# Patient Record
Sex: FEMALE | Race: WHITE | NOT HISPANIC OR LATINO | URBAN - METROPOLITAN AREA
[De-identification: names, ages, dates, MRNs, and addresses within clinical notes are randomized per-mention and may not be internally consistent; named-entity substitution may affect disease eponyms.]

---

## 2018-04-11 ENCOUNTER — TRANSFERRED RECORDS (OUTPATIENT)
Dept: HEALTH INFORMATION MANAGEMENT | Facility: CLINIC | Age: 33
End: 2018-04-11

## 2018-04-12 ENCOUNTER — TRANSFERRED RECORDS (OUTPATIENT)
Dept: HEALTH INFORMATION MANAGEMENT | Facility: CLINIC | Age: 33
End: 2018-04-12

## 2018-04-13 ENCOUNTER — TRANSFERRED RECORDS (OUTPATIENT)
Dept: HEALTH INFORMATION MANAGEMENT | Facility: CLINIC | Age: 33
End: 2018-04-13

## 2018-04-14 ENCOUNTER — TRANSFERRED RECORDS (OUTPATIENT)
Dept: HEALTH INFORMATION MANAGEMENT | Facility: CLINIC | Age: 33
End: 2018-04-14

## 2018-04-24 ENCOUNTER — MEDICAL CORRESPONDENCE (OUTPATIENT)
Dept: HEALTH INFORMATION MANAGEMENT | Facility: CLINIC | Age: 33
End: 2018-04-24

## 2018-04-24 ENCOUNTER — TRANSFERRED RECORDS (OUTPATIENT)
Dept: HEALTH INFORMATION MANAGEMENT | Facility: CLINIC | Age: 33
End: 2018-04-24

## 2018-04-27 ENCOUNTER — PRE VISIT (OUTPATIENT)
Dept: ONCOLOGY | Facility: CLINIC | Age: 33
End: 2018-04-27

## 2018-04-27 NOTE — TELEPHONE ENCOUNTER
Date of appointment: TBD-Needs appt with Dr. 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 Melvin Gupta/[facility]   Who called: Faxed referral    Recent Studies  Imaging: MRI brain on 4/11/18 and 4/14/18, CT CAP on 4/12/18  Pathology: [facility] done on 4/13/18  Labs: see CE recs  Previous chemo/radiation (if known):    Records requested/received from: Records in CE from [facility], Images pushed to PACS    Additional information:

## 2018-04-27 NOTE — TELEPHONE ENCOUNTER
Patient scheduled:  5/2/2018 with Dr. Borges for radiation oncology consult  5/4/2018 with Dr. Morrison for neuro-oncology consult.     No path requested - slides were read here by Dr. Wander Amaro for initial diagnosis.

## 2018-05-02 ENCOUNTER — ALLIED HEALTH/NURSE VISIT (OUTPATIENT)
Dept: RADIATION ONCOLOGY | Facility: CLINIC | Age: 33
End: 2018-05-02
Attending: RADIOLOGY
Payer: COMMERCIAL

## 2018-05-02 VITALS
BODY MASS INDEX: 41.03 KG/M2 | HEIGHT: 60 IN | SYSTOLIC BLOOD PRESSURE: 120 MMHG | WEIGHT: 209 LBS | DIASTOLIC BLOOD PRESSURE: 88 MMHG

## 2018-05-02 DIAGNOSIS — C71.6 MEDULLOBLASTOMA OF CEREBELLUM (H): Primary | ICD-10-CM

## 2018-05-02 LAB — HCG SERPL QL: NEGATIVE

## 2018-05-02 PROCEDURE — 77470 SPECIAL RADIATION TREATMENT: CPT | Performed by: RADIOLOGY

## 2018-05-02 PROCEDURE — 77334 RADIATION TREATMENT AID(S): CPT | Performed by: RADIOLOGY

## 2018-05-02 PROCEDURE — G0463 HOSPITAL OUTPT CLINIC VISIT: HCPCS | Mod: 25 | Performed by: RADIOLOGY

## 2018-05-02 PROCEDURE — 84703 CHORIONIC GONADOTROPIN ASSAY: CPT | Performed by: RADIOLOGY

## 2018-05-02 RX ORDER — LORAZEPAM 0.5 MG/1
0.5 TABLET ORAL ONCE
Status: DISCONTINUED | OUTPATIENT
Start: 2018-05-02 | End: 2018-05-02 | Stop reason: HOSPADM

## 2018-05-02 RX ORDER — SENNOSIDES 8.6 MG
1 TABLET ORAL DAILY
COMMUNITY
End: 2018-07-13

## 2018-05-02 ASSESSMENT — ENCOUNTER SYMPTOMS
SPUTUM PRODUCTION: 0
HEADACHES: 1
DYSURIA: 0
MEMORY LOSS: 0
BRUISES/BLEEDS EASILY: 0
HEMATURIA: 0
CONSTIPATION: 0
DIZZINESS: 1
NERVOUS/ANXIOUS: 1
LOSS OF CONSCIOUSNESS: 0
EYE DISCHARGE: 0
EYE PAIN: 0
FOCAL WEAKNESS: 0
DIARRHEA: 0
ABDOMINAL PAIN: 0
NAUSEA: 0
SEIZURES: 0
BLURRED VISION: 0
SORE THROAT: 0
FREQUENCY: 0
FEVER: 0
BLOOD IN STOOL: 0
WEAKNESS: 0
SPEECH CHANGE: 0
DOUBLE VISION: 0
NECK PAIN: 0
HALLUCINATIONS: 0
DIAPHORESIS: 0
TINGLING: 0
INSOMNIA: 0
STRIDOR: 0
COUGH: 0
WEIGHT LOSS: 0
MYALGIAS: 0
BACK PAIN: 0
PND: 0
EYE REDNESS: 0
DEPRESSION: 0
FALLS: 0
CLAUDICATION: 0
PHOTOPHOBIA: 0
CHILLS: 0
VOMITING: 0
POLYDIPSIA: 0
SHORTNESS OF BREATH: 0
FLANK PAIN: 0
WHEEZING: 0
HEARTBURN: 0
HEMOPTYSIS: 0
PALPITATIONS: 0
SINUS PAIN: 0
TREMORS: 0
SENSORY CHANGE: 0
ORTHOPNEA: 0

## 2018-05-02 ASSESSMENT — LIFESTYLE VARIABLES: SUBSTANCE_ABUSE: 0

## 2018-05-02 NOTE — PROGRESS NOTES
Department of Radiation Oncology  [address] [address]  [phone]       Consultation Note    Name: Merissa Silverman MRN: [id number]   : 1985   Date of Service: 2018  Referring: Dr. 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 Silverman is a 32 year old female with a recently diagnosed medulloblastoma. She originally presented to the emergency department at an outside hospital with progressive headaches, vision changes, and difficulties with balance. A CT scan of the head was concerning for a mass in the left cerebellum. The patient was subsequently transferred to [facility]. An MRI of the brain was then completed, which showed a mass in the left cerebellar hemisphere, measuring 3.5 cm. There was associated mild cerebellar tonsillar herniation and hydrocephalus. An MRI of the spine was also completed which showed no evidence of disease. She was then taken back to the OR on 2018 where she underwent a suboccipital craniotomy with resection of the left cerebellar tumor. The surgical pathology was positive for medulloblastoma. An immediate postop MRI showed postoperative changes without any evidence of residual tumor. The patient recovered without any significant difficulty and was ultimately discharged on 2018. It is unclear whether a lumbar puncture with CSF analysis was completed as part of the workup. The patient was ultimately referred to [facility] for further evaluation and treatment.    On exam today, Ms. Ji reports feeling quite well. She is no longer on steroids, having taken the last of her taper yesterday. Today she has  noted mild headaches which she attributes to fatigue. She denies having any nausea, vomiting, vision changes, difficulties with balance or coordination,  weakness/numbness/tingling, or any other neurologic symptoms. She otherwise has no additional concerns or complaints and the remainder of her ROS are negative.    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 Pertinent findings are noted in the HPI.    Physical Exam   ECOG Status: 0    Vitals:  B/P: 120/88  Weight: 89.4 kg  Pain: 2/10    Gen: Alert, in NAD  HEENT: Surgical scar healing well. No other abormalities   Pulm: No wheezing, stridor or respiratory distress  CV: Extremities are warm and well-perfused, no cyanosis, no pedal edema  Musculoskeletal: Normal bulk and tone  Skin: Normal color and turgor  Neuro: A/Ox3, CN II-XII intact. Motor 5/5 in bilateral upper and lower extremities. Bilateral patellar reflexes 2+. Normal finger-nose-finger, fine motor control, heel-shin maneuver, sensation to light touch.    Imaging/Path   Imaging/Path: Reviewed, per HPI    Assessment    Ms. Silverman is a 32 year old female with a diagnosis of medulloblastoma of the left cerebellar hemisphere, status post gross total resection. She presents today for discussion regarding treatment with adjuvant radiation therapy.    Plan   We reviewed the standard of care for  management of medulloblastoma includes maximal tumor resection followed by adjuvant therapy with a combination of chemotherapy and radiation. With regards to radiation therapy, our plan would be to treat the craniospinal axis followed by a sequential boost to the posterior fossa.     Ms. Silverman is scheduled to see Dr. Morrison with neuro-oncology later this week for a discussion regarding the role of chemotherapy in the treatment of her disease. We reviewed the process of, side effects, and potential long-term complications associated with radiation therapy to the craniospinal axis. The patient and her father both asked many questions, which were answered to their satisfaction, and verbalized understanding. Informed consent was obtained in clinic and she underwent a CT-simulation session for radiotherapy planning purposes.    Kaushal Garcia MD  Resident, Radiation Oncology      Attending addendum:   I saw and examined the patient with the resident and agree with the documented plan of care.    In brief, Ms. Silverman is a 32-year-old female with a recently-diagnosed medulloblastoma of the left cerebellum status post gross total resection. We are currently working on having her pathology and imaging reviewed here at the [facility] and will plan to discuss her case at our upcoming interdisciplinary CNS tumor board. Tentative plan is for concurrent chemoradiotherapy with craniospinal radiation followed by a sequential radiotherapy boost to the posterior fossa for improved local disease control. I discussed the risk and benefits of treatment and, following exam, Ms. Silverman underwent a CT-simulation session for radiotherapy planning purposes. I anticipate a tentative start date of her radiotherapy on 5/14/2018.    Mike Borges MD/PhD  Dept of Radiation Oncology  [facility]

## 2018-05-02 NOTE — PROGRESS NOTES
HPI  INITIAL PATIENT ASSESSMENT    Diagnosis: Medulloblastoma    Prior radiation therapy: None    Prior chemotherapy: None    Prior hormonal therapy:No    Pain Eval:  Denies    Psychosocial  Living arrangements:  and child  Fall Risk: independent   referral needs: Not needed    Advanced Directive: No  Implantable Cardiac Device? No        Nurse face-to-face time: Level 5:  over 15 min face to face time    Review of Systems   Constitutional: Negative for chills, diaphoresis, fever, malaise/fatigue and weight loss.   HENT: Negative for congestion, ear discharge, ear pain, hearing loss, nosebleeds, sinus pain, sore throat and tinnitus.    Eyes: Negative for blurred vision, double vision, photophobia, pain, discharge and redness.   Respiratory: Negative for cough, hemoptysis, sputum production, shortness of breath, wheezing and stridor.    Cardiovascular: Negative for chest pain, palpitations, orthopnea, claudication, leg swelling and PND.   Gastrointestinal: Negative for abdominal pain, blood in stool, constipation, diarrhea, heartburn, melena, nausea and vomiting.   Genitourinary: Negative for dysuria, flank pain, frequency, hematuria and urgency.   Musculoskeletal: Negative for back pain, falls, joint pain, myalgias and neck pain.   Skin: Negative for itching and rash.   Neurological: Positive for dizziness and headaches. Negative for tingling, tremors, sensory change, speech change, focal weakness, seizures, loss of consciousness and weakness.   Endo/Heme/Allergies: Positive for environmental allergies. Negative for polydipsia. Does not bruise/bleed easily.   Psychiatric/Behavioral: Negative for depression, hallucinations, memory loss, substance abuse and suicidal ideas. The patient is nervous/anxious. The patient does not have insomnia.

## 2018-05-02 NOTE — PROGRESS NOTES
Radiation Therapy Patient Education    Person involved with teaching: Patient    Patient educational needs for self management of treatment-related side effects assessment completed.  [facility] Patient Ed tab contains Patient Learning Assessment    Education Materials Given  Radiation Therapy and You    Educational Topics Discussed  Side effects expected    Response To Teaching  Verbalizes understanding    GYN Only  Vaginal Dilator-given and educated: N/A    Referrals sent: None    Chemotherapy?  Yes: Notified medical oncology of start date of 05/14

## 2018-05-02 NOTE — LETTER
5/2/2018      RE: Merissa Silverman  [address]    [place] [address]         HPI  INITIAL PATIENT ASSESSMENT    Diagnosis: Medulloblastoma    Prior radiation therapy: None    Prior chemotherapy: None    Prior hormonal therapy:No    Pain Eval:  Denies    Psychosocial  Living arrangements:  and child  Fall Risk: independent   referral needs: Not needed    Advanced Directive: No  Implantable Cardiac Device? No        Nurse face-to-face time: Level 5:  over 15 min face to face time    Review of Systems   Constitutional: Negative for chills, diaphoresis, fever, malaise/fatigue and weight loss.   HENT: Negative for congestion, ear discharge, ear pain, hearing loss, nosebleeds, sinus pain, sore throat and tinnitus.    Eyes: Negative for blurred vision, double vision, photophobia, pain, discharge and redness.   Respiratory: Negative for cough, hemoptysis, sputum production, shortness of breath, wheezing and stridor.    Cardiovascular: Negative for chest pain, palpitations, orthopnea, claudication, leg swelling and PND.   Gastrointestinal: Negative for abdominal pain, blood in stool, constipation, diarrhea, heartburn, melena, nausea and vomiting.   Genitourinary: Negative for dysuria, flank pain, frequency, hematuria and urgency.   Musculoskeletal: Negative for back pain, falls, joint pain, myalgias and neck pain.   Skin: Negative for itching and rash.   Neurological: Positive for dizziness and headaches. Negative for tingling, tremors, sensory change, speech change, focal weakness, seizures, loss of consciousness and weakness.   Endo/Heme/Allergies: Positive for environmental allergies. Negative for polydipsia. Does not bruise/bleed easily.   Psychiatric/Behavioral: Negative for depression, hallucinations, memory loss, substance abuse and suicidal ideas. The patient is nervous/anxious. The patient does not have insomnia.                     Department of Radiation Oncology  [facility]  [facility]  [address] [place] [address]  [phone]       Consultation Note    Name: Merissa Silverman MRN: [id number]   : 1985   Date of Service: 2018  Referring: Dr. 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 Silverman is a 32 year old female with a recently diagnosed medulloblastoma. She originally presented to the emergency department at an outside hospital with progressive headaches, vision changes, and difficulties with balance. A CT scan of the head was concerning for a mass in the left cerebellum. The patient was subsequently transferred to [facility]. An MRI of the brain was then completed, which showed a mass in the left cerebellar hemisphere, measuring 3.5 cm. There was associated mild cerebellar tonsillar herniation and hydrocephalus. An MRI of the spine was also completed which showed no evidence of disease. She was then taken back to the OR on 2018 where she underwent a suboccipital craniotomy with resection of the left cerebellar tumor. The surgical pathology was positive for medulloblastoma. An immediate postop MRI showed postoperative changes without any evidence of residual tumor. The patient recovered without any significant difficulty and was ultimately discharged on 2018. It is unclear whether a lumbar puncture with CSF analysis was completed as part of the workup. The patient was ultimately referred to [facility] for further evaluation and treatment.    On exam today, Ms. Ji reports feeling quite well. She is no longer on steroids, having taken the last of her taper yesterday. Today she has  noted mild headaches which she attributes to fatigue. She denies having any nausea, vomiting, vision changes, difficulties with balance or coordination, weakness/numbness/tingling, or any other neurologic  symptoms. She otherwise has no additional concerns or complaints and the remainder of her ROS are negative.    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 Pertinent findings are noted in the HPI.    Physical Exam   ECOG Status: 0    Vitals:  B/P: 120/88  Weight: 89.4 kg  Pain: 2/10    Gen: Alert, in NAD  HEENT: Surgical scar healing well. No other abormalities   Pulm: No wheezing, stridor or respiratory distress  CV: Extremities are warm and well-perfused, no cyanosis, no pedal edema  Musculoskeletal: Normal bulk and tone  Skin: Normal color and turgor  Neuro: A/Ox3, CN II-XII intact. Motor 5/5 in bilateral upper and lower extremities. Bilateral patellar reflexes 2+. Normal finger-nose-finger, fine motor control, heel-shin maneuver, sensation to light touch.    Imaging/Path   Imaging/Path: Reviewed, per HPI    Assessment    Ms. Silverman is a 32 year old female with a diagnosis of medulloblastoma of the left cerebellar hemisphere, status post gross total resection. She presents today for discussion regarding treatment with adjuvant radiation therapy.    Plan   We reviewed the standard of care for management of medulloblastoma includes maximal tumor  resection followed by adjuvant therapy with a combination of chemotherapy and radiation. With regards to radiation therapy, our plan would be to treat the craniospinal axis followed by a sequential boost to the posterior fossa.     Ms. Silverman is scheduled to see Dr. Morrison with neuro-oncology later this week for a discussion regarding the role of chemotherapy in the treatment of her disease. We reviewed the process of, side effects, and potential long-term complications associated with radiation therapy to the craniospinal axis. The patient and her father both asked many questions, which were answered to their satisfaction, and verbalized understanding. Informed consent was obtained in clinic and she underwent a CT-simulation session for radiotherapy planning purposes.    Kaushal Garcia MD  Resident, Radiation Oncology      Attending addendum:   I saw and examined the patient with the resident and agree with the documented plan of care.    In brief, Ms. Silverman is a 32-year-old female with a recently-diagnosed medulloblastoma of the left cerebellum status post gross total resection. We are currently working on having her pathology and imaging reviewed here at the [facility] and will plan to discuss her case at our upcoming interdisciplinary CNS tumor board. Tentative plan is for concurrent chemoradiotherapy with craniospinal radiation followed by a sequential radiotherapy boost to the posterior fossa for improved local disease control. I discussed the risk and benefits of treatment and, following exam, Ms. Silverman underwent a CT-simulation session for radiotherapy planning purposes. I anticipate a tentative start date of her radiotherapy on 5/14/2018.    Mike Borges MD/PhD  Dept of Radiation Oncology  [facility]

## 2018-05-02 NOTE — MR AVS SNAPSHOT
After Visit Summary   2018    Merissa Silverman    MRN: [id number]           Patient Information     Date Of Birth          1985        Visit Information        Provider Department      2018 1:00 PM Mike Borges MD Radiation Oncology Clinic        Today's Diagnoses     Medulloblastoma of cerebellum (H)    -  1       Follow-ups after your visit        Your next 10 appointments already scheduled     May 04, 2018 11:00 AM CDT   (Arrive by 10:45 AM)   New Patient Visit with Anupama Morrison MD   [facility] ([facility])    [address]  [address]  [place] [address] [phone]              Who to contact     Please call your clinic at [phone] to:    Ask questions about your health    Make or cancel appointments    Discuss your medicines    Learn about your test results    Speak to your doctor            Additional Information About Your Visit        MyChart Information     Pradamat is an electronic gateway that provides easy, online access to your medical records. With Dashbid, you can request a clinic appointment, read your test results, renew a prescription or communicate with your care team.     To sign up for Pradamat visit the website at [email or web address]   You will be asked to enter the access code listed below, as well as some personal information. Please follow the directions to create your username and password.     Your access code is: [email or web address]  Expires: 2018  6:30 AM     Your access code will  in 90 days. If you need help or a new code, please contact your [facility] or call [phone] for assistance.        Care EveryWhere ID     This is your Care EveryWhere ID. This could be used by other organizations to access your [facility] medical records  [id number]         Blood Pressure from Last 3 Encounters:   18 120/88    Weight from  Last 3 Encounters:   05/02/18 94.8 kg (209 lb)              We Performed the Following     HCG qualitative        Primary Care Provider Fax #    Physician No Ref-Primary [phone]       No address on file        Equal Access to Services     WANDER STREETLILIANE : Lisa handy thomas jimmy Beck, walarryda luqadaha, qaangelta kaalmada yumiko, toño nj laBccj bowman. So [facility] [phone].    ATENCIÓN: Si habla español, tiene a mccurdy disposición servicios gratuitos de asistencia lingüística. Llame al [phone].    We comply with applicable federal civil rights laws and [place] laws. We do not discriminate on the basis of race, color, national origin, age, disability, sex, sexual orientation, or gender identity.            Thank you!     Thank you for choosing RADIATION ONCOLOGY CLINIC  for your care. Our goal is always to provide you with excellent care. Hearing back from our patients is one way we can continue to improve our services. Please take a few minutes to complete the written survey that you may receive in the mail after your visit with us. Thank you!             Your Updated Medication List - Protect others around you: Learn how to safely use, store and throw away your medicines at www.disposemymeds.org.          This list is accurate as of 5/2/18  3:21 PM.  Always use your most recent med list.                   Brand Name Dispense Instructions for use Diagnosis    DECADRON PO           OXYCODONE HCL PO      Take 5 mg by mouth        PROTONIX PO      Take 40 mg by mouth        sennosides 8.6 MG tablet    SENOKOT     Take 1 tablet by mouth daily        SODIUM CHLORIDE PO      Take 1 g by mouth 3 times daily (with meals)

## 2018-05-02 NOTE — MR AVS SNAPSHOT
After Visit Summary   2018    Merissa Silverman    MRN: [id number]           Patient Information     Date Of Birth          1985        Visit Information        Provider Department      2018 10:30 AM Mike Borges MD [facility]        Today's Diagnoses     Medulloblastoma of cerebellum (H)    -  1       Follow-ups after your visit        Your next 10 appointments already scheduled     May 04, 2018 11:00 AM CDT   (Arrive by 10:45 AM)   New Patient Visit with Anupama Morrison MD   [facility] ([facility])    [address]  [address]  [place] [address] [phone]              Who to contact     Please call your clinic at [phone] to:    Ask questions about your health    Make or cancel appointments    Discuss your medicines    Learn about your test results    Speak to your doctor            Additional Information About Your Visit        MyChart Information     Lanicat is an electronic gateway that provides easy, online access to your medical records. With [email or web address], you can request a clinic appointment, read your test results, renew a prescription or communicate with your care team.     To sign up for Lanicat visit the website at [email or web address]   You will be asked to enter the access code listed below, as well as some personal information. Please follow the directions to create your username and password.     Your access code is: [email or web address]  Expires: 2018  6:30 AM     Your access code will  in 90 days. If you need help or a new code, please contact your [facility] or call [phone] for assistance.        Care EveryWhere ID     This is your Care EveryWhere ID. This could be used by other organizations to access your [facility] medical records  [id number]        Your Vitals Were     Height BMI (Body Mass Index)                1.524 m  (5') 40.82 kg/m2           Blood Pressure from Last 3 Encounters:   05/02/18 120/88    Weight from Last 3 Encounters:   05/02/18 94.8 kg (209 lb)              Today, you had the following     No orders found for display      Information about OPIOIDS     PRESCRIPTION OPIOIDS: WHAT YOU NEED TO KNOW   You have a prescription for an opioid (narcotic) pain medicine. Opioids can cause addiction. If you have a history of chemical dependency of any type, you are at a higher risk of becoming addicted to opioids. Only take this medicine after all other options have been tried. Take it for as short a time and as few doses as possible.     Do not:    Drive. If you drive while taking these medicines, you could be arrested for driving under the influence (DUI).    Operate heavy machinery    Do any other dangerous activities while taking these medicines.     Drink any alcohol while taking these medicines.      Take with any other medicines that contain acetaminophen. Read all labels carefully. Look for the word  acetaminophen  or  Tylenol.  Ask your pharmacist if you have questions or are unsure.    Store your pills in a secure place, locked if possible. We will not replace any lost or stolen medicine. If you don t finish your medicine, please throw away (dispose) as directed by your pharmacist. The [place] Pollution Control Agency has more information about safe disposal: https://www.pca.[facility].[place].us/living-green/managing-unwanted-medications    All opioids tend to cause constipation. Drink plenty of water and eat foods that have a lot of fiber, such as fruits, vegetables, prune juice, apple juice and high-fiber cereal. Take a laxative (Miralax, milk of magnesia, Colace, Senna) if you don t move your bowels at least every other day.          Primary Care Provider Fax #    Physician No Ref-Primary [phone]       No address on file        Equal Access to Services     WANDER SPAULDING: perry Reynolds  cherelle vareladeshauntu peacocktoño cisneros bryantrola bowman. So [facility] [phone].    ATENCIÓN: Si livia cox, tiene a mccurdy disposición servicios gratuitos de asistencia lingüística. Crissame al [phone].    We comply with applicable federal civil rights laws and [place] laws. We do not discriminate on the basis of race, color, national origin, age, disability, sex, sexual orientation, or gender identity.            Thank you!     Thank you for choosing [facility]  for your care. Our goal is always to provide you with excellent care. Hearing back from our patients is one way we can continue to improve our services. Please take a few minutes to complete the written survey that you may receive in the mail after your visit with us. Thank you!             Your Updated Medication List - Protect others around you: Learn how to safely use, store and throw away your medicines at www.disposemymeds.org.          This list is accurate as of 5/2/18  3:31 PM.  Always use your most recent med list.                   Brand Name Dispense Instructions for use Diagnosis    DECADRON PO           OXYCODONE HCL PO      Take 5 mg by mouth        PROTONIX PO      Take 40 mg by mouth        sennosides 8.6 MG tablet    SENOKOT     Take 1 tablet by mouth daily        SODIUM CHLORIDE PO      Take 1 g by mouth 3 times daily (with meals)

## 2018-05-04 ENCOUNTER — ONCOLOGY VISIT (OUTPATIENT)
Dept: ONCOLOGY | Facility: CLINIC | Age: 33
End: 2018-05-04
Attending: PSYCHIATRY & NEUROLOGY
Payer: COMMERCIAL

## 2018-05-04 ENCOUNTER — CARE COORDINATION (OUTPATIENT)
Dept: ONCOLOGY | Facility: CLINIC | Age: 33
End: 2018-05-04

## 2018-05-04 ENCOUNTER — OFFICE VISIT (OUTPATIENT)
Dept: ONCOLOGY | Facility: CLINIC | Age: 33
End: 2018-05-04
Attending: PHYSICIAN ASSISTANT
Payer: COMMERCIAL

## 2018-05-04 VITALS
TEMPERATURE: 97.9 F | WEIGHT: 210.9 LBS | RESPIRATION RATE: 18 BRPM | DIASTOLIC BLOOD PRESSURE: 86 MMHG | HEART RATE: 102 BPM | HEIGHT: 60 IN | BODY MASS INDEX: 41.4 KG/M2 | OXYGEN SATURATION: 99 % | SYSTOLIC BLOOD PRESSURE: 129 MMHG

## 2018-05-04 DIAGNOSIS — D70.1 CHEMOTHERAPY INDUCED NEUTROPENIA (H): ICD-10-CM

## 2018-05-04 DIAGNOSIS — C71.6 MEDULLOBLASTOMA OF CEREBELLUM (H): Primary | ICD-10-CM

## 2018-05-04 DIAGNOSIS — R11.2 CHEMOTHERAPY-INDUCED NAUSEA AND VOMITING: ICD-10-CM

## 2018-05-04 DIAGNOSIS — Z51.11 CHEMOTHERAPY MANAGEMENT, ENCOUNTER FOR: ICD-10-CM

## 2018-05-04 DIAGNOSIS — T45.1X5A CHEMOTHERAPY INDUCED NEUTROPENIA (H): ICD-10-CM

## 2018-05-04 DIAGNOSIS — C71.6 MEDULLOBLASTOMA (H): Primary | ICD-10-CM

## 2018-05-04 DIAGNOSIS — T45.1X5A CHEMOTHERAPY-INDUCED NAUSEA AND VOMITING: ICD-10-CM

## 2018-05-04 DIAGNOSIS — H93.8X9 OTOTOXICITY, UNSPECIFIED LATERALITY: ICD-10-CM

## 2018-05-04 DIAGNOSIS — Z91.89 HIGH RISK FOR CHEMOTHERAPY-INDUCED INFECTIOUS COMPLICATION: ICD-10-CM

## 2018-05-04 LAB
ALBUMIN SERPL-MCNC: 3.2 G/DL (ref 3.4–5)
ALP SERPL-CCNC: 86 U/L (ref 40–150)
ALT SERPL W P-5'-P-CCNC: 43 U/L (ref 0–50)
ANION GAP SERPL CALCULATED.3IONS-SCNC: 8 MMOL/L (ref 3–14)
APPEARANCE CSF: CLEAR
AST SERPL W P-5'-P-CCNC: 23 U/L (ref 0–45)
BASOPHILS # BLD AUTO: 0 10E9/L (ref 0–0.2)
BASOPHILS NFR BLD AUTO: 0.2 %
BILIRUB SERPL-MCNC: 0.2 MG/DL (ref 0.2–1.3)
BUN SERPL-MCNC: 24 MG/DL (ref 7–30)
CALCIUM SERPL-MCNC: 8.4 MG/DL (ref 8.5–10.1)
CHLORIDE SERPL-SCNC: 106 MMOL/L (ref 94–109)
CO2 SERPL-SCNC: 27 MMOL/L (ref 20–32)
COLOR CSF: COLORLESS
COPATH REPORT: NORMAL
CREAT SERPL-MCNC: 0.56 MG/DL (ref 0.52–1.04)
DIFFERENTIAL METHOD BLD: ABNORMAL
EOSINOPHIL # BLD AUTO: 0.2 10E9/L (ref 0–0.7)
EOSINOPHIL NFR BLD AUTO: 2.1 %
ERYTHROCYTE [DISTWIDTH] IN BLOOD BY AUTOMATED COUNT: 15.1 % (ref 10–15)
GFR SERPL CREATININE-BSD FRML MDRD: >90 ML/MIN/1.7M2
GLUCOSE CSF-MCNC: 62 MG/DL (ref 40–70)
GLUCOSE SERPL-MCNC: 95 MG/DL (ref 70–99)
HBV CORE AB SERPL QL IA: NONREACTIVE
HBV SURFACE AG SERPL QL IA: NONREACTIVE
HCT VFR BLD AUTO: 37.8 % (ref 35–47)
HGB BLD-MCNC: 12.2 G/DL (ref 11.7–15.7)
IMM GRANULOCYTES # BLD: 0.1 10E9/L (ref 0–0.4)
IMM GRANULOCYTES NFR BLD: 1 %
LYMPHOCYTES # BLD AUTO: 0.8 10E9/L (ref 0.8–5.3)
LYMPHOCYTES NFR BLD AUTO: 10.3 %
MCH RBC QN AUTO: 30.6 PG (ref 26.5–33)
MCHC RBC AUTO-ENTMCNC: 32.3 G/DL (ref 31.5–36.5)
MCV RBC AUTO: 95 FL (ref 78–100)
MONOCYTES # BLD AUTO: 0.5 10E9/L (ref 0–1.3)
MONOCYTES NFR BLD AUTO: 6.5 %
NEUTROPHILS # BLD AUTO: 6.5 10E9/L (ref 1.6–8.3)
NEUTROPHILS NFR BLD AUTO: 79.9 %
NRBC # BLD AUTO: 0 10*3/UL
NRBC BLD AUTO-RTO: 0 /100
PLATELET # BLD AUTO: 227 10E9/L (ref 150–450)
POTASSIUM SERPL-SCNC: 4.3 MMOL/L (ref 3.4–5.3)
PROT CSF-MCNC: 25 MG/DL (ref 15–60)
PROT SERPL-MCNC: 6.7 G/DL (ref 6.8–8.8)
RBC # BLD AUTO: 3.99 10E12/L (ref 3.8–5.2)
RBC # CSF MANUAL: 1 /UL (ref 0–2)
SODIUM SERPL-SCNC: 141 MMOL/L (ref 133–144)
TUBE # CSF: 3 #
WBC # BLD AUTO: 8.2 10E9/L (ref 4–11)
WBC # CSF MANUAL: 1 /UL (ref 0–5)

## 2018-05-04 PROCEDURE — 86704 HEP B CORE ANTIBODY TOTAL: CPT | Performed by: PSYCHIATRY & NEUROLOGY

## 2018-05-04 PROCEDURE — 84157 ASSAY OF PROTEIN OTHER: CPT | Performed by: PSYCHIATRY & NEUROLOGY

## 2018-05-04 PROCEDURE — 82945 GLUCOSE OTHER FLUID: CPT | Performed by: PSYCHIATRY & NEUROLOGY

## 2018-05-04 PROCEDURE — 99215 OFFICE O/P EST HI 40 MIN: CPT | Mod: 24 | Performed by: PSYCHIATRY & NEUROLOGY

## 2018-05-04 PROCEDURE — 00000102 ZZHCL STATISTIC CYTO WRIGHT STAIN TC: Performed by: PSYCHIATRY & NEUROLOGY

## 2018-05-04 PROCEDURE — 36415 COLL VENOUS BLD VENIPUNCTURE: CPT

## 2018-05-04 PROCEDURE — G0463 HOSPITAL OUTPT CLINIC VISIT: HCPCS | Mod: ZF

## 2018-05-04 PROCEDURE — 25000125 ZZHC RX 250: Mod: ZF | Performed by: PHYSICIAN ASSISTANT

## 2018-05-04 PROCEDURE — 85025 COMPLETE CBC W/AUTO DIFF WBC: CPT | Performed by: PSYCHIATRY & NEUROLOGY

## 2018-05-04 PROCEDURE — 62270 DX LMBR SPI PNXR: CPT | Mod: ZF | Performed by: PHYSICIAN ASSISTANT

## 2018-05-04 PROCEDURE — 80053 COMPREHEN METABOLIC PANEL: CPT | Performed by: PSYCHIATRY & NEUROLOGY

## 2018-05-04 PROCEDURE — 87340 HEPATITIS B SURFACE AG IA: CPT | Performed by: PSYCHIATRY & NEUROLOGY

## 2018-05-04 PROCEDURE — 88108 CYTOPATH CONCENTRATE TECH: CPT | Performed by: PSYCHIATRY & NEUROLOGY

## 2018-05-04 PROCEDURE — G0463 HOSPITAL OUTPT CLINIC VISIT: HCPCS | Mod: 25

## 2018-05-04 PROCEDURE — 89050 BODY FLUID CELL COUNT: CPT | Performed by: PSYCHIATRY & NEUROLOGY

## 2018-05-04 RX ORDER — ACETAMINOPHEN 325 MG/1
650 TABLET ORAL
COMMUNITY
Start: 2018-04-15 | End: 2020-05-18

## 2018-05-04 RX ORDER — LIDOCAINE HYDROCHLORIDE 10 MG/ML
4 INJECTION, SOLUTION EPIDURAL; INFILTRATION; INTRACAUDAL; PERINEURAL ONCE
Status: COMPLETED | OUTPATIENT
Start: 2018-05-04 | End: 2018-05-04

## 2018-05-04 RX ORDER — LIDOCAINE HYDROCHLORIDE 10 MG/ML
6 INJECTION, SOLUTION EPIDURAL; INFILTRATION; INTRACAUDAL; PERINEURAL ONCE
Status: COMPLETED | OUTPATIENT
Start: 2018-05-04 | End: 2018-05-04

## 2018-05-04 RX ADMIN — LIDOCAINE HYDROCHLORIDE 4 ML: 10 INJECTION, SOLUTION EPIDURAL; INFILTRATION; INTRACAUDAL; PERINEURAL at 12:47

## 2018-05-04 RX ADMIN — LIDOCAINE HYDROCHLORIDE 6 ML: 10 INJECTION, SOLUTION EPIDURAL; INFILTRATION; INTRACAUDAL; PERINEURAL at 13:07

## 2018-05-04 ASSESSMENT — PAIN SCALES - GENERAL: PAINLEVEL: NO PAIN (0)

## 2018-05-04 NOTE — LETTER
5/4/2018      RE: Merissa Silverman  [address]    [place] [address]       BMT ONC Adult Lumbar Puncture Procedure Note    May 4, 2018    Procedure: Lumbar Puncture to obtain CSF     Diagnosis: Medulloblastoma    Learning needs assessment complete within 12 months: YES    Vitals reviewed:  YES    Informed Consent: Procedure, benefits, risks, and alternatives explained to the patient who verbalized understanding of the information and agreed to proceed with lumbar puncture. Risks include bleeding, headache, and or infection.  Patient safety checklist completed.    Labs: Reviewed:      Lab Results   Component Value Date     05/04/2018       Description:. The patient was seated at the bedside with knees dangling. The L4-5 disc space was prepped and draped in a sterile fashion. Local anesthesia with 5 mL 1% preservative-free lidocaine was used. A 22 gauge, 4 inch needle was placed on the second attempt.  11 mL spinal fluid collected. Specimen appears clear. Specimen sent for cell count and differential, cytology, protein and glucose.  The needle was removed and a bandage was applied to the site.  Patient tolerated with mild difficulty--she developed headache during procedure which improved after lying flat.    Post-procedure pain assessment: 0 out of 10 on the numeric pain rating scale  Interventions: No    Complications: No     Disposition: Patient will remain on back for 30 minutes post procedure. Avoid soaking in a tub tonight.  Call if develops headaches, fevers and or chills.     Performed by:   JOSITN Loera

## 2018-05-04 NOTE — NURSING NOTE
Oncology Rooming Note    May 4, 2018 12:44 PM   Merissa Silverman is a 32 year old female who presents for:    Chief Complaint   Patient presents with     Oncology Clinic Visit     Lumbar Puncture     Initial Vitals: LMP 04/18/2018 (Approximate) Estimated body mass index is 41.19 kg/(m^2) as calculated from the following:    Height as of an earlier encounter on 5/4/18: 1.524 m (5').    Weight as of an earlier encounter on 5/4/18: 95.7 kg (210 lb 14.4 oz). There is no height or weight on file to calculate BSA.  Data Unavailable Comment: Data Unavailable   Patient's last menstrual period was 04/18/2018 (approximate).  Allergies reviewed: Yes  Medications reviewed: Yes    Medications: Medication refills not needed today.  Pharmacy name entered into EPIC: Data Unavailable    Clinical concerns: No New Concerns    5 minutes for nursing intake (face to face time)     PINKY Floyd

## 2018-05-04 NOTE — LETTER
5/4/2018       RE: Merissa Silverman  [address]    [place] [id number]     Dear Colleague,    Thank you for referring your patient, Merissa Silverman, to the [facility]. Please see a copy of my visit note below.    NEURO-ONCOLOGY INITIAL VISIT  May 4, 2018    CHIEF COMPLAINT: Ms. 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 She is presenting to this initial clinic visit as referred by Dr. Gupta, medical oncology at [facility] for evaluation and recommendations on treatment.     Accompanying her to this visit is Tito ().       HISTORY OF PRESENT ILLNESS  A summary of the patient s oncologic history is as follows;   -PRESENTATION: Progressively worsening headaches. Additionally was with abrupt position changes resulted in dizziness/ syncope. CTH at an outside hospital showed a mass in the left cerebellum. Transferred to [facility].  -4/11/2018 MRB showed a 3.5cm mass in the left cerebellar hemisphere that was associated with mild cerebellar tonsillar herniation and hydrocephalus.  -MRI spine showed no evidence of disease.   -4/13/2018 SURGERY: Suboccipital craniotomy with resection of the left cerebellar mass. Immediate post-operative MRI demonstrated a gross total resection.   PATHOLOGY: Medulloblastoma; Molecular markers pending.   -5/4/2018 NEURO-ONC: LP performed. Evaluated by radiation oncology. Recommending craniospinal radiation + concurrent vincristine followed by eight 6-week cycles of cisplatin, lomustine, and vincristine.  -5/4/2018 LP with CSF analysis: WBC 1/ RBC 1, protein 25, glucose 62, negative cytology.   -5/14 - 6/25/2018 CHEMORADS with vincristine 2 mg/m2.    Today in clinic;   -Merissa is feeling well, recovered from surgery.   -Off steroids, but  experiencing some joint discomfort. No interest is restarting steroids with a slower/ more prolonged taper.   -Infrequent mild headaches, continue to improve.   -Fatigue, but energy is improving with improved sleep now that she is off dexamethasone.   -Denies nausea, vomiting, vision changes, difficulties with balance or coordination, weakness, numbness, or any other neurologic symptoms.   -No additional concerns or complaints.  -Denies any episodes concerning for a seizure, including unexplained episodes of loss of consciousness, unexplained falls, unexplained loss of bowel/ bladder control or tongue biting, unexplained bruising/ myalgia, periods of loss of time, or witnessed shaking/ jerking movements.   -Fertility preservation was discussed and Merissa is not interested.     REVIEW OF SYSTEMS  A comprehensive ROS negative except as in HPI.      MEDICATIONS   Current Outpatient Prescriptions   Medication Sig Dispense Refill     acetaminophen (TYLENOL) 325 MG tablet Take 650 mg by mouth       sennosides (SENOKOT) 8.6 MG tablet Take 1 tablet by mouth daily       DRUG ALLERGIES   Allergies   Allergen Reactions     Sulfa Drugs Unknown     PAST MEDICAL HISTORY   Past Medical History:   Diagnosis Date     Medulloblastoma (H)      PAST SURGICAL HISTORY   Past Surgical History:   Procedure Laterality Date     CRANIOTOMY  04/13/2018     SOCIAL HISTORY   History   Smoking Status     Current Every Day Smoker     Types: Other   Smokeless Tobacco     Never Used     Comment: ECigs      Alcohol use No    History   Drug Use No   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    FAMILY HISTORY   Family History   Problem Relation Age of Onset     Colon Cancer Paternal Grandmother      Colon Cancer Paternal Grandfather        PHYSICAL EXAMINATION  /86 (BP Location: Right arm, Patient Position: Sitting, Cuff Size: Adult Regular)  Pulse 102  Temp 97.9  F (36.6  C) (Tympanic)  Resp 18  Ht 1.524 m (5')   Wt 95.7 kg (210 lb 14.4 oz)  LMP 04/18/2018 (Approximate)  SpO2 99%  BMI 41.19 kg/m2  Wt Readings from Last 2 Encounters:   05/04/18 95.7 kg (210 lb 14.4 oz)   05/02/18 94.8 kg (209 lb)    Ht Readings from Last 2 Encounters:   05/04/18 1.524 m (5')   05/02/18 1.524 m (5')     KPS: 100    -Generally well appearing.  -Throat: No oral thrush.  -Respiratory: Normal breath sounds, no audible wheezing.   -Skin: No rashes. Healing head incision.  -Hematologic/ lymphatic: No abnormal bruising. Mild leg swelling.  -Psychiatric: Normal mood and affect. Pleasant, talkative.  -Neurologic:   MENTAL STATUS:     Alert, oriented to date.    Recall: Immediate 3/3, delayed 3/3.    Speech fluent. Comprehension intact to multi-step commands.   Normal naming, repetition. Able to read.   Good right-left orientation.     CRANIAL NERVES:     Discs flat on fundoscopy.    Pupils are equal, round, reactive to light.     Extraocular movements full, patient denies diplopia. Mild eye jerks noted on pursuit.    Visual fields full.     Facial sensation intact to light touch.   Symmetric facial movements.   Hearing intact.   Palate moves symmetrically.     Sternocleidomastoid and trapezius strength intact.    Tongue midline.  MOTOR:    Normal and symmetric tone.   Grossly 5/5 throughout.    No pronation or drift. No orbiting.   Able to rise from a chair without use of arms.   On toe/ heel walk, equal distance from floor to heels/ toes.   SENSATION:    Intact to light touch throughout.  COORDINATION:   Intact finger-nose with eyes open and closed, mild impairment on the left.   REFLEXES:    Brisk, but symmetric.    Toes not tested. No clonus. No Hoffmans.   No grasp.    GAIT:  Walks without assistance.   Good speed. Normal stride length and heel strike. Normal turns. Normal arm swing.   Able to toe, heel walk. Able to tandem walk.       MEDICAL RECORDS  Obtained and personally reviewed all available outside medical records in addition to  reviewing any records available in our electronic system.     LABS  Personally reviewed all available lab results.     IMAGING  Personally reviewed pre- and post-operative MR brain imaging from last month. To my eye, there was a gross total resection of the contrast enhancing tumor in the cerebellum.    Imaging and case reviewed and discussed at Brain Tumor Conference.       IMPRESSION  For the 60 minute appointment, more than 50% of the encounter was spent discussing in detail the nature of this tumor, providing emotional support, answering questions pertaining to my recommendations, and devising the treatment plan as outlined below.    While medulloblastomas are the most common malignant brain tumor of childhood, this tumor is rare in adults. As a result, information specific to the adult patient population with medulloblastoma is limited and much is extrapolated from data on children.     Spine MR imaging and a lumbar puncture for CSF analysis are performed on all patients as part of initial work-up to determine the extent of disease. Even with no evidence of cancer spread on spinal imaging, this does not eliminate the concern for metastatic disease. Given that nearly 30% that have metastatic disease at time of diagnosis, will arrange for a lumbar puncture to analysis CSF for neoplastic cells, elevated protein, and pleocytosis. While negative testing does not exclude advanced disease, positive CSF results serve as a gauge to predict a likely higher rate of relapse and worse overall outcome. ADDENDUM: CSF negative for malignant cells.     In terms of prognosis, using the modified Irizarry criteria, the tumor is a T2, M0. Per the literature, patients without metastatic disease (M0) had a significantly higher five-year progression-free survival rate compared to those with disease spread. However, older patients tend to have a worse outcome. Tumors that show activation of the WNT pathway have the best prognosis, whereas  tumors with amplification of the MYC kenya-oncogene or  group 3  have the worst prognosis. Merissa's tumor did not have nuclear beta-catenin staining, ruling out a WNT-activated mechanism. At this point, molecular testing is still pending, but with a unilateral lesion in this adult age group, a SHH subgroup should be considered until proven otherwise. Tumors with activation of the SHH pathway and those in group 4 have an intermediate prognosis, with the exception of SHH tumors containing TP53 mutations, which are associated with a particularly poor prognosis. As stated, these studies are pending and will submit pathology for next generation sequencing via [employer].    With regard to cancer-directed therapy, a multimodal treatment approach first involves attempting a gross total surgical resection, which was performed in this case. As medulloblastoma is rare in adults, following surgery, there are no randomized studies upon which to base treatment recommendations and such recommendations are based on the literature, which mainly details retrospective case reviews, but some prospective data.     Of the prospective data, results detail that patients with standard risk medulloblastoma appear to do better with adjuvant chemotherapy. Will initiate weekly vincristine during radiation followed by up to eight cycles of maintenance chemotherapy with cisplatin, lomustine, and vincristine. Of note, this regimen is highly toxic and in clinical studies, treatment was terminated or dose reduced due to side effects in nearly 60 percent of patients by cycle four.    Finally, there is a clinical trial options for recurrent disease; Phase I/II Study of Intra-arterial Melphalan Given With Intra-arterial Carboplatin, Osmotic Blood-Brain Barrier Disruption and Delayed Otoprotective Sodium Thiosulfate for Patients With Recurrent or Progressive CNS Embryonal or Germ Cell Tumors that is open here. So, following completion of initial therapy,  Merissa should be scanned every three months to monitor for disease recurrence. At recurrence, therapy on or off clinical trials can be considered.     PROBLEM LIST  Medulloblastoma  Steroid intolerance    PLAN  -CANCER-DIRECTED THERAPY-  -As above.  -LP to be performed by [facility]. CSF testing for cell count, protein, glucose, and cytology. (Negative for malignancy).   -Molecular markers pending.   -Next generation sequencing via [employer].  -Radiation per Dr. Borges.   -Radiotherapy and concomitant 2 mg/m2 vincristin i.v weekly. Following a 6 week break, maintenance chemotherapy (8 cycles of 42 days): Day 1 is 70 mg/m2 cis-platin i.v. + 75 mg/m2 CCNU oral + 2 mg/m2 vincristin i.v. Day 8 and 15 is 2 mg/m2 vincristin i.v.  -Pharmacy to do chemotherapy teaching.   -Port placement.   -Baseline labs today.  -Merissa and Tito declined referral for fertility preservation.     -STEROIDS-  -Currently off dexamethasone.  -Dose may increase while undergoing radiation.  -Poor tolerance for steroids.     -SEIZURE MANAGEMENT-  -While this patient is at increased risk of having seizures, given the lack of seizure history, there is no indication to prescribe an antiepileptic at this time.     -Quality of life/ MOOD/ FATIGUE-  -Denies any mood issues.  -Continue to monitor mood as untreated/ undertreated depression can worsen fatigue, dysorexia, and quality of life.    Return to clinic on 6/4 at midpoint of chemoradiotherapy.     In the meantime, Merissa knows to call with questions or concerns or to report new complaints and can be seen sooner if needed. Urgent evaluation is needed in the setting of acute onset of severe headache, abrupt change in mental status, on-going seizures, new focal deficits, or new leg swelling/ pain. Everyone in attendance voiced understanding.    Anupama Morrison MD  Neuro-oncology

## 2018-05-04 NOTE — MR AVS SNAPSHOT
After Visit Summary   5/4/2018    Merissa Silverman    MRN: [id number]           Patient Information     Date Of Birth          1985        Visit Information        Provider Department      5/4/2018 11:00 AM Anupama Morrison MD [facility]        Today's Diagnoses     Medulloblastoma (H)    -  1    Chemotherapy induced neutropenia (H)        Chemotherapy-induced nausea and vomiting        High risk for chemotherapy-induced infectious complication        Chemotherapy management, encounter for          Care Instructions    Treatment plan:  Craniospinal local-boost radiotherapy and adjuvant chemotherapy with vincristine weekly during radiotherapy followed by eight 6-week cycles of cisplatin, Lomustine, and vincristine.  Dosage: Vincristine weekly during RT       After RT     Cisplatin DAY 1                    Lomustine DAY 1         Vincristine  DAY 1, 8, and 15    Labs done today.   LP today.   Pharmacy to do chemotherapy teaching.     Return to clinic in 6/4.    Anupama Morrison MD  Neuro-oncology  5/4/2018            Follow-ups after your visit        Your next 10 appointments already scheduled     May 14, 2018  9:30 AM CDT   EXTERNAL RADIATION TREATMENT with [facility] RAD ONC DANISHA   Radiation Oncology Clinic ([facility])    [facility]  1st Floor  [address] [address]   [phone]            May 14, 2018  9:45 AM CDT   ON TREATMENT VISIT with Mike Borges MD   Radiation Oncology Clinic ([facility])    [facility]  1st Floor  [address] [address]   [phone]            May 15, 2018 10:00 AM CDT   EXTERNAL RADIATION TREATMENT with [facility] RAD ONC DANISHA   Radiation Oncology Clinic ([facility])    [facility]  1st Floor  [address] [address]   [phone]            May 16, 2018 10:00 AM CDT    EXTERNAL RADIATION TREATMENT with [facility] RAD ONC DANISHA   Radiation Oncology Clinic ([facility])    [facility]  1st Floor  [address] [address]   [phone]            May 17, 2018 10:00 AM CDT   EXTERNAL RADIATION TREATMENT with [facility] RAD ONC DANISHA   Radiation Oncology Clinic ([facility])    [facility]  1st Floor  [address] [address]   [phone]            May 18, 2018 10:00 AM CDT   EXTERNAL RADIATION TREATMENT with [facility] RAD ONC DANSIHA   Radiation Oncology Clinic ([facility])    [facility]  1st Floor  [address] [address]   [phone]            May 21, 2018 10:00 AM CDT   EXTERNAL RADIATION TREATMENT with [facility] RAD ONC DANISHA   Radiation Oncology Clinic ([facility])    [facility]  1st Floor  [address] [address]   [phone]            May 21, 2018 10:15 AM CDT   ON TREATMENT VISIT with Mike Borges MD   Radiation Oncology Clinic ([facility])    [facility]  1st Floor  [address] [address]   [phone]            May 22, 2018 10:00 AM CDT   EXTERNAL RADIATION TREATMENT with [facility] RAD ONC DANISHA   Radiation Oncology Clinic ([facility])    [facility]  1st Floor  [address] [address]   [phone]            May 23, 2018 10:00 AM CDT   EXTERNAL RADIATION TREATMENT with [facility] RAD ONC DANISHA   Radiation Oncology Clinic ([facility])    [facility]  1st Floor  [address] [address]   [phone]              Who to contact     If you have questions or need follow up information about today's clinic visit or your schedule please contact [facility] directly at [phone].  Normal or non-critical lab  "and imaging results will be communicated to you by MyChart, letter or phone within 4 business days after the clinic has received the results. If you do not hear from us within 7 days, please contact the clinic through [employer] or phone. If you have a critical or abnormal lab result, we will notify you by phone as soon as possible.  Submit refill requests through [employer] or call your pharmacy and they will forward the refill request to us. Please allow 3 business days for your refill to be completed.          Additional Information About Your Visit        [email or web address] Information     [employer] lets you send messages to your doctor, view your test results, renew your prescriptions, schedule appointments and more. To sign up, go to [email or web address] . Click on \"Log in\" on the left side of the screen, which will take you to the Welcome page. Then click on \"Sign up Now\" on the right side of the page.     You will be asked to enter the access code listed below, as well as some personal information. Please follow the directions to create your username and password.     Your access code is: [email or web address]  Expires: 2018  6:30 AM     Your access code will  in 90 days. If you need help or a new code, please call your [facility] clinic or [phone].        Care EveryWhere ID     This is your Care EveryWhere ID. This could be used by other organizations to access your [facility] medical records  [id number]        Your Vitals Were     Pulse Temperature Respirations Height Last Period Pulse Oximetry    102 97.9  F (36.6  C) (Tympanic) 18 1.524 m (5') 2018 (Approximate) 99%    BMI (Body Mass Index)                   41.19 kg/m2            Blood Pressure from Last 3 Encounters:   18 129/86   18 120/88    Weight from Last 3 Encounters:   18 95.7 kg (210 lb 14.4 oz)   18 94.8 kg (209 lb)              We Performed the Following     Cell count with differential CSF: Tube 1     Cell count with " differential CSF: Tube 4     Cytology non gyn     Glucose CSF: Tube 2     Protein total CSF: Tube 2          Today's Medication Changes          These changes are accurate as of 5/4/18 12:21 PM.  If you have any questions, ask your nurse or doctor.               Stop taking these medicines if you haven't already. Please contact your care team if you have questions.     DECADRON PO   Stopped by:  Anupama Morrison MD           OXYCODONE HCL PO   Stopped by:  Anupama Morrison MD           PROTONIX PO   Stopped by:  Anupama Morrison MD           SODIUM CHLORIDE PO   Stopped by:  Anupama Morrison MD                    Primary Care Provider Fax #    Physician No Ref-Primary [phone]       No address on file        Equal Access to Services     [facility] : Lisa nielsono Soraul, waaxda luqadaha, qaybta kaalmada adedeannayatu, toño jade . So [facility] [phone].    ATENCIÓN: Si habla español, tiene a mccurdy disposición servicios gratuitos de asistencia lingüística. Llame al [phone].    We comply with applicable federal civil rights laws and [place] laws. We do not discriminate on the basis of race, color, national origin, age, disability, sex, sexual orientation, or gender identity.            Thank you!     Thank you for choosing [facility]  for your care. Our goal is always to provide you with excellent care. Hearing back from our patients is one way we can continue to improve our services. Please take a few minutes to complete the written survey that you may receive in the mail after your visit with us. Thank you!             Your Updated Medication List - Protect others around you: Learn how to safely use, store and throw away your medicines at www.disposemymeds.org.          This list is accurate as of 5/4/18 12:21 PM.  Always use your most recent med list.                   Brand Name Dispense Instructions for use  Diagnosis    acetaminophen 325 MG tablet    TYLENOL     Take 650 mg by mouth        sennosides 8.6 MG tablet    SENOKOT     Take 1 tablet by mouth daily

## 2018-05-04 NOTE — PROGRESS NOTES
BMT ONC Adult Lumbar Puncture Procedure Note    May 4, 2018    Procedure: Lumbar Puncture to obtain CSF     Diagnosis: Medulloblastoma    Learning needs assessment complete within 12 months: YES    Vitals reviewed:  YES    Informed Consent: Procedure, benefits, risks, and alternatives explained to the patient who verbalized understanding of the information and agreed to proceed with lumbar puncture. Risks include bleeding, headache, and or infection.  Patient safety checklist completed.    Labs: Reviewed:      Lab Results   Component Value Date     05/04/2018       Description:. The patient was seated at the bedside with knees dangling. The L4-5 disc space was prepped and draped in a sterile fashion. Local anesthesia with 5 mL 1% preservative-free lidocaine was used. A 22 gauge, 4 inch needle was placed on the second attempt.  11 mL spinal fluid collected. Specimen appears clear. Specimen sent for cell count and differential, cytology, protein and glucose.  The needle was removed and a bandage was applied to the site.  Patient tolerated with mild difficulty--she developed headache during procedure which improved after lying flat.    Post-procedure pain assessment: 0 out of 10 on the numeric pain rating scale  Interventions: No    Complications: No     Disposition: Patient will remain on back for 30 minutes post procedure. Avoid soaking in a tub tonight.  Call if develops headaches, fevers and or chills.     Performed by:   Karla Dorman

## 2018-05-04 NOTE — NURSING NOTE
Oncology Rooming Note    May 4, 2018 10:44 AM   Merissa Silverman is a 32 year old female who presents for:    Chief Complaint   Patient presents with     Oncology Clinic Visit     new     Initial Vitals: /86 (BP Location: Right arm, Patient Position: Sitting, Cuff Size: Adult Regular)  Pulse 102  Temp 97.9  F (36.6  C) (Tympanic)  Resp 18  Ht 1.524 m (5')  Wt 95.7 kg (210 lb 14.4 oz)  LMP 04/18/2018 (Approximate)  SpO2 99%  BMI 41.19 kg/m2 Estimated body mass index is 41.19 kg/(m^2) as calculated from the following:    Height as of this encounter: 1.524 m (5').    Weight as of this encounter: 95.7 kg (210 lb 14.4 oz). Body surface area is 2.01 meters squared.  No Pain (0) Comment: Data Unavailable   Patient's last menstrual period was 04/18/2018 (approximate).  Allergies reviewed: Yes  Medications reviewed: Yes    Medications: Medication refills not needed today.  Pharmacy name entered into EPIC: Data Unavailable    Clinical concerns: New     6 minutes for nursing intake (face to face time)     Cary Siddiqui, RN

## 2018-05-04 NOTE — PROGRESS NOTES
NEURO-ONCOLOGY INITIAL VISIT  May 4, 2018    CHIEF COMPLAINT: Ms. 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 She is presenting to this initial clinic visit as referred by Dr. Gupta, medical oncology at [facility] for evaluation and recommendations on treatment.     Accompanying her to this visit is Tito ().       HISTORY OF PRESENT ILLNESS  A summary of the patient s oncologic history is as follows;   -PRESENTATION: Progressively worsening headaches. Additionally was with abrupt position changes resulted in dizziness/ syncope. CTH at an outside hospital showed a mass in the left cerebellum. Transferred to [facility].  -4/11/2018 MRB showed a 3.5cm mass in the left cerebellar hemisphere that was associated with mild cerebellar tonsillar herniation and hydrocephalus.  -MRI spine showed no evidence of disease.   -4/13/2018 SURGERY: Suboccipital craniotomy with resection of the left cerebellar mass. Immediate post-operative MRI demonstrated a gross total resection.   PATHOLOGY: Medulloblastoma; Molecular markers pending.   -5/4/2018 NEURO-ONC: LP performed. Evaluated by radiation oncology. Recommending craniospinal radiation + concurrent vincristine followed by eight 6-week cycles of cisplatin, lomustine, and vincristine.  -5/4/2018 LP with CSF analysis: WBC 1/ RBC 1, protein 25, glucose 62, negative cytology.   -5/14 - 6/25/2018 CHEMORADS with vincristine 2 mg/m2.    Today in clinic;   -Merissa is feeling well, recovered from surgery.   -Off steroids, but experiencing some joint discomfort. No interest is restarting steroids with a slower/ more prolonged taper.   -Infrequent mild headaches, continue to improve.   -Fatigue, but energy is improving with improved sleep now that she is off dexamethasone.   -Denies  nausea, vomiting, vision changes, difficulties with balance or coordination, weakness, numbness, or any other neurologic symptoms.   -No additional concerns or complaints.  -Denies any episodes concerning for a seizure, including unexplained episodes of loss of consciousness, unexplained falls, unexplained loss of bowel/ bladder control or tongue biting, unexplained bruising/ myalgia, periods of loss of time, or witnessed shaking/ jerking movements.   -Fertility preservation was discussed and Merissa is not interested.     REVIEW OF SYSTEMS  A comprehensive ROS negative except as in HPI.      MEDICATIONS   Current Outpatient Prescriptions   Medication Sig Dispense Refill     acetaminophen (TYLENOL) 325 MG tablet Take 650 mg by mouth       sennosides (SENOKOT) 8.6 MG tablet Take 1 tablet by mouth daily       DRUG ALLERGIES   Allergies   Allergen Reactions     Sulfa Drugs Unknown     PAST MEDICAL HISTORY   Past Medical History:   Diagnosis Date     Medulloblastoma (H)      PAST SURGICAL HISTORY   Past Surgical History:   Procedure Laterality Date     CRANIOTOMY  04/13/2018     SOCIAL HISTORY   History   Smoking Status     Current Every Day Smoker     Types: Other   Smokeless Tobacco     Never Used     Comment: ECigs      Alcohol use No    History   Drug Use No   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    FAMILY HISTORY   Family History   Problem Relation Age of Onset     Colon Cancer Paternal Grandmother      Colon Cancer Paternal Grandfather        PHYSICAL EXAMINATION  /86 (BP Location: Right arm, Patient Position: Sitting, Cuff Size: Adult Regular)  Pulse 102  Temp 97.9  F (36.6  C) (Tympanic)  Resp 18  Ht 1.524 m (5')  Wt 95.7 kg (210 lb 14.4 oz)  LMP 04/18/2018 (Approximate)  SpO2 99%  BMI 41.19 kg/m2  Wt Readings from Last 2 Encounters:   05/04/18 95.7 kg (210 lb 14.4 oz)   05/02/18 94.8 kg (209 lb)    Ht Readings from Last 2 Encounters:   05/04/18 1.524 m (5')    05/02/18 1.524 m (5')     KPS: 100    -Generally well appearing.  -Throat: No oral thrush.  -Respiratory: Normal breath sounds, no audible wheezing.   -Skin: No rashes. Healing head incision.  -Hematologic/ lymphatic: No abnormal bruising. Mild leg swelling.  -Psychiatric: Normal mood and affect. Pleasant, talkative.  -Neurologic:   MENTAL STATUS:     Alert, oriented to date.    Recall: Immediate 3/3, delayed 3/3.    Speech fluent. Comprehension intact to multi-step commands.   Normal naming, repetition. Able to read.   Good right-left orientation.     CRANIAL NERVES:     Discs flat on fundoscopy.    Pupils are equal, round, reactive to light.     Extraocular movements full, patient denies diplopia. Mild eye jerks noted on pursuit.    Visual fields full.     Facial sensation intact to light touch.   Symmetric facial movements.   Hearing intact.   Palate moves symmetrically.     Sternocleidomastoid and trapezius strength intact.    Tongue midline.  MOTOR:    Normal and symmetric tone.   Grossly 5/5 throughout.    No pronation or drift. No orbiting.   Able to rise from a chair without use of arms.   On toe/ heel walk, equal distance from floor to heels/ toes.   SENSATION:    Intact to light touch throughout.  COORDINATION:   Intact finger-nose with eyes open and closed, mild impairment on the left.   REFLEXES:    Brisk, but symmetric.    Toes not tested. No clonus. No Hoffmans.   No grasp.    GAIT:  Walks without assistance.   Good speed. Normal stride length and heel strike. Normal turns. Normal arm swing.   Able to toe, heel walk. Able to tandem walk.       MEDICAL RECORDS  Obtained and personally reviewed all available outside medical records in addition to reviewing any records available in our electronic system.     LABS  Personally reviewed all available lab results.     IMAGING  Personally reviewed pre- and post-operative MR brain imaging from last month. To my eye, there was a gross total resection of the  contrast enhancing tumor in the cerebellum.    Imaging and case reviewed and discussed at Brain Tumor Conference.       IMPRESSION  For the 60 minute appointment, more than 50% of the encounter was spent discussing in detail the nature of this tumor, providing emotional support, answering questions pertaining to my recommendations, and devising the treatment plan as outlined below.    While medulloblastomas are the most common malignant brain tumor of childhood, this tumor is rare in adults. As a result, information specific to the adult patient population with medulloblastoma is limited and much is extrapolated from data on children.     Spine MR imaging and a lumbar puncture for CSF analysis are performed on all patients as part of initial work-up to determine the extent of disease. Even with no evidence of cancer spread on spinal imaging, this does not eliminate the concern for metastatic disease. Given that nearly 30% that have metastatic disease at time of diagnosis, will arrange for a lumbar puncture to analysis CSF for neoplastic cells, elevated protein, and pleocytosis. While negative testing does not exclude advanced disease, positive CSF results serve as a gauge to predict a likely higher rate of relapse and worse overall outcome. ADDENDUM: CSF negative for malignant cells.     In terms of prognosis, using the modified Irizarry criteria, the tumor is a T2, M0. Per the literature, patients without metastatic disease (M0) had a significantly higher five-year progression-free survival rate compared to those with disease spread. However, older patients tend to have a worse outcome. Tumors that show activation of the WNT pathway have the best prognosis, whereas tumors with amplification of the MYC kenya-oncogene or  group 3  have the worst prognosis. Merissa's tumor did not have nuclear beta-catenin staining, ruling out a WNT-activated mechanism. At this point, molecular testing is still pending, but with a  unilateral lesion in this adult age group, a SHH subgroup should be considered until proven otherwise. Tumors with activation of the SHH pathway and those in group 4 have an intermediate prognosis, with the exception of SHH tumors containing TP53 mutations, which are associated with a particularly poor prognosis. As stated, these studies are pending and will submit pathology for next generation sequencing via [employer].    With regard to cancer-directed therapy, a multimodal treatment approach first involves attempting a gross total surgical resection, which was performed in this case. As medulloblastoma is rare in adults, following surgery, there are no randomized studies upon which to base treatment recommendations and such recommendations are based on the literature, which mainly details retrospective case reviews, but some prospective data.     Of the prospective data, results detail that patients with standard risk medulloblastoma appear to do better with adjuvant chemotherapy. Will initiate weekly vincristine during radiation followed by up to eight cycles of maintenance chemotherapy with cisplatin, lomustine, and vincristine. Of note, this regimen is highly toxic and in clinical studies, treatment was terminated or dose reduced due to side effects in nearly 60 percent of patients by cycle four.    Finally, there is a clinical trial options for recurrent disease; Phase I/II Study of Intra-arterial Melphalan Given With Intra-arterial Carboplatin, Osmotic Blood-Brain Barrier Disruption and Delayed Otoprotective Sodium Thiosulfate for Patients With Recurrent or Progressive CNS Embryonal or Germ Cell Tumors that is open here. So, following completion of initial therapy, Merissa should be scanned every three months to monitor for disease recurrence. At recurrence, therapy on or off clinical trials can be considered.     PROBLEM LIST  Medulloblastoma  Steroid intolerance    PLAN  -CANCER-DIRECTED THERAPY-  -As above.  -LP  to be performed by [facility]. CSF testing for cell count, protein, glucose, and cytology. (Negative for malignancy).   -Molecular markers pending.   -Next generation sequencing via [facility].  -Radiation per Dr. Borges.   -Radiotherapy and concomitant 2 mg/m2 vincristin i.v weekly. Following a 6 week break, maintenance chemotherapy (8 cycles of 42 days): Day 1 is 70 mg/m2 cis-platin i.v. + 75 mg/m2 CCNU oral + 2 mg/m2 vincristin i.v. Day 8 and 15 is 2 mg/m2 vincristin i.v.  -Pharmacy to do chemotherapy teaching.   -Port placement.   -Baseline labs today.  -Merissa and Tito declined referral for fertility preservation.     -STEROIDS-  -Currently off dexamethasone.  -Dose may increase while undergoing radiation.  -Poor tolerance for steroids.     -SEIZURE MANAGEMENT-  -While this patient is at increased risk of having seizures, given the lack of seizure history, there is no indication to prescribe an antiepileptic at this time.     -Quality of life/ MOOD/ FATIGUE-  -Denies any mood issues.  -Continue to monitor mood as untreated/ undertreated depression can worsen fatigue, dysorexia, and quality of life.    Return to clinic on 6/4 at midpoint of chemoradiotherapy.     In the meantime, Merissa knows to call with questions or concerns or to report new complaints and can be seen sooner if needed. Urgent evaluation is needed in the setting of acute onset of severe headache, abrupt change in mental status, on-going seizures, new focal deficits, or new leg swelling/ pain. Everyone in attendance voiced understanding.    Anupama Morrison MD  Neuro-oncology

## 2018-05-04 NOTE — MR AVS SNAPSHOT
After Visit Summary   5/4/2018    Merissa Silverman    MRN: [id number]           Patient Information     Date Of Birth          1985        Visit Information        Provider Department      5/4/2018 12:20 PM Karla Dorman PA [facility]        Today's Diagnoses     Medulloblastoma of cerebellum (H)    -  1       Follow-ups after your visit        Your next 10 appointments already scheduled     May 14, 2018  9:30 AM CDT   EXTERNAL RADIATION TREATMENT with [facility] RAD ONC DANISHA   Radiation Oncology Clinic ([facility])    [facility]  1st Floor  [address] [address]   [phone]            May 14, 2018  9:45 AM CDT   ON TREATMENT VISIT with Mike Borges MD   Radiation Oncology Clinic ([facility])    [facility]  1st Floor  [address] [address]   [phone]            May 14, 2018 12:15 PM CDT   [facility] Draw with  [facility] DRAW   [facility]    [address]  [address]  [facility] [address]   [phone]            May 14, 2018  1:00 PM CDT   Infusion 60 with UC ONCOLOGY INFUSION, UC 18 ATC   [facility] ([facility])    [address]  [address]  [place] [address]   [phone]            May 15, 2018 10:00 AM CDT   EXTERNAL RADIATION TREATMENT with [facility] RAD ONC DANISHA   Radiation Oncology Clinic ([facility])    [facility]  1st Floor  [address] [address]   [phone]            May 16, 2018 10:00 AM CDT   EXTERNAL RADIATION TREATMENT with [facility] RAD ONC DANISHA   Radiation Oncology Clinic ([facility])    [facility]  1st Floor  [address] [address]   [phone]            May 17, 2018 10:00 AM CDT   EXTERNAL  "RADIATION TREATMENT with [facility] RAD ONC DANISHA   Radiation Oncology Clinic ([facility])    [facility]  1st Floor  [address] [facility] [address]   [phone]            May 18, 2018 10:00 AM CDT   EXTERNAL RADIATION TREATMENT with [facility] RAD ONC DANISHA   Radiation Oncology Clinic ([facility])    [facility]  1st Floor  [address] [facility] [address]   [phone]            May 21, 2018 10:00 AM CDT   EXTERNAL RADIATION TREATMENT with [facility] RAD ONC DANISHA   Radiation Oncology Clinic ([facility])    [facility]  1st Floor  [address] [facility] [address]   [phone]              Future tests that were ordered for you today     Open Future Orders        Priority Expected Expires Ordered    IR Chest Port Placement > 5 Yrs of Age Routine  5/4/2019 5/4/2018            Who to contact     If you have questions or need follow up information about today's clinic visit or your schedule please contact [facility] directly at [phone].  Normal or non-critical lab and imaging results will be communicated to you by [employer], letter or phone within 4 business days after the clinic has received the results. If you do not hear from us within 7 days, please contact the clinic through [employer] or phone. If you have a critical or abnormal lab result, we will notify you by phone as soon as possible.  Submit refill requests through [employer] or call your pharmacy and they will forward the refill request to us. Please allow 3 business days for your refill to be completed.          Additional Information About Your Visit        [employer] Information     [employer] lets you send messages to your doctor, view your test results, renew your prescriptions, schedule appointments and more. To sign up, go to [email or web address] . Click on \"Log in\" on the left side of the screen, which will take you " "to the Welcome page. Then click on \"Sign up Now\" on the right side of the page.     You will be asked to enter the access code listed below, as well as some personal information. Please follow the directions to create your username and password.     Your access code is: [email or web address]  Expires: 2018  6:30 AM     Your access code will  in 90 days. If you need help or a new code, please call your [place] clinic or [phone].        Care EveryWhere ID     This is your Care EveryWhere ID. This could be used by other organizations to access your [place] medical records  [id number]        Your Vitals Were     Last Period                   2018 (Approximate)            Blood Pressure from Last 3 Encounters:   18 129/86   18 120/88    Weight from Last 3 Encounters:   18 95.7 kg (210 lb 14.4 oz)   18 94.8 kg (209 lb)              We Performed the Following     Lumbar Puncture, Diagnostic (Charge)          Today's Medication Changes          These changes are accurate as of 18  2:42 PM.  If you have any questions, ask your nurse or doctor.               Stop taking these medicines if you haven't already. Please contact your care team if you have questions.     DECADRON PO   Stopped by:  Anupama Morrison MD           OXYCODONE HCL PO   Stopped by:  Anupama Morrison MD           PROTONIX PO   Stopped by:  Anupama Morrison MD           SODIUM CHLORIDE PO   Stopped by:  Anupama Morrison MD                    Primary Care Provider Fax #    Physician No Ref-Primary [phone]       No address on file        Equal Access to Services     [facility] : Hadii handy Beck, waaxda luqadaha, qaybta yangaltoño casas . So [facility] [phone].    ATENCIÓN: Si habla español, tiene a mccurdy disposición servicios gratuitos de asistencia lingüística. Llame al [phone].    We comply with applicable " federal civil rights laws and [place] laws. We do not discriminate on the basis of race, color, national origin, age, disability, sex, sexual orientation, or gender identity.            Thank you!     Thank you for choosing [facility]  for your care. Our goal is always to provide you with excellent care. Hearing back from our patients is one way we can continue to improve our services. Please take a few minutes to complete the written survey that you may receive in the mail after your visit with us. Thank you!             Your Updated Medication List - Protect others around you: Learn how to safely use, store and throw away your medicines at www.disposemymeds.org.          This list is accurate as of 5/4/18  2:42 PM.  Always use your most recent med list.                   Brand Name Dispense Instructions for use Diagnosis    acetaminophen 325 MG tablet    TYLENOL     Take 650 mg by mouth        sennosides 8.6 MG tablet    SENOKOT     Take 1 tablet by mouth daily

## 2018-05-04 NOTE — PROGRESS NOTES
"Met with Merissa to discuss chemotherapy and resources available at the [facility]. Provided patient with \"My Cancer Guidebook\" and [facility] printouts on Vincristine and Cisplatin. Reviewed administration, side effects (including myelosuppression, nausea/vomiting, diarrhea/constipation, hair loss, mouth sores) and ongoing symptom management by APPs in clinic. Provided phone numbers for triage and after hours care. Highlighted steps to expect when getting chemotherapy (check in, labs, pre- meds, infusion), Discussed that chemo treatment may be delayed a day or two due to blood counts, infusion schedule or patient's need to modify. Included a one page resource of symptoms and when to contact the Cancer Clinic with questions or concerns.      Merissa signed Authorization to Discuss paperwork. Discussed and encouraged Merissa to sign up for [employer] to assist in managing appointments and asking future questions of health care team.    Presented the port book with visual of what a port looks like, provided descriptive explanation of placement, access, when to use and ongoing maintenance of port. Discussed risks of infection and bloods clots. Provided [facility] Port information for Merissa to read at home.              Answered all Merissa's questions to her stated satisfaction.                                                                                                                                                  "

## 2018-05-04 NOTE — PATIENT INSTRUCTIONS
Treatment plan:  Craniospinal local-boost radiotherapy and adjuvant chemotherapy with vincristine weekly during radiotherapy followed by eight 6-week cycles of cisplatin, Lomustine, and vincristine.  Dosage: Vincristine weekly during RT       After RT     Cisplatin DAY 1                    Lomustine DAY 1         Vincristine  DAY 1, 8, and 15    Labs done today.   LP today.   Pharmacy to do chemotherapy teaching.     Return to clinic in 6/4.    Anupama Morrison MD  Neuro-oncology  5/4/2018

## 2018-05-04 NOTE — NURSING NOTE
Chief Complaint   Patient presents with     Blood Draw     Labs only     Vitals done, labs drawn by venipuncture.  See doc flow sheets for details.  Xiomara Tello CMA

## 2018-05-07 RX ORDER — DIPHENHYDRAMINE HYDROCHLORIDE 50 MG/ML
50 INJECTION INTRAMUSCULAR; INTRAVENOUS
Status: CANCELLED
Start: 2018-06-04

## 2018-05-07 RX ORDER — ALBUTEROL SULFATE 90 UG/1
1-2 AEROSOL, METERED RESPIRATORY (INHALATION)
Status: CANCELLED
Start: 2018-05-14

## 2018-05-07 RX ORDER — SODIUM CHLORIDE 9 MG/ML
1000 INJECTION, SOLUTION INTRAVENOUS CONTINUOUS PRN
Status: CANCELLED
Start: 2018-06-11

## 2018-05-07 RX ORDER — ALBUTEROL SULFATE 90 UG/1
1-2 AEROSOL, METERED RESPIRATORY (INHALATION)
Status: CANCELLED
Start: 2018-06-11

## 2018-05-07 RX ORDER — SODIUM CHLORIDE 9 MG/ML
1000 INJECTION, SOLUTION INTRAVENOUS CONTINUOUS PRN
Status: CANCELLED
Start: 2018-06-04

## 2018-05-07 RX ORDER — ALBUTEROL SULFATE 90 UG/1
1-2 AEROSOL, METERED RESPIRATORY (INHALATION)
Status: CANCELLED
Start: 2018-06-24

## 2018-05-07 RX ORDER — EPINEPHRINE 1 MG/ML
0.3 INJECTION, SOLUTION, CONCENTRATE INTRAVENOUS EVERY 5 MIN PRN
Status: CANCELLED | OUTPATIENT
Start: 2018-06-04

## 2018-05-07 RX ORDER — EPINEPHRINE 0.3 MG/.3ML
0.3 INJECTION SUBCUTANEOUS EVERY 5 MIN PRN
Status: CANCELLED | OUTPATIENT
Start: 2018-05-21

## 2018-05-07 RX ORDER — EPINEPHRINE 1 MG/ML
0.3 INJECTION, SOLUTION, CONCENTRATE INTRAVENOUS EVERY 5 MIN PRN
Status: CANCELLED | OUTPATIENT
Start: 2018-06-18

## 2018-05-07 RX ORDER — LORAZEPAM 2 MG/ML
0.5 INJECTION INTRAMUSCULAR EVERY 4 HOURS PRN
Status: CANCELLED
Start: 2018-06-24

## 2018-05-07 RX ORDER — MEPERIDINE HYDROCHLORIDE 25 MG/ML
25 INJECTION INTRAMUSCULAR; INTRAVENOUS; SUBCUTANEOUS EVERY 30 MIN PRN
Status: CANCELLED | OUTPATIENT
Start: 2018-05-14

## 2018-05-07 RX ORDER — ALBUTEROL SULFATE 0.83 MG/ML
2.5 SOLUTION RESPIRATORY (INHALATION)
Status: CANCELLED | OUTPATIENT
Start: 2018-06-18

## 2018-05-07 RX ORDER — DIPHENHYDRAMINE HYDROCHLORIDE 50 MG/ML
50 INJECTION INTRAMUSCULAR; INTRAVENOUS
Status: CANCELLED
Start: 2018-06-18

## 2018-05-07 RX ORDER — LORAZEPAM 2 MG/ML
0.5 INJECTION INTRAMUSCULAR EVERY 4 HOURS PRN
Status: CANCELLED
Start: 2018-06-18

## 2018-05-07 RX ORDER — SODIUM CHLORIDE 9 MG/ML
1000 INJECTION, SOLUTION INTRAVENOUS CONTINUOUS PRN
Status: CANCELLED
Start: 2018-05-28

## 2018-05-07 RX ORDER — METHYLPREDNISOLONE SODIUM SUCCINATE 125 MG/2ML
125 INJECTION, POWDER, LYOPHILIZED, FOR SOLUTION INTRAMUSCULAR; INTRAVENOUS
Status: CANCELLED
Start: 2018-05-14

## 2018-05-07 RX ORDER — SODIUM CHLORIDE 9 MG/ML
1000 INJECTION, SOLUTION INTRAVENOUS CONTINUOUS PRN
Status: CANCELLED
Start: 2018-06-24

## 2018-05-07 RX ORDER — MEPERIDINE HYDROCHLORIDE 25 MG/ML
25 INJECTION INTRAMUSCULAR; INTRAVENOUS; SUBCUTANEOUS EVERY 30 MIN PRN
Status: CANCELLED | OUTPATIENT
Start: 2018-06-04

## 2018-05-07 RX ORDER — LORAZEPAM 2 MG/ML
0.5 INJECTION INTRAMUSCULAR EVERY 4 HOURS PRN
Status: CANCELLED
Start: 2018-05-14

## 2018-05-07 RX ORDER — MEPERIDINE HYDROCHLORIDE 25 MG/ML
25 INJECTION INTRAMUSCULAR; INTRAVENOUS; SUBCUTANEOUS EVERY 30 MIN PRN
Status: CANCELLED | OUTPATIENT
Start: 2018-05-28

## 2018-05-07 RX ORDER — MEPERIDINE HYDROCHLORIDE 25 MG/ML
25 INJECTION INTRAMUSCULAR; INTRAVENOUS; SUBCUTANEOUS EVERY 30 MIN PRN
Status: CANCELLED | OUTPATIENT
Start: 2018-06-11

## 2018-05-07 RX ORDER — ALBUTEROL SULFATE 0.83 MG/ML
2.5 SOLUTION RESPIRATORY (INHALATION)
Status: CANCELLED | OUTPATIENT
Start: 2018-06-11

## 2018-05-07 RX ORDER — ALBUTEROL SULFATE 0.83 MG/ML
2.5 SOLUTION RESPIRATORY (INHALATION)
Status: CANCELLED | OUTPATIENT
Start: 2018-05-28

## 2018-05-07 RX ORDER — MEPERIDINE HYDROCHLORIDE 25 MG/ML
25 INJECTION INTRAMUSCULAR; INTRAVENOUS; SUBCUTANEOUS EVERY 30 MIN PRN
Status: CANCELLED | OUTPATIENT
Start: 2018-06-24

## 2018-05-07 RX ORDER — METHYLPREDNISOLONE SODIUM SUCCINATE 125 MG/2ML
125 INJECTION, POWDER, LYOPHILIZED, FOR SOLUTION INTRAMUSCULAR; INTRAVENOUS
Status: CANCELLED
Start: 2018-06-24

## 2018-05-07 RX ORDER — EPINEPHRINE 0.3 MG/.3ML
0.3 INJECTION SUBCUTANEOUS EVERY 5 MIN PRN
Status: CANCELLED | OUTPATIENT
Start: 2018-05-28

## 2018-05-07 RX ORDER — EPINEPHRINE 0.3 MG/.3ML
0.3 INJECTION SUBCUTANEOUS EVERY 5 MIN PRN
Status: CANCELLED | OUTPATIENT
Start: 2018-06-18

## 2018-05-07 RX ORDER — LORAZEPAM 2 MG/ML
0.5 INJECTION INTRAMUSCULAR EVERY 4 HOURS PRN
Status: CANCELLED
Start: 2018-05-21

## 2018-05-07 RX ORDER — DIPHENHYDRAMINE HYDROCHLORIDE 50 MG/ML
50 INJECTION INTRAMUSCULAR; INTRAVENOUS
Status: CANCELLED
Start: 2018-05-28

## 2018-05-07 RX ORDER — DIPHENHYDRAMINE HYDROCHLORIDE 50 MG/ML
50 INJECTION INTRAMUSCULAR; INTRAVENOUS
Status: CANCELLED
Start: 2018-06-11

## 2018-05-07 RX ORDER — ALBUTEROL SULFATE 0.83 MG/ML
2.5 SOLUTION RESPIRATORY (INHALATION)
Status: CANCELLED | OUTPATIENT
Start: 2018-06-04

## 2018-05-07 RX ORDER — METHYLPREDNISOLONE SODIUM SUCCINATE 125 MG/2ML
125 INJECTION, POWDER, LYOPHILIZED, FOR SOLUTION INTRAMUSCULAR; INTRAVENOUS
Status: CANCELLED
Start: 2018-06-18

## 2018-05-07 RX ORDER — MEPERIDINE HYDROCHLORIDE 25 MG/ML
25 INJECTION INTRAMUSCULAR; INTRAVENOUS; SUBCUTANEOUS EVERY 30 MIN PRN
Status: CANCELLED | OUTPATIENT
Start: 2018-05-21

## 2018-05-07 RX ORDER — DIPHENHYDRAMINE HYDROCHLORIDE 50 MG/ML
50 INJECTION INTRAMUSCULAR; INTRAVENOUS
Status: CANCELLED
Start: 2018-05-21

## 2018-05-07 RX ORDER — EPINEPHRINE 0.3 MG/.3ML
0.3 INJECTION SUBCUTANEOUS EVERY 5 MIN PRN
Status: CANCELLED | OUTPATIENT
Start: 2018-06-24

## 2018-05-07 RX ORDER — ALBUTEROL SULFATE 90 UG/1
1-2 AEROSOL, METERED RESPIRATORY (INHALATION)
Status: CANCELLED
Start: 2018-05-28

## 2018-05-07 RX ORDER — EPINEPHRINE 1 MG/ML
0.3 INJECTION, SOLUTION, CONCENTRATE INTRAVENOUS EVERY 5 MIN PRN
Status: CANCELLED | OUTPATIENT
Start: 2018-05-28

## 2018-05-07 RX ORDER — EPINEPHRINE 0.3 MG/.3ML
0.3 INJECTION SUBCUTANEOUS EVERY 5 MIN PRN
Status: CANCELLED | OUTPATIENT
Start: 2018-05-14

## 2018-05-07 RX ORDER — EPINEPHRINE 1 MG/ML
0.3 INJECTION, SOLUTION, CONCENTRATE INTRAVENOUS EVERY 5 MIN PRN
Status: CANCELLED | OUTPATIENT
Start: 2018-05-14

## 2018-05-07 RX ORDER — EPINEPHRINE 1 MG/ML
0.3 INJECTION, SOLUTION, CONCENTRATE INTRAVENOUS EVERY 5 MIN PRN
Status: CANCELLED | OUTPATIENT
Start: 2018-05-21

## 2018-05-07 RX ORDER — EPINEPHRINE 0.3 MG/.3ML
0.3 INJECTION SUBCUTANEOUS EVERY 5 MIN PRN
Status: CANCELLED | OUTPATIENT
Start: 2018-06-04

## 2018-05-07 RX ORDER — METHYLPREDNISOLONE SODIUM SUCCINATE 125 MG/2ML
125 INJECTION, POWDER, LYOPHILIZED, FOR SOLUTION INTRAMUSCULAR; INTRAVENOUS
Status: CANCELLED
Start: 2018-06-11

## 2018-05-07 RX ORDER — LORAZEPAM 2 MG/ML
0.5 INJECTION INTRAMUSCULAR EVERY 4 HOURS PRN
Status: CANCELLED
Start: 2018-05-28

## 2018-05-07 RX ORDER — EPINEPHRINE 1 MG/ML
0.3 INJECTION, SOLUTION, CONCENTRATE INTRAVENOUS EVERY 5 MIN PRN
Status: CANCELLED | OUTPATIENT
Start: 2018-06-24

## 2018-05-07 RX ORDER — ALBUTEROL SULFATE 0.83 MG/ML
2.5 SOLUTION RESPIRATORY (INHALATION)
Status: CANCELLED | OUTPATIENT
Start: 2018-05-14

## 2018-05-07 RX ORDER — METHYLPREDNISOLONE SODIUM SUCCINATE 125 MG/2ML
125 INJECTION, POWDER, LYOPHILIZED, FOR SOLUTION INTRAMUSCULAR; INTRAVENOUS
Status: CANCELLED
Start: 2018-05-21

## 2018-05-07 RX ORDER — METHYLPREDNISOLONE SODIUM SUCCINATE 125 MG/2ML
125 INJECTION, POWDER, LYOPHILIZED, FOR SOLUTION INTRAMUSCULAR; INTRAVENOUS
Status: CANCELLED
Start: 2018-05-28

## 2018-05-07 RX ORDER — LORAZEPAM 2 MG/ML
0.5 INJECTION INTRAMUSCULAR EVERY 4 HOURS PRN
Status: CANCELLED
Start: 2018-06-11

## 2018-05-07 RX ORDER — EPINEPHRINE 1 MG/ML
0.3 INJECTION, SOLUTION, CONCENTRATE INTRAVENOUS EVERY 5 MIN PRN
Status: CANCELLED | OUTPATIENT
Start: 2018-06-11

## 2018-05-07 RX ORDER — DIPHENHYDRAMINE HYDROCHLORIDE 50 MG/ML
50 INJECTION INTRAMUSCULAR; INTRAVENOUS
Status: CANCELLED
Start: 2018-06-24

## 2018-05-07 RX ORDER — ALBUTEROL SULFATE 90 UG/1
1-2 AEROSOL, METERED RESPIRATORY (INHALATION)
Status: CANCELLED
Start: 2018-06-18

## 2018-05-07 RX ORDER — MEPERIDINE HYDROCHLORIDE 25 MG/ML
25 INJECTION INTRAMUSCULAR; INTRAVENOUS; SUBCUTANEOUS EVERY 30 MIN PRN
Status: CANCELLED | OUTPATIENT
Start: 2018-06-18

## 2018-05-07 RX ORDER — METHYLPREDNISOLONE SODIUM SUCCINATE 125 MG/2ML
125 INJECTION, POWDER, LYOPHILIZED, FOR SOLUTION INTRAMUSCULAR; INTRAVENOUS
Status: CANCELLED
Start: 2018-06-04

## 2018-05-07 RX ORDER — ALBUTEROL SULFATE 90 UG/1
1-2 AEROSOL, METERED RESPIRATORY (INHALATION)
Status: CANCELLED
Start: 2018-06-04

## 2018-05-07 RX ORDER — LORAZEPAM 2 MG/ML
0.5 INJECTION INTRAMUSCULAR EVERY 4 HOURS PRN
Status: CANCELLED
Start: 2018-06-04

## 2018-05-07 RX ORDER — ALBUTEROL SULFATE 0.83 MG/ML
2.5 SOLUTION RESPIRATORY (INHALATION)
Status: CANCELLED | OUTPATIENT
Start: 2018-06-24

## 2018-05-07 RX ORDER — ALBUTEROL SULFATE 0.83 MG/ML
2.5 SOLUTION RESPIRATORY (INHALATION)
Status: CANCELLED | OUTPATIENT
Start: 2018-05-21

## 2018-05-07 RX ORDER — SODIUM CHLORIDE 9 MG/ML
1000 INJECTION, SOLUTION INTRAVENOUS CONTINUOUS PRN
Status: CANCELLED
Start: 2018-06-18

## 2018-05-07 RX ORDER — ALBUTEROL SULFATE 90 UG/1
1-2 AEROSOL, METERED RESPIRATORY (INHALATION)
Status: CANCELLED
Start: 2018-05-21

## 2018-05-07 RX ORDER — DIPHENHYDRAMINE HYDROCHLORIDE 50 MG/ML
50 INJECTION INTRAMUSCULAR; INTRAVENOUS
Status: CANCELLED
Start: 2018-05-14

## 2018-05-07 RX ORDER — SODIUM CHLORIDE 9 MG/ML
1000 INJECTION, SOLUTION INTRAVENOUS CONTINUOUS PRN
Status: CANCELLED
Start: 2018-05-21

## 2018-05-07 RX ORDER — SODIUM CHLORIDE 9 MG/ML
1000 INJECTION, SOLUTION INTRAVENOUS CONTINUOUS PRN
Status: CANCELLED
Start: 2018-05-14

## 2018-05-07 RX ORDER — EPINEPHRINE 0.3 MG/.3ML
0.3 INJECTION SUBCUTANEOUS EVERY 5 MIN PRN
Status: CANCELLED | OUTPATIENT
Start: 2018-06-11

## 2018-05-08 ENCOUNTER — ANESTHESIA EVENT (OUTPATIENT)
Dept: SURGERY | Facility: AMBULATORY SURGERY CENTER | Age: 33
End: 2018-05-08

## 2018-05-08 ENCOUNTER — HEALTH MAINTENANCE LETTER (OUTPATIENT)
Age: 33
End: 2018-05-08

## 2018-05-09 ENCOUNTER — SURGERY (OUTPATIENT)
Age: 33
End: 2018-05-09

## 2018-05-09 ENCOUNTER — RADIANT APPOINTMENT (OUTPATIENT)
Dept: RADIOLOGY | Facility: AMBULATORY SURGERY CENTER | Age: 33
End: 2018-05-09
Attending: PSYCHIATRY & NEUROLOGY
Payer: COMMERCIAL

## 2018-05-09 ENCOUNTER — HOSPITAL ENCOUNTER (OUTPATIENT)
Facility: AMBULATORY SURGERY CENTER | Age: 33
End: 2018-05-09
Attending: PHYSICIAN ASSISTANT
Payer: COMMERCIAL

## 2018-05-09 ENCOUNTER — ANESTHESIA (OUTPATIENT)
Dept: SURGERY | Facility: AMBULATORY SURGERY CENTER | Age: 33
End: 2018-05-09

## 2018-05-09 VITALS
RESPIRATION RATE: 16 BRPM | SYSTOLIC BLOOD PRESSURE: 123 MMHG | TEMPERATURE: 97 F | BODY MASS INDEX: 41.23 KG/M2 | DIASTOLIC BLOOD PRESSURE: 71 MMHG | WEIGHT: 210 LBS | OXYGEN SATURATION: 100 % | HEIGHT: 60 IN

## 2018-05-09 DIAGNOSIS — C71.6 MEDULLOBLASTOMA (H): ICD-10-CM

## 2018-05-09 DIAGNOSIS — Z51.11 CHEMOTHERAPY MANAGEMENT, ENCOUNTER FOR: ICD-10-CM

## 2018-05-09 LAB
B-HCG SERPL-ACNC: <1 IU/L (ref 0–5)
INR PPP: 0.93 (ref 0.86–1.14)

## 2018-05-09 DEVICE — IMPLANTABLE DEVICE
Type: IMPLANTABLE DEVICE | Site: CHEST | Status: NON-FUNCTIONAL
Removed: 2019-03-08

## 2018-05-09 RX ORDER — LIDOCAINE 40 MG/G
CREAM TOPICAL
Status: DISCONTINUED | OUTPATIENT
Start: 2018-05-09 | End: 2018-05-10 | Stop reason: HOSPADM

## 2018-05-09 RX ORDER — DEXTROSE MONOHYDRATE 25 G/50ML
25-50 INJECTION, SOLUTION INTRAVENOUS
Status: DISCONTINUED | OUTPATIENT
Start: 2018-05-09 | End: 2018-05-10 | Stop reason: HOSPADM

## 2018-05-09 RX ORDER — ONDANSETRON 4 MG/1
4 TABLET, ORALLY DISINTEGRATING ORAL EVERY 30 MIN PRN
Status: DISCONTINUED | OUTPATIENT
Start: 2018-05-09 | End: 2018-05-10 | Stop reason: HOSPADM

## 2018-05-09 RX ORDER — GABAPENTIN 300 MG/1
300 CAPSULE ORAL ONCE
Status: COMPLETED | OUTPATIENT
Start: 2018-05-09 | End: 2018-05-09

## 2018-05-09 RX ORDER — HEPARIN SODIUM (PORCINE) LOCK FLUSH IV SOLN 100 UNIT/ML 100 UNIT/ML
SOLUTION INTRAVENOUS PRN
Status: DISCONTINUED | OUTPATIENT
Start: 2018-05-09 | End: 2018-05-09 | Stop reason: HOSPADM

## 2018-05-09 RX ORDER — PROPOFOL 10 MG/ML
INJECTION, EMULSION INTRAVENOUS CONTINUOUS PRN
Status: DISCONTINUED | OUTPATIENT
Start: 2018-05-09 | End: 2018-05-09

## 2018-05-09 RX ORDER — SODIUM CHLORIDE, SODIUM LACTATE, POTASSIUM CHLORIDE, CALCIUM CHLORIDE 600; 310; 30; 20 MG/100ML; MG/100ML; MG/100ML; MG/100ML
INJECTION, SOLUTION INTRAVENOUS CONTINUOUS
Status: DISCONTINUED | OUTPATIENT
Start: 2018-05-09 | End: 2018-05-10 | Stop reason: HOSPADM

## 2018-05-09 RX ORDER — HEPARIN SODIUM,PORCINE 10 UNIT/ML
5 VIAL (ML) INTRAVENOUS EVERY 24 HOURS
Status: CANCELLED | OUTPATIENT
Start: 2018-05-09

## 2018-05-09 RX ORDER — ACETAMINOPHEN 325 MG/1
975 TABLET ORAL ONCE
Status: COMPLETED | OUTPATIENT
Start: 2018-05-09 | End: 2018-05-09

## 2018-05-09 RX ORDER — FENTANYL CITRATE 50 UG/ML
25-50 INJECTION, SOLUTION INTRAMUSCULAR; INTRAVENOUS
Status: DISCONTINUED | OUTPATIENT
Start: 2018-05-09 | End: 2018-05-10 | Stop reason: HOSPADM

## 2018-05-09 RX ORDER — NICOTINE POLACRILEX 4 MG
15-30 LOZENGE BUCCAL
Status: DISCONTINUED | OUTPATIENT
Start: 2018-05-09 | End: 2018-05-10 | Stop reason: HOSPADM

## 2018-05-09 RX ORDER — ONDANSETRON 2 MG/ML
4 INJECTION INTRAMUSCULAR; INTRAVENOUS EVERY 30 MIN PRN
Status: DISCONTINUED | OUTPATIENT
Start: 2018-05-09 | End: 2018-05-10 | Stop reason: HOSPADM

## 2018-05-09 RX ORDER — HYDROMORPHONE HYDROCHLORIDE 1 MG/ML
.3-.5 INJECTION, SOLUTION INTRAMUSCULAR; INTRAVENOUS; SUBCUTANEOUS EVERY 10 MIN PRN
Status: DISCONTINUED | OUTPATIENT
Start: 2018-05-09 | End: 2018-05-10 | Stop reason: HOSPADM

## 2018-05-09 RX ORDER — NALOXONE HYDROCHLORIDE 0.4 MG/ML
.1-.4 INJECTION, SOLUTION INTRAMUSCULAR; INTRAVENOUS; SUBCUTANEOUS
Status: DISCONTINUED | OUTPATIENT
Start: 2018-05-09 | End: 2018-05-10 | Stop reason: HOSPADM

## 2018-05-09 RX ORDER — ONDANSETRON 2 MG/ML
INJECTION INTRAMUSCULAR; INTRAVENOUS PRN
Status: DISCONTINUED | OUTPATIENT
Start: 2018-05-09 | End: 2018-05-09

## 2018-05-09 RX ORDER — LIDOCAINE HYDROCHLORIDE 20 MG/ML
INJECTION, SOLUTION INFILTRATION; PERINEURAL PRN
Status: DISCONTINUED | OUTPATIENT
Start: 2018-05-09 | End: 2018-05-09

## 2018-05-09 RX ORDER — MEPERIDINE HYDROCHLORIDE 25 MG/ML
12.5 INJECTION INTRAMUSCULAR; INTRAVENOUS; SUBCUTANEOUS
Status: DISCONTINUED | OUTPATIENT
Start: 2018-05-09 | End: 2018-05-10 | Stop reason: HOSPADM

## 2018-05-09 RX ORDER — HEPARIN SODIUM (PORCINE) LOCK FLUSH IV SOLN 100 UNIT/ML 100 UNIT/ML
5 SOLUTION INTRAVENOUS
Status: CANCELLED | OUTPATIENT
Start: 2018-05-09

## 2018-05-09 RX ORDER — SODIUM CHLORIDE, SODIUM LACTATE, POTASSIUM CHLORIDE, CALCIUM CHLORIDE 600; 310; 30; 20 MG/100ML; MG/100ML; MG/100ML; MG/100ML
INJECTION, SOLUTION INTRAVENOUS CONTINUOUS PRN
Status: DISCONTINUED | OUTPATIENT
Start: 2018-05-09 | End: 2018-05-09

## 2018-05-09 RX ADMIN — LIDOCAINE HYDROCHLORIDE 100 MG: 20 INJECTION, SOLUTION INFILTRATION; PERINEURAL at 12:15

## 2018-05-09 RX ADMIN — PROPOFOL 150 MCG/KG/MIN: 10 INJECTION, EMULSION INTRAVENOUS at 12:15

## 2018-05-09 RX ADMIN — ONDANSETRON 4 MG: 2 INJECTION INTRAMUSCULAR; INTRAVENOUS at 12:20

## 2018-05-09 RX ADMIN — HEPARIN SODIUM (PORCINE) LOCK FLUSH IV SOLN 100 UNIT/ML 500 UNITS: 100 SOLUTION at 13:24

## 2018-05-09 RX ADMIN — ACETAMINOPHEN 975 MG: 325 TABLET ORAL at 10:54

## 2018-05-09 RX ADMIN — Medication 25 ML: at 13:13

## 2018-05-09 RX ADMIN — PROPOFOL: 10 INJECTION, EMULSION INTRAVENOUS at 12:51

## 2018-05-09 RX ADMIN — SODIUM CHLORIDE, SODIUM LACTATE, POTASSIUM CHLORIDE, CALCIUM CHLORIDE: 600; 310; 30; 20 INJECTION, SOLUTION INTRAVENOUS at 12:11

## 2018-05-09 RX ADMIN — GABAPENTIN 300 MG: 300 CAPSULE ORAL at 10:55

## 2018-05-09 ASSESSMENT — LIFESTYLE VARIABLES: TOBACCO_USE: 1

## 2018-05-09 ASSESSMENT — COPD QUESTIONNAIRES: COPD: 0

## 2018-05-09 NOTE — IP AVS SNAPSHOT
[facility]    [address] [place] [address]    Phone:  [phone]    Fax:  [phone]                                       After Visit Summary   5/9/2018    Merissa Silverman    MRN: [id number]           After Visit Summary Signature Page     I have received my discharge instructions, and my questions have been answered. I have discussed any challenges I see with this plan with the nurse or doctor.    ..........................................................................................................................................  Patient/Patient Representative Signature      ..........................................................................................................................................  Patient Representative Print Name and Relationship to Patient    ..................................................               ................................................  Date                                            Time    ..........................................................................................................................................  Reviewed by Signature/Title    ...................................................              ..............................................  Date                                                            Time

## 2018-05-09 NOTE — DISCHARGE INSTRUCTIONS
A collaboration between [facility] and [facility]  Experts in minimally invasive, targeted treatments performed using imaging guidance    Venous Access Device,  Port Catheter or Tunneled or Non-Tunneled Central Line Placement    Today you had a procedure today to install a venous access device; either a tunneled central vein catheter or a subcutaneous port catheter.    One of our Radiology PAs performed this procedure for you today:  ? Sung Selby PA-C  ? JARRETT Ndiaye PA-C  ? Tito Briceño PA-C  ? Gavi Gutierrez PA-C   ? Xander Mahmood PA-C    After you go home:  - Drink plenty of fluids.  Generally 6-8 (8 ounce) glasses a day is recommended.  - Resume your regular diet unless otherwise ordered by a medical provider.  - Keep any applied tape/gauze dressings clean and dry.  Change tape/gauze dressings if they get wet or soiled.  - You may shower the following day after procedure, however cover and protect from moisture any tape/gauze dressings.  You may let water hit and run over dried skin glue, but do not scrub.  Pat the area dry after showering.  - Port placement incisions are closed with absorbable suture, meaning they do not need to be removed at a later date, and a topical skin adhesive (skin glue).  This glue will wear off in 7-14 days.  Do not remove before this time.  If 14 days have passed and residual glue is present, you may gently remove it.  - Do not apply gels, lotions, or ointments to the glue site for the first 10 days as this may cause the glue to prematurely soften and fail.  - Do not perform strenuous activities or lift greater than 10 pounds for the next three days.  - If there is bleeding or oozing from the procedure site, apply firm pressure to the area for 5-10 minutes.  If the bleeding continues seek medical advice at the numbers below.  - Mild procedure site discomfort can be treated with an ice pack and over-the-counter pain  relievers.        For 24 hours after any sedation used:  - Relax and take it easy.  No strenuous activities.  - Do not drive or operate machines at home or at work.  - No alcohol consumption.  - Do not make any important or legal decisions.    Call our Interventional Radiology (IR) service if:  - If you start bleeding from the procedure site.  If you do start to bleed from the site, lie down and hold some pressure on the site.  Our radiology provider can help you decide if you need to return to the hospital.  - If you have new or worsening pain related to the procedure.  - If you have concerning swelling at the procedure site.  - If you develop persistent nausea or vomiting.  - If you develop hives or a rash or any unexplained itching.  - If you have a fever of greater than 100.5  F and chills in the first 5 days after procedure.  - Any other concerns related to your procedure.      [facility]  Interventional Radiology (IR)  [address] [place] [address]    Contact Number:  [phone]  (IR control desk)  - Monday - Friday 8:00 am - 4:30 pm    After hours for urgent concerns:  [phone]  - After 4:30 pm Monday - Friday, Weekends and Holidays.   - Ask for Interventional Radiology on-call.  Someone is available 24 hours a day.  - [facility] toll free number:  [phone]

## 2018-05-09 NOTE — ANESTHESIA CARE TRANSFER NOTE
Patient: Merissa Silverman    Procedure(s):  Right Central Venous Chest Port Placement - Wound Class: I-Clean    Diagnosis: Chemotherapy Management, Encounter for Medulloblastoma  Diagnosis Additional Information: No value filed.    Anesthesia Type:   MAC     Note:  Airway :Room Air  Patient transferred to:Phase II  Comments: VSS and WNL, denies pain, no PONV, report to Magdalena DAVISHandoff Report: Identifed the Patient, Identified the Reponsible Provider, Reviewed the pertinent medical history, Discussed the surgical course, Reviewed Intra-OP anesthesia mangement and issues during anesthesia, Set expectations for post-procedure period and Allowed opportunity for questions and acknowledgement of understanding      Vitals: (Last set prior to Anesthesia Care Transfer)    CRNA VITALS  5/9/2018 1322 - 5/9/2018 1357      5/9/2018             Resp Rate (set): 10                Electronically Signed By: PHIL Crawley CRNA  May 9, 2018  1:57 PM

## 2018-05-09 NOTE — ANESTHESIA POSTPROCEDURE EVALUATION
Patient: Merissa Silverman    Procedure(s):  Right Central Venous Chest Port Placement - Wound Class: I-Clean    Diagnosis:Chemotherapy Management, Encounter for Medulloblastoma  Diagnosis Additional Information: No value filed.    Anesthesia Type:  MAC    Note:  Anesthesia Post Evaluation    Patient location during evaluation: Phase 2  Patient participation: Able to fully participate in evaluation  Level of consciousness: awake and alert  Pain management: adequate  Airway patency: patent  Cardiovascular status: acceptable  Respiratory status: acceptable  Hydration status: acceptable  PONV: none     Anesthetic complications: None          Last vitals:  Vitals:    05/09/18 1028 05/09/18 1356 05/09/18 1412   BP: 136/87 116/74 123/71   Resp: 16 16 16   Temp: 36.7  C (98  F) 35.7  C (96.2  F) 36.1  C (97  F)   SpO2: 98% 99% 100%         Electronically Signed By: Wander Hall MD  May 9, 2018  2:38 PM

## 2018-05-09 NOTE — ANESTHESIA PREPROCEDURE EVALUATION
Merissa Silverman is a 32 year old female with a PMH of  Chemotherapy Management, Encounter for Medulloblastoma who is scheduled for Procedure(s):  Chest Port Placement - Wound Class: I-Clean    NPO Status: Adequate.  > 6 hours solids, > 2 hours clear liquids.       Past Surgical History:   Procedure Laterality Date     CRANIOTOMY  04/13/2018     dermoid cyst removed right ovary         Anesthesia Evaluation     . Pt has had prior anesthetic. Type: General    No history of anesthetic complications          ROS/MED HX    ENT/Pulmonary:     (+)tobacco use, Past use , . .   (-) asthma and COPD   Neurologic:      (-) CVA, TIA and Neuropathy   Cardiovascular:        (-) hypertension, CAD, irregular heartbeat/palpitations and stent   METS/Exercise Tolerance:     Hematologic:        (-) anemia   Musculoskeletal:         GI/Hepatic:        (-) GERD and liver disease   Renal/Genitourinary:      (-) renal disease   Endo:     (+) Obesity, .   (-) Type I DM, Type II DM and thyroid disease   Psychiatric:         Infectious Disease:  - neg infectious disease ROS       Malignancy:   (+) Malignancy History of Neuro  Neuro CA status post Surgery.          Other:                                    Anesthesia Plan      History & Physical Review  History and physical reviewed and following examination; no interval change.    ASA Status:  3 .        Plan for MAC (with GA backup) with Intravenous induction. Maintenance will be TIVA.  Reason for MAC:  Procedure to face, neck, head or breast  PONV prophylaxis:  Ondansetron (or other 5HT-3) and Dexamethasone or Solumedrol       Postoperative Care  Postoperative pain management:  IV analgesics.      Consents  Anesthetic plan, risks, benefits and alternatives discussed with:  Patient (Including possibility of intraoperative awareness or recall.)..        Wander Hall MD  11:51 AM May 9, 2018                       .

## 2018-05-09 NOTE — IP AVS SNAPSHOT
MRN:[id number]                      After Visit Summary   5/9/2018    Merissa Silverman    MRN: [id number]           Thank you!     Thank you for choosing [facility] for your care. Our goal is always to provide you with excellent care. Hearing back from our patients is one way we can continue to improve our services. Please take a few minutes to complete the written survey that you may receive in the mail after you visit with us. Thank you!        Patient Information     Date Of Birth          1985        About your hospital stay     You were admitted on:  May 9, 2018 You last received care in the:  [facility]    You were discharged on:  May 9, 2018       Who to Call     For medical emergencies, please call 911.  For non-urgent questions about your medical care, please call your primary care provider or clinic, None  For questions related to your surgery, please call your surgery clinic        Attending Provider     Provider Specialty    Sung Selby PA-C Radiology       Primary Care Provider Fax #    Physician No Ref-Primary [phone]      Your next 10 appointments already scheduled     May 14, 2018  9:30 AM CDT   EXTERNAL RADIATION TREATMENT with [facility] RAD ONC DANISHA   Radiation Oncology Clinic ([facility])    [facility]  1st Floor  [address] [address]   [phone]            May 14, 2018  9:45 AM CDT   ON TREATMENT VISIT with Mike Borges MD   Radiation Oncology Clinic ([facility])    [facility]  1st Floor  [address] [address]   [phone]            May 14, 2018 12:15 PM CDT   [facility] Draw with  [facility] DRAW   [facility] ([facility])    [address]  [address]  [facility] [address]   [phone]            May 14, 2018  1:00 PM CDT   Infusion 60 with   ONCOLOGY INFUSION,  18 ATC   [facility] ([facility])    [address]  [address]  [facility] [address]   [phone]            May 15, 2018 10:00 AM CDT   EXTERNAL RADIATION TREATMENT with [facility] RAD ONC DANISHA   Radiation Oncology Clinic ([facility])    [facility]  1st Floor  [address] [address]   [phone]            May 16, 2018 10:00 AM CDT   EXTERNAL RADIATION TREATMENT with [facility] RAD ONC DANISHA   Radiation Oncology Clinic ([facility])    [facility]  1st Floor  [address] [address]   [phone]            May 17, 2018 10:00 AM CDT   EXTERNAL RADIATION TREATMENT with [facility] RAD ONC DANISHA   Radiation Oncology Clinic ([facility])    [facility]  1st Floor  [address] [address]   [phone]            May 18, 2018 10:00 AM CDT   EXTERNAL RADIATION TREATMENT with [facility] RAD ONC DANISHA   Radiation Oncology Clinic ([facility])    [facility]  1st Floor  [address] [facility] [address]   [phone]            May 21, 2018 10:00 AM CDT   EXTERNAL RADIATION TREATMENT with [facility] RAD ONC DANISHA   Radiation Oncology Clinic ([facility])    [facility]  1st Floor  [address] [facility] [address]   [phone]              Further instructions from your care team         A collaboration between [facility] and [facility]  Experts in minimally invasive, targeted treatments performed using imaging guidance    Venous Access Device,  Port Catheter or Tunneled or Non-Tunneled Central Line Placement    Today you had a procedure today to install a venous access device; either a tunneled central vein catheter or a subcutaneous port catheter.    One of our Radiology PAs  performed this procedure for you today:  ? Sung Selby PA-C  ? JARRETT Ndiaye PA-C  ? Tito Briceño PA-C  ? Gavi Gutierrez PA-C   ? Xander Mahmood PA-C    After you go home:  - Drink plenty of fluids.  Generally 6-8 (8 ounce) glasses a day is recommended.  - Resume your regular diet unless otherwise ordered by a medical provider.  - Keep any applied tape/gauze dressings clean and dry.  Change tape/gauze dressings if they get wet or soiled.  - You may shower the following day after procedure, however cover and protect from moisture any tape/gauze dressings.  You may let water hit and run over dried skin glue, but do not scrub.  Pat the area dry after showering.  - Port placement incisions are closed with absorbable suture, meaning they do not need to be removed at a later date, and a topical skin adhesive (skin glue).  This glue will wear off in 7-14 days.  Do not remove before this time.  If 14 days have passed and residual glue is present, you may gently remove it.  - Do not apply gels, lotions, or ointments to the glue site for the first 10 days as this may cause the glue to prematurely soften and fail.  - Do not perform strenuous activities or lift greater than 10 pounds for the next three days.  - If there is bleeding or oozing from the procedure site, apply firm pressure to the area for 5-10 minutes.  If the bleeding continues seek medical advice at the numbers below.  - Mild procedure site discomfort can be treated with an ice pack and over-the-counter pain relievers.        For 24 hours after any sedation used:  - Relax and take it easy.  No strenuous activities.  - Do not drive or operate machines at home or at work.  - No alcohol consumption.  - Do not make any important or legal decisions.    Call our Interventional Radiology (IR) service if:  - If you start bleeding from the procedure site.  If you do start to bleed from the site, lie down and hold some pressure on the site.  Our radiology  provider can help you decide if you need to return to the hospital.  - If you have new or worsening pain related to the procedure.  - If you have concerning swelling at the procedure site.  - If you develop persistent nausea or vomiting.  - If you develop hives or a rash or any unexplained itching.  - If you have a fever of greater than 100.5  F and chills in the first 5 days after procedure.  - Any other concerns related to your procedure.      [facility]  Interventional Radiology (IR)  [address]  2nd Floor, [facility] [place] [address]    Contact Number:  [phone]  (IR control desk)  - Monday - Friday 8:00 am - 4:30 pm    After hours for urgent concerns:  [phone]  - After 4:30 pm Monday - Friday, Weekends and Holidays.   - Ask for Interventional Radiology on-call.  Someone is available 24 hours a day.  - [facility] toll free number:  [phone]        Pending Results     Date and Time Order Name Status Description    5/9/2018 1153 IR CHEST PORT PLACEMENT > 5 YRS OF AGE In process             Admission Information     Date & Time Provider Department Dept. Phone    5/9/2018 Sung Selby PA-C [facility] [phone]      Your Vitals Were     Blood Pressure Temperature Respirations Height Weight Last Period    116/74 96.2  F (35.7  C) (Axillary) 16 1.524 m (5') 95.3 kg (210 lb) 04/18/2018 (Approximate)    Pulse Oximetry BMI (Body Mass Index)                99% 41.01 kg/m2          [employer] Information     [employer] gives you secure access to your electronic health record. If you see a primary care provider, you can also send messages to your care team and make appointments. If you have questions, please call your primary care clinic.  If you do not have a primary care provider, please call [phone] and they will assist you.      [employer] is an electronic gateway that provides easy, online access to your medical records. With  [employer], you can request a clinic appointment, read your test results, renew a prescription or communicate with your care team.     To access your existing account, please contact your [facility] or call [phone] for assistance.        Care EveryWhere ID     This is your Care EveryWhere ID. This could be used by other organizations to access your [facility] medical records  [id number]        Equal Access to Services     WANDER HAMMER : Hadii aad ku hadasho Soomaali, waaxda luqadaha, qaybta kaalmada adeegyada, waxay bryantin hayaan adedeanna nj laBccj . So [facility] [phone].    ATENCIÓN: Si habla esplaurie, tiene a mccurdy disposición servicios gratuitos de asistencia lingüística. Llame al [phone].    We comply with applicable federal civil rights laws and [place] laws. We do not discriminate on the basis of race, color, national origin, age, disability, sex, sexual orientation, or gender identity.               Review of your medicines      UNREVIEWED medicines. Ask your doctor about these medicines        Dose / Directions    acetaminophen 325 MG tablet   Commonly known as:  TYLENOL        Dose:  650 mg   Take 650 mg by mouth   Refills:  0       sennosides 8.6 MG tablet   Commonly known as:  SENOKOT        Dose:  1 tablet   Take 1 tablet by mouth daily   Refills:  0                Protect others around you: Learn how to safely use, store and throw away your medicines at www.disposemymeds.org.             Medication List: This is a list of all your medications and when to take them. Check marks below indicate your daily home schedule. Keep this list as a reference.      Medications           Morning Afternoon Evening Bedtime As Needed    acetaminophen 325 MG tablet   Commonly known as:  TYLENOL   Take 650 mg by mouth   Last time this was given:  975 mg on 5/9/2018 10:54 AM                                sennosides 8.6 MG tablet   Commonly known as:  SENOKOT   Take 1 tablet by mouth  daily

## 2018-05-09 NOTE — BRIEF OP NOTE
Interventional Radiology Brief Post Procedure Note    Procedure: Central venous chest port placement.    Proceduralist: Sona Junior PA-C    Assistant: BRI Davidson.    Time Out: Prior to the start of the procedure and with procedural staff participation, I verbally confirmed the patient s identity using two indicators, relevant allergies, that the procedure was appropriate and matched the consent or emergent situation, and that the correct equipment/implants were available. Immediately prior to starting the procedure I conducted the Time Out with the procedural staff and re-confirmed the patient s name, procedure, and site/side. (The Joint Commission universal protocol was followed.)  Yes        Sedation: Monitored Anesthesia Care (MAC) administered and documented by Anesthesia Care Provider    Findings: Image guided placement of right IJ, 8 Fr., 19 cm., single chamber central venous chest port. Port is ready for use.    Estimated Blood Loss: Less than 10 ml    Fluoroscopy Time: 0.4 minute(s)    SPECIMENS: None    Complications: 1. None     Condition: Stable    Plan: Follow up per primary team. Return to IR for port removal when indicated.    Comments: See dictated procedure note for full details.    Sung Selby PA-C

## 2018-05-11 ENCOUNTER — CARE COORDINATION (OUTPATIENT)
Dept: ONCOLOGY | Facility: CLINIC | Age: 33
End: 2018-05-11

## 2018-05-11 NOTE — PROGRESS NOTES
Placed call to patient to discuss need for hearing test. Discussed Cisplatin can cause hearing loss and baseline hearing test is needed. Patient voiced understanding. States she is doing well so far and has no further questions or concerns at this time. Encouraged patient to call clinic should any needs arise.

## 2018-05-14 ENCOUNTER — ALLIED HEALTH/NURSE VISIT (OUTPATIENT)
Dept: ONCOLOGY | Facility: CLINIC | Age: 33
End: 2018-05-14

## 2018-05-14 ENCOUNTER — APPOINTMENT (OUTPATIENT)
Dept: RADIATION ONCOLOGY | Facility: CLINIC | Age: 33
End: 2018-05-14
Attending: RADIOLOGY
Payer: COMMERCIAL

## 2018-05-14 ENCOUNTER — APPOINTMENT (OUTPATIENT)
Dept: LAB | Facility: CLINIC | Age: 33
End: 2018-05-14
Attending: PSYCHIATRY & NEUROLOGY
Payer: COMMERCIAL

## 2018-05-14 ENCOUNTER — INFUSION THERAPY VISIT (OUTPATIENT)
Dept: ONCOLOGY | Facility: CLINIC | Age: 33
End: 2018-05-14
Attending: PSYCHIATRY & NEUROLOGY
Payer: COMMERCIAL

## 2018-05-14 VITALS
OXYGEN SATURATION: 98 % | HEART RATE: 95 BPM | DIASTOLIC BLOOD PRESSURE: 82 MMHG | SYSTOLIC BLOOD PRESSURE: 117 MMHG | WEIGHT: 207.5 LBS | TEMPERATURE: 97.1 F | RESPIRATION RATE: 16 BRPM | BODY MASS INDEX: 40.52 KG/M2

## 2018-05-14 VITALS — BODY MASS INDEX: 40.82 KG/M2 | WEIGHT: 209 LBS

## 2018-05-14 DIAGNOSIS — C71.6 MEDULLOBLASTOMA OF CEREBELLUM (H): Primary | ICD-10-CM

## 2018-05-14 DIAGNOSIS — Z71.9 VISIT FOR COUNSELING: Primary | ICD-10-CM

## 2018-05-14 LAB
ALBUMIN SERPL-MCNC: 3.6 G/DL (ref 3.4–5)
ALP SERPL-CCNC: 75 U/L (ref 40–150)
ALT SERPL W P-5'-P-CCNC: 29 U/L (ref 0–50)
ANION GAP SERPL CALCULATED.3IONS-SCNC: 8 MMOL/L (ref 3–14)
AST SERPL W P-5'-P-CCNC: 14 U/L (ref 0–45)
BASOPHILS # BLD AUTO: 0 10E9/L (ref 0–0.2)
BASOPHILS NFR BLD AUTO: 0.6 %
BILIRUB SERPL-MCNC: 0.3 MG/DL (ref 0.2–1.3)
BUN SERPL-MCNC: 9 MG/DL (ref 7–30)
CALCIUM SERPL-MCNC: 9 MG/DL (ref 8.5–10.1)
CHLORIDE SERPL-SCNC: 107 MMOL/L (ref 94–109)
CO2 SERPL-SCNC: 23 MMOL/L (ref 20–32)
CREAT SERPL-MCNC: 0.71 MG/DL (ref 0.52–1.04)
DIFFERENTIAL METHOD BLD: NORMAL
EOSINOPHIL # BLD AUTO: 0.2 10E9/L (ref 0–0.7)
EOSINOPHIL NFR BLD AUTO: 2.9 %
ERYTHROCYTE [DISTWIDTH] IN BLOOD BY AUTOMATED COUNT: 13.4 % (ref 10–15)
GFR SERPL CREATININE-BSD FRML MDRD: >90 ML/MIN/1.7M2
GLUCOSE SERPL-MCNC: 142 MG/DL (ref 70–99)
HCT VFR BLD AUTO: 35.5 % (ref 35–47)
HGB BLD-MCNC: 12.2 G/DL (ref 11.7–15.7)
IMM GRANULOCYTES # BLD: 0 10E9/L (ref 0–0.4)
IMM GRANULOCYTES NFR BLD: 0.8 %
LYMPHOCYTES # BLD AUTO: 1 10E9/L (ref 0.8–5.3)
LYMPHOCYTES NFR BLD AUTO: 18.4 %
MCH RBC QN AUTO: 30.6 PG (ref 26.5–33)
MCHC RBC AUTO-ENTMCNC: 34.4 G/DL (ref 31.5–36.5)
MCV RBC AUTO: 89 FL (ref 78–100)
MONOCYTES # BLD AUTO: 0.3 10E9/L (ref 0–1.3)
MONOCYTES NFR BLD AUTO: 5.8 %
NEUTROPHILS # BLD AUTO: 3.7 10E9/L (ref 1.6–8.3)
NEUTROPHILS NFR BLD AUTO: 71.5 %
NRBC # BLD AUTO: 0 10*3/UL
NRBC BLD AUTO-RTO: 0 /100
PLATELET # BLD AUTO: 283 10E9/L (ref 150–450)
POTASSIUM SERPL-SCNC: 3.8 MMOL/L (ref 3.4–5.3)
PROT SERPL-MCNC: 7.2 G/DL (ref 6.8–8.8)
RBC # BLD AUTO: 3.99 10E12/L (ref 3.8–5.2)
SODIUM SERPL-SCNC: 139 MMOL/L (ref 133–144)
WBC # BLD AUTO: 5.2 10E9/L (ref 4–11)

## 2018-05-14 PROCEDURE — 25000128 H RX IP 250 OP 636: Mod: ZF | Performed by: PSYCHIATRY & NEUROLOGY

## 2018-05-14 PROCEDURE — 77386 ZZH IMRT TREATMENT DELIVERY, COMPLEX: CPT | Performed by: RADIOLOGY

## 2018-05-14 PROCEDURE — 96409 CHEMO IV PUSH SNGL DRUG: CPT

## 2018-05-14 PROCEDURE — 85025 COMPLETE CBC W/AUTO DIFF WBC: CPT | Performed by: PSYCHIATRY & NEUROLOGY

## 2018-05-14 PROCEDURE — 25000132 ZZH RX MED GY IP 250 OP 250 PS 637: Mod: ZF | Performed by: RADIOLOGY

## 2018-05-14 PROCEDURE — 80053 COMPREHEN METABOLIC PANEL: CPT | Performed by: PSYCHIATRY & NEUROLOGY

## 2018-05-14 RX ORDER — HEPARIN SODIUM (PORCINE) LOCK FLUSH IV SOLN 100 UNIT/ML 100 UNIT/ML
500 SOLUTION INTRAVENOUS ONCE
Status: COMPLETED | OUTPATIENT
Start: 2018-05-14 | End: 2018-05-14

## 2018-05-14 RX ORDER — HEPARIN SODIUM (PORCINE) LOCK FLUSH IV SOLN 100 UNIT/ML 100 UNIT/ML
5 SOLUTION INTRAVENOUS ONCE
Status: COMPLETED | OUTPATIENT
Start: 2018-05-14 | End: 2018-05-14

## 2018-05-14 RX ORDER — LORAZEPAM 0.5 MG/1
0.5 TABLET ORAL EVERY 6 HOURS PRN
Qty: 10 TABLET | Refills: 0 | Status: SHIPPED | OUTPATIENT
Start: 2018-05-14 | End: 2018-05-21

## 2018-05-14 RX ORDER — LORAZEPAM 0.5 MG/1
0.5 TABLET ORAL ONCE
Status: COMPLETED | OUTPATIENT
Start: 2018-05-14 | End: 2018-05-14

## 2018-05-14 RX ADMIN — SODIUM CHLORIDE, PRESERVATIVE FREE 5 ML: 5 INJECTION INTRAVENOUS at 12:12

## 2018-05-14 RX ADMIN — LORAZEPAM 0.5 MG: 0.5 TABLET ORAL at 09:33

## 2018-05-14 RX ADMIN — SODIUM CHLORIDE 250 ML: 9 INJECTION, SOLUTION INTRAVENOUS at 13:38

## 2018-05-14 RX ADMIN — VINCRISTINE SULFATE 2 MG: 1 INJECTION, SOLUTION INTRAVENOUS at 13:40

## 2018-05-14 RX ADMIN — Medication 500 UNITS: at 13:48

## 2018-05-14 ASSESSMENT — PAIN SCALES - GENERAL: PAINLEVEL: NO PAIN (0)

## 2018-05-14 NOTE — PROGRESS NOTES
RADIATION ONCOLOGY WEEKLY ON TREATMENT VISIT   Encounter Date: May 14, 2018    Patient Name: Merissa Silverman  MRN: [id number]  : 1985     Disease and Stage: Medulloblastoma   Treatment Site: Brain and spine   Current Dose/Planned Total Dose: 180 / 5400 cGy  Daily Fraction Size: 180 cGy/day, 5 times/week  Concurrent Chemotherapy: Yes  Drug and Frequency: Weekly vincristine     Subjective: Ms. Silverman presents to clinic today for her first weekly on treatment visit. She was anxious prior to radiotherapy today and required ativan. She otherwise has no concerns or complaints.     ROS:   Nutrition Alteration  Diet Type: Patient's Preference    Skin  Skin Reaction: No changes     ENT and Mouth Exam  Mucositis - Current: None      Gastrointestinal  Nausea: None      Pain Assessment  0-10 Pain Scale: 0    Objective:   Weight: 94.8 mg    General: Alert, NAD   Neuro: CN II-XII intact. Normal strength and sensation in bilateral upper and lower extremities   Skin: No erythema    Treatment-related toxicities (CTCAE v4.0):  No acute toxicities     Assessment:    Ms. 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 She is currently undergoing adjuvant chemoradiotherapy for improved local disease control.    Plan:   1. Continue radiotherapy  2. Ativan prescription provided for anxiety prior to radiotherapy treatments    Mosaiq chart and setup information reviewed  IGRT/MVCT images reviewed    Medication Review  Med list reviewed with patient?: Yes    Kaushal Garcia MD  Department of Radiation Oncology  [facility]      Attending addendum:   I saw and examined the patient with the resident and agree with the documented plan of care.    Mike Borges MD/PhD  Dept of Radiation Oncology  [facility]

## 2018-05-14 NOTE — PATIENT INSTRUCTIONS
Contact Numbers  [facility]: [phone]  [facility] Triage Nurse:  [phone]  After Hours: [phone]    Call triage with chills and/or temperature greater than or equal to 100.5, uncontrolled nausea/vomiting, diarrhea, constipation, dizziness, shortness of breath, chest pain, bleeding, unexplained bruising, or any new/concerning symptoms, questions/concerns.     If after hours, weekends, or holidays, call the main clinic number. Calls will be forwarded to the hospital , please ask for the adult oncology doctor on call.     If you are having any concerning symptoms or wish to speak to a provider before your next infusion visit, please call your care coordinator or triage to notify them so we can adequately serve you.     If you need a refill on a narcotic prescription, please call triage or your care coordinator before your infusion appointment.             May 2018   Tod Monday Tuesday Wednesday Thursday Friday Saturday             1     2     [facility] CONSULT   10:30 AM   (90 min.)   Mike Borges MD   Radiation Oncology Clinic     [facility] TCT/SIM SUITE    1:00 PM   (60 min.)   Mike Borges MD   Radiation Oncology Clinic 3     4     [facility]   10:30 AM   (15 min.)   [facility] LAB DRAW   [facility] Lab Draw     [facility] NEW   10:45 AM   (60 min.)   Anupama Morrison MD   [facility] LUMBAR PUNCTURE   12:05 PM   (50 min.)   Karla Dorman PA   [facility]     LAB WITH [facility]    2:15 PM   (15 min.)    LAB   [facility] Lab 5       6     7     8     9     Outpatient Visit   10:11 AM   [facility]     IR CHEST PORT PLACEMENT >5 YRS   10:45 AM   (75 min.)   [id number]   [facility]     INSERT PORT VASCULAR ACCESS   12:15 PM   Sung Selby PA-C    OR 10     11     [facility] TREATMENT PLAN VISIT    7:00 AM   (15 min.)   Mike Borges MD   Radiation  Oncology Clinic 12       13     14     [facility] EXTERNAL RADIATION TREATMT    9:30 AM   (15 min.)   [facility] RAD ONC DANISHA   Radiation Oncology Clinic     [facility] ON TREATMENT VISIT    9:45 AM   (15 min.)   Mike Borges MD   Radiation Oncology Clinic     [facility] DRAW   12:15 PM   (15 min.)    [facility] DRAW   [facility] Draw     [facility] ONC INFUSION 60    1:00 PM   (60 min.)   [facility] ONCOLOGY INFUSION   [facility] 15     [facility] EXTERNAL RADIATION TREATMT   10:00 AM   (15 min.)   [facility] RAD ONC DANISHA   Radiation Oncology Clinic 16     [facility] EXTERNAL RADIATION TREATMT   10:00 AM   (15 min.)   [facility] RAD ONC DANISHA   Radiation Oncology Clinic 17     [facility] EXTERNAL RADIATION TREATMT   10:00 AM   (15 min.)   P RAD ONC DANISHA   Radiation Oncology Clinic     CHEMO AUDIOGRAM   10:45 AM   (60 min.)   Tomasa Kelley, Jerson   [facility] 18     [facility] EXTERNAL RADIATION TREATMT   10:00 AM   (15 min.)   [facility] RAD ONC DANISHA   Radiation Oncology Clinic 19       20     21     [facility] EXTERNAL RADIATION TREATMT   10:00 AM   (15 min.)   [facility] RAD ONC DANISHA   Radiation Oncology Clinic     [facility] ON TREATMENT VISIT   10:15 AM   (15 min.)   Mike Borges MD   Radiation Oncology Clinic     [facility] DRAW   11:30 AM   (15 min.)    [facility] DRAW   [facility] Draw     P ONC INFUSION 60   12:00 PM   (60 min.)   [facility] ONCOLOGY INFUSION   [facility] 22     [facility] EXTERNAL RADIATION TREATMT   10:00 AM   (15 min.)   [facility] RAD ONC DANISHA   Radiation Oncology Clinic 23     [facility] EXTERNAL RADIATION TREATMT   10:00 AM   (15 min.)   [facility] RAD ONC DANISHA   Radiation Oncology Clinic 24     [facility] EXTERNAL RADIATION TREATMT   10:00 AM   (15 min.)   [facility] RAD ONC DANISHA   Radiation Oncology Clinic 25     [facility] EXTERNAL RADIATION TREATMT   10:00 AM   (15 min.)   [facility] RAD ONC DANISHA   Radiation Oncology Clinic 26       27     28     29     [facility] EXTERNAL RADIATION TREATMT   10:00 AM   (15 min.)   [facility] RAD ONC DANISHA    Radiation Oncology Clinic     [facility] ON TREATMENT VISIT   10:15 AM   (15 min.)   Mike Borges MD   Radiation Oncology Clinic 30     [facility] EXTERNAL RADIATION TREATMT   10:00 AM   (15 min.)   [facility] RAD ONC DANISHA   Radiation Oncology Clinic     [facility] DRAW   11:00 AM   (15 min.)    [facility] LAB DRAW   [facility] Lab Draw     [facility] ONC INFUSION 60   11:30 AM   (60 min.)   [facility] ONCOLOGY INFUSION   [facility] 31     [facility] EXTERNAL RADIATION TREATMT   10:00 AM   (15 min.)   [facility] RAD ONC DANISHA   Radiation Oncology Clinic                      June 2018 Sunday Monday Tuesday Wednesday Thursday Friday Saturday                            1     [facility] EXTERNAL RADIATION TREATMT   10:00 AM   (15 min.)   [facility] RAD ONC DANISHA   Radiation Oncology Clinic 2       3     4     [facility] EXTERNAL RADIATION TREATMT   10:00 AM   (15 min.)   P RAD ONC DANISHA   Radiation Oncology Clinic     P ON TREATMENT VISIT   10:15 AM   (15 min.)   Mike Borges MD   Radiation Oncology Clinic     [facility] RETURN   10:45 AM   (30 min.)   Anupama Morrison MD   [facility] 5     [facility] EXTERNAL RADIATION TREATMT   10:00 AM   (15 min.)   P RAD ONC DANISHA   Radiation Oncology Clinic     [facility] DRAW   11:00 AM   (15 min.)    [facility] LAB DRAW   [facility] Draw     [facility] ONC INFUSION 60   11:30 AM   (60 min.)   [facility] ONCOLOGY INFUSION   [facility] 6     [facility] EXTERNAL RADIATION TREATMT   10:00 AM   (15 min.)   [facility] RAD ONC DANISHA   Radiation Oncology Clinic 7     [facility] EXTERNAL RADIATION TREATMT   10:00 AM   (15 min.)   [facility] RAD ONC DANISHA   Radiation Oncology Clinic 8     [facility] EXTERNAL RADIATION TREATMT   10:00 AM   (15 min.)   [facility] RAD ONC DANISHA   Radiation Oncology Clinic 9       10     11     [facility] EXTERNAL RADIATION TREATMT   10:00 AM   (15 min.)   [facility] RAD ONC DANISHA   Radiation Oncology Clinic     [facility] ON TREATMENT VISIT   10:15 AM   (15 min.)   Mike Borges MD    Radiation Oncology Clinic     [facility] LAB DRAW   11:00 AM   (15 min.)   [facility] DRAW   [facility]     [facility] ONC INFUSION 60   11:30 AM   (60 min.)   UC ONCOLOGY INFUSION   [facility] 12     [facility] EXTERNAL RADIATION TREATMT   10:00 AM   (15 min.)   [facility] RAD ONC DANISHA   Radiation Oncology Clinic 13     [facility] EXTERNAL RADIATION TREATMT   10:00 AM   (15 min.)   [facility] RAD ONC DANSIHA   Radiation Oncology Clinic 14     [facility] EXTERNAL RADIATION TREATMT   10:00 AM   (15 min.)   [facility] RAD ONC DANISHA   Radiation Oncology Clinic 15     [facility] EXTERNAL RADIATION TREATMT   10:00 AM   (15 min.)   [facility] RAD ONC DANISHA   Radiation Oncology Clinic 16       17     18     [facility] EXTERNAL RADIATION TREATMT   10:00 AM   (15 min.)   P RAD ONC DANISHA   Radiation Oncology Clinic     [facility] ON TREATMENT VISIT   10:15 AM   (15 min.)   Mike Borges MD   Radiation Oncology Clinic     [facility] LAB DRAW   11:00 AM   (15 min.)   [facility] DRAW   [facility] Lab Draw     P ONC INFUSION 60   11:30 AM   (60 min.)   UC ONCOLOGY INFUSION   [facility] 19     [facility] EXTERNAL RADIATION TREATMT   10:00 AM   (15 min.)   P RAD ONC DANISHA   Radiation Oncology Clinic 20     [facility] EXTERNAL RADIATION TREATMT   10:00 AM   (15 min.)   [facility] RAD ONC DANISHA   Radiation Oncology Clinic 21     [facility] EXTERNAL RADIATION TREATMT   10:00 AM   (15 min.)   P RAD ONC DANISHA   Radiation Oncology Clinic 22     [facility] EXTERNAL RADIATION TREATMT   10:00 AM   (15 min.)   P RAD ONC DANISHA   Radiation Oncology Clinic 23       24     25     [facility] EXTERNAL RADIATION TREATMT   10:00 AM   (15 min.)   P RAD ONC DANISHA   Radiation Oncology Clinic     [facility] ON TREATMENT VISIT   10:15 AM   (15 min.)   Mike Borges MD   Radiation Oncology Clinic     [facility] LAB DRAW   11:00 AM   (15 min.)   [facility] DRAW   [facility]     P ONC INFUSION 60   11:30 AM   (60 min.)   UC ONCOLOGY INFUSION   [facility]  Cancer Clinic 26     27     28     29     30                  Lab Results:  Recent Results (from the past 12 hour(s))   CBC with platelets differential    Collection Time: 05/14/18 12:18 PM   Result Value Ref Range    WBC 5.2 4.0 - 11.0 10e9/L    RBC Count 3.99 3.8 - 5.2 10e12/L    Hemoglobin 12.2 11.7 - 15.7 g/dL    Hematocrit 35.5 35.0 - 47.0 %    MCV 89 78 - 100 fl    MCH 30.6 26.5 - 33.0 pg    MCHC 34.4 31.5 - 36.5 g/dL    RDW 13.4 10.0 - 15.0 %    Platelet Count 283 150 - 450 10e9/L    Diff Method Automated Method     % Neutrophils 71.5 %    % Lymphocytes 18.4 %    % Monocytes 5.8 %    % Eosinophils 2.9 %    % Basophils 0.6 %    % Immature Granulocytes 0.8 %    Nucleated RBCs 0 0 /100    Absolute Neutrophil 3.7 1.6 - 8.3 10e9/L    Absolute Lymphocytes 1.0 0.8 - 5.3 10e9/L    Absolute Monocytes 0.3 0.0 - 1.3 10e9/L    Absolute Eosinophils 0.2 0.0 - 0.7 10e9/L    Absolute Basophils 0.0 0.0 - 0.2 10e9/L    Abs Immature Granulocytes 0.0 0 - 0.4 10e9/L    Absolute Nucleated RBC 0.0    Comprehensive metabolic panel    Collection Time: 05/14/18 12:18 PM   Result Value Ref Range    Sodium 139 133 - 144 mmol/L    Potassium 3.8 3.4 - 5.3 mmol/L    Chloride 107 94 - 109 mmol/L    Carbon Dioxide 23 20 - 32 mmol/L    Anion Gap 8 3 - 14 mmol/L    Glucose 142 (H) 70 - 99 mg/dL    Urea Nitrogen 9 7 - 30 mg/dL    Creatinine 0.71 0.52 - 1.04 mg/dL    GFR Estimate >90 >60 mL/min/1.7m2    GFR Estimate If Black >90 >60 mL/min/1.7m2    Calcium 9.0 8.5 - 10.1 mg/dL    Bilirubin Total 0.3 0.2 - 1.3 mg/dL    Albumin 3.6 3.4 - 5.0 g/dL    Protein Total 7.2 6.8 - 8.8 g/dL    Alkaline Phosphatase 75 40 - 150 U/L    ALT 29 0 - 50 U/L    AST 14 0 - 45 U/L

## 2018-05-14 NOTE — MR AVS SNAPSHOT
After Visit Summary   5/14/2018    Merissa Silverman    MRN: [id number]           Patient Information     Date Of Birth          1985        Visit Information        Provider Department      5/14/2018 1:00 PM  18 ATC;  ONCOLOGY INFUSION [facility]        Today's Diagnoses     Medulloblastoma of cerebellum (H)    -  1      Care Instructions    Contact Numbers  [facility] Clinic: [phone]  [facility] Triage Nurse:  [phone]  After Hours: [phone]    Call triage with chills and/or temperature greater than or equal to 100.5, uncontrolled nausea/vomiting, diarrhea, constipation, dizziness, shortness of breath, chest pain, bleeding, unexplained bruising, or any new/concerning symptoms, questions/concerns.     If after hours, weekends, or holidays, call the main clinic number. Calls will be forwarded to the hospital , please ask for the adult oncology doctor on call.     If you are having any concerning symptoms or wish to speak to a provider before your next infusion visit, please call your care coordinator or triage to notify them so we can adequately serve you.     If you need a refill on a narcotic prescription, please call triage or your care coordinator before your infusion appointment.             May 2018   Tod Monday Tuesday Wednesday Thursday Friday Saturday             1     2     [facility] CONSULT   10:30 AM   (90 min.)   Mike Borges MD   Radiation Oncology Clinic     [facility] TCT/SIM SUITE    1:00 PM   (60 min.)   Mike Borges MD   Radiation Oncology Clinic 3     4     [facility] LAB DRAW   10:30 AM   (15 min.)   [facility] LAB DRAW   [facility] Lab Draw     [facility] NEW   10:45 AM   (60 min.)   Anupama Morrison MD   [facility] LUMBAR PUNCTURE   12:05 PM   (50 min.)   Karla Dorman PA   [facility]     LAB WITH  CLINIC    2:15 PM   (15 min.)    LAB   [facility]  Lab 5       6     7     8     9     Outpatient Visit   10:11 AM   [facility]     IR CHEST PORT PLACEMENT >5 YRS   10:45 AM   (75 min.)   [id number]   [facility] ASC Imaging     INSERT PORT VASCULAR ACCESS   12:15 PM   Sung Selby PA-C    OR 10     11     [facility] TREATMENT PLAN VISIT    7:00 AM   (15 min.)   Mike Borges MD   Radiation Oncology Clinic 12       13     14     [facility] EXTERNAL RADIATION TREATMT    9:30 AM   (15 min.)   P RAD ONC DANISHA   Radiation Oncology Clinic     [facility] ON TREATMENT VISIT    9:45 AM   (15 min.)   Mike Borges MD   Radiation Oncology Clinic     P [facility] LAB DRAW   12:15 PM   (15 min.)    [facility] LAB DRAW   [facility] Lab Draw     P ONC INFUSION 60    1:00 PM   (60 min.)   UC ONCOLOGY INFUSION   [facility] 15     UMP EXTERNAL RADIATION TREATMT   10:00 AM   (15 min.)   P RAD ONC DANISHA   Radiation Oncology Clinic 16     [facility] EXTERNAL RADIATION TREATMT   10:00 AM   (15 min.)   P RAD ONC DANISHA   [facility]iation Oncology Clinic 17     [facility] EXTERNAL RADIATION TREATMT   10:00 AM   (15 min.)   P RAD ONC DANISHA   Radiation Oncology Clinic     CHEMO AUDIOGRAM   10:45 AM   (60 min.)   Tomasa Kelley AuD   [facility] Audiology 18     [facility] EXTERNAL RADIATION TREATMT   10:00 AM   (15 min.)   P RAD ONC DANISHA   [facility] Oncology Clinic 19       20     21     [facility] EXTERNAL RADIATION TREATMT   10:00 AM   (15 min.)   P RAD ONC DANISHA   Radiation Oncology Clinic     [facility] ON TREATMENT VISIT   10:15 AM   (15 min.)   Mike Borges MD   Radiation Oncology Clinic     [facility] LAB DRAW   11:30 AM   (15 min.)   [facility] LAB DRAW   [facility] Lab Draw     P ONC INFUSION 60   12:00 PM   (60 min.)   UC ONCOLOGY INFUSION   [facility] 22     UMP EXTERNAL RADIATION TREATMT   10:00 AM   (15 min.)   P RAD ONC DANISHA   [facility] Oncology Clinic 23     [facility] EXTERNAL RADIATION TREATMT    10:00 AM   (15 min.)   UMP RAD ONC DANISHA   [facility] Oncology Clinic 24     UMP EXTERNAL RADIATION TREATMT   10:00 AM   (15 min.)   UMP RAD ONC DANISHA   [facility] Oncology Clinic 25     UMP EXTERNAL RADIATION TREATMT   10:00 AM   (15 min.)   [facility] RAD ONC DANISHA   [facility] Oncology Clinic 26       27     28     29     UMP EXTERNAL RADIATION TREATMT   10:00 AM   (15 min.)   [facility] RAD ONC DANISHA   Radiation Oncology Clinic     [facility] ON TREATMENT VISIT   10:15 AM   (15 min.)   Mike Borges MD   [facility] Oncology Clinic 30     UMP EXTERNAL RADIATION TREATMT   10:00 AM   (15 min.)   [facility] RAD ONC DANISHA   Radiation Oncology Clinic     [facility] LAB DRAW   11:00 AM   (15 min.)   [facility] LAB DRAW   [facility] Lab Draw     P ONC INFUSION 60   11:30 AM   (60 min.)   UC ONCOLOGY INFUSION   [facility] 31     UMP EXTERNAL RADIATION TREATMT   10:00 AM   (15 min.)   P RAD ONC DANISHA   Radiation Oncology Clinic                      June 2018 Sunday Monday Tuesday Wednesday Thursday Friday Saturday                            1     UMP EXTERNAL RADIATION TREATMT   10:00 AM   (15 min.)   UMP RAD ONC DANISHA   Radiation Oncology Clinic 2       3     4     [facility] EXTERNAL RADIATION TREATMT   10:00 AM   (15 min.)   P RAD ONC DANISHA   Radiation Oncology Clinic     [facility] ON TREATMENT VISIT   10:15 AM   (15 min.)   Mike Borges MD   Radiation Oncology Clinic     UMP RETURN   10:45 AM   (30 min.)   Anupama Morrison MD   [facility] 5     [facility] EXTERNAL RADIATION TREATMT   10:00 AM   (15 min.)   P RAD ONC DANISHA   Radiation Oncology Clinic     [facility] LAB DRAW   11:00 AM   (15 min.)   [facility] LAB DRAW   [facility] Lab Draw     UMP ONC INFUSION 60   11:30 AM   (60 min.)   UC ONCOLOGY INFUSION   [facility] 6     UMP EXTERNAL RADIATION TREATMT   10:00 AM   (15 min.)   P RAD ONC DANISHA   [facility] Oncology Clinic 7     UMP EXTERNAL RADIATION  TREATMT   10:00 AM   (15 min.)   UMP RAD ONC DANISHA   [facility] 8     UMP EXTERNAL RADIATION TREATMT   10:00 AM   (15 min.)   UMP RAD ONC DANISHA   [facility] 9       10     11     UMP EXTERNAL RADIATION TREATMT   10:00 AM   (15 min.)   UMP RAD ONC DANISHA   Radiation Oncology Clinic     UMP ON TREATMENT VISIT   10:15 AM   (15 min.)   Mike Borges MD   Radiation Oncology Clinic     [facility] LAB DRAW   11:00 AM   (15 min.)    [facility] LAB DRAW   [facility] Draw     P ONC INFUSION 60   11:30 AM   (60 min.)   UC ONCOLOGY INFUSION   [facility] 12     UMP EXTERNAL RADIATION TREATMT   10:00 AM   (15 min.)   UMP RAD ONC DANISHA   [facility] Oncology Clinic 13     UMP EXTERNAL RADIATION TREATMT   10:00 AM   (15 min.)   UMP RAD ONC DANISHA   [facility] Clinic 14     UMP EXTERNAL RADIATION TREATMT   10:00 AM   (15 min.)   UMP RAD ONC DANISHA   [facility] Oncology Clinic 15     UMP EXTERNAL RADIATION TREATMT   10:00 AM   (15 min.)   UMP RAD ONC DANISHA   [facility] Clinic 16       17     18     UMP EXTERNAL RADIATION TREATMT   10:00 AM   (15 min.)   UMP RAD ONC DANISHA   Radiation Oncology Clinic     [facility] ON TREATMENT VISIT   10:15 AM   (15 min.)   Mike Borges MD   Radiation Oncology Clinic     [facility] LAB DRAW   11:00 AM   (15 min.)    [facility] LAB DRAW   [facility] Draw     P ONC INFUSION 60   11:30 AM   (60 min.)   UC ONCOLOGY INFUSION   [facility] 19     UMP EXTERNAL RADIATION TREATMT   10:00 AM   (15 min.)   UMP RAD ONC DANISHA   [facility]cology Clinic 20     UMP EXTERNAL RADIATION TREATMT   10:00 AM   (15 min.)   UMP RAD ONC DANISHA   [facility] Oncology Clinic 21     UMP EXTERNAL RADIATION TREATMT   10:00 AM   (15 min.)   UMP RAD ONC DANISHA   [facility] Oncology Clinic 22     UMP EXTERNAL RADIATION TREATMT   10:00 AM   (15 min.)   UMP RAD ONC DANISHA   [facility] Oncology Clinic 23       24     25     UMP  EXTERNAL RADIATION TREATMT   10:00 AM   (15 min.)   [facility] RAD ONC DANISHA   Radiation Oncology Clinic     [facility] ON TREATMENT VISIT   10:15 AM   (15 min.)   Mike Borges MD   Radiation Oncology Clinic     [facility] LAB DRAW   11:00 AM   (15 min.)   [facility] LAB DRAW   [facility] Lab Draw     [facility] ONC INFUSION 60   11:30 AM   (60 min.)    ONCOLOGY INFUSION   [facility] 26     27     28     29     30                  Lab Results:  Recent Results (from the past 12 hour(s))   CBC with platelets differential    Collection Time: 05/14/18 12:18 PM   Result Value Ref Range    WBC 5.2 4.0 - 11.0 10e9/L    RBC Count 3.99 3.8 - 5.2 10e12/L    Hemoglobin 12.2 11.7 - 15.7 g/dL    Hematocrit 35.5 35.0 - 47.0 %    MCV 89 78 - 100 fl    MCH 30.6 26.5 - 33.0 pg    MCHC 34.4 31.5 - 36.5 g/dL    RDW 13.4 10.0 - 15.0 %    Platelet Count 283 150 - 450 10e9/L    Diff Method Automated Method     % Neutrophils 71.5 %    % Lymphocytes 18.4 %    % Monocytes 5.8 %    % Eosinophils 2.9 %    % Basophils 0.6 %    % Immature Granulocytes 0.8 %    Nucleated RBCs 0 0 /100    Absolute Neutrophil 3.7 1.6 - 8.3 10e9/L    Absolute Lymphocytes 1.0 0.8 - 5.3 10e9/L    Absolute Monocytes 0.3 0.0 - 1.3 10e9/L    Absolute Eosinophils 0.2 0.0 - 0.7 10e9/L    Absolute Basophils 0.0 0.0 - 0.2 10e9/L    Abs Immature Granulocytes 0.0 0 - 0.4 10e9/L    Absolute Nucleated RBC 0.0    Comprehensive metabolic panel    Collection Time: 05/14/18 12:18 PM   Result Value Ref Range    Sodium 139 133 - 144 mmol/L    Potassium 3.8 3.4 - 5.3 mmol/L    Chloride 107 94 - 109 mmol/L    Carbon Dioxide 23 20 - 32 mmol/L    Anion Gap 8 3 - 14 mmol/L    Glucose 142 (H) 70 - 99 mg/dL    Urea Nitrogen 9 7 - 30 mg/dL    Creatinine 0.71 0.52 - 1.04 mg/dL    GFR Estimate >90 >60 mL/min/1.7m2    GFR Estimate If Black >90 >60 mL/min/1.7m2    Calcium 9.0 8.5 - 10.1 mg/dL    Bilirubin Total 0.3 0.2 - 1.3 mg/dL    Albumin 3.6 3.4 - 5.0 g/dL    Protein  Total 7.2 6.8 - 8.8 g/dL    Alkaline Phosphatase 75 40 - 150 U/L    ALT 29 0 - 50 U/L    AST 14 0 - 45 U/L               Follow-ups after your visit        Your next 10 appointments already scheduled     May 15, 2018 10:00 AM CDT   EXTERNAL RADIATION TREATMENT with [facility] RAD ONC DANISHA   Radiation Oncology Clinic ([facility])    [facility]  1st Floor  [address] [address]   [phone]            May 16, 2018 10:00 AM CDT   EXTERNAL RADIATION TREATMENT with [facility] RAD ONC DANISHA   Radiation Oncology Clinic ([facility])    [facility]  1st Floor  [address] [address]   [phone]            May 17, 2018 10:00 AM CDT   EXTERNAL RADIATION TREATMENT with [facility] RAD ONC DANISAH   Radiation Oncology Clinic ([facility])    [facility]  1st Floor  [address] [address]   [phone]            May 17, 2018 11:00 AM CDT   (Arrive by 10:45 AM)   Chemo Audiogram with Tomasa Kelley [facility] ([facility])    [address]  4th Floor  [place] [address] [phone]            May 18, 2018 10:00 AM CDT   EXTERNAL RADIATION TREATMENT with [facility] RAD ONC DANISHA   Radiation Oncology Clinic ([facility])    [facility]  1st Floor  [address] [address]   [phone]            May 21, 2018 10:00 AM CDT   EXTERNAL RADIATION TREATMENT with [facility] RAD ONC DANISHA   Radiation Oncology Clinic ([facility])    [facility]  1st Floor  [address] [address]   [phone]            May 21, 2018 10:15 AM CDT   ON TREATMENT VISIT with Mike Borges MD   Radiation Oncology Clinic ([facility])    [facility]  1st Floor  [address] [address]   [phone]             May 21, 2018 11:30 AM CDT   [facility] Lab Draw with  [facility] LAB DRAW   [facility] Lab Draw ([facility])    [address]  [address]  [place] [address] [phone]            May 21, 2018 12:00 PM CDT   Infusion 60 with UC ONCOLOGY INFUSION, UC 10 ATC   [facility] ([facility])    [address]  [address]  [place] [address] [phone]              Who to contact     If you have questions or need follow up information about today's clinic visit or your schedule please contact [facility] directly at [phone].  Normal or non-critical lab and imaging results will be communicated to you by Health2[employer], letter or phone within 4 business days after the clinic has received the results. If you do not hear from us within 7 days, please contact the clinic through [employer] or phone. If you have a critical or abnormal lab result, we will notify you by phone as soon as possible.  Submit refill requests through [employer] or call your pharmacy and they will forward the refill request to us. Please allow 3 business days for your refill to be completed.          Additional Information About Your Visit        [employer] Information     [employer] gives you secure access to your electronic health record. If you see a primary care provider, you can also send messages to your care team and make appointments. If you have questions, please call your primary care clinic.  If you do not have a primary care provider, please call [phone] and they will assist you.        Care EveryWhere ID     This is your Care EveryWhere ID. This could be used by other organizations to access your [place] medical records  [id number]        Your Vitals Were     Pulse Temperature Respirations Last Period Pulse Oximetry BMI (Body Mass Index)    95 97.1  F (36.2  C) 16 04/18/2018 (Approximate) 98% 40.52 kg/m2       Blood  Pressure from Last 3 Encounters:   05/14/18 117/82   05/09/18 123/71   05/04/18 129/86    Weight from Last 3 Encounters:   05/14/18 94.1 kg (207 lb 8 oz)   05/14/18 94.8 kg (209 lb)   05/09/18 95.3 kg (210 lb)              We Performed the Following     CBC with platelets differential     Comprehensive metabolic panel          Today's Medication Changes          These changes are accurate as of 5/14/18  4:42 PM.  If you have any questions, ask your nurse or doctor.               Start taking these medicines.        Dose/Directions    LORazepam 0.5 MG tablet   Commonly known as:  ATIVAN   Used for:  Medulloblastoma of cerebellum (H)   Started by:  Mike Borges MD        Dose:  0.5 mg   Take 1 tablet (0.5 mg) by mouth every 6 hours as needed for anxiety   Quantity:  10 tablet   Refills:  0            Where to get your medicines      Some of these will need a paper prescription and others can be bought over the counter.  Ask your nurse if you have questions.     Bring a paper prescription for each of these medications     LORazepam 0.5 MG tablet                Primary Care Provider Fax #    Physician No Ref-Primary [phone]       No address on file        Equal Access to Services     WANDER HAMMER : Hadlindy Beck, perry varela, qaangelta kaalmada yumiko, toño jade . So [facility] [phone].    ATENCIÓN: Si habla español, tiene a mccurdy disposición servicios gratuitos de asistencia lingüística. Llame al [phone].    We comply with applicable federal civil rights laws and [place] laws. We do not discriminate on the basis of race, color, national origin, age, disability, sex, sexual orientation, or gender identity.            Thank you!     Thank you for choosing [facility]  for your care. Our goal is always to provide you with excellent care. Hearing back from our patients is one way we can continue to improve our services. Please take  a few minutes to complete the written survey that you may receive in the mail after your visit with us. Thank you!             Your Updated Medication List - Protect others around you: Learn how to safely use, store and throw away your medicines at www.disposemymeds.org.          This list is accurate as of 5/14/18  4:42 PM.  Always use your most recent med list.                   Brand Name Dispense Instructions for use Diagnosis    acetaminophen 325 MG tablet    TYLENOL     Take 650 mg by mouth        LORazepam 0.5 MG tablet    ATIVAN    10 tablet    Take 1 tablet (0.5 mg) by mouth every 6 hours as needed for anxiety    Medulloblastoma of cerebellum (H)       sennosides 8.6 MG tablet    SENOKOT     Take 1 tablet by mouth daily

## 2018-05-14 NOTE — NURSING NOTE
Chief Complaint   Patient presents with     Port Draw     labs drawn from port by RN     Port accessed, labs drawn, flushed with NS & heparin.  Patient checked in for infusion visit.  Marivel Kovacs RN

## 2018-05-14 NOTE — PROGRESS NOTES
Infusion Nursing Note:  Merissa Silverman presents today for Day 1 Cycle 1 Vincristine.    Patient seen by provider today: No   present during visit today: Not Applicable.    Note: Merissa reports feeling well today. First time getting chemotherapy. Oriented to infusion center, including call light, nutrition station and restrooms. Chemotherapy teaching done previously by Dr. Morrison. Revieweed side effects and schedule reviewed with patient. General and individual chemotherapy side effects reviewed with patient. Pt instructed to call care coordinator, triage (or MD on call if after hours/weekends) with chills/temp >=100.5, questions/concerns. Pt stated understanding of plan.     Intravenous Access:  Implanted Port.    Treatment Conditions:  Lab Results   Component Value Date    HGB 12.2 05/14/2018     Lab Results   Component Value Date    WBC 5.2 05/14/2018      Lab Results   Component Value Date    ANEU 3.7 05/14/2018     Lab Results   Component Value Date     05/14/2018      Lab Results   Component Value Date     05/14/2018                   Lab Results   Component Value Date    POTASSIUM 3.8 05/14/2018           No results found for: MAG         Lab Results   Component Value Date    CR 0.71 05/14/2018                   Lab Results   Component Value Date    ANAND 9.0 05/14/2018                Lab Results   Component Value Date    BILITOTAL 0.3 05/14/2018           Lab Results   Component Value Date    ALBUMIN 3.6 05/14/2018                    Lab Results   Component Value Date    ALT 29 05/14/2018           Lab Results   Component Value Date    AST 14 05/14/2018       Results reviewed, labs MET treatment parameters, ok to proceed with treatment.    Post Infusion Assessment:  Patient tolerated infusion without incident.  Blood return noted pre and post infusion.  Vincristine   Site patent and intact, free from redness, edema or discomfort.  No evidence of extravasations.  Access discontinued per  protocol.    Discharge Plan:   Prescription refills given for Ativan.  AVS to patient via [employer].  Patient will return 05/21 for next appointment.   Patient discharged in stable condition accompanied by: sister.  Departure Mode: Ambulatory.    Vera Abad RN                                                t

## 2018-05-14 NOTE — PROGRESS NOTES
Social Work Follow-Up Encounter Visit  Oncology Clinic    Data/Intervention:  Patient Name:  Merissa Silverman  /Age:  1985 (32 year old)    Reason for Follow-Up:  New diagnosis    Collaborated With:    -RNKURTIS-Luis Fernando  -patient  -infusion RN    Resources Provided:  [employer] information  ACS Navigator resource     Assessment:  Met with pt (family/friend present) to introduce SW and role in clinic/infusion.  SW discussed possible resources and support available to pt (and family) during pt's treatment.  Pt has a 7yr old (almost 8yr old) son.  SW reviewed [employer], their children programs, emergency financial assistance and others.  SW also mentioned ACS navigator available in clinic as well, pt states already meeting Halima from [facility].  Discussed pt talking to son's school, pt states she has and no concerns so far.  Pt and family/friend state appreciation for SW visit and resources available.  Pt not interested in applying today, but possibly in future.       Plan:  Provided patient/family with SW's contact information and availability.   SW available as necessary.       Antonietta Juarez (Martens), ASIYA, MSW   - [facility]  Phone: [phone]  Pager: [phone]  2018

## 2018-05-14 NOTE — MR AVS SNAPSHOT
After Visit Summary   5/14/2018    Merissa Silverman    MRN: [id number]           Patient Information     Date Of Birth          1985        Visit Information        Provider Department      5/14/2018 2:08 PM Antonietta Juarez MSW [facility]        Today's Diagnoses     Visit for counseling    -  1       Follow-ups after your visit        Your next 10 appointments already scheduled     May 15, 2018 10:00 AM CDT   EXTERNAL RADIATION TREATMENT with [facility] RAD ONC DANISHA   Radiation Oncology Clinic ([facility])    [facility]  1st Floor  [address] [address]   [phone]            May 16, 2018 10:00 AM CDT   EXTERNAL RADIATION TREATMENT with [facility] RAD ONC DANISAH   Radiation Oncology Clinic ([facility])    [facility]  1st Floor  [address] [address]   [phone]            May 17, 2018 10:00 AM CDT   EXTERNAL RADIATION TREATMENT with [facility] RAD ONC DANISHA   Radiation Oncology Clinic ([facility])    [facility]  1st Floor  [address] [address]   [phone]            May 17, 2018 11:00 AM CDT   (Arrive by 10:45 AM)   Chemo Audiogram with Jerson Lloyd   [facility] Audiology ([facility])    [address]  [address] [facility] [address]   [phone]            May 18, 2018 10:00 AM CDT   EXTERNAL RADIATION TREATMENT with [facility] RAD ONC DANISHA   Radiation Oncology Clinic ([facility])    [facility]  1st Floor  [address] [address]   [phone]            May 21, 2018 10:00 AM CDT   EXTERNAL RADIATION TREATMENT with [facility] RAD ONC DANISHA   Radiation Oncology Clinic ([facility])    [facility]  1st Floor  [address] [address]   [phone]            May 21, 2018 10:15  AM CDT   ON TREATMENT VISIT with Mike Borges MD   Radiation Oncology Clinic ([facility])    [facility]  1st Floor  [address]  [facility] [address] [phone]            May 21, 2018 11:30 AM CDT   Masonic Lab Draw with  MASONIC LAB DRAW   [facility] Lab Draw ([facility])    [address]  [address]  [facility] [id number] [phone]            May 21, 2018 12:00 PM CDT   Infusion 60 with UC ONCOLOGY INFUSION, UC 10 ATC   [facility] ([facility])    [address]  [address]  [facility] [address] [phone]              Who to contact     If you have questions or need follow up information about today's clinic visit or your schedule please contact [facility] directly at [phone].  Normal or non-critical lab and imaging results will be communicated to you by [employer], letter or phone within 4 business days after the clinic has received the results. If you do not hear from us within 7 days, please contact the clinic through [employer] or phone. If you have a critical or abnormal lab result, we will notify you by phone as soon as possible.  Submit refill requests through [employer] or call your pharmacy and they will forward the refill request to us. Please allow 3 business days for your refill to be completed.          Additional Information About Your Visit        Meet[employer]hart Information     [employer] gives you secure access to your electronic health record. If you see a primary care provider, you can also send messages to your care team and make appointments. If you have questions, please call your primary care clinic.  If you do not have a primary care provider, please call [phone] and they will assist you.        Care EveryWhere ID     This is your Care EveryWhere ID. This could be used by other organizations to access your  [place] medical records  [id number]        Your Vitals Were     Last Period                   04/18/2018 (Approximate)            Blood Pressure from Last 3 Encounters:   05/14/18 117/82   05/09/18 123/71   05/04/18 129/86    Weight from Last 3 Encounters:   05/14/18 94.1 kg (207 lb 8 oz)   05/14/18 94.8 kg (209 lb)   05/09/18 95.3 kg (210 lb)              Today, you had the following     No orders found for display         Today's Medication Changes          These changes are accurate as of 5/14/18  3:41 PM.  If you have any questions, ask your nurse or doctor.               Start taking these medicines.        Dose/Directions    LORazepam 0.5 MG tablet   Commonly known as:  ATIVAN   Used for:  Medulloblastoma of cerebellum (H)   Started by:  Mike Borges MD        Dose:  0.5 mg   Take 1 tablet (0.5 mg) by mouth every 6 hours as needed for anxiety   Quantity:  10 tablet   Refills:  0            Where to get your medicines      Some of these will need a paper prescription and others can be bought over the counter.  Ask your nurse if you have questions.     Bring a paper prescription for each of these medications     LORazepam 0.5 MG tablet                Primary Care Provider Fax #    Physician No Ref-Primary [phone]       No address on file        Equal Access to Services     WANDER HAMMER AH: Lisa nielsono Soraul, waaxda luqadaha, qaybta kaalmada adeegyada, toño bowman. So [facility] [phone].    ATENCIÓN: Si habla español, tiene a mccurdy disposición servicios gratuitos de asistencia lingüística. Llame al [phone].    We comply with applicable federal civil rights laws and [place] laws. We do not discriminate on the basis of race, color, national origin, age, disability, sex, sexual orientation, or gender identity.            Thank you!     Thank you for choosing [facility]  for your care. Our goal is always to provide you with excellent  care. Hearing back from our patients is one way we can continue to improve our services. Please take a few minutes to complete the written survey that you may receive in the mail after your visit with us. Thank you!             Your Updated Medication List - Protect others around you: Learn how to safely use, store and throw away your medicines at www.disposemymeds.org.          This list is accurate as of 5/14/18  3:41 PM.  Always use your most recent med list.                   Brand Name Dispense Instructions for use Diagnosis    acetaminophen 325 MG tablet    TYLENOL     Take 650 mg by mouth        LORazepam 0.5 MG tablet    ATIVAN    10 tablet    Take 1 tablet (0.5 mg) by mouth every 6 hours as needed for anxiety    Medulloblastoma of cerebellum (H)       sennosides 8.6 MG tablet    SENOKOT     Take 1 tablet by mouth daily

## 2018-05-15 ENCOUNTER — APPOINTMENT (OUTPATIENT)
Dept: RADIATION ONCOLOGY | Facility: CLINIC | Age: 33
End: 2018-05-15
Attending: RADIOLOGY
Payer: COMMERCIAL

## 2018-05-15 PROCEDURE — 77386 ZZH IMRT TREATMENT DELIVERY, COMPLEX: CPT | Performed by: RADIOLOGY

## 2018-05-16 ENCOUNTER — APPOINTMENT (OUTPATIENT)
Dept: RADIATION ONCOLOGY | Facility: CLINIC | Age: 33
End: 2018-05-16
Attending: RADIOLOGY
Payer: COMMERCIAL

## 2018-05-16 PROCEDURE — 77386 ZZH IMRT TREATMENT DELIVERY, COMPLEX: CPT | Performed by: RADIOLOGY

## 2018-05-17 ENCOUNTER — APPOINTMENT (OUTPATIENT)
Dept: RADIATION ONCOLOGY | Facility: CLINIC | Age: 33
End: 2018-05-17
Attending: RADIOLOGY
Payer: COMMERCIAL

## 2018-05-17 ENCOUNTER — OFFICE VISIT (OUTPATIENT)
Dept: AUDIOLOGY | Facility: CLINIC | Age: 33
End: 2018-05-17
Payer: COMMERCIAL

## 2018-05-17 DIAGNOSIS — Z51.81 ENCOUNTER FOR MONITORING OTOTOXIC DRUG THERAPY: ICD-10-CM

## 2018-05-17 DIAGNOSIS — Z01.10 EXAMINATION OF EARS AND HEARING: Primary | ICD-10-CM

## 2018-05-17 DIAGNOSIS — Z79.899 ENCOUNTER FOR MONITORING OTOTOXIC DRUG THERAPY: ICD-10-CM

## 2018-05-17 PROCEDURE — 77386 ZZH IMRT TREATMENT DELIVERY, COMPLEX: CPT | Performed by: RADIOLOGY

## 2018-05-17 NOTE — PROGRESS NOTES
AUDIOLOGY REPORT    SUBJECTIVE:  Merissa Silverman is a 32 year old female who was seen in the Audiology Clinic at the [facility] for audiologic evaluation, referred by Anupama Morrison M.D. 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  Her chart indicates that she will be receiving Cisplatin which is a known ototoxic drug. The patient denies bilateral tinnitus, bilateral otalgia, bilateral drainage, bilateral aural fullness, history of noise exposure, and history of ear surgeries. Today's results will serve as the patient's baseline hearing evaluation.    OBJECTIVE:  Otoscopic exam indicated ears are clear of cerumen bilaterally     Pure Tone Thresholds assessed using conventional audiometry with good, reliability from 250-8000 Hz bilaterally using circumaural headphones     RIGHT:  Normal hearing    LEFT:    Normal hearing    High frequency audiometry from 9,000-20,000 Hz was performed and results were in the normal to profound hearing loss range, results for the right ear were within aged norms (potentially outside of aged norms 18-20 kHz but due to equipment limits thresholds could not be tested at a louder level), results for the left ear were outside of aged norms 9 kHz, 12.5-16 kHz and possible 18-20 kHz.     Distortion product otoacoustic emissions were attempted but due to technical issues the testing could not be performed (multiple machines would not connect to the computer and no other room was available for testing).    Tympanogram:    RIGHT: normal eardrum mobility    LEFT:  normal eardrum mobility    Reflexes (reported by stimulus ear):  RIGHT: Ipsilateral: present at normal levels  RIGHT: Contralateral: present at normal levels  LEFT:   Ipsilateral: present at normal levels  LEFT:   Contralateral: present at normal levels    Speech Reception Threshold:    RIGHT: 15 dB HL    LEFT:   10 dB HL    Word Recognition Score:      RIGHT: 100% at 55 dB HL using NU-6 recorded word list.    LEFT:   100% at 55 dB HL using NU-6 recorded word list.      ASSESSMENT:   Hearing evaluation was performed and no hearing loss was found in the range important for understanding speech and language. Today s results were discussed with the patient in detail. Today's results will serve as a baseline hearing evaluation.    PLAN:  Patient was counseled regarding hearing loss and impact on communication.  It is recommended that the patient return for testing based on physician protocol and recommendation, sooner if concerns arise.  Please call this clinic with questions regarding these results or recommendations.        Jerson Lloyd  Audiologist  [place] License  #[id number]

## 2018-05-17 NOTE — MR AVS SNAPSHOT
After Visit Summary   5/17/2018    Merissa Silverman    MRN: [id number]           Patient Information     Date Of Birth          1985        Visit Information        Provider Department      5/17/2018 11:00 AM Tomasa Kelley [facility] Audiology        Today's Diagnoses     Examination of ears and hearing    -  1    Encounter for monitoring ototoxic drug therapy           Follow-ups after your visit        Your next 10 appointments already scheduled     May 18, 2018 10:00 AM CDT   EXTERNAL RADIATION TREATMENT with [facility] RAD ONC DANISHA   Radiation Oncology Clinic ([facility])    [facility]  1st Floor  [address] [address]   [phone]            May 21, 2018 10:00 AM CDT   EXTERNAL RADIATION TREATMENT with [facility] RAD ONC DANISHA   Radiation Oncology Clinic ([facility])    [facility]  1st Floor  [address] [address]   [phone]            May 21, 2018 10:15 AM CDT   ON TREATMENT VISIT with Mike Borges MD   Radiation Oncology Clinic ([facility])    [facility]  1st Floor  [address] [address]   [phone]            May 21, 2018 11:30 AM CDT   Masonic Lab Draw with  MASONIC LAB DRAW   [facility])    [address]  [address]  [facility] [place] [facility] [address]   [phone]            May 21, 2018 12:00 PM CDT   Infusion 60 with UC ONCOLOGY INFUSION, UC 10 ATC   [facility])    [address]  [address]  [facility] [place] [facility] [address]   [phone]            May 22, 2018 10:00 AM CDT   EXTERNAL RADIATION TREATMENT with [facility] RAD ONC DANISHA   Radiation Oncology Clinic ([facility])    [facility]  1st Floor  [address] [address]   [phone]             May 23, 2018 10:00 AM CDT   EXTERNAL RADIATION TREATMENT with [facility] RAD ONC DANISHA   Radiation Oncology Clinic ([facility])    [facility]  1st Floor  [address] [address]   [phone]            May 24, 2018 10:00 AM CDT   EXTERNAL RADIATION TREATMENT with [facility] RAD ONC DANISHA   Radiation Oncology Clinic ([facility])    [facility]  1st Floor  [address] [address]   [phone]            May 25, 2018 10:00 AM CDT   EXTERNAL RADIATION TREATMENT with [facility] RAD ONC DANISHA   Radiation Oncology Clinic ([facility])    [facility]  1st Floor  [address] [address] [phone]              Who to contact     Please call your clinic at [phone] to:    Ask questions about your health    Make or cancel appointments    Discuss your medicines    Learn about your test results    Speak to your doctor            Additional Information About Your Visit        [employer] Information     [employer] gives you secure access to your electronic health record. If you see a primary care provider, you can also send messages to your care team and make appointments. If you have questions, please call your primary care clinic.  If you do not have a primary care provider, please call [phone] and they will assist you.      [employer] is an electronic gateway that provides easy, online access to your medical records. With [employer], you can request a clinic appointment, read your test results, renew a prescription or communicate with your care team.     To access your existing account, please contact your [facility] or call [phone] for assistance.        Care EveryWhere ID     This is your Care EveryWhere ID. This could be used by other organizations to access your [place] medical records  [id number]        Your Vitals Were     Last Period                    04/18/2018 (Approximate)            Blood Pressure from Last 3 Encounters:   05/14/18 117/82   05/09/18 123/71   05/04/18 129/86    Weight from Last 3 Encounters:   05/14/18 94.1 kg (207 lb 8 oz)   05/14/18 94.8 kg (209 lb)   05/09/18 95.3 kg (210 lb)              We Performed the Following     AUDIOGRAM/TYMPANOGRAM - INTERFACE     [facility] Audiometry Thrshld Eval & Speech Recog (68662)     Tymps / Reflex   ([address])        Primary Care Provider Fax #    Physician No Ref-Primary [phone]       No address on file        Equal Access to Services     [facility] : Hadii handy nielsono Ebony, waaxda luqadaha, qaybta kaalmada yumiko, toño jade . So [facility] [phone].    ATENCIÓN: Si habla español, tiene a mccurdy disposición servicios gratuitos de asistencia lingüística. Llame al [phone].    We comply with applicable federal civil rights laws and [place] laws. We do not discriminate on the basis of race, color, national origin, age, disability, sex, sexual orientation, or gender identity.            Thank you!     Thank you for choosing [facility] AUDIOLOGY  for your care. Our goal is always to provide you with excellent care. Hearing back from our patients is one way we can continue to improve our services. Please take a few minutes to complete the written survey that you may receive in the mail after your visit with us. Thank you!             Your Updated Medication List - Protect others around you: Learn how to safely use, store and throw away your medicines at www.disposemymeds.org.          This list is accurate as of 5/17/18  1:12 PM.  Always use your most recent med list.                   Brand Name Dispense Instructions for use Diagnosis    acetaminophen 325 MG tablet    TYLENOL     Take 650 mg by mouth        LORazepam 0.5 MG tablet    ATIVAN    10 tablet    Take 1 tablet (0.5 mg) by mouth every 6 hours as needed for anxiety    Medulloblastoma of cerebellum  (H)       sennosides 8.6 MG tablet    SENOKOT     Take 1 tablet by mouth daily

## 2018-05-18 ENCOUNTER — APPOINTMENT (OUTPATIENT)
Dept: RADIATION ONCOLOGY | Facility: CLINIC | Age: 33
End: 2018-05-18
Attending: RADIOLOGY
Payer: COMMERCIAL

## 2018-05-18 DIAGNOSIS — C71.6 MEDULLOBLASTOMA OF CEREBELLUM (H): Primary | ICD-10-CM

## 2018-05-18 PROCEDURE — 25000125 ZZHC RX 250: Mod: ZF | Performed by: RADIOLOGY

## 2018-05-18 PROCEDURE — 77336 RADIATION PHYSICS CONSULT: CPT | Performed by: RADIOLOGY

## 2018-05-18 PROCEDURE — 77386 ZZH IMRT TREATMENT DELIVERY, COMPLEX: CPT | Performed by: RADIOLOGY

## 2018-05-18 RX ORDER — ONDANSETRON 8 MG/1
8 TABLET, ORALLY DISINTEGRATING ORAL EVERY 8 HOURS PRN
Qty: 60 TABLET | Refills: 1 | Status: SHIPPED | OUTPATIENT
Start: 2018-05-18 | End: 2018-08-13

## 2018-05-18 RX ORDER — ONDANSETRON 4 MG/1
8 TABLET, ORALLY DISINTEGRATING ORAL EVERY 6 HOURS PRN
Status: DISCONTINUED | OUTPATIENT
Start: 2018-05-18 | End: 2018-05-18 | Stop reason: HOSPADM

## 2018-05-18 RX ADMIN — ONDANSETRON 8 MG: 4 TABLET, ORALLY DISINTEGRATING ORAL at 11:59

## 2018-05-21 ENCOUNTER — APPOINTMENT (OUTPATIENT)
Dept: RADIATION ONCOLOGY | Facility: CLINIC | Age: 33
End: 2018-05-21
Attending: RADIOLOGY
Payer: COMMERCIAL

## 2018-05-21 ENCOUNTER — APPOINTMENT (OUTPATIENT)
Dept: LAB | Facility: CLINIC | Age: 33
End: 2018-05-21
Attending: PSYCHIATRY & NEUROLOGY
Payer: COMMERCIAL

## 2018-05-21 ENCOUNTER — INFUSION THERAPY VISIT (OUTPATIENT)
Dept: ONCOLOGY | Facility: CLINIC | Age: 33
End: 2018-05-21
Attending: PSYCHIATRY & NEUROLOGY
Payer: COMMERCIAL

## 2018-05-21 VITALS
HEART RATE: 79 BPM | OXYGEN SATURATION: 100 % | DIASTOLIC BLOOD PRESSURE: 85 MMHG | WEIGHT: 204.4 LBS | BODY MASS INDEX: 39.92 KG/M2 | RESPIRATION RATE: 24 BRPM | TEMPERATURE: 97.8 F | SYSTOLIC BLOOD PRESSURE: 123 MMHG

## 2018-05-21 VITALS — BODY MASS INDEX: 40.23 KG/M2 | WEIGHT: 206 LBS

## 2018-05-21 DIAGNOSIS — C71.6 MEDULLOBLASTOMA OF CEREBELLUM (H): Primary | ICD-10-CM

## 2018-05-21 DIAGNOSIS — C71.6 MEDULLOBLASTOMA OF CEREBELLUM (H): ICD-10-CM

## 2018-05-21 LAB
ALBUMIN SERPL-MCNC: 3.8 G/DL (ref 3.4–5)
ALP SERPL-CCNC: 67 U/L (ref 40–150)
ALT SERPL W P-5'-P-CCNC: 23 U/L (ref 0–50)
ANION GAP SERPL CALCULATED.3IONS-SCNC: 8 MMOL/L (ref 3–14)
AST SERPL W P-5'-P-CCNC: 18 U/L (ref 0–45)
BASOPHILS # BLD AUTO: 0 10E9/L (ref 0–0.2)
BASOPHILS NFR BLD AUTO: 0.6 %
BILIRUB SERPL-MCNC: 0.5 MG/DL (ref 0.2–1.3)
BUN SERPL-MCNC: 6 MG/DL (ref 7–30)
CALCIUM SERPL-MCNC: 9.2 MG/DL (ref 8.5–10.1)
CHLORIDE SERPL-SCNC: 105 MMOL/L (ref 94–109)
CO2 SERPL-SCNC: 24 MMOL/L (ref 20–32)
CREAT SERPL-MCNC: 0.59 MG/DL (ref 0.52–1.04)
DIFFERENTIAL METHOD BLD: ABNORMAL
EOSINOPHIL # BLD AUTO: 0 10E9/L (ref 0–0.7)
EOSINOPHIL NFR BLD AUTO: 0.6 %
ERYTHROCYTE [DISTWIDTH] IN BLOOD BY AUTOMATED COUNT: 12.5 % (ref 10–15)
GFR SERPL CREATININE-BSD FRML MDRD: >90 ML/MIN/1.7M2
GLUCOSE SERPL-MCNC: 81 MG/DL (ref 70–99)
HCT VFR BLD AUTO: 32.9 % (ref 35–47)
HGB BLD-MCNC: 11.4 G/DL (ref 11.7–15.7)
IMM GRANULOCYTES # BLD: 0.1 10E9/L (ref 0–0.4)
IMM GRANULOCYTES NFR BLD: 1.5 %
LYMPHOCYTES # BLD AUTO: 0.4 10E9/L (ref 0.8–5.3)
LYMPHOCYTES NFR BLD AUTO: 12.2 %
MCH RBC QN AUTO: 30.4 PG (ref 26.5–33)
MCHC RBC AUTO-ENTMCNC: 34.7 G/DL (ref 31.5–36.5)
MCV RBC AUTO: 88 FL (ref 78–100)
MONOCYTES # BLD AUTO: 0.2 10E9/L (ref 0–1.3)
MONOCYTES NFR BLD AUTO: 4.8 %
NEUTROPHILS # BLD AUTO: 2.7 10E9/L (ref 1.6–8.3)
NEUTROPHILS NFR BLD AUTO: 80.3 %
NRBC # BLD AUTO: 0 10*3/UL
NRBC BLD AUTO-RTO: 0 /100
PLATELET # BLD AUTO: 329 10E9/L (ref 150–450)
POTASSIUM SERPL-SCNC: 3.8 MMOL/L (ref 3.4–5.3)
PROT SERPL-MCNC: 7.4 G/DL (ref 6.8–8.8)
RBC # BLD AUTO: 3.75 10E12/L (ref 3.8–5.2)
SODIUM SERPL-SCNC: 138 MMOL/L (ref 133–144)
WBC # BLD AUTO: 3.4 10E9/L (ref 4–11)

## 2018-05-21 PROCEDURE — 77386 ZZH IMRT TREATMENT DELIVERY, COMPLEX: CPT | Performed by: RADIOLOGY

## 2018-05-21 PROCEDURE — 25000128 H RX IP 250 OP 636: Mod: ZF | Performed by: PSYCHIATRY & NEUROLOGY

## 2018-05-21 PROCEDURE — 85025 COMPLETE CBC W/AUTO DIFF WBC: CPT | Performed by: PSYCHIATRY & NEUROLOGY

## 2018-05-21 PROCEDURE — 96409 CHEMO IV PUSH SNGL DRUG: CPT

## 2018-05-21 PROCEDURE — 80053 COMPREHEN METABOLIC PANEL: CPT | Performed by: PSYCHIATRY & NEUROLOGY

## 2018-05-21 RX ORDER — HEPARIN SODIUM (PORCINE) LOCK FLUSH IV SOLN 100 UNIT/ML 100 UNIT/ML
5 SOLUTION INTRAVENOUS ONCE
Status: COMPLETED | OUTPATIENT
Start: 2018-05-21 | End: 2018-05-21

## 2018-05-21 RX ORDER — HEPARIN SODIUM (PORCINE) LOCK FLUSH IV SOLN 100 UNIT/ML 100 UNIT/ML
5 SOLUTION INTRAVENOUS EVERY 8 HOURS
Status: DISCONTINUED | OUTPATIENT
Start: 2018-05-21 | End: 2018-05-21 | Stop reason: HOSPADM

## 2018-05-21 RX ORDER — LORAZEPAM 0.5 MG/1
0.5 TABLET ORAL EVERY 6 HOURS PRN
Qty: 30 TABLET | Refills: 0 | Status: SHIPPED | OUTPATIENT
Start: 2018-05-21 | End: 2018-06-11

## 2018-05-21 RX ADMIN — Medication 5 ML: at 13:07

## 2018-05-21 RX ADMIN — VINCRISTINE SULFATE 2 MG: 1 INJECTION, SOLUTION INTRAVENOUS at 12:56

## 2018-05-21 RX ADMIN — SODIUM CHLORIDE 250 ML: 9 INJECTION, SOLUTION INTRAVENOUS at 12:16

## 2018-05-21 RX ADMIN — Medication 5 ML: at 11:44

## 2018-05-21 ASSESSMENT — PAIN SCALES - GENERAL: PAINLEVEL: NO PAIN (0)

## 2018-05-21 NOTE — MR AVS SNAPSHOT
After Visit Summary   5/21/2018    Merissa Silverman    MRN: [id number]           Patient Information     Date Of Birth          1985        Visit Information        Provider Department      5/21/2018 10:15 AM Mike Borges MD Radiation Oncology Clinic        Today's Diagnoses     Medulloblastoma of cerebellum (H)           Follow-ups after your visit        Your next 10 appointments already scheduled     May 22, 2018 10:00 AM CDT   EXTERNAL RADIATION TREATMENT with [facility] RAD ONC DANISHA   Radiation Oncology Clinic ([facility])    [facility]  1st Floor  [address] [address]   [phone]            May 23, 2018 10:00 AM CDT   EXTERNAL RADIATION TREATMENT with [facility] RAD ONC DANISHA   Radiation Oncology Clinic ([facility])    [facility]  1st Floor  [address] [address]   [phone]            May 24, 2018 10:00 AM CDT   EXTERNAL RADIATION TREATMENT with [facility] RAD ONC DANISHA   Radiation Oncology Clinic ([facility])    [facility]  1st Floor  [address] [address]   [phone]            May 25, 2018 10:00 AM CDT   EXTERNAL RADIATION TREATMENT with [facility] RAD ONC DANISHA   Radiation Oncology Clinic ([facility])    [facility]  1st Floor  [address] [address]   [phone]            May 29, 2018 10:00 AM CDT   EXTERNAL RADIATION TREATMENT with [facility] RAD ONC DANISHA   Radiation Oncology Clinic ([facility])    [facility]  1st Floor  [address] [address]   [phone]            May 30, 2018 10:00 AM CDT   EXTERNAL RADIATION TREATMENT with [facility] RAD ONC DANISHA   Radiation Oncology Clinic ([facility])    [facility]  1st Floor  [address] [address]   [phone]             May 30, 2018 10:15 AM CDT   ON TREATMENT VISIT with Leslye Crenshaw MD   Radiation Oncology Clinic ([facility])    [facility]  1st Floor  [address]  [facility] [address] [phone]            May 30, 2018 11:00 AM CDT   Masonic Lab Draw with UC MASONIC LAB DRAW   [facility] Lab Draw ([facility])    [address]  [address]  [facility] [id number] [phone]            May 30, 2018 11:30 AM CDT   Infusion 60 with UC ONCOLOGY INFUSION, UC 13 ATC   [facility]    [address]  [address]  [facility] [id number] [phone]              Who to contact     Please call your clinic at [phone] to:    Ask questions about your health    Make or cancel appointments    Discuss your medicines    Learn about your test results    Speak to your doctor            Additional Information About Your Visit        [employer] Information     [employer] gives you secure access to your electronic health record. If you see a primary care provider, you can also send messages to your care team and make appointments. If you have questions, please call your primary care clinic.  If you do not have a primary care provider, please call [phone] and they will assist you.      [employer] is an electronic gateway that provides easy, online access to your medical records. With [employer], you can request a clinic appointment, read your test results, renew a prescription or communicate with your care team.     To access your existing account, please contact your [facility] or call [phone] for assistance.        Care EveryWhere ID     This is your Care EveryWhere ID. This could be used by other organizations to access your [place] medical records  [id number]        Your Vitals Were     BMI (Body Mass Index)                   40.23 kg/m2             Blood Pressure from Last 3 Encounters:   05/21/18 123/85   05/14/18 117/82   05/09/18 123/71    Weight from Last 3 Encounters:   05/21/18 92.7 kg (204 lb 6.4 oz)   05/21/18 93.4 kg (206 lb)   05/14/18 94.1 kg (207 lb 8 oz)              Today, you had the following     No orders found for display         Where to get your medicines      Some of these will need a paper prescription and others can be bought over the counter.  Ask your nurse if you have questions.     Bring a paper prescription for each of these medications     LORazepam 0.5 MG tablet          Primary Care Provider Fax #    Physician No Ref-Primary [phone]       No address on file        Equal Access to Services     WANDER HAMMER : Lisa Beck, perry varela, cherelle calderon, toño bowman. So [facility] [phone].    ATENCIÓN: Si habla español, tiene a mccurdy disposición servicios gratuitos de asistencia lingüística. Llame al [phone].    We comply with applicable federal civil rights laws and [place] laws. We do not discriminate on the basis of race, color, national origin, age, disability, sex, sexual orientation, or gender identity.            Thank you!     Thank you for choosing RADIATION ONCOLOGY CLINIC  for your care. Our goal is always to provide you with excellent care. Hearing back from our patients is one way we can continue to improve our services. Please take a few minutes to complete the written survey that you may receive in the mail after your visit with us. Thank you!             Your Updated Medication List - Protect others around you: Learn how to safely use, store and throw away your medicines at www.disposemymeds.org.          This list is accurate as of 5/21/18  2:38 PM.  Always use your most recent med list.                   Brand Name Dispense Instructions for use Diagnosis    acetaminophen 325 MG tablet    TYLENOL     Take 650 mg by mouth        LORazepam 0.5 MG  tablet    ATIVAN    30 tablet    Take 1 tablet (0.5 mg) by mouth every 6 hours as needed for anxiety    Medulloblastoma of cerebellum (H)       ondansetron 8 MG ODT tab    ZOFRAN-ODT    60 tablet    Take 1 tablet (8 mg) by mouth every 8 hours as needed for nausea    Medulloblastoma of cerebellum (H)       sennosides 8.6 MG tablet    SENOKOT     Take 1 tablet by mouth daily

## 2018-05-21 NOTE — PATIENT INSTRUCTIONS
Contact Numbers    [facility] [place]: [phone]  [facility] [place] and after hours / weekends / holidays:  [phone]      Please call the triage or after hours line if you experience a temperature greater than or equal to 100.5, shaking chills, have uncontrolled nausea, vomiting and/or diarrhea, dizziness, shortness of breath, chest pain, bleeding, unexplained bruising, or if you have any other new/concerning symptoms, questions or concerns.      If you are having any concerning symptoms or wish to speak to a provider before your next infusion visit, please call your care coordinator or triage to notify them so we can adequately serve you.     If you need a refill on a narcotic prescription or other medication, please call before your infusion appointment.                   May 2018   Tod Monday Tuesday Wednesday Thursday Friday Saturday             1     2     P CONSULT   10:30 AM   (90 min.)   Mike Borges MD   [facility]     [facility] TCT/SIM SUITE    1:00 PM   (60 min.)   Mike Borges MD   [facility] 3     4     [facility] DRAW   10:30 AM   (15 min.)    [facility] DRAW   [facility] Lab Draw     [facility] NEW   10:45 AM   (60 min.)   Anupama Morrison MD   [facility] LUMBAR PUNCTURE   12:05 PM   (50 min.)   Karla Dorman PA   [facility]     LAB WITH [facility] CL[facility]IC    2:15 PM   (15 min.)    LAB   [facility] Lab 5       6     7     8     9     Outpatient Visit   10:11 AM   [facility]     IR CHEST PORT PLACEMENT >5 YRS   10:45 AM   (75 min.)   [id number]   [facility] Imaging     INSERT PORT VASCULAR ACCESS   12:15 PM   Sung Selby PA-C    OR 10     11     [facility] TREATMENT PLAN VISIT    7:00 AM   (15 min.)   Mike Borges MD   [facility] 12       13     14     [facility] EXTERNAL RADIATION TREATMT    9:30 AM   (15 min.)   [facility] RAD ONC DANISHA    [facility]cology Clinic     [facility] ON TREATMENT VISIT    9:45 AM   (15 min.)   Mike Borges MD   [facility]cology [facility]     [facility] DRAW   12:15 PM   (15 min.)    [facility] DRAW   [facility] Draw     P ONC INFUSION 60    1:00 PM   (60 min.)   [facility] ONCOLOGY INFUSION   [facility] 15     [facility] EXTERNAL RADIATION TREATMT   10:00 AM   (15 min.)   P RAD ONC DANISHA   [facility] 16     [facility] EXTERNAL RADIATION TREATMT   10:00 AM   (15 min.)   P RAD ONC DANISHA   [facility] 17     [facility] EXTERNAL RADIATION TREATMT   10:00 AM   (15 min.)   [facility] RAD ONC DANISHA   [facility]     CHEMO AUDIOGRAM   10:45 AM   (60 min.)   Tomasa Kelley AuD   [facility] Audiology 18     [facility] EXTERNAL RADIATION TREATMT   10:00 AM   (15 min.)   [facility] RAD ONC DANISHA   [facility]cology [facility]     [facility] RETURN   11:00 AM   (30 min.)   Nurse, [facility] Rad Onc   [facility] 19       20     21     [facility] EXTERNAL RADIATION TREATMT   10:00 AM   (15 min.)   [facility] RAD ONC DANISHA   [facility]     [facility] ON TREATMENT VISIT   10:15 AM   (15 min.)   Mike Borges MD   [facility]     [facility] DRAW   11:30 AM   (15 min.)    [facility] DRAW   [facility] Draw     [facility] ONC INFUSION 60   12:00 PM   (60 min.)   [facility] ONCOLOGY INFUSION   [facility] 22     [facility] EXTERNAL RADIATION TREATMT   10:00 AM   (15 min.)   P RAD ONC DANISHA   [facility] 23     [facility] EXTERNAL RADIATION TREATMT   10:00 AM   (15 min.)   P RAD ONC DANISHA   [facility] 24     [facility] EXTERNAL RADIATION TREATMT   10:00 AM   (15 min.)   P RAD ONC DANISHA   [facility] 25     [facility] EXTERNAL RADIATION TREATMT   10:00 AM   (15 min.)   P RAD ONC DANISHA   [facility] 26       27     28     29     [facility] EXTERNAL RADIATION TREATMT   10:00 AM   (15 min.)   [facility] RAD ONC DANISHA   [facility]  30     [facility] EXTERNAL RADIATION TREATMT   10:00 AM   (15 min.)   [facility] RAD ONC DANISHA   [facility]     [facility] ON TREATMENT VISIT   10:15 AM   (15 min.)   Leslye Crenshaw MD   [facility]cology [facility]     [facility] DRAW   11:00 AM   (15 min.)    [facility]     [facility] ONC INFUSION 60   11:30 AM   (60 min.)   [facility] ONCOLOGY INFUSION   [facility] 31     [facility] EXTERNAL RADIATION TREATMT   10:00 AM   (15 min.)   P RAD ONC DANISHA   [facility]                      June 2018 Sunday Monday Tuesday Wednesday Thursday Friday Saturday                            1     [facility] EXTERNAL RADIATION TREATMT   10:00 AM   (15 min.)   P RAD ONC DANISHA   [facility] 2       3     4     [facility] EXTERNAL RADIATION TREATMT   10:00 AM   (15 min.)   P RAD ONC DANISHA   [facility]     [facility] ON TREATMENT VISIT   10:15 AM   (15 min.)   Mike Borges MD   [facility]     [facility] RETURN   10:45 AM   (30 min.)   Anupama Morrison MD   [facility] 5     [facility] EXTERNAL RADIATION TREATMT   10:00 AM   (15 min.)   P RAD ONC DANISHA   [facility]     [facility] DRAW   11:00 AM   (15 min.)    [facility] DRAW   [facility] Lab Draw     P ONC INFUSION 60   11:30 AM   (60 min.)   [facility] ONCOLOGY INFUSION   [facility] 6     [facility] EXTERNAL RADIATION TREATMT   10:00 AM   (15 min.)   P RAD ONC DANISHA   [facility] 7     [facility] EXTERNAL RADIATION TREATMT   10:00 AM   (15 min.)   P RAD ONC DANISHA   [facility] 8     [facility] EXTERNAL RADIATION TREATMT   10:00 AM   (15 min.)   P RAD ONC DANISHA   [facility] 9       10     11     [facility] EXTERNAL RADIATION TREATMT   10:00 AM   (15 min.)   P RAD ONC DANISHA   [facility]     [facility] ON TREATMENT VISIT   10:15 AM   (15 min.)   Mike Borges MD   [facility]     [facility]  [facility] DRAW   11:00 AM   (15 min.)    [facility] DRAW   [facility] Draw     [facility] ONC INFUSION 60   11:30 AM   (60 min.)   [facility] ONCOLOGY INFUSION   [facility] 12     [facility] EXTERNAL RADIATION TREATMT   10:00 AM   (15 min.)   [facility] RAD ONC DANISHA   [facility] 13     [facility] EXTERNAL RADIATION TREATMT   10:00 AM   (15 min.)   [facility] RAD ONC DANISHA   [facility] 14     [facility] EXTERNAL RADIATION TREATMT   10:00 AM   (15 min.)   [facility] RAD ONC DANIHSA   [facility] 15     [facility] EXTERNAL RADIATION TREATMT   10:00 AM   (15 min.)   [facility] RAD ONC DANISHA   [facility] 16       17     18     [facility] EXTERNAL RADIATION TREATMT   10:00 AM   (15 min.)   P RAD ONC DANISHA   [facility]     [facility] ON TREATMENT VISIT   10:15 AM   (15 min.)   Mike Borges MD   [facility]cology [facility]     [facility] DRAW   11:00 AM   (15 min.)    [facility]     P ONC INFUSION 60   11:30 AM   (60 min.)   [facility] ONCOLOGY INFUSION   [facility] 19     [facility] EXTERNAL RADIATION TREATMT   10:00 AM   (15 min.)   [facility] RAD ONC DANISHA   [facility] 20     [facility] EXTERNAL RADIATION TREATMT   10:00 AM   (15 min.)   [facility] RAD ONC DANISHA   [facility] 21     [facility] EXTERNAL RADIATION TREATMT   10:00 AM   (15 min.)   [facility] RAD ONC DAINSHA   [facility] 22     [facility] EXTERNAL RADIATION TREATMT   10:00 AM   (15 min.)   [facility] RAD ONC DANISHA   [facility] 23       24     25     [facility] EXTERNAL RADIATION TREATMT   10:00 AM   (15 min.)   [facility] RAD ONC DANISHA   [facility]     [facility] ON TREATMENT VISIT   10:15 AM   (15 min.)   Mike Borges MD   [facility]     [facility] DRAW   11:00 AM   (15 min.)   [facility] DRAW   [facility] Draw     P ONC INFUSION 60   11:30 AM   (60 min.)   [facility] ONCOLOGY INFUSION   [facility] 26     27     28      29     30                 Recent Results (from the past 24 hour(s))   CBC with platelets differential    Collection Time: 05/21/18 11:45 AM   Result Value Ref Range    WBC 3.4 (L) 4.0 - 11.0 10e9/L    RBC Count 3.75 (L) 3.8 - 5.2 10e12/L    Hemoglobin 11.4 (L) 11.7 - 15.7 g/dL    Hematocrit 32.9 (L) 35.0 - 47.0 %    MCV 88 78 - 100 fl    MCH 30.4 26.5 - 33.0 pg    MCHC 34.7 31.5 - 36.5 g/dL    RDW 12.5 10.0 - 15.0 %    Platelet Count 329 150 - 450 10e9/L    Diff Method Automated Method     % Neutrophils 80.3 %    % Lymphocytes 12.2 %    % Monocytes 4.8 %    % Eosinophils 0.6 %    % Basophils 0.6 %    % Immature Granulocytes 1.5 %    Nucleated RBCs 0 0 /100    Absolute Neutrophil 2.7 1.6 - 8.3 10e9/L    Absolute Lymphocytes 0.4 (L) 0.8 - 5.3 10e9/L    Absolute Monocytes 0.2 0.0 - 1.3 10e9/L    Absolute Eosinophils 0.0 0.0 - 0.7 10e9/L    Absolute Basophils 0.0 0.0 - 0.2 10e9/L    Abs Immature Granulocytes 0.1 0 - 0.4 10e9/L    Absolute Nucleated RBC 0.0    Comprehensive metabolic panel    Collection Time: 05/21/18 11:45 AM   Result Value Ref Range    Sodium 138 133 - 144 mmol/L    Potassium 3.8 3.4 - 5.3 mmol/L    Chloride 105 94 - 109 mmol/L    Carbon Dioxide 24 20 - 32 mmol/L    Anion Gap 8 3 - 14 mmol/L    Glucose 81 70 - 99 mg/dL    Urea Nitrogen 6 (L) 7 - 30 mg/dL    Creatinine 0.59 0.52 - 1.04 mg/dL    GFR Estimate >90 >60 mL/min/1.7m2    GFR Estimate If Black >90 >60 mL/min/1.7m2    Calcium 9.2 8.5 - 10.1 mg/dL    Bilirubin Total 0.5 0.2 - 1.3 mg/dL    Albumin 3.8 3.4 - 5.0 g/dL    Protein Total 7.4 6.8 - 8.8 g/dL    Alkaline Phosphatase 67 40 - 150 U/L    ALT 23 0 - 50 U/L    AST 18 0 - 45 U/L

## 2018-05-21 NOTE — MR AVS SNAPSHOT
After Visit Summary   5/21/2018    Merissa Silverman    MRN: [id number]           Patient Information     Date Of Birth          1985        Visit Information        Provider Department      5/21/2018 12:00 PM  10 ATC;  ONCOLOGY INFUSION [facility]        Today's Diagnoses     Medulloblastoma of cerebellum (H)    -  1      Care Instructions    Contact Numbers    [facility] Main Line: [phone]  [facility] Triage and after hours / weekends / holidays:  [phone]      Please call the triage or after hours line if you experience a temperature greater than or equal to 100.5, shaking chills, have uncontrolled nausea, vomiting and/or diarrhea, dizziness, shortness of breath, chest pain, bleeding, unexplained bruising, or if you have any other new/concerning symptoms, questions or concerns.      If you are having any concerning symptoms or wish to speak to a provider before your next infusion visit, please call your care coordinator or triage to notify them so we can adequately serve you.     If you need a refill on a narcotic prescription or other medication, please call before your infusion appointment.                   May 2018   Tod Monday Tuesday Wednesday Thursday Friday Saturday             1     2     [facility] CONSULT   10:30 AM   (90 min.)   Mike Borges MD   Radiation Oncology Clinic     [facility] TCT/SIM SUITE    1:00 PM   (60 min.)   Mike Borges MD   Radiation Oncology Clinic 3     4     [facility] LAB DRAW   10:30 AM   (15 min.)   [facility] DRAW   [facility] Lab Draw     [facility] NEW   10:45 AM   (60 min.)   Anupama Morrison MD   [facility] LUMBAR PUNCTURE   12:05 PM   (50 min.)   Karla Dorman PA   [facility]     LAB WITH [facility] CLINIC    2:15 PM   (15 min.)    LAB   [facility] Lab 5       6     7     8     9     Outpatient Visit   10:11 AM   [facility]     IR  CHEST PORT PLACEMENT >5 YRS   10:45 AM   (75 min.)   [id number]   [facility] ASC Imaging     INSERT PORT VASCULAR ACCESS   12:15 PM   Sung Selby PA-C   [facility] OR 10     11     [facility] TREATMENT PLAN VISIT    7:00 AM   (15 min.)   Mike Borges MD   [facility] 12       13     14     UMP EXTERNAL RADIATION TREATMT    9:30 AM   (15 min.)   P RAD ONC DANISHA   Radiation Oncology Clinic     [facility] ON TREATMENT VISIT    9:45 AM   (15 min.)   Mike Borges MD   Radiation Oncology Clinic     [facility] LAB DRAW   12:15 PM   (15 min.)    [facility] LAB DRAW   [facility] Lab Draw     P ONC INFUSION 60    1:00 PM   (60 min.)   UC ONCOLOGY INFUSION   [facility] 15     UMP EXTERNAL RADIATION TREATMT   10:00 AM   (15 min.)   [facility] RAD ONC DANISHA   [facility] Oncology Clinic 16     UMP EXTERNAL RADIATION TREATMT   10:00 AM   (15 min.)   [facility] RAD ONC DANISHA   [facility] 17     UMP EXTERNAL RADIATION TREATMT   10:00 AM   (15 min.)   [facility] RAD ONC DANISHA   Radiation Oncology Clinic     CHEMO AUDIOGRAM   10:45 AM   (60 min.)   Tomasa Kelley AuD   [facility] Audiology 18     UMP EXTERNAL RADIATION TREATMT   10:00 AM   (15 min.)   [facility] RAD ONC DANISHA   Radiation Oncology Clinic     UMP RETURN   11:00 AM   (30 min.)   Nurse, [facility] Rad Onc   Radiation Oncology Clinic 19       20     21     UMP EXTERNAL RADIATION TREATMT   10:00 AM   (15 min.)   [facility] RAD ONC DANISHA   Radiation Oncology Clinic     UM ON TREATMENT VISIT   10:15 AM   (15 min.)   Mike Borges MD   Radiation Oncology Clinic     [facility] LAB DRAW   11:30 AM   (15 min.)    [facility] LAB DRAW   [facility] Lab Draw     [facility] ONC INFUSION 60   12:00 PM   (60 min.)   UC ONCOLOGY INFUSION   [facility] 22     UMP EXTERNAL RADIATION TREATMT   10:00 AM   (15 min.)   [facility] RAD ONC DANISHA   [facility] Oncology Clinic 23     UMP EXTERNAL RADIATION TREATMT   10:00 AM   (15 min.)   [facility]  RAD ONC DANISHA   Radiation Oncology Clinic 24     UMP EXTERNAL RADIATION TREATMT   10:00 AM   (15 min.)   [facility] RAD ONC DANISHA   [facility] Clinic 25     UMP EXTERNAL RADIATION TREATMT   10:00 AM   (15 min.)   [facility] RAD ONC DANISHA   [facility] 26       27     28     29     UMP EXTERNAL RADIATION TREATMT   10:00 AM   (15 min.)   [facility] RAD ONC DANISHA   [facility] 30     UMP EXTERNAL RADIATION TREATMT   10:00 AM   (15 min.)   [facility] RAD ONC DANISHA   Radiation Oncology Clinic     UMP ON TREATMENT VISIT   10:15 AM   (15 min.)   Leslye Crenshaw MD   Radiation Oncology Clinic     [facility] LAB DRAW   11:00 AM   (15 min.)    [facility] LAB DRAW   [facility] [employer] Lab Draw     P ONC INFUSION 60   11:30 AM   (60 min.)   UC ONCOLOGY INFUSION   [facility] 31     UMP EXTERNAL RADIATION TREATMT   10:00 AM   (15 min.)   P RAD ONC DANISHA   Radiation Oncology Clinic                      June 2018 Sunday Monday Tuesday Wednesday Thursday Friday Saturday                            1     UMP EXTERNAL RADIATION TREATMT   10:00 AM   (15 min.)   P RAD ONC DANISHA   Radiation Oncology Clinic 2       3     4     UMP EXTERNAL RADIATION TREATMT   10:00 AM   (15 min.)   P RAD ONC DANISHA   Radiation Oncology Clinic     [facility] ON TREATMENT VISIT   10:15 AM   (15 min.)   Mike Borges MD   Radiation Oncology Clinic     UMP RETURN   10:45 AM   (30 min.)   Anupama Morrison MD   [facility] 5     UMP EXTERNAL RADIATION TREATMT   10:00 AM   (15 min.)   P RAD ONC DANISHA   Radiation Oncology Clinic     [facility] LAB DRAW   11:00 AM   (15 min.)    [facility] LAB DRAW   [facility] [employer] Lab Draw     P ONC INFUSION 60   11:30 AM   (60 min.)   UC ONCOLOGY INFUSION   [facility] 6     UMP EXTERNAL RADIATION TREATMT   10:00 AM   (15 min.)   P RAD ONC DANISHA   [facility] 7     UMP EXTERNAL RADIATION TREATMT   10:00 AM   (15 min.)   [facility]  RAD ONC DANISHA   Radiation Oncology Clinic 8     UMP EXTERNAL RADIATION TREATMT   10:00 AM   (15 min.)   UMP RAD ONC DANISHA   [facility] 9       10     11     UMP EXTERNAL RADIATION TREATMT   10:00 AM   (15 min.)   UMP RAD ONC DANISHA   Radiation Oncology Clinic     UMP ON TREATMENT VISIT   10:15 AM   (15 min.)   Mike Borges MD   Radiation Oncology Clinic     [facility] LAB DRAW   11:00 AM   (15 min.)    [facility] LAB DRAW   [facility] Lab Draw     UMP ONC INFUSION 60   11:30 AM   (60 min.)   UC ONCOLOGY INFUSION   [facility] 12     UMP EXTERNAL RADIATION TREATMT   10:00 AM   (15 min.)   UMP RAD ONC DANISHA   [facility] 13     UMP EXTERNAL RADIATION TREATMT   10:00 AM   (15 min.)   UMP RAD ONC DANISHA   [facility] 14     UMP EXTERNAL RADIATION TREATMT   10:00 AM   (15 min.)   P RAD ONC DANISHA   [facility] Oncology Clinic 15     UMP EXTERNAL RADIATION TREATMT   10:00 AM   (15 min.)   UMP RAD ONC DANISHA   [facility] 16       17     18     UMP EXTERNAL RADIATION TREATMT   10:00 AM   (15 min.)   UMP RAD ONC DANISHA   Radiation Oncology Clinic     UMP ON TREATMENT VISIT   10:15 AM   (15 min.)   Mike Borges MD   Radiation Oncology Clinic     [facility] LAB DRAW   11:00 AM   (15 min.)   [facility] LAB DRAW   [facility] [employer] Lab Draw     P ONC INFUSION 60   11:30 AM   (60 min.)   UC ONCOLOGY INFUSION   [facility] 19     UMP EXTERNAL RADIATION TREATMT   10:00 AM   (15 min.)   UMP RAD ONC DANISHA   [facility] 20     UMP EXTERNAL RADIATION TREATMT   10:00 AM   (15 min.)   UMP RAD ONC DANISHA   [facility] 21     UMP EXTERNAL RADIATION TREATMT   10:00 AM   (15 min.)   UMP RAD ONC DANISHA   [facility] Clinic 22     UMP EXTERNAL RADIATION TREATMT   10:00 AM   (15 min.)   UMP RAD ONC DANISHA   [facility] Clinic 23       24     25     UMP EXTERNAL RADIATION TREATMT   10:00 AM    (15 min.)   [facility] RAD ONC DANISHA   Radiation Oncology Clinic     [facility] ON TREATMENT VISIT   10:15 AM   (15 min.)   Mike Borges MD   Radiation Oncology Clinic     [facility] LAB DRAW   11:00 AM   (15 min.)   [facility] LAB DRAW   [facility] Lab Draw     [facility] ONC INFUSION 60   11:30 AM   (60 min.)    ONCOLOGY INFUSION   [facility] 26     27     28     29     30                 Recent Results (from the past 24 hour(s))   CBC with platelets differential    Collection Time: 05/21/18 11:45 AM   Result Value Ref Range    WBC 3.4 (L) 4.0 - 11.0 10e9/L    RBC Count 3.75 (L) 3.8 - 5.2 10e12/L    Hemoglobin 11.4 (L) 11.7 - 15.7 g/dL    Hematocrit 32.9 (L) 35.0 - 47.0 %    MCV 88 78 - 100 fl    MCH 30.4 26.5 - 33.0 pg    MCHC 34.7 31.5 - 36.5 g/dL    RDW 12.5 10.0 - 15.0 %    Platelet Count 329 150 - 450 10e9/L    Diff Method Automated Method     % Neutrophils 80.3 %    % Lymphocytes 12.2 %    % Monocytes 4.8 %    % Eosinophils 0.6 %    % Basophils 0.6 %    % Immature Granulocytes 1.5 %    Nucleated RBCs 0 0 /100    Absolute Neutrophil 2.7 1.6 - 8.3 10e9/L    Absolute Lymphocytes 0.4 (L) 0.8 - 5.3 10e9/L    Absolute Monocytes 0.2 0.0 - 1.3 10e9/L    Absolute Eosinophils 0.0 0.0 - 0.7 10e9/L    Absolute Basophils 0.0 0.0 - 0.2 10e9/L    Abs Immature Granulocytes 0.1 0 - 0.4 10e9/L    Absolute Nucleated RBC 0.0    Comprehensive metabolic panel    Collection Time: 05/21/18 11:45 AM   Result Value Ref Range    Sodium 138 133 - 144 mmol/L    Potassium 3.8 3.4 - 5.3 mmol/L    Chloride 105 94 - 109 mmol/L    Carbon Dioxide 24 20 - 32 mmol/L    Anion Gap 8 3 - 14 mmol/L    Glucose 81 70 - 99 mg/dL    Urea Nitrogen 6 (L) 7 - 30 mg/dL    Creatinine 0.59 0.52 - 1.04 mg/dL    GFR Estimate >90 >60 mL/min/1.7m2    GFR Estimate If Black >90 >60 mL/min/1.7m2    Calcium 9.2 8.5 - 10.1 mg/dL    Bilirubin Total 0.5 0.2 - 1.3 mg/dL    Albumin 3.8 3.4 - 5.0 g/dL    Protein Total 7.4 6.8 - 8.8 g/dL    Alkaline  Phosphatase 67 40 - 150 U/L    ALT 23 0 - 50 U/L    AST 18 0 - 45 U/L                 Follow-ups after your visit        Your next 10 appointments already scheduled     May 22, 2018 10:00 AM CDT   EXTERNAL RADIATION TREATMENT with UMP RAD ONC DANISHA   Radiation Oncology Clinic ([facility])    [facility]  1st Floor  [address] [address]   [phone]            May 23, 2018 10:00 AM CDT   EXTERNAL RADIATION TREATMENT with UMP RAD ONC DANISHA   Radiation Oncology Clinic ([facility])    [facility]  1st Floor  [address] [address]   [phone]            May 24, 2018 10:00 AM CDT   EXTERNAL RADIATION TREATMENT with UMP RAD ONC DANISHA   Radiation Oncology Clinic ([facility])    [facility]  1st Floor  [address] [address]   [phone]            May 25, 2018 10:00 AM CDT   EXTERNAL RADIATION TREATMENT with UMP RAD ONC DANISHA   Radiation Oncology Clinic ([facility])    [facility]  1st Floor  [address] [address]   [phone]            May 29, 2018 10:00 AM CDT   EXTERNAL RADIATION TREATMENT with UMP RAD ONC DANISHA   Radiation Oncology Clinic ([facility])    [facility]  1st Floor  [address] [address]   [phone]            May 30, 2018 10:00 AM CDT   EXTERNAL RADIATION TREATMENT with UMP RAD ONC DANISHA   Radiation Oncology Clinic ([facility])    [facility]  1st Floor  [address] [address]   [phone]            May 30, 2018 10:15 AM CDT   ON TREATMENT VISIT with Leslye Crenshaw MD   Radiation Oncology Clinic ([facility])    [facility]  1st Floor  [address] [address]   [phone]            May 30, 2018 11:00 AM CDT    [facility] Lab Draw with  [facility] LAB DRAW   [facility] Lab Draw ([facility])    [address]  [address]  [facility] [facility] [facility] [address] [phone]            May 30, 2018 11:30 AM CDT   Infusion 60 with UC ONCOLOGY INFUSION, UC 13 ATC   [facility] ([facility])    [address]  [address]  [place] [facility] [address] [phone]              Who to contact     If you have questions or need follow up information about today's clinic visit or your schedule please contact [facility] directly at [phone].  Normal or non-critical lab and imaging results will be communicated to you by [employer], letter or phone within 4 business days after the clinic has received the results. If you do not hear from us within 7 days, please contact the clinic through [employer] or phone. If you have a critical or abnormal lab result, we will notify you by phone as soon as possible.  Submit refill requests through [employer] or call your pharmacy and they will forward the refill request to us. Please allow 3 business days for your refill to be completed.          Additional Information About Your Visit        [employer] Information     [employer] gives you secure access to your electronic health record. If you see a primary care provider, you can also send messages to your care team and make appointments. If you have questions, please call your primary care clinic.  If you do not have a primary care provider, please call [phone] and they will assist you.        Care EveryWhere ID     This is your Care EveryWhere ID. This could be used by other organizations to access your [place] medical records  [id number]        Your Vitals Were     Pulse Temperature Respirations Pulse Oximetry BMI (Body Mass Index)       79 97.8  F (36.6  C) (Oral) 24 100% 39.92 kg/m2        Blood Pressure from Last 3 Encounters:   05/21/18 123/85   05/14/18  117/82   05/09/18 123/71    Weight from Last 3 Encounters:   05/21/18 92.7 kg (204 lb 6.4 oz)   05/21/18 93.4 kg (206 lb)   05/14/18 94.1 kg (207 lb 8 oz)              We Performed the Following     CBC with platelets differential     Comprehensive metabolic panel          Where to get your medicines      Some of these will need a paper prescription and others can be bought over the counter.  Ask your nurse if you have questions.     Bring a paper prescription for each of these medications     LORazepam 0.5 MG tablet          Primary Care Provider Fax #    Physician No Ref-Primary [phone]       No address on file        Equal Access to Services     WANDER HAMMER : Lisa Beck, perry varela, cherelle calderon, toño jade . So [facility] [phone].    ATENCIÓN: Si habla español, tiene a mccurdy disposición servicios gratuitos de asistencia lingüística. Llame al [phone].    We comply with applicable federal civil rights laws and [place] laws. We do not discriminate on the basis of race, color, national origin, age, disability, sex, sexual orientation, or gender identity.            Thank you!     Thank you for choosing [facility]  for your care. Our goal is always to provide you with excellent care. Hearing back from our patients is one way we can continue to improve our services. Please take a few minutes to complete the written survey that you may receive in the mail after your visit with us. Thank you!             Your Updated Medication List - Protect others around you: Learn how to safely use, store and throw away your medicines at www.disposemymeds.org.          This list is accurate as of 5/21/18  1:15 PM.  Always use your most recent med list.                   Brand Name Dispense Instructions for use Diagnosis    acetaminophen 325 MG tablet    TYLENOL     Take 650 mg by mouth        LORazepam 0.5 MG tablet    ATIVAN    30 tablet     Take 1 tablet (0.5 mg) by mouth every 6 hours as needed for anxiety    Medulloblastoma of cerebellum (H)       ondansetron 8 MG ODT tab    ZOFRAN-ODT    60 tablet    Take 1 tablet (8 mg) by mouth every 8 hours as needed for nausea    Medulloblastoma of cerebellum (H)       sennosides 8.6 MG tablet    SENOKOT     Take 1 tablet by mouth daily

## 2018-05-21 NOTE — PROGRESS NOTES
RADIATION ONCOLOGY WEEKLY ON TREATMENT VISIT   Encounter Date: May 21, 2018    Patient Name: Merissa Silverman  MRN: [id number]  : 1985     Disease and Stage: Medulloblastoma   Treatment Site: Brain and spine   Current Dose/Planned Total Dose: 1080 / 5400 cGy  Daily Fraction Size: 180 cGy/day, 5 times/week  Concurrent Chemotherapy: Yes  Drug and Frequency: Weekly vincristine     Subjective: Ms. Silverman presents to clinic today for her weekly on treatment visit. She continues to do well without any acute, radiation-induced toxicities. Her nausea is well controlled with zofran and ativan at night. She reports mild dysguesia. No additional complaints.     ROS:   Nutrition Alteration  Diet Type: Patient's Preference    Skin  Skin Reaction: No changes     ENT and Mouth Exam  Mucositis - Current: None      Gastrointestinal  Nausea: None      Pain Assessment  0-10 Pain Scale: 0    Objective:   Weight: 93.4 kg  Weight last week: 94.8 kg  Starting Weight: 94.8 kg    General: Alert, NAD  HEENT: NC/AT, no alopecia, EOMI, PERRL  Skin: No erythema    Treatment-related toxicities (CTCAE v4.0):  No acute toxicities     Assessment:    Ms. 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 She is currently undergoing adjuvant chemoradiotherapy for improved local disease control.    Plan:   1. Continue radiotherapy  2. Continue ativan for anxiety   3. Continue zofran for nausea    Mosaiq chart and setup information reviewed  MVCT images reviewed    Medication Review  Med list reviewed with patient?: Yes    Kaushal Garcia MD  Department of Radiation Oncology  [facility]        Attending addendum:   I saw and examined the patient with the resident and agree with the documented plan of care.    Mike Borges MD/PhD  Dept of  Radiation Oncology  [facility]

## 2018-05-21 NOTE — PROGRESS NOTES
Infusion Nursing Note:  Merissa Silverman presents today for Cycle 1 day 8 Vincristine.    Patient seen by provider today: No   present during visit today: Not Applicable.    Intravenous Access:  Implanted Port.    Treatment Conditions:  Lab Results   Component Value Date    HGB 11.4 05/21/2018     Lab Results   Component Value Date    WBC 3.4 05/21/2018      Lab Results   Component Value Date    ANEU 2.7 05/21/2018     Lab Results   Component Value Date     05/21/2018      Lab Results   Component Value Date     05/21/2018                   Lab Results   Component Value Date    POTASSIUM 3.8 05/21/2018           No results found for: MAG         Lab Results   Component Value Date    CR 0.59 05/21/2018                   Lab Results   Component Value Date    ANAND 9.2 05/21/2018                Lab Results   Component Value Date    BILITOTAL 0.5 05/21/2018           Lab Results   Component Value Date    ALBUMIN 3.8 05/21/2018                    Lab Results   Component Value Date    ALT 23 05/21/2018           Lab Results   Component Value Date    AST 18 05/21/2018     Results reviewed, labs MET treatment parameters, ok to proceed with treatment.    Post Infusion Assessment:  Patient tolerated infusion without incident.  Blood return checked q5 mLs during vincristine infusion- blood return noted  Blood return noted pre and post infusion.  Site patent and intact, free from redness, edema or discomfort.  No evidence of extravasations.  Access discontinued per protocol.    Discharge Plan:   Prescription refills given for Ativan.  Discharge instructions reviewed with: Patient and Family.  Patient and/or family verbalized understanding of discharge instructions and all questions answered.  AVS to patient via [employer] managementT.  Patient will return 5/30/18 for next appointment.   Patient discharged in stable condition accompanied by: .  Departure Mode: Ambulatory.    NURIS DALAL  RN

## 2018-05-22 ENCOUNTER — APPOINTMENT (OUTPATIENT)
Dept: RADIATION ONCOLOGY | Facility: CLINIC | Age: 33
End: 2018-05-22
Attending: RADIOLOGY
Payer: COMMERCIAL

## 2018-05-22 PROCEDURE — 77386 ZZH IMRT TREATMENT DELIVERY, COMPLEX: CPT | Performed by: RADIOLOGY

## 2018-05-23 ENCOUNTER — APPOINTMENT (OUTPATIENT)
Dept: RADIATION ONCOLOGY | Facility: CLINIC | Age: 33
End: 2018-05-23
Attending: RADIOLOGY
Payer: COMMERCIAL

## 2018-05-23 PROCEDURE — 77386 ZZH IMRT TREATMENT DELIVERY, COMPLEX: CPT | Performed by: RADIOLOGY

## 2018-05-24 ENCOUNTER — APPOINTMENT (OUTPATIENT)
Dept: RADIATION ONCOLOGY | Facility: CLINIC | Age: 33
End: 2018-05-24
Attending: RADIOLOGY
Payer: COMMERCIAL

## 2018-05-24 PROCEDURE — 77386 ZZH IMRT TREATMENT DELIVERY, COMPLEX: CPT | Performed by: RADIOLOGY

## 2018-05-25 ENCOUNTER — APPOINTMENT (OUTPATIENT)
Dept: RADIATION ONCOLOGY | Facility: CLINIC | Age: 33
End: 2018-05-25
Attending: RADIOLOGY
Payer: COMMERCIAL

## 2018-05-25 PROCEDURE — 77386 ZZH IMRT TREATMENT DELIVERY, COMPLEX: CPT | Performed by: RADIOLOGY

## 2018-05-25 PROCEDURE — 77336 RADIATION PHYSICS CONSULT: CPT | Performed by: RADIOLOGY

## 2018-05-29 ENCOUNTER — OFFICE VISIT (OUTPATIENT)
Dept: RADIATION ONCOLOGY | Facility: CLINIC | Age: 33
End: 2018-05-29
Attending: RADIOLOGY
Payer: COMMERCIAL

## 2018-05-29 VITALS — BODY MASS INDEX: 38.94 KG/M2 | WEIGHT: 199.4 LBS

## 2018-05-29 DIAGNOSIS — C71.6 MEDULLOBLASTOMA OF CEREBELLUM (H): Primary | ICD-10-CM

## 2018-05-29 PROCEDURE — 77386 ZZH IMRT TREATMENT DELIVERY, COMPLEX: CPT | Performed by: RADIOLOGY

## 2018-05-29 NOTE — MR AVS SNAPSHOT
After Visit Summary   5/29/2018    Merissa Silverman    MRN: [id number]           Patient Information     Date Of Birth          1985        Visit Information        Provider Department      5/29/2018 10:15 AM Leslye Crenshaw MD Radiation Oncology Clinic        Today's Diagnoses     Medulloblastoma of cerebellum (H)    -  1       Follow-ups after your visit        Your next 10 appointments already scheduled     May 30, 2018 10:00 AM CDT   EXTERNAL RADIATION TREATMENT with [facility] RAD ONC DANISHA   Radiation Oncology Clinic ([facility])    [facility]  1st Floor  [address] [facility] [address]   [phone]            May 30, 2018 11:00 AM CDT   [facility] Draw with  [facility] DRAW   [facility] Draw ([facility])    [address]  [address]  [facility] [address]   [phone]            May 30, 2018 11:30 AM CDT   Infusion 60 with [facility] ONCOLOGY INFUSION, UC 13 ATC   [facility] ([facility])    [address]  [address]  [facility] [address]   [phone]            May 31, 2018 10:00 AM CDT   EXTERNAL RADIATION TREATMENT with [facility] RAD ONC DANISHA   Radiation Oncology Clinic ([facility])    [facility]  1st Floor  [address] [address]   [phone]            Jun 01, 2018 10:00 AM CDT   EXTERNAL RADIATION TREATMENT with [facility] RAD ONC DANISHA   Radiation Oncology Clinic ([facility])    [facility]  1st Floor  [address] [address]   [phone]            Jun 03, 2018 10:00 AM CDT   EXTERNAL RADIATION TREATMENT with [facility] RAD ONC DANISHA   Radiation Oncology Clinic ([facility])    [facility]  1st Floor  [address] [address]   [phone]            Jun 04, 2018  9:00 AM CDT   (Arrive by 8:45  AM)   New Patient Visit with Marsha Reeder RD   [facility] ([facility])    [address]  [address]  [facility] [address] [phone]            Jun 04, 2018 10:00 AM CDT   EXTERNAL RADIATION TREATMENT with [facility] RAD ONC DANISHA   Radiation Oncology Clinic ([facility])    [facility]  1st Floor  [address]  [facility] [id number] [phone]            Jun 04, 2018 10:15 AM CDT   ON TREATMENT VISIT with Mike Borges MD   Radiation Oncology Clinic ([facility])    [facility]  1st Floor  [address]  [facility] [id number] [phone]              Who to contact     Please call your clinic at [phone] to:    Ask questions about your health    Make or cancel appointments    Discuss your medicines    Learn about your test results    Speak to your doctor            Additional Information About Your Visit        [employer] Information     [employer] gives you secure access to your electronic health record. If you see a primary care provider, you can also send messages to your care team and make appointments. If you have questions, please call your primary care clinic.  If you do not have a primary care provider, please call [phone] and they will assist you.      [employer] is an electronic gateway that provides easy, online access to your medical records. With [employer], you can request a clinic appointment, read your test results, renew a prescription or communicate with your care team.     To access your existing account, please contact your [facility] or call [phone] for assistance.        Care EveryWhere ID     This is your Care EveryWhere ID. This could be used by other organizations to access your [place] medical records  [id number]        Your Vitals Were     BMI (Body Mass Index)                   38.94 kg/m2             Blood Pressure from Last 3 Encounters:   05/21/18 123/85   05/14/18 117/82   05/09/18 123/71    Weight from Last 3 Encounters:   05/29/18 90.4 kg (199 lb 6.4 oz)   05/21/18 92.7 kg (204 lb 6.4 oz)   05/21/18 93.4 kg (206 lb)              Today, you had the following     No orders found for display       Primary Care Provider Fax #    Physician No Ref-Primary [phone]       No address on file        Equal Access to Services     WANDER HAMMER : Hadii handy ku nagao Soomaali, waaxda luqadaha, qaybta kaalmada adedeannayatu, toño jade . So [facility] [phone].    ATENCIÓN: Si habla español, tiene a mccurdy disposición servicios gratuitos de asistencia lingüística. Llame al [phone].    We comply with applicable federal civil rights laws and [place] laws. We do not discriminate on the basis of race, color, national origin, age, disability, sex, sexual orientation, or gender identity.            Thank you!     Thank you for choosing RADIATION ONCOLOGY CLINIC  for your care. Our goal is always to provide you with excellent care. Hearing back from our patients is one way we can continue to improve our services. Please take a few minutes to complete the written survey that you may receive in the mail after your visit with us. Thank you!             Your Updated Medication List - Protect others around you: Learn how to safely use, store and throw away your medicines at www.disposemymeds.org.          This list is accurate as of 5/29/18  1:10 PM.  Always use your most recent med list.                   Brand Name Dispense Instructions for use Diagnosis    acetaminophen 325 MG tablet    TYLENOL     Take 650 mg by mouth        LORazepam 0.5 MG tablet    ATIVAN    30 tablet    Take 1 tablet (0.5 mg) by mouth every 6 hours as needed for anxiety    Medulloblastoma of cerebellum (H)       ondansetron 8 MG ODT tab    ZOFRAN-ODT    60 tablet    Take 1 tablet (8 mg) by mouth every 8 hours as  needed for nausea    Medulloblastoma of cerebellum (H)       sennosides 8.6 MG tablet    SENOKOT     Take 1 tablet by mouth daily

## 2018-05-29 NOTE — LETTER
"2018       RE: Merissa Silverman  [address]  [address] [place], [place] [id number]     Dear Colleague,    Thank you for referring your patient, Merissa Silverman, to the RADIATION ONCOLOGY CLINIC. Please see a copy of my visit note below.    RADIATION ONCOLOGY WEEKLY ON TREATMENT VISIT   Encounter Date: May 29, 2018    Patient Name: Merissa Silverman  MRN: [id number]  : 1985     Disease and Stage: Medulloblastoma   Treatment Site: Brain and spine   Current Dose/Planned Total Dose: 1980 / 5400 cGy  Daily Fraction Size: 180 cGy/day, 5 times/week  Concurrent Chemotherapy: Yes  Drug and Frequency: Weekly vincristine     Treatment Summary:  -18: Doing well. No issues  -18: Worsening esophagitis, fatigue and alopecia. Weight down 5 lbs from last week    ED Visits/Hospitalizations: None   Unplanned Treatment Breaks: None    Subjective: Ms. Silverman presents to clinic today for her weekly on treatment visit. She has developed esophagitis over the past week. It is still quite mild at this point and she is not interested in using viscous lidocaine. She is down ~5 lbs from last week. However, she says this is closer to her weight prior to starting steroids. She has not yet met with nutrition. Her energy level is \"great\". She otherwise has no complaints.     ROS:   Nutrition Alteration  Diet Type: Patient's Preference    Skin  Skin Reaction: No changes     ENT and Mouth Exam  Mucositis - Current: None      Gastrointestinal  Nausea: None      Pain Assessment  0-10 Pain Scale: 0    Objective:   Weight: 90.4 kg  Weight last week: 93.4 kg  Starting Weight: 94.8 kg    BP: 116/78 (laying); 104/73 (standing)  Pulse: 78 (laying); 94 (standing)     General: Alert, NAD  HEENT: NC/AT, no alopecia, EOMI, PERRL  Skin: No erythema    Treatment-related toxicities (CTCAE v4.0):  Alopecia: Grade 1: Hair loss <50% of normal; not obvious from a distance; does not require a wig or hair peice to camoflage  Esophagitis: Grade " 2: Symptomatic; altered eating/swallowing; oral supplements indicated  Dermatitis: Grade 1: Faint erythema or dry desquamation    Assessment:    Ms. 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 She is currently undergoing adjuvant chemoradiotherapy for improved local disease control.    Plan:   1. Continue radiotherapy  2. Continue ativan for anxiety   3. Continue zofran for nausea  4. Given ensure supplements. She will meet with nutrition next week      Mosaiq chart and setup information reviewed  MVCT images reviewed    Medication Review  Med list reviewed with patient?: Yes    Kaushal Garcia MD  Department of Radiation Oncology  [facility]      Ms. Silverman was seen and examined by me. Note above by Dr. Garcia was reviewed and edited by me and reflects our mutual findings and plan of care.    Leslye Crenshaw MD  Department of Radiation Oncology  [facility]

## 2018-05-29 NOTE — PROGRESS NOTES
"RADIATION ONCOLOGY WEEKLY ON TREATMENT VISIT   Encounter Date: May 29, 2018    Patient Name: Merissa Silverman  MRN: [id number]  : 1985     Disease and Stage: Medulloblastoma   Treatment Site: Brain and spine   Current Dose/Planned Total Dose: 1980 / 5400 cGy  Daily Fraction Size: 180 cGy/day, 5 times/week  Concurrent Chemotherapy: Yes  Drug and Frequency: Weekly vincristine     Treatment Summary:  -18: Doing well. No issues  -18: Worsening esophagitis, fatigue and alopecia. Weight down 5 lbs from last week    ED Visits/Hospitalizations: None   Unplanned Treatment Breaks: None    Subjective: Ms. Silverman presents to clinic today for her weekly on treatment visit. She has developed esophagitis over the past week. It is still quite mild at this point and she is not interested in using viscous lidocaine. She is down ~5 lbs from last week. However, she says this is closer to her weight prior to starting steroids. She has not yet met with nutrition. Her energy level is \"great\". She otherwise has no complaints.     ROS:   Nutrition Alteration  Diet Type: Patient's Preference    Skin  Skin Reaction: No changes     ENT and Mouth Exam  Mucositis - Current: None      Gastrointestinal  Nausea: None      Pain Assessment  0-10 Pain Scale: 0    Objective:   Weight: 90.4 kg  Weight last week: 93.4 kg  Starting Weight: 94.8 kg    BP: 116/78 (laying); 104/73 (standing)  Pulse: 78 (laying); 94 (standing)     General: Alert, NAD  HEENT: NC/AT, no alopecia, EOMI, PERRL  Skin: No erythema    Treatment-related toxicities (CTCAE v4.0):  Alopecia: Grade 1: Hair loss <50% of normal; not obvious from a distance; does not require a wig or hair peice to camoflage  Esophagitis: Grade 2: Symptomatic; altered eating/swallowing; oral supplements indicated  Dermatitis: Grade 1: Faint erythema or dry desquamation    Assessment:    Ms. Silverman is a 32 year old female with a diagnosis of a medulloblastoma of the left cerebellar " hemisphere s/p gross total resection with no evidence of disseminated disease within the cerebrospinal axis on imaging or by CSF analysis. She is currently undergoing adjuvant chemoradiotherapy for improved local disease control.    Plan:   1. Continue radiotherapy  2. Continue ativan for anxiety   3. Continue zofran for nausea  4. Given ensure supplements. She will meet with nutrition next week      Mosaiq chart and setup information reviewed  MVCT images reviewed    Medication Review  Med list reviewed with patient?: Yes    Kaushal Garcia MD  Department of Radiation Oncology  [facility]      Ms. Silverman was seen and examined by me. Note above by Dr. Garcia was reviewed and edited by me and reflects our mutual findings and plan of care.    Leslye Crenshaw MD  Department of Radiation Oncology  [facility]

## 2018-05-30 ENCOUNTER — INFUSION THERAPY VISIT (OUTPATIENT)
Dept: ONCOLOGY | Facility: CLINIC | Age: 33
End: 2018-05-30
Attending: PSYCHIATRY & NEUROLOGY
Payer: COMMERCIAL

## 2018-05-30 ENCOUNTER — APPOINTMENT (OUTPATIENT)
Dept: RADIATION ONCOLOGY | Facility: CLINIC | Age: 33
End: 2018-05-30
Attending: RADIOLOGY
Payer: COMMERCIAL

## 2018-05-30 ENCOUNTER — APPOINTMENT (OUTPATIENT)
Dept: LAB | Facility: CLINIC | Age: 33
End: 2018-05-30
Attending: PSYCHIATRY & NEUROLOGY
Payer: COMMERCIAL

## 2018-05-30 VITALS
SYSTOLIC BLOOD PRESSURE: 122 MMHG | BODY MASS INDEX: 38.79 KG/M2 | WEIGHT: 198.6 LBS | DIASTOLIC BLOOD PRESSURE: 81 MMHG | RESPIRATION RATE: 16 BRPM | OXYGEN SATURATION: 100 % | HEART RATE: 93 BPM | TEMPERATURE: 98 F

## 2018-05-30 DIAGNOSIS — C71.6 MEDULLOBLASTOMA OF CEREBELLUM (H): Primary | ICD-10-CM

## 2018-05-30 LAB
ALBUMIN SERPL-MCNC: 3.9 G/DL (ref 3.4–5)
ALP SERPL-CCNC: 66 U/L (ref 40–150)
ALT SERPL W P-5'-P-CCNC: 32 U/L (ref 0–50)
ANION GAP SERPL CALCULATED.3IONS-SCNC: 7 MMOL/L (ref 3–14)
AST SERPL W P-5'-P-CCNC: 32 U/L (ref 0–45)
BASOPHILS # BLD AUTO: 0 10E9/L (ref 0–0.2)
BASOPHILS NFR BLD AUTO: 0 %
BILIRUB SERPL-MCNC: 0.5 MG/DL (ref 0.2–1.3)
BUN SERPL-MCNC: 6 MG/DL (ref 7–30)
CALCIUM SERPL-MCNC: 9.2 MG/DL (ref 8.5–10.1)
CHLORIDE SERPL-SCNC: 107 MMOL/L (ref 94–109)
CO2 SERPL-SCNC: 25 MMOL/L (ref 20–32)
CREAT SERPL-MCNC: 0.66 MG/DL (ref 0.52–1.04)
DIFFERENTIAL METHOD BLD: ABNORMAL
EOSINOPHIL # BLD AUTO: 0 10E9/L (ref 0–0.7)
EOSINOPHIL NFR BLD AUTO: 0 %
ERYTHROCYTE [DISTWIDTH] IN BLOOD BY AUTOMATED COUNT: 12.4 % (ref 10–15)
GFR SERPL CREATININE-BSD FRML MDRD: >90 ML/MIN/1.7M2
GLUCOSE SERPL-MCNC: 89 MG/DL (ref 70–99)
HCT VFR BLD AUTO: 32.7 % (ref 35–47)
HGB BLD-MCNC: 11.4 G/DL (ref 11.7–15.7)
LYMPHOCYTES # BLD AUTO: 0.1 10E9/L (ref 0.8–5.3)
LYMPHOCYTES NFR BLD AUTO: 8.9 %
MCH RBC QN AUTO: 30.3 PG (ref 26.5–33)
MCHC RBC AUTO-ENTMCNC: 34.9 G/DL (ref 31.5–36.5)
MCV RBC AUTO: 87 FL (ref 78–100)
MONOCYTES # BLD AUTO: 0.1 10E9/L (ref 0–1.3)
MONOCYTES NFR BLD AUTO: 8.9 %
NEUTROPHILS # BLD AUTO: 1 10E9/L (ref 1.6–8.3)
NEUTROPHILS NFR BLD AUTO: 82.2 %
NRBC # BLD AUTO: 0 10*3/UL
NRBC BLD AUTO-RTO: 1 /100
PLATELET # BLD AUTO: 118 10E9/L (ref 150–450)
PLATELET # BLD EST: ABNORMAL 10*3/UL
POLYCHROMASIA BLD QL SMEAR: SLIGHT
POTASSIUM SERPL-SCNC: 3.8 MMOL/L (ref 3.4–5.3)
PROT SERPL-MCNC: 7.4 G/DL (ref 6.8–8.8)
RBC # BLD AUTO: 3.76 10E12/L (ref 3.8–5.2)
SODIUM SERPL-SCNC: 139 MMOL/L (ref 133–144)
WBC # BLD AUTO: 1.2 10E9/L (ref 4–11)

## 2018-05-30 PROCEDURE — 80053 COMPREHEN METABOLIC PANEL: CPT | Performed by: PSYCHIATRY & NEUROLOGY

## 2018-05-30 PROCEDURE — 25000128 H RX IP 250 OP 636: Mod: ZF | Performed by: PSYCHIATRY & NEUROLOGY

## 2018-05-30 PROCEDURE — 36591 DRAW BLOOD OFF VENOUS DEVICE: CPT

## 2018-05-30 PROCEDURE — 85025 COMPLETE CBC W/AUTO DIFF WBC: CPT | Performed by: PSYCHIATRY & NEUROLOGY

## 2018-05-30 PROCEDURE — 77386 ZZH IMRT TREATMENT DELIVERY, COMPLEX: CPT | Performed by: RADIOLOGY

## 2018-05-30 RX ORDER — HEPARIN SODIUM (PORCINE) LOCK FLUSH IV SOLN 100 UNIT/ML 100 UNIT/ML
5 SOLUTION INTRAVENOUS
Status: COMPLETED | OUTPATIENT
Start: 2018-05-30 | End: 2018-05-30

## 2018-05-30 RX ORDER — HEPARIN SODIUM (PORCINE) LOCK FLUSH IV SOLN 100 UNIT/ML 100 UNIT/ML
500 SOLUTION INTRAVENOUS ONCE
Status: DISCONTINUED | OUTPATIENT
Start: 2018-05-30 | End: 2018-05-30 | Stop reason: HOSPADM

## 2018-05-30 RX ADMIN — SODIUM CHLORIDE, PRESERVATIVE FREE 5 ML: 5 INJECTION INTRAVENOUS at 11:27

## 2018-05-30 ASSESSMENT — PAIN SCALES - GENERAL: PAINLEVEL: NO PAIN (0)

## 2018-05-30 NOTE — NURSING NOTE
"Chief Complaint   Patient presents with     Port Draw     port accessed and labs drawn by rn.  vs taken.     Port accessed with 20g 3/4\" gripper needle, labs drawn, port flushed with saline and heparin, vitals checked, pt checked in for next appointment.  Katherin Pardo RN    "

## 2018-05-30 NOTE — PROGRESS NOTES
Infusion Nursing Note:  Merissa Silverman presents today for Day 15 Cycle 1 Vincristine--Canceled.    Patient seen by provider today: No   present during visit today: Not Applicable.    Note: Patient has been having blurred vision when she stands up quickly for the last several days.  Patient states that it improves after she shakes her head.  Patient is feeling well.  Zofran is helping to control her nausea.  She is drinking adequately and eating as she is able.    TORB Dr. Morrison/Ale Yanez, RN at 1235 on 5/30/18  Cancel chemotherapy today for ANC 1.0  Review neutropenic precautions with patient  Patient to return Friday for CBC with differential.  No infusion appointment needed.  No additional intervention for occasional blurred vision needed at this time.    Reviewed plan with patient. Written information about neutropenic precautions reviewed and given to patient.  Patient verbalized understanding of plan.  Intravenous Access:  Implanted Port.    Treatment Conditions:  Lab Results   Component Value Date    HGB 11.4 05/30/2018     Lab Results   Component Value Date    WBC 1.2 05/30/2018      Lab Results   Component Value Date    ANEU 1.0 05/30/2018     Lab Results   Component Value Date     05/30/2018      Lab Results   Component Value Date     05/30/2018                   Lab Results   Component Value Date    POTASSIUM 3.8 05/30/2018           No results found for: MAG         Lab Results   Component Value Date    CR 0.66 05/30/2018                   Lab Results   Component Value Date    ANAND 9.2 05/30/2018                Lab Results   Component Value Date    BILITOTAL 0.5 05/30/2018           Lab Results   Component Value Date    ALBUMIN 3.9 05/30/2018                    Lab Results   Component Value Date    ALT 32 05/30/2018           Lab Results   Component Value Date    AST 32 05/30/2018       Results reviewed, labs did NOT meet treatment parameters: ANC, see TORB.      Post  Infusion Assessment:  Access discontinued per protocol.    Discharge Plan:   Patient declined prescription refills.  Discharge instructions reviewed with: Patient.  Patient and/or family verbalized understanding of discharge instructions and all questions answered.  Copy of AVS reviewed with patient and/or family.  Patient will return 6/1/18 for labs and 6/5/18 for next appointment.  Patient discharged in stable condition accompanied by: self.  Departure Mode: Ambulatory.  Face to Face time: 3 minutes educating patient.    Ale Yanez RN

## 2018-05-30 NOTE — MR AVS SNAPSHOT
After Visit Summary   5/30/2018    Merissa Silverman    MRN: [id number]           Patient Information     Date Of Birth          1985        Visit Information        Provider Department      5/30/2018 11:30 AM  13 ATC;  ONCOLOGY INFUSION [facility]        Today's Diagnoses     Medulloblastoma of cerebellum (H)    -  1      Care Instructions    Contact Numbers  [facility] Clinic: [phone] (for scheduling changes)  Triage: [phone] (for symptoms)    Please call the [facility] Triage line if you experience a temperature greater than or equal to 100.4, shaking chills, have uncontrolled nausea, vomiting and/or diarrhea, dizziness, shortness of breath, chest pain, bleeding, unexplained bruising, or if you have any other new/concerning symptoms, questions or concerns.     If you are having any concerning symptoms or wish to speak to a provider before your next infusion visit, please call your care coordinator or triage to notify them so we can adequately serve you.     If you need a refill on a narcotic prescription or other medication, please call triage before your infusion appointment.             May 2018   Tod Monday Tuesday Wednesday Thursday Friday Saturday             1     2     P CONSULT   10:30 AM   (90 min.)   Mike Borges MD   Radiation Oncology Clinic     [facility] TCT/SIM SUITE    1:00 PM   (60 min.)   Mike Borges MD   Radiation Oncology Clinic 3     4     [facility] LAB DRAW   10:30 AM   (15 min.)   [facility] LAB DRAW   [facility] Lab Draw     [facility] NEW   10:45 AM   (60 min.)   Anupama Morrison MD   [facility] LUMBAR PUNCTURE   12:05 PM   (50 min.)   Karla Dorman PA   [facility]     LAB WITH [facility]    2:15 PM   (15 min.)    LAB   [facility] Lab 5       6     7     8     9     Outpatient Visit   10:11 AM   [facility]     IR CHEST PORT  PLACEMENT >5 YRS   10:45 AM   (75 min.)   [id number]   [facility]     INSERT PORT VASCULAR ACCESS   12:15 PM   Sung Selby PA-C   [facility] OR 10     11     [facility] TREATMENT PLAN VISIT    7:00 AM   (15 min.)   Mike Borges MD   [facility] 12       13     14     [facility] EXTERNAL RADIATION TREATMT    9:30 AM   (15 min.)   P RAD ONC DANISHA   Radiation Oncology Clinic     [facility] ON TREATMENT VISIT    9:45 AM   (15 min.)   Mike Borges MD   Radiation Oncology Clinic     P [facility] LAB DRAW   12:15 PM   (15 min.)    [facility] LAB DRAW   [facility]Dialonic Lab Draw     P ONC INFUSION 60    1:00 PM   (60 min.)   UC ONCOLOGY INFUSION   [facility] 15     [facility] EXTERNAL RADIATION TREATMT   10:00 AM   (15 min.)   P RAD ONC DANISHA   [facility] 16     [facility] EXTERNAL RADIATION TREATMT   10:00 AM   (15 min.)   P RAD ONC DANISHA   [facility] 17     [facility] EXTERNAL RADIATION TREATMT   10:00 AM   (15 min.)   [facility] RAD ONC DANISHA   Radiation Oncology Clinic     CHEMO AUDIOGRAM   10:45 AM   (60 min.)   Tomasa Kelley AuD   [facility] 18     [facility] EXTERNAL RADIATION TREATMT   10:00 AM   (15 min.)   [facility] RAD ONC DANISHA   Radiation Oncology Clinic     UMP RETURN   11:00 AM   (30 min.)   Nurse, [facility] Rad Onc   [facility] Oncology Clinic 19       20     21     [facility] EXTERNAL RADIATION TREATMT   10:00 AM   (15 min.)   [facility] RAD ONC DANISHA   Radiation Oncology Clinic     [facility] ON TREATMENT VISIT   10:15 AM   (15 min.)   Mike Borges MD   Radiation Oncology Clinic     [facility] LAB DRAW   11:30 AM   (15 min.)    [facility] LAB DRAW   [facility]ial[facility] Lab Draw     P ONC INFUSION 60   12:00 PM   (60 min.)   UC ONCOLOGY INFUSION   [facility] 22     [facility] EXTERNAL RADIATION TREATMT   10:00 AM   (15 min.)   P RAD ONC DANISHA   [facility] 23     [facility] EXTERNAL RADIATION TREATMT   10:00 AM   (15 min.)   [facility] RAD ONC  DANISHA   Radiation Oncology Clinic 24     [facility] EXTERNAL RADIATION TREATMT   10:00 AM   (15 min.)   P RAD ONC DANISHA   [facility] Oncology Clinic 25     UMP EXTERNAL RADIATION TREATMT   10:00 AM   (15 min.)   [facility] RAD ONC DANISHA   Radiation Oncology Clinic 26       27     28     29     [facility] EXTERNAL RADIATION TREATMT   10:00 AM   (15 min.)   P RAD ONC DANISHA   Radiation Oncology Clinic     [facility] ON TREATMENT VISIT   10:15 AM   (15 min.)   Leslye Crenshaw MD   [facility]cology Clinic 30     [facility] EXTERNAL RADIATION TREATMT   10:00 AM   (15 min.)   [facility] RAD ONC DANISHA   Radiation Oncology Clinic     [facility] LAB DRAW   11:00 AM   (15 min.)    [facility] LAB DRAW   [facility] Lab Draw     [facility] ONC INFUSION 60   11:30 AM   (60 min.)   UC ONCOLOGY INFUSION   [facility] 31     UMP EXTERNAL RADIATION TREATMT   10:00 AM   (15 min.)   [facility] RAD ONC DANISHA   Radiation Oncology Clinic                      June 2018 Sunday Monday Tuesday Wednesday Thursday Friday Saturday                            1     UMP EXTERNAL RADIATION TREATMT   10:00 AM   (15 min.)   [facility] RAD ONC DANISHA   Radiation Oncology Clinic     [facility] LAB DRAW   11:15 AM   (15 min.)    [facility] LAB DRAW   [facility] Lab Draw 2       3     UMP EXTERNAL RADIATION TREATMT   10:00 AM   (15 min.)   [facility] RAD ONC DANISHA   Radiation Oncology Clinic 4     FOLLOW UP    9:00 AM   (60 min.)   Marsha Tadeo RD   [facility] [place], Nutrition Services     P EXTERNAL RADIATION TREATMT   10:00 AM   (15 min.)   [facility] RAD ONC DANISHA   Radiation Oncology Clinic     [facility] ON TREATMENT VISIT   10:15 AM   (15 min.)   Mike Borges MD   Radiation Oncology Clinic     UMP RETURN   10:45 AM   (30 min.)   Anupama Morrison MD   [facility] 5     UMP EXTERNAL RADIATION TREATMT   10:00 AM   (15 min.)   [facility] RAD ONC DANISHA   Radiation Oncology Clinic     [facility] LAB DRAW   11:00 AM   (15 min.)   [facility] DRAW   M  [facility] Lab Draw     UMP ONC INFUSION 60   11:30 AM   (60 min.)   UC ONCOLOGY INFUSION   [facility] 6     UMP EXTERNAL RADIATION TREATMT   10:00 AM   (15 min.)   [facility] RAD ONC DANISHA   [facility] 7     [facility] EXTERNAL RADIATION TREATMT   10:00 AM   (15 min.)   [facility] RAD ONC DANISHA   [facility] 8     [facility] EXTERNAL RADIATION TREATMT   10:00 AM   (15 min.)   [facility] RAD ONC DANISHA   [facility] 9       10     11     [facility] EXTERNAL RADIATION TREATMT   10:00 AM   (15 min.)   [facility] RAD ONC DANISHA   Radiation Oncology Clinic     [facility] ON TREATMENT VISIT   10:15 AM   (15 min.)   Mike Borges MD   Radiation Oncology Clinic     [facility] LAB DRAW   11:00 AM   (15 min.)    [facility] LAB DRAW   [facility] Lab Draw     UMP ONC INFUSION 60   11:30 AM   (60 min.)   UC ONCOLOGY INFUSION   [facility] 12     UMP EXTERNAL RADIATION TREATMT   10:00 AM   (15 min.)   [facility] RAD ONC DANISHA   [facility] 13     [facility] EXTERNAL RADIATION TREATMT   10:00 AM   (15 min.)   [facility] RAD ONC DANISHA   [facility] 14     [facility] EXTERNAL RADIATION TREATMT   10:00 AM   (15 min.)   [facility] RAD ONC DANISHA   [facility]cology Clinic 15     [facility] EXTERNAL RADIATION TREATMT   10:00 AM   (15 min.)   [facility] RAD ONC DANISHA   [facility] 16       17     18     [facility] EXTERNAL RADIATION TREATMT   10:00 AM   (15 min.)   [facility] RAD ONC DANISHA   Radiation Oncology Clinic     [facility] ON TREATMENT VISIT   10:15 AM   (15 min.)   Mike Borges MD   Radiation Oncology Clinic     [facility] LAB DRAW   11:00 AM   (15 min.)    [facility] LAB DRAW   [facility] Lab Draw     UMP ONC INFUSION 60   11:30 AM   (60 min.)   UC ONCOLOGY INFUSION   [facility] 19     UMP EXTERNAL RADIATION TREATMT   10:00 AM   (15 min.)   [facility] RAD ONC DANISHA   [facility]cology Clinic 20     [facility] EXTERNAL RADIATION TREATMT   10:00 AM   (15 min.)   [facility] RAD ONC DANISHA   Radiation  Oncology Clinic 21     [facility] EXTERNAL RADIATION TREATMT   10:00 AM   (15 min.)   [facility] RAD ONC DANISHA   Radiation Oncology Clinic 22     [facility] EXTERNAL RADIATION TREATMT   10:00 AM   (15 min.)   [facility] RAD ONC DANISHA   Radiation Oncology Clinic 23       24     25     [facility] EXTERNAL RADIATION TREATMT   10:00 AM   (15 min.)   [facility] RAD ONC DANISHA   Radiation Oncology Clinic     [facility] ON TREATMENT VISIT   10:15 AM   (15 min.)   Mike Borges MD   Radiation Oncology Clinic     [facility] LAB DRAW   11:00 AM   (15 min.)   [facility] LAB DRAW   [facility] Lab Draw     [facility] ONC INFUSION 60   11:30 AM   (60 min.)    ONCOLOGY INFUSION   [facility] 26     27     28     29     30                  Lab Results:  Recent Results (from the past 12 hour(s))   CBC with platelets differential    Collection Time: 05/30/18 11:31 AM   Result Value Ref Range    WBC 1.2 (L) 4.0 - 11.0 10e9/L    RBC Count 3.76 (L) 3.8 - 5.2 10e12/L    Hemoglobin 11.4 (L) 11.7 - 15.7 g/dL    Hematocrit 32.7 (L) 35.0 - 47.0 %    MCV 87 78 - 100 fl    MCH 30.3 26.5 - 33.0 pg    MCHC 34.9 31.5 - 36.5 g/dL    RDW 12.4 10.0 - 15.0 %    Platelet Count 118 (L) 150 - 450 10e9/L    Diff Method Manual Differential     % Neutrophils 82.2 %    % Lymphocytes 8.9 %    % Monocytes 8.9 %    % Eosinophils 0.0 %    % Basophils 0.0 %    Nucleated RBCs 1 (H) 0 /100    Absolute Neutrophil 1.0 (L) 1.6 - 8.3 10e9/L    Absolute Lymphocytes 0.1 (L) 0.8 - 5.3 10e9/L    Absolute Monocytes 0.1 0.0 - 1.3 10e9/L    Absolute Eosinophils 0.0 0.0 - 0.7 10e9/L    Absolute Basophils 0.0 0.0 - 0.2 10e9/L    Absolute Nucleated RBC 0.0     Polychromasia Slight     Platelet Estimate Confirming automated cell count    Comprehensive metabolic panel    Collection Time: 05/30/18 11:31 AM   Result Value Ref Range    Sodium 139 133 - 144 mmol/L    Potassium 3.8 3.4 - 5.3 mmol/L    Chloride 107 94 - 109 mmol/L    Carbon Dioxide 25 20 - 32 mmol/L    Anion Gap 7 3 - 14 mmol/L     Glucose 89 70 - 99 mg/dL    Urea Nitrogen 6 (L) 7 - 30 mg/dL    Creatinine 0.66 0.52 - 1.04 mg/dL    GFR Estimate >90 >60 mL/min/1.7m2    GFR Estimate If Black >90 >60 mL/min/1.7m2    Calcium 9.2 8.5 - 10.1 mg/dL    Bilirubin Total 0.5 0.2 - 1.3 mg/dL    Albumin 3.9 3.4 - 5.0 g/dL    Protein Total 7.4 6.8 - 8.8 g/dL    Alkaline Phosphatase 66 40 - 150 U/L    ALT 32 0 - 50 U/L    AST 32 0 - 45 U/L              Follow-ups after your visit        Your next 10 appointments already scheduled     May 31, 2018 10:00 AM CDT   EXTERNAL RADIATION TREATMENT with [facility] RAD ONC DANISHA   Radiation Oncology Clinic ([facility])    [facility]  1st Floor  [address] [address]   [phone]            Jun 01, 2018 10:00 AM CDT   EXTERNAL RADIATION TREATMENT with [facility] RAD ONC DANISHA   Radiation Oncology Clinic ([facility])    [facility]  1st Floor  [address] [address]   [phone]            Jun 01, 2018 11:15 AM CDT   [facility] Lab Draw with  [facility] LAB DRAW   [facility] Draw ([facility])    [address]  [address]  [facility] [address] [phone]            Jun 03, 2018 10:00 AM CDT   EXTERNAL RADIATION TREATMENT with [facility] RAD ONC DANISHA   Radiation Oncology Clinic ([facility])    [facility]  1st Floor  [address] [address]   [phone]            Jun 04, 2018  9:00 AM CDT   Follow Up with Marsha Reeder RD   [facility] ([facility] [place])    [address]  [facility] [address]               Jun 04, 2018 10:00 AM CDT   EXTERNAL RADIATION TREATMENT with [facility] RAD ONC DANISHA   Radiation Oncology Clinic ([facility])    [facility]  1st Floor  [address] [address]   [phone]             Jun 04, 2018 10:15 AM CDT   ON TREATMENT VISIT with Mike Borges MD   Radiation Oncology Clinic ([facility])    [facility]  1st Floor  [address]  [facility] [address]   [phone]            Jun 04, 2018 11:00 AM CDT   (Arrive by 10:45 AM)   Return Visit with Anupama Morrison MD   [facility] ([facility])    [address]  [address]  [facility] [address]   [phone]            Jun 05, 2018 10:00 AM CDT   EXTERNAL RADIATION TREATMENT with [facility] RAD ONC DANISHA   Radiation Oncology Clinic ([facility])    [facility]  1st Floor  [address]  [facility] [address]   [phone]            Jun 05, 2018 11:30 AM CDT   Infusion 60 with UC ONCOLOGY INFUSION   [facility] ([facility])    [address]  [address]  [facility] [address] [phone]              Future tests that were ordered for you today     Open Future Orders        Priority Expected Expires Ordered    *CBC with platelets differential Routine 6/1/2018 5/30/2019 5/30/2018            Who to contact     If you have questions or need follow up information about today's clinic visit or your schedule please contact [facility] directly at [phone].  Normal or non-critical lab and imaging results will be communicated to you by MyChart, letter or phone within 4 business days after the clinic has received the results. If you do not hear from us within 7 days, please contact the clinic through MyChart or phone. If you have a critical or abnormal lab result, we will notify you by phone as soon as possible.  Submit refill requests through [employer] or call your pharmacy and they will forward the refill request to us. Please allow 3 business days for your refill to be completed.          Additional Information About  Your Visit        [employer] Information     [employer] gives you secure access to your electronic health record. If you see a primary care provider, you can also send messages to your care team and make appointments. If you have questions, please call your primary care clinic.  If you do not have a primary care provider, please call [phone] and they will assist you.        Care EveryWhere ID     This is your Care EveryWhere ID. This could be used by other organizations to access your [place] medical records  [id number]        Your Vitals Were     Pulse Temperature Respirations Pulse Oximetry BMI (Body Mass Index)       93 98  F (36.7  C) (Oral) 16 100% 38.79 kg/m2        Blood Pressure from Last 3 Encounters:   05/30/18 122/81   05/21/18 123/85   05/14/18 117/82    Weight from Last 3 Encounters:   05/30/18 90.1 kg (198 lb 9.6 oz)   05/29/18 90.4 kg (199 lb 6.4 oz)   05/21/18 92.7 kg (204 lb 6.4 oz)              We Performed the Following     CBC with platelets differential     Comprehensive metabolic panel        Primary Care Provider Fax #    Physician No Ref-Primary [phone]       No address on file        Equal Access to Services     WANDER HAMMER : Hadii handy Beck, waaxda avery, qaybta kaalmada yumiko, toño bowman. So [facility] [phone].    ATENCIÓN: Si habla español, tiene a mccurdy disposición servicios gratuitos de asistencia lingüística. [facility] [phone].    We comply with applicable federal civil rights laws and [place] laws. We do not discriminate on the basis of race, color, national origin, age, disability, sex, sexual orientation, or gender identity.            Thank you!     Thank you for choosing [facility]  for your care. Our goal is always to provide you with excellent care. Hearing back from our patients is one way we can continue to improve our services. Please take a few minutes to complete the written survey that you  may receive in the mail after your visit with us. Thank you!             Your Updated Medication List - Protect others around you: Learn how to safely use, store and throw away your medicines at www.disposemymeds.org.          This list is accurate as of 5/30/18 12:43 PM.  Always use your most recent med list.                   Brand Name Dispense Instructions for use Diagnosis    acetaminophen 325 MG tablet    TYLENOL     Take 650 mg by mouth        LORazepam 0.5 MG tablet    ATIVAN    30 tablet    Take 1 tablet (0.5 mg) by mouth every 6 hours as needed for anxiety    Medulloblastoma of cerebellum (H)       ondansetron 8 MG ODT tab    ZOFRAN-ODT    60 tablet    Take 1 tablet (8 mg) by mouth every 8 hours as needed for nausea    Medulloblastoma of cerebellum (H)       sennosides 8.6 MG tablet    SENOKOT     Take 1 tablet by mouth daily

## 2018-05-30 NOTE — PATIENT INSTRUCTIONS
Contact Numbers  [facility] Clinic: [phone] (for scheduling changes)  Triage: [phone] (for symptoms)    Please call the [facility] Triage line if you experience a temperature greater than or equal to 100.4, shaking chills, have uncontrolled nausea, vomiting and/or diarrhea, dizziness, shortness of breath, chest pain, bleeding, unexplained bruising, or if you have any other new/concerning symptoms, questions or concerns.     If you are having any concerning symptoms or wish to speak to a provider before your next infusion visit, please call your care coordinator or triage to notify them so we can adequately serve you.     If you need a refill on a narcotic prescription or other medication, please call triage before your infusion appointment.             May 2018   Tod Monday Tuesday Wednesday Thursday Friday Saturday             1     2     P CONSULT   10:30 AM   (90 min.)   Mike Borges MD   Radiation Oncology Clinic     [facility] TCT/SIM SUITE    1:00 PM   (60 min.)   Mike Borges MD   Radiation Oncology Clinic 3     4     [facility]   10:30 AM   (15 min.)   [facility]   [facility] Lab Draw     [facility] NEW   10:45 AM   (60 min.)   Anupama Morrison MD   [facility] Cancer [facility] LUMBAR PUNCTURE   12:05 PM   (50 min.)   Karla Dorman PA   [facility]     LAB WITH [facility] CLINIC    2:15 PM   (15 min.)    LAB   [facility] Lab 5       6     7     8     9     Outpatient Visit   10:11 AM   [facility]     IR CHEST PORT PLACEMENT >5 YRS   10:45 AM   (75 min.)   [id number]   [facility] ASC Imaging     INSERT PORT VASCULAR ACCESS   12:15 PM   Sung Selby PA-C    OR 10     11     [facility] TREATMENT PLAN VISIT    7:00 AM   (15 min.)   Mike Borges MD   Radiation Oncology Clinic 12       13     14     [facility] EXTERNAL RADIATION TREATMT    9:30 AM   (15 min.)   [facility] RAD ONC DANISHA   Radiation  Oncology Clinic     [facility] ON TREATMENT VISIT    9:45 AM   (15 min.)   Mike Bogres MD   Radiation Oncology Clinic     [facility] LAB DRAW   12:15 PM   (15 min.)    [facility] LAB DRAW   [facility] Lab Draw     P ONC INFUSION 60    1:00 PM   (60 min.)   [facility] ONCOLOGY INFUSION   [facility] 15     [facility] EXTERNAL RADIATION TREATMT   10:00 AM   (15 min.)   P RAD ONC DANISHA   Radiation Oncology Clinic 16     [facility] EXTERNAL RADIATION TREATMT   10:00 AM   (15 min.)   P RAD ONC DANISHA   Radiation Oncology Clinic 17     [facility] EXTERNAL RADIATION TREATMT   10:00 AM   (15 min.)   [facility] RAD ONC DANISHA   Radiation Oncology Clinic     CHEMO AUDIOGRAM   10:45 AM   (60 min.)   Tomasa Kelley AuD   [facility] Audiology 18     [facility] EXTERNAL RADIATION TREATMT   10:00 AM   (15 min.)   [facility] RAD ONC DANISHA   Radiation Oncology Clinic     [facility] RETURN   11:00 AM   (30 min.)   Nurse, [facility] Rad Onc   Radiation Oncology Clinic 19       20     21     [facility] EXTERNAL RADIATION TREATMT   10:00 AM   (15 min.)   [facility] RAD ONC DANISHA   Radiation Oncology Clinic     [facility] ON TREATMENT VISIT   10:15 AM   (15 min.)   Mike Borges MD   Radiation Oncology Clinic     [facility] LAB DRAW   11:30 AM   (15 min.)    [facility] LAB DRAW   [facility] Cardiostrongonic Lab Draw     [facility] ONC INFUSION 60   12:00 PM   (60 min.)   [facility] ONCOLOGY INFUSION   [facility] 22     [facility] EXTERNAL RADIATION TREATMT   10:00 AM   (15 min.)   P RAD ONC DANISHA   Radiation Oncology Clinic 23     [facility] EXTERNAL RADIATION TREATMT   10:00 AM   (15 min.)   P RAD ONC DANISHA   Radiation Oncology Clinic 24     [facility] EXTERNAL RADIATION TREATMT   10:00 AM   (15 min.)   P RAD ONC DANISHA   Radiation Oncology Clinic 25     [facility] EXTERNAL RADIATION TREATMT   10:00 AM   (15 min.)   P RAD ONC DANISHA   Radiation Oncology Clinic 26       27     28     29     [facility] EXTERNAL RADIATION TREATMT   10:00 AM   (15 min.)   P RAD ONC DANISHA   Radiation Oncology Clinic     [facility] ON  TREATMENT VISIT   10:15 AM   (15 min.)   Leslye Crenshaw MD   Radiation Oncology Clinic 30     [facility] EXTERNAL RADIATION TREATMT   10:00 AM   (15 min.)   [facility] RAD ONC DANISHA   Radiation Oncology Clinic     [facility] LAB DRAW   11:00 AM   (15 min.)   [facility] DRAW   [facility] Lab Draw     P ONC INFUSION 60   11:30 AM   (60 min.)   [facility] ONCOLOGY INFUSION   [facility] 31     [facility] EXTERNAL RADIATION TREATMT   10:00 AM   (15 min.)   P RAD ONC DANISHA   Radiation Oncology Clinic                      June 2018 Sunday Monday Tuesday Wednesday Thursday Friday Saturday                            1     [facility] EXTERNAL RADIATION TREATMT   10:00 AM   (15 min.)   P RAD ONC DANISHA   Radiation Oncology Clinic     [facility] LAB DRAW   11:15 AM   (15 min.)   [facility] DRAW   [facility] Lab Draw 2       3     [facility] EXTERNAL RADIATION TREATMT   10:00 AM   (15 min.)   [facility] RAD ONC DANISHA   Radiation Oncology Clinic 4     FOLLOW UP    9:00 AM   (60 min.)   Marsha Tadeo RD   [facility] Services     [facility] EXTERNAL RADIATION TREATMT   10:00 AM   (15 min.)   P RAD ONC DANISHA   Radiation Oncology Clinic     [facility] ON TREATMENT VISIT   10:15 AM   (15 min.)   Mike Borges MD   Radiation Oncology Clinic     [facility] RETURN   10:45 AM   (30 min.)   Anupama Morrison MD   [facility] 5     [facility] EXTERNAL RADIATION TREATMT   10:00 AM   (15 min.)   P RAD ONC DANISHA   Radiation Oncology Clinic     [facility] LAB DRAW   11:00 AM   (15 min.)   [facility] DRAW   [facility] Lab Draw     P ONC INFUSION 60   11:30 AM   (60 min.)   [facility] ONCOLOGY INFUSION   [facility] 6     [facility] EXTERNAL RADIATION TREATMT   10:00 AM   (15 min.)   [facility] RAD ONC DANISHA   Radiation Oncology Clinic 7     [facility] EXTERNAL RADIATION TREATMT   10:00 AM   (15 min.)   [facility] RAD ONC DANISHA   Radiation Oncology Clinic 8     [facility] EXTERNAL RADIATION TREATMT   10:00 AM    (15 min.)   [facility] RAD ONC DANISHA   Radiation Oncology Clinic 9       10     11     [facility] EXTERNAL RADIATION TREATMT   10:00 AM   (15 min.)   [facility] RAD ONC DANISHA   Radiation Oncology Clinic     [facility] ON TREATMENT VISIT   10:15 AM   (15 min.)   Mike Borges MD   Radiation Oncology Clinic     [facility] DRAW   11:00 AM   (15 min.)   [facility] ONC INFUSION 60   11:30 AM   (60 min.)   [facility] ONCOLOGY INFUSION   [facility] 12     [facility] EXTERNAL RADIATION TREATMT   10:00 AM   (15 min.)   [facility] RAD ONC DANISHA   Radiation Oncology Clinic 13     [facility] EXTERNAL RADIATION TREATMT   10:00 AM   (15 min.)   [facility] RAD ONC DANISHA   Radiation Oncology Clinic 14     [facility] EXTERNAL RADIATION TREATMT   10:00 AM   (15 min.)   [facility] RAD ONC DANISHA   Radiation Oncology Clinic 15     [facility] EXTERNAL RADIATION TREATMT   10:00 AM   (15 min.)   [facility] RAD ONC DANISHA   Radiation Oncology Clinic 16       17     18     [facility] EXTERNAL RADIATION TREATMT   10:00 AM   (15 min.)   [facility] RAD ONC DANISHA   Radiation Oncology Clinic     [facility] ON TREATMENT VISIT   10:15 AM   (15 min.)   Mike Borges MD   Radiation Oncology Clinic     [facility] DRAW   11:00 AM   (15 min.)   [facility] D[facility]   [facility]     P ONC INFUSION 60   11:30 AM   (60 min.)   [facility] ONCOLOGY INFUSION   [facility] 19     [facility] EXTERNAL RADIATION TREATMT   10:00 AM   (15 min.)   [facility] RAD ONC DANISHA   Radiation Oncology Clinic 20     [facility] EXTERNAL RADIATION TREATMT   10:00 AM   (15 min.)   [facility] RAD ONC DANISHA   Radiation Oncology Clinic 21     [facility] EXTERNAL RADIATION TREATMT   10:00 AM   (15 min.)   [facility] RAD ONC DANISHA   Radiation Oncology Clinic 22     [facility] EXTERNAL RADIATION TREATMT   10:00 AM   (15 min.)   [facility] RAD ONC DANISHA   Radiation Oncology Clinic 23       24     25     [facility] EXTERNAL RADIATION TREATMT   10:00 AM   (15 min.)   [facility] RAD ONC DANISHA   Radiation Oncology Clinic     [facility] ON TREATMENT VISIT   10:15  AM   (15 min.)   Mike Borges MD   Radiation Oncology Clinic     [facility] LAB DRAW   11:00 AM   (15 min.)   [facility] LAB DRAW   [facility] Lab Draw     [facility] ONC INFUSION 60   11:30 AM   (60 min.)    ONCOLOGY INFUSION   [facility] Cancer Clinic 26     27     28     29     30                  Lab Results:  Recent Results (from the past 12 hour(s))   CBC with platelets differential    Collection Time: 05/30/18 11:31 AM   Result Value Ref Range    WBC 1.2 (L) 4.0 - 11.0 10e9/L    RBC Count 3.76 (L) 3.8 - 5.2 10e12/L    Hemoglobin 11.4 (L) 11.7 - 15.7 g/dL    Hematocrit 32.7 (L) 35.0 - 47.0 %    MCV 87 78 - 100 fl    MCH 30.3 26.5 - 33.0 pg    MCHC 34.9 31.5 - 36.5 g/dL    RDW 12.4 10.0 - 15.0 %    Platelet Count 118 (L) 150 - 450 10e9/L    Diff Method Manual Differential     % Neutrophils 82.2 %    % Lymphocytes 8.9 %    % Monocytes 8.9 %    % Eosinophils 0.0 %    % Basophils 0.0 %    Nucleated RBCs 1 (H) 0 /100    Absolute Neutrophil 1.0 (L) 1.6 - 8.3 10e9/L    Absolute Lymphocytes 0.1 (L) 0.8 - 5.3 10e9/L    Absolute Monocytes 0.1 0.0 - 1.3 10e9/L    Absolute Eosinophils 0.0 0.0 - 0.7 10e9/L    Absolute Basophils 0.0 0.0 - 0.2 10e9/L    Absolute Nucleated RBC 0.0     Polychromasia Slight     Platelet Estimate Confirming automated cell count    Comprehensive metabolic panel    Collection Time: 05/30/18 11:31 AM   Result Value Ref Range    Sodium 139 133 - 144 mmol/L    Potassium 3.8 3.4 - 5.3 mmol/L    Chloride 107 94 - 109 mmol/L    Carbon Dioxide 25 20 - 32 mmol/L    Anion Gap 7 3 - 14 mmol/L    Glucose 89 70 - 99 mg/dL    Urea Nitrogen 6 (L) 7 - 30 mg/dL    Creatinine 0.66 0.52 - 1.04 mg/dL    GFR Estimate >90 >60 mL/min/1.7m2    GFR Estimate If Black >90 >60 mL/min/1.7m2    Calcium 9.2 8.5 - 10.1 mg/dL    Bilirubin Total 0.5 0.2 - 1.3 mg/dL    Albumin 3.9 3.4 - 5.0 g/dL    Protein Total 7.4 6.8 - 8.8 g/dL    Alkaline Phosphatase 66 40 - 150 U/L    ALT 32 0 - 50 U/L    AST 32 0 - 45  U/L

## 2018-05-31 ENCOUNTER — APPOINTMENT (OUTPATIENT)
Dept: RADIATION ONCOLOGY | Facility: CLINIC | Age: 33
End: 2018-05-31
Attending: RADIOLOGY
Payer: COMMERCIAL

## 2018-05-31 PROCEDURE — 77386 ZZH IMRT TREATMENT DELIVERY, COMPLEX: CPT | Performed by: RADIOLOGY

## 2018-06-01 ENCOUNTER — APPOINTMENT (OUTPATIENT)
Dept: RADIATION ONCOLOGY | Facility: CLINIC | Age: 33
End: 2018-06-01
Attending: RADIOLOGY
Payer: COMMERCIAL

## 2018-06-01 ENCOUNTER — CARE COORDINATION (OUTPATIENT)
Dept: ONCOLOGY | Facility: CLINIC | Age: 33
End: 2018-06-01

## 2018-06-01 DIAGNOSIS — C71.6 MEDULLOBLASTOMA OF CEREBELLUM (H): ICD-10-CM

## 2018-06-01 PROBLEM — D70.1 CHEMOTHERAPY INDUCED NEUTROPENIA (H): Status: ACTIVE | Noted: 2018-06-01

## 2018-06-01 PROBLEM — T45.1X5A CHEMOTHERAPY INDUCED NEUTROPENIA (H): Status: ACTIVE | Noted: 2018-06-01

## 2018-06-01 PROBLEM — Z91.89 HIGH RISK FOR CHEMOTHERAPY-INDUCED INFECTIOUS COMPLICATION: Status: ACTIVE | Noted: 2018-06-01

## 2018-06-01 PROBLEM — T45.1X5A ANTINEOPLASTIC CHEMOTHERAPY INDUCED PANCYTOPENIA (H): Status: ACTIVE | Noted: 2018-06-01

## 2018-06-01 PROBLEM — D61.810 ANTINEOPLASTIC CHEMOTHERAPY INDUCED PANCYTOPENIA (H): Status: ACTIVE | Noted: 2018-06-01

## 2018-06-01 LAB
BASOPHILS # BLD AUTO: 0 10E9/L (ref 0–0.2)
BASOPHILS NFR BLD AUTO: 0 %
DIFFERENTIAL METHOD BLD: ABNORMAL
EOSINOPHIL # BLD AUTO: 0 10E9/L (ref 0–0.7)
EOSINOPHIL NFR BLD AUTO: 0.7 %
ERYTHROCYTE [DISTWIDTH] IN BLOOD BY AUTOMATED COUNT: 12.8 % (ref 10–15)
HCT VFR BLD AUTO: 30 % (ref 35–47)
HGB BLD-MCNC: 10.6 G/DL (ref 11.7–15.7)
IMM GRANULOCYTES # BLD: 0 10E9/L (ref 0–0.4)
IMM GRANULOCYTES NFR BLD: 0.7 %
LYMPHOCYTES # BLD AUTO: 0.2 10E9/L (ref 0.8–5.3)
LYMPHOCYTES NFR BLD AUTO: 16.4 %
MCH RBC QN AUTO: 30.4 PG (ref 26.5–33)
MCHC RBC AUTO-ENTMCNC: 35.3 G/DL (ref 31.5–36.5)
MCV RBC AUTO: 86 FL (ref 78–100)
MONOCYTES # BLD AUTO: 0.3 10E9/L (ref 0–1.3)
MONOCYTES NFR BLD AUTO: 23.9 %
NEUTROPHILS # BLD AUTO: 0.8 10E9/L (ref 1.6–8.3)
NEUTROPHILS NFR BLD AUTO: 58.3 %
NRBC # BLD AUTO: 0 10*3/UL
NRBC BLD AUTO-RTO: 0 /100
PLATELET # BLD AUTO: 106 10E9/L (ref 150–450)
PLATELET # BLD EST: ABNORMAL 10*3/UL
RBC # BLD AUTO: 3.49 10E12/L (ref 3.8–5.2)
WBC # BLD AUTO: 1.3 10E9/L (ref 4–11)

## 2018-06-01 PROCEDURE — 77386 ZZH IMRT TREATMENT DELIVERY, COMPLEX: CPT | Performed by: RADIOLOGY

## 2018-06-01 PROCEDURE — 25000128 H RX IP 250 OP 636: Performed by: PSYCHIATRY & NEUROLOGY

## 2018-06-01 PROCEDURE — 85025 COMPLETE CBC W/AUTO DIFF WBC: CPT | Performed by: PSYCHIATRY & NEUROLOGY

## 2018-06-01 RX ORDER — HEPARIN SODIUM (PORCINE) LOCK FLUSH IV SOLN 100 UNIT/ML 100 UNIT/ML
5 SOLUTION INTRAVENOUS ONCE
Status: COMPLETED | OUTPATIENT
Start: 2018-06-01 | End: 2018-06-01

## 2018-06-01 RX ADMIN — SODIUM CHLORIDE, PRESERVATIVE FREE 5 ML: 5 INJECTION INTRAVENOUS at 11:10

## 2018-06-01 NOTE — PROGRESS NOTES
Reviewed lab results with Dr. Morrison. Per Dr. Morrison, will wait to start neupogen until appointment next week. Reviewed neutropenic precautions with patient. Instructed patient to call clinic and go to ED should she develop temp of 100.4 or higher. Patient voiced understanding and states she has been feeling well. No new concerns or complaints at this time. She knows to call clinic should anything arise.

## 2018-06-01 NOTE — NURSING NOTE
Chief Complaint   Patient presents with     Labs Only     labs drawn via port by RN     There were no vitals taken for this visit.    Port accessed by RN. Labs collected and sent. Line flushed with NS & Heparin. Pt tolerated well.  Gale Valentino RN

## 2018-06-03 ENCOUNTER — APPOINTMENT (OUTPATIENT)
Dept: RADIATION ONCOLOGY | Facility: CLINIC | Age: 33
End: 2018-06-03
Attending: RADIOLOGY
Payer: COMMERCIAL

## 2018-06-03 PROCEDURE — 77386 ZZH IMRT TREATMENT DELIVERY, COMPLEX: CPT | Performed by: RADIOLOGY

## 2018-06-03 PROCEDURE — 77336 RADIATION PHYSICS CONSULT: CPT | Performed by: RADIOLOGY

## 2018-06-04 ENCOUNTER — ONCOLOGY VISIT (OUTPATIENT)
Dept: ONCOLOGY | Facility: CLINIC | Age: 33
End: 2018-06-04
Attending: PSYCHIATRY & NEUROLOGY
Payer: COMMERCIAL

## 2018-06-04 ENCOUNTER — APPOINTMENT (OUTPATIENT)
Dept: RADIATION ONCOLOGY | Facility: CLINIC | Age: 33
End: 2018-06-04
Attending: RADIOLOGY
Payer: COMMERCIAL

## 2018-06-04 VITALS
SYSTOLIC BLOOD PRESSURE: 113 MMHG | WEIGHT: 198 LBS | BODY MASS INDEX: 38.67 KG/M2 | OXYGEN SATURATION: 100 % | RESPIRATION RATE: 16 BRPM | TEMPERATURE: 98.2 F | DIASTOLIC BLOOD PRESSURE: 80 MMHG | HEART RATE: 82 BPM

## 2018-06-04 VITALS — BODY MASS INDEX: 38.67 KG/M2 | WEIGHT: 198 LBS

## 2018-06-04 DIAGNOSIS — R11.2 CHEMOTHERAPY-INDUCED NAUSEA AND VOMITING: ICD-10-CM

## 2018-06-04 DIAGNOSIS — D70.1 CHEMOTHERAPY INDUCED NEUTROPENIA (H): ICD-10-CM

## 2018-06-04 DIAGNOSIS — Z91.89 HIGH RISK FOR CHEMOTHERAPY-INDUCED INFECTIOUS COMPLICATION: ICD-10-CM

## 2018-06-04 DIAGNOSIS — C71.6 MEDULLOBLASTOMA OF CEREBELLUM (H): Primary | ICD-10-CM

## 2018-06-04 DIAGNOSIS — H35.60 RETINAL HEMORRHAGE, UNSPECIFIED LATERALITY: ICD-10-CM

## 2018-06-04 DIAGNOSIS — Z51.11 CHEMOTHERAPY MANAGEMENT, ENCOUNTER FOR: ICD-10-CM

## 2018-06-04 DIAGNOSIS — T45.1X5A CHEMOTHERAPY-INDUCED NAUSEA AND VOMITING: ICD-10-CM

## 2018-06-04 DIAGNOSIS — T45.1X5A CHEMOTHERAPY INDUCED NEUTROPENIA (H): ICD-10-CM

## 2018-06-04 LAB
ALBUMIN SERPL-MCNC: 4.2 G/DL (ref 3.4–5)
ALP SERPL-CCNC: 63 U/L (ref 40–150)
ALT SERPL W P-5'-P-CCNC: 40 U/L (ref 0–50)
ANION GAP SERPL CALCULATED.3IONS-SCNC: 7 MMOL/L (ref 3–14)
AST SERPL W P-5'-P-CCNC: 27 U/L (ref 0–45)
BASOPHILS # BLD AUTO: 0 10E9/L (ref 0–0.2)
BASOPHILS NFR BLD AUTO: 0.4 %
BILIRUB SERPL-MCNC: 0.6 MG/DL (ref 0.2–1.3)
BUN SERPL-MCNC: 10 MG/DL (ref 7–30)
CALCIUM SERPL-MCNC: 9.1 MG/DL (ref 8.5–10.1)
CHLORIDE SERPL-SCNC: 107 MMOL/L (ref 94–109)
CO2 SERPL-SCNC: 24 MMOL/L (ref 20–32)
CREAT SERPL-MCNC: 0.63 MG/DL (ref 0.52–1.04)
DIFFERENTIAL METHOD BLD: ABNORMAL
EOSINOPHIL # BLD AUTO: 0 10E9/L (ref 0–0.7)
EOSINOPHIL NFR BLD AUTO: 0.4 %
ERYTHROCYTE [DISTWIDTH] IN BLOOD BY AUTOMATED COUNT: 13.1 % (ref 10–15)
GFR SERPL CREATININE-BSD FRML MDRD: >90 ML/MIN/1.7M2
GLUCOSE SERPL-MCNC: 84 MG/DL (ref 70–99)
HCT VFR BLD AUTO: 30.1 % (ref 35–47)
HGB BLD-MCNC: 10.7 G/DL (ref 11.7–15.7)
IMM GRANULOCYTES # BLD: 0 10E9/L (ref 0–0.4)
IMM GRANULOCYTES NFR BLD: 1.3 %
LYMPHOCYTES # BLD AUTO: 0.2 10E9/L (ref 0.8–5.3)
LYMPHOCYTES NFR BLD AUTO: 8.8 %
MCH RBC QN AUTO: 30.1 PG (ref 26.5–33)
MCHC RBC AUTO-ENTMCNC: 35.5 G/DL (ref 31.5–36.5)
MCV RBC AUTO: 85 FL (ref 78–100)
MONOCYTES # BLD AUTO: 0.3 10E9/L (ref 0–1.3)
MONOCYTES NFR BLD AUTO: 13.6 %
NEUTROPHILS # BLD AUTO: 1.7 10E9/L (ref 1.6–8.3)
NEUTROPHILS NFR BLD AUTO: 75.5 %
NRBC # BLD AUTO: 0 10*3/UL
NRBC BLD AUTO-RTO: 0 /100
PLATELET # BLD AUTO: 105 10E9/L (ref 150–450)
POTASSIUM SERPL-SCNC: 3.9 MMOL/L (ref 3.4–5.3)
PROT SERPL-MCNC: 7.5 G/DL (ref 6.8–8.8)
RBC # BLD AUTO: 3.56 10E12/L (ref 3.8–5.2)
SODIUM SERPL-SCNC: 138 MMOL/L (ref 133–144)
WBC # BLD AUTO: 2.3 10E9/L (ref 4–11)

## 2018-06-04 PROCEDURE — 85025 COMPLETE CBC W/AUTO DIFF WBC: CPT | Performed by: PSYCHIATRY & NEUROLOGY

## 2018-06-04 PROCEDURE — 25000128 H RX IP 250 OP 636: Mod: ZF | Performed by: PSYCHIATRY & NEUROLOGY

## 2018-06-04 PROCEDURE — 99215 OFFICE O/P EST HI 40 MIN: CPT | Mod: ZP | Performed by: PSYCHIATRY & NEUROLOGY

## 2018-06-04 PROCEDURE — G0463 HOSPITAL OUTPT CLINIC VISIT: HCPCS | Mod: ZF

## 2018-06-04 PROCEDURE — 77386 ZZH IMRT TREATMENT DELIVERY, COMPLEX: CPT | Performed by: RADIOLOGY

## 2018-06-04 PROCEDURE — 80053 COMPREHEN METABOLIC PANEL: CPT | Performed by: PSYCHIATRY & NEUROLOGY

## 2018-06-04 PROCEDURE — 36591 DRAW BLOOD OFF VENOUS DEVICE: CPT

## 2018-06-04 RX ORDER — HEPARIN SODIUM (PORCINE) LOCK FLUSH IV SOLN 100 UNIT/ML 100 UNIT/ML
5 SOLUTION INTRAVENOUS EVERY 8 HOURS
Status: DISCONTINUED | OUTPATIENT
Start: 2018-06-04 | End: 2018-06-05 | Stop reason: HOSPADM

## 2018-06-04 RX ADMIN — SODIUM CHLORIDE, PRESERVATIVE FREE 5 ML: 5 INJECTION INTRAVENOUS at 11:57

## 2018-06-04 ASSESSMENT — PAIN SCALES - GENERAL: PAINLEVEL: NO PAIN (0)

## 2018-06-04 NOTE — MR AVS SNAPSHOT
After Visit Summary   6/4/2018    Merissa Silverman    MRN: [id number]           Patient Information     Date Of Birth          1985        Visit Information        Provider Department      6/4/2018 11:00 AM Anupama Morrison MD [facility]        Today's Diagnoses     Medulloblastoma of cerebellum (H)    -  1    Retinal hemorrhage, unspecified laterality        Chemotherapy induced neutropenia (H)        Chemotherapy-induced nausea and vomiting        High risk for chemotherapy-induced infectious complication        Chemotherapy management, encounter for          Care Instructions    Treatment plan:  1. Craniospinal local-boost radiotherapy. No more chemotherapy.  2. Then a 4-6 week break with no cancer-directed treatment.  3. Then eight 6-week cycles of cisplatin, Lomustine, and vincristine.   Dosage: Cisplatin DAY 1                    Lomustine DAY 1                     Vincristine  DAY 1 and 8    Labs today, then continue with weekly labs.     Monitor temperature.     Referral to Dr. Kennedy Rivera for ophtho follow-up.     Return to clinic on 6/25.     Anupama Morrison MD  Neuro-oncology  6/4/2018            Follow-ups after your visit        Additional Services     CANCER RISK MGMT/CANCER GENETIC COUNSELING REFERRAL       Your provider has referred you to the Cancer Risk Management Program - Cancer Genetic Counseling.    Reason for Referral: Rare adult cancer    We have a sent a notice to a staff member of the Cancer Risk Management Program to give you a call to assist with scheduling your appointment.  You may also call  [phone] ([phone]) to initiate scheduling.    Please be aware that coverage of these services is subject to the terms and limitations of your health insurance plan.  Call member services at your health plan with any benefit or coverage questions.      Please bring the completed family history sheet to your appointment in addition  to any available outside medical records documenting your cancer diagnosis.            OPHTHALMOLOGY ADULT REFERRAL       Your provider has referred you to: Dr. Kennedy Rivera with [facility].    Please be aware that coverage of these services is subject to the terms and limitations of your health insurance plan.  Call member services at your health plan with any benefit or coverage questions.      Please bring the following with you to your appointment:    (1) Any X-Rays, CTs or MRIs which have been performed.  Contact the facility where they were done to arrange for  prior to your scheduled appointment.    (2) List of current medications  (3) This referral request   (4) Any documents/labs given to you for this referral                  Your next 10 appointments already scheduled     Jun 06, 2018 10:00 AM CDT   EXTERNAL RADIATION TREATMENT with [facility] RAD ONC DANISHA   Radiation Oncology Clinic ([facility])    [facility]  1st Floor  [address] [address]   [phone]            Jun 07, 2018 10:00 AM CDT   EXTERNAL RADIATION TREATMENT with [facility] RAD ONC DANISHA   Radiation Oncology Clinic ([facility])    [facility]  1st Floor  [address] [address]   [phone]            Jun 08, 2018 10:00 AM CDT   EXTERNAL RADIATION TREATMENT with [facility] RAD ONC DANISHA   Radiation Oncology Clinic ([facility])    [facility]  1st Floor  [address] [address]   [phone]            Jun 11, 2018 10:00 AM CDT   EXTERNAL RADIATION TREATMENT with [facility] RAD ONC DANISHA   Radiation Oncology Clinic ([facility])    [facility]  1st Floor  [address] [address]   [phone]            Jun 11, 2018 10:15 AM CDT   ON TREATMENT VISIT with Mike Borges MD   Radiation Oncology Clinic ([facility])    [facility]  [place] [facility]  1st Floor  [address] [facility] [address]   [phone]            Jun 11, 2018 11:15 AM CDT   [facility] Lab Draw with  [facility] LAB DRAW   [facility] Lab Draw ([facility])    [address]  [address]  [facility] [address]   [phone]            Jun 11, 2018  1:00 PM CDT   (Arrive by 12:45 PM)   New Patient Visit with Marsha Reeder RD   [facility] ([facility])    [address]  [address]  [facility] [address]   [phone]            Jun 12, 2018 10:00 AM CDT   EXTERNAL RADIATION TREATMENT with [facility] RAD ONC DANISHA   Radiation Oncology Clinic ([facility])    [facility]  1st Floor  [address] [facility] [address]   [phone]            Jun 13, 2018 10:00 AM CDT   EXTERNAL RADIATION TREATMENT with [facility] RAD ONC DANISHA   Radiation Oncology Clinic ([facility])    [facility]  1st Floor  [address] [facility] [address]   [phone]            Jun 14, 2018 10:00 AM CDT   EXTERNAL RADIATION TREATMENT with [facility] RAD ONC DANISHA   Radiation Oncology Clinic ([facility])    [facility]  1st Floor  [address] [facility] [address]   [phone]              Who to contact     If you have questions or need follow up information about today's clinic visit or your schedule please contact [facility] directly at [phone].  Normal or non-critical lab and imaging results will be communicated to you by MyChart, letter or phone within 4 business days after the clinic has received the results. If you do not hear from us within 7 days, please contact the clinic through MyChart or phone. If you have a critical or abnormal lab result, we will notify you by phone as soon as possible.  Submit refill requests through [facility] or call  your pharmacy and they will forward the refill request to us. Please allow 3 business days for your refill to be completed.          Additional Information About Your Visit        [employer] Information     [facility] gives you secure access to your electronic health record. If you see a primary care provider, you can also send messages to your care team and make appointments. If you have questions, please call your primary care clinic.  If you do not have a primary care provider, please call [phone] and they will assist you.        Care EveryWhere ID     This is your Care EveryWhere ID. This could be used by other organizations to access your [place] medical records  [id number]        Your Vitals Were     Pulse Temperature Respirations Pulse Oximetry BMI (Body Mass Index)       82 98.2  F (36.8  C) (Oral) 16 100% 38.67 kg/m2        Blood Pressure from Last 3 Encounters:   06/04/18 (P) 119/86   06/04/18 113/80   05/30/18 122/81    Weight from Last 3 Encounters:   06/04/18 89.8 kg (198 lb)   06/04/18 89.8 kg (198 lb)   05/30/18 90.1 kg (198 lb 9.6 oz)              We Performed the Following     CANCER RISK MGMT/CANCER GENETIC COUNSELING REFERRAL     CBC with platelets differential - NOW     Comprehensive metabolic panel - NOW     OPHTHALMOLOGY ADULT REFERRAL        Primary Care Provider Fax #    Physician No Ref-Primary [phone]       No address on file        Equal Access to Services     WANDER HAMMER : Hadii handy nielsono Ebony, waaxda luqadaha, qaybta kaalmada yumiko, toño bowman. So [facility] [phone].    ATENCIÓN: Si habla español, tiene a mccurdy disposición servicios gratuitos de asistencia lingüística. Llame al [phone].    We comply with applicable federal civil rights laws and [place] laws. We do not discriminate on the basis of race, color, national origin, age, disability, sex, sexual orientation, or gender identity.            Thank you!     Thank you for  [facility]  for your care. Our goal is always to provide you with excellent care. Hearing back from our patients is one way we can continue to improve our services. Please take a few minutes to complete the written survey that you may receive in the mail after your visit with us. Thank you!             Your Updated Medication List - Protect others around you: Learn how to safely use, store and throw away your medicines at www.disposemymeds.org.          This list is accurate as of 6/4/18 11:59 PM.  Always use your most recent med list.                   Brand Name Dispense Instructions for use Diagnosis    acetaminophen 325 MG tablet    TYLENOL     Take 650 mg by mouth        LORazepam 0.5 MG tablet    ATIVAN    30 tablet    Take 1 tablet (0.5 mg) by mouth every 6 hours as needed for anxiety    Medulloblastoma of cerebellum (H)       ondansetron 8 MG ODT tab    ZOFRAN-ODT    60 tablet    Take 1 tablet (8 mg) by mouth every 8 hours as needed for nausea    Medulloblastoma of cerebellum (H)       sennosides 8.6 MG tablet    SENOKOT     Take 1 tablet by mouth daily

## 2018-06-04 NOTE — PROGRESS NOTES
"RADIATION ONCOLOGY WEEKLY ON TREATMENT VISIT   Encounter Date: 2018    Patient Name: Merissa Silverman  MRN: [id number]  : 1985     Disease and Stage: Medulloblastoma   Treatment Site: Craniospinal  Current Dose/Planned Total Dose: 2880 / 5400 cGy  Daily Fraction Size: 180 cGy/day, 5 times/week  Concurrent Chemotherapy: Yes  Drug and Frequency: Weekly vincristine     Treatment Summary:  -18: Doing well. No issues  -18: Worsening esophagitis, fatigue and alopecia. Weight down 5 lbs from last week  -2018: Continued esophagitis, well controlled with tylenol. No other issues.     ED Visits/Hospitalizations: None   Unplanned Treatment Breaks: None  Pain Management: Tylenol for esophagitis   Chemo:   -2018: Week 3 vincristine infusion held for ANC of 1.0.     Subjective: Ms. Silverman presents to clinic today for her weekly on treatment visit. She has continued issues with esophagitis, but this is still well controlled with PRN Tylenol. It is still quite mild at this point and she is not interested in using viscous lidocaine. Her energy level is \"great\". She otherwise has no complaints.     ROS:   Nutrition Alteration  Diet Type: Patient's Preference    Skin  Skin Reaction: No changes     ENT and Mouth Exam  Mucositis - Current: None      Gastrointestinal  Nausea: None      Pain Assessment  0-10 Pain Scale: 0    Objective:   Weight: 89.8  kg  Weight last week: 90.4kg  Starting Weight: 94.8 kg    BP: 113/80  Pulse: 82    General: Alert, NAD  HEENT: NC/AT, no alopecia, EOMI, PERRL  Skin: No erythema    Treatment-related toxicities (CTCAE v4.0):  Alopecia: Grade 2: Hair loss >=50% of normal; readily apparent to others; wig or hair piece necessary to camoflage  Esophagitis: Grade 2: Symptomatic; altered eating/swallowing; oral supplements indicated  Dermatitis: Grade 1: Faint erythema or dry desquamation    Assessment:    Ms. 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 She is currently undergoing adjuvant chemoradiotherapy for improved local disease control.    Plan:   1. Continue radiotherapy  2. Continue ativan for anxiety   3. Continue zofran for nausea  4. Continue to press oral intake  5. Will consider viscous lidocaine if esophagitis continues to worsen     Mosaiq chart and setup information reviewed  MVCT images reviewed    Medication Review  Med list reviewed with patient?: Yes    Kaushal Garcia MD  Department of Radiation Oncology  [facility]      Attending addendum:   I saw and examined the patient with the resident and agree with the documented plan of care.    Mike Borges MD/PhD  Dept of Radiation Oncology  [facility]

## 2018-06-04 NOTE — PROGRESS NOTES
NEURO-ONCOLOGY VISIT  06/04/2018    CHIEF COMPLAINT: Ms. 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 Currently undergoing craniospinal radiation, concurrent vincristine was stopped in the setting of pancytopenia.    She is presenting in follow-up for continued evaluation and recommendations on treatment accompanied by Tito ().       HISTORY OF PRESENT ILLNESS  -Merissa is feeling well, mild soreness of her throat when she swallows some food, not liquids.  -Elevated temperature of 100 x 1 on Saturday, rechecked hourly and it did not increase.   -With position change, she at times has double vision that lasts for seconds.   -No problems with strength/ walking.  -No confusion, no headaches.   -Nausea controlled with Zofran.  -Had last radiation to the spine.    REVIEW OF SYSTEMS  A comprehensive ROS negative except as in HPI.      MEDICATIONS   Current Outpatient Prescriptions   Medication     acetaminophen (TYLENOL) 325 MG tablet     LORazepam (ATIVAN) 0.5 MG tablet     ondansetron (ZOFRAN-ODT) 8 MG ODT tab     sennosides (SENOKOT) 8.6 MG tablet     Current Facility-Administered Medications   Medication     heparin 100 UNIT/ML injection 5 mL     DRUG ALLERGIES   Allergies   Allergen Reactions     Sulfa Drugs Unknown       ONCOLOGIC HISTORY  -PRESENTATION: Progressively worsening headaches. Additionally was with abrupt position changes resulted in dizziness/ syncope. CTH at an outside hospital showed a mass in the left cerebellum. Transferred to [facility].  -4/11/2018 MRB showed a 3.5cm mass in the left cerebellar hemisphere that was associated with mild cerebellar tonsillar herniation and hydrocephalus.  -MRI spine showed no evidence of disease.   -4/13/2018 SURGERY: Suboccipital craniotomy  with resection of the left cerebellar mass. Immediate post-operative MRI demonstrated a gross total resection.   PATHOLOGY: Medulloblastoma; Molecular markers pending.   -5/4/2018 NEURO-ONC: LP performed. Evaluated by radiation oncology. Recommending craniospinal radiation + concurrent vincristine followed by eight 6-week cycles of cisplatin, lomustine, and vincristine.  -5/4/2018 LP with CSF analysis: WBC 1/ RBC 1, protein 25, glucose 62, negative cytology.   -5/14 - 6/25/2018 CHEMORADS with vincristine 2 mg/m2 (stopped after 3 infusions due to pancytopenia).  -5/17/2018 Audiology- Baseline hearing testing with no hearing loss.  -6/4/2018 NEURO-ONC: Doing well, no new neurological symptoms. Tolerating treatment well. Ophtho referral. Labs improved.    SOCIAL HISTORY   Tobacco use: Former smoker, E-cigs stopped on 5/3/2018  Alcohol use: None.   Drug use: Denies marijuana use.  Supplement, complimentary/ alternative medicine: None.   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      PHYSICAL EXAMINATION  /80  Pulse 82  Temp 98.2  F (36.8  C) (Oral)  Resp 16  Wt 89.8 kg (198 lb)  SpO2 100%  BMI 38.67 kg/m2   Vitals:    06/04/18 1043   Weight: 89.8 kg (198 lb)     Ht Readings from Last 2 Encounters:   05/09/18 1.524 m (5')   05/04/18 1.524 m (5')     KPS: 100    -Generally well appearing.  -Throat: No oral thrush. No redness of throat. No lymphadenopathy.   -Respiratory: Normal breath sounds, no audible wheezing.   -Skin: No rashes. Healed head incision. Shaved head.   -Hematologic/ lymphatic: No abnormal bruising. No leg swelling.  -Psychiatric: Normal mood and affect. Pleasant, talkative.  -Neurologic:   MENTAL STATUS:     Alert, oriented to date.    Recall: Immediate 3/3, delayed 3/3.    Speech fluent. Comprehension intact to multi-step commands.   Normal naming, repetition. Able to read.   Good right-left orientation.     CRANIAL NERVES:     Discs flat on fundoscopy.    Pupils  are equal, round, reactive to light.     Extraocular movements full, patient denies diplopia. Mild eye jerks noted on pursuit.    Visual fields full.     Facial sensation intact to light touch.   Symmetric facial movements.   Hearing intact.   Palate moves symmetrically.     Sternocleidomastoid and trapezius strength intact.    Tongue midline.  MOTOR:    Normal and symmetric tone.   Grossly 5/5 throughout.    No pronation or drift. No orbiting.   Able to rise from a chair without use of arms.   On toe/ heel walk, equal distance from floor to heels/ toes.   SENSATION:    Intact to light touch throughout.  COORDINATION:   Intact finger-nose with eyes open and closed, mild impairment on the left.   REFLEXES:    Brisk, but symmetric.    Toes not tested. No clonus. No Hoffmans.   No grasp.    GAIT:  Walks without assistance.   Good speed. Normal stride length and heel strike. Normal turns. Normal arm swing.   Able to toe, heel walk. Able to tandem walk.       MEDICAL RECORDS  Obtained and personally reviewed all available outside medical records in addition to reviewing any records available in our electronic system.     LABS  Personally reviewed all available lab results.     IMAGING  No new neuro-imaging to review.       IMPRESSION  For the 30 minute appointment, more than 50% of the encounter was spent discussing in detail the nature of this tumor, providing emotional support, answering questions pertaining to my recommendations, and devising the treatment plan as outlined below.    We briefly discussed updates in molecular testing; SHH subtype with no TP53 mutations, translating to a fair/ intermediate prognosis. Next generation sequencing via [employer] is still pending.    With regard to cancer-directed therapy, will permanently stop weekly vincristine infusions during radiation due to chemoradiotherapy induced pancytopenia. Counts have nicely improved, however, WBC remains low and Hb and platelets are borderline low.  Four to six weeks post-radiation, will start up to eight cycles of maintenance chemotherapy with cisplatin, lomustine, and vincristine. Of note, this regimen is highly toxic and in clinical studies, treatment was terminated or dose reduced due to side effects in nearly 60 percent of patients by cycle four. Clinical trial options remain a strong consideration for recurrent disease.     PROBLEM LIST  Medulloblastoma  Steroid intolerance    PLAN  -CANCER-DIRECTED THERAPY-  -As above.  -Next generation sequencing via [employer] pending.  -Radiation per Dr. Borges.   -Stopping concomitant vincristin.  -Continue weekly labs to monitor counts.  -Merissa and Tito declined referral for fertility preservation.   -Referral to Cancer Risk Management given the rarity of her cancer.     -STEROIDS-  -Currently off dexamethasone.  -Dose may increase while undergoing radiation.  -Poor tolerance for steroids.     -SEIZURE MANAGEMENT-  -While this patient is at increased risk of having seizures, given the lack of seizure history, there is no indication to prescribe an antiepileptic at this time.     -Quality of life/ MOOD/ FATIGUE-  -Denies any mood issues.  -Continue to monitor mood as untreated/ undertreated depression can worsen fatigue, dysorexia, and quality of life.    -RETINAL HEMORRHAGE-  -Identified when inpatient on presentation.  -Follow-up dilated eye examination recommended by inpatient ophtho team.  -Referral to Dr. Kennedy Rivera for repeat examination.     Return to clinic on 6/25 at completion of radiation.     In the meantime, Merissa knows to call with questions or concerns or to report new complaints and can be seen sooner if needed. Urgent evaluation is needed in the setting of acute onset of severe headache, abrupt change in mental status, on-going seizures, new focal deficits, or new leg swelling/ pain. Everyone in attendance voiced understanding.    Anupama Morrison MD  Neuro-oncology

## 2018-06-04 NOTE — MR AVS SNAPSHOT
After Visit Summary   6/4/2018    Merissa Silverman    MRN: [id number]           Patient Information     Date Of Birth          1985        Visit Information        Provider Department      6/4/2018 12:15 PM Mike Borges MD Radiation Oncology Clinic        Today's Diagnoses     Medulloblastoma of cerebellum (H)    -  1       Follow-ups after your visit        Your next 10 appointments already scheduled     Jun 05, 2018 10:00 AM CDT   EXTERNAL RADIATION TREATMENT with [facility] RAD ONC DANISHA   Radiation Oncology Clinic ([facility])    [facility]  1st Floor  [address] [address]   [phone]            Jun 06, 2018 10:00 AM CDT   EXTERNAL RADIATION TREATMENT with [facility] RAD ONC DANISHA   Radiation Oncology Clinic ([facility])    [facility]  1st Floor  [address] [address]   [phone]            Jun 07, 2018 10:00 AM CDT   EXTERNAL RADIATION TREATMENT with [facility] RAD ONC DANISHA   Radiation Oncology Clinic ([facility])    [facility]  1st Floor  [address] [address]   [phone]            Jun 08, 2018 10:00 AM CDT   EXTERNAL RADIATION TREATMENT with [facility] RAD ONC DANISHA   Radiation Oncology Clinic ([facility])    [facility]  1st Floor  [address] [facility] [address]   [phone]            Jun 11, 2018 10:00 AM CDT   EXTERNAL RADIATION TREATMENT with [facility] RAD ONC DANISHA   Radiation Oncology Clinic ([facility])    [facility]  1st Floor  [address] [address]   [phone]            Jun 11, 2018 10:15 AM CDT   ON TREATMENT VISIT with Mike Borges MD   Radiation Oncology Clinic ([facility])    [facility]  1st Floor  [address] [address]   [phone]             Jun 11, 2018 11:15 AM CDT   [facility] Draw with  [facility] DRAW   [facility] Draw ([facility])    [address]  [address]  [place] [address] [phone]            Jun 11, 2018  1:00 PM CDT   (Arrive by 12:45 PM)   New Patient Visit with Marsha Reeder RD   [facility] ([facility])    [address]  [address]  [facility] [id number] [phone]            Jun 12, 2018 10:00 AM CDT   EXTERNAL RADIATION TREATMENT with [facility] RAD ONC DANISHA   Radiation Oncology Clinic ([facility])    [facility]  1st Floor  [address] [id number] [phone]            Jun 13, 2018 10:00 AM CDT   EXTERNAL RADIATION TREATMENT with [facility] RAD ONC DANISHA   Radiation Oncology Clinic ([facility])    [facility]  1st Floor  [address] [id number] [phone]              Who to contact     Please call your clinic at [phone] to:    Ask questions about your health    Make or cancel appointments    Discuss your medicines    Learn about your test results    Speak to your doctor            Additional Information About Your Visit        InboxQ Information     InboxQ gives you secure access to your electronic health record. If you see a primary care provider, you can also send messages to your care team and make appointments. If you have questions, please call your primary care clinic.  If you do not have a primary care provider, please call [phone] and they will assist you.      InboxQ is an electronic gateway that provides easy, online access to your medical records. With InboxQ, you can request a clinic appointment, read your test results, renew a prescription or communicate with your care team.     To access your existing account, please contact your [facility] or  call [phone] for assistance.        Care EveryWhere ID     This is your Care EveryWhere ID. This could be used by other organizations to access your [place] medical records  [id number]        Your Vitals Were     BMI (Body Mass Index)                   38.67 kg/m2            Blood Pressure from Last 3 Encounters:   06/04/18 (P) 119/86   06/04/18 113/80   05/30/18 122/81    Weight from Last 3 Encounters:   06/04/18 89.8 kg (198 lb)   06/04/18 89.8 kg (198 lb)   05/30/18 90.1 kg (198 lb 9.6 oz)              Today, you had the following     No orders found for display       Primary Care Provider Fax #    Physician No Ref-Primary [phone]       No address on file        Equal Access to Services     WANDER HAMMER : Lisa Beck, wawil welchqcatarino, qaybta kaalmatu calderon, toño jade . [facility] [phone].    ATENCIÓN: Si habla español, tiene a mccurdy disposición servicios gratuitos de asistencia lingüística. Llame al [phone].    We comply with applicable federal civil rights laws and [place] laws. We do not discriminate on the basis of race, color, national origin, age, disability, sex, sexual orientation, or gender identity.            Thank you!     Thank you for choosing RADIATION ONCOLOGY CLINIC  for your care. Our goal is always to provide you with excellent care. Hearing back from our patients is one way we can continue to improve our services. Please take a few minutes to complete the written survey that you may receive in the mail after your visit with us. Thank you!             Your Updated Medication List - Protect others around you: Learn how to safely use, store and throw away your medicines at www.disposemymeds.org.          This list is accurate as of 6/4/18  3:10 PM.  Always use your most recent med list.                   Brand Name Dispense Instructions for use Diagnosis    acetaminophen 325 MG tablet    TYLENOL     Take 650 mg by mouth         LORazepam 0.5 MG tablet    ATIVAN    30 tablet    Take 1 tablet (0.5 mg) by mouth every 6 hours as needed for anxiety    Medulloblastoma of cerebellum (H)       ondansetron 8 MG ODT tab    ZOFRAN-ODT    60 tablet    Take 1 tablet (8 mg) by mouth every 8 hours as needed for nausea    Medulloblastoma of cerebellum (H)       sennosides 8.6 MG tablet    SENOKOT     Take 1 tablet by mouth daily

## 2018-06-04 NOTE — NURSING NOTE
Oncology Rooming Note    June 4, 2018 10:47 AM   Merissa Silverman is a 32 year old female who presents for:    Chief Complaint   Patient presents with     Oncology Clinic Visit     Return Medulloblasoma 1 month f.u; Active Tx     Initial Vitals: /80  Pulse 82  Temp 98.2  F (36.8  C) (Oral)  Resp 16  Wt 89.8 kg (198 lb)  SpO2 100%  BMI 38.67 kg/m2 Estimated body mass index is 38.67 kg/(m^2) as calculated from the following:    Height as of 5/9/18: 1.524 m (5').    Weight as of this encounter: 89.8 kg (198 lb). Body surface area is 1.95 meters squared.  No Pain (0) Comment: Data Unavailable   No LMP recorded.  Allergies reviewed: Yes  Medications reviewed: Yes    Medications: Medication refills not needed today.  Pharmacy name entered into EPIC: Data Unavailable    Clinical concerns: Patient states there are no new concerns to discuss with provider.  Dr Morrison was not notified.       8 minutes for nursing intake (face to face time)     Chelle Ruiz CMA

## 2018-06-04 NOTE — LETTER
6/4/2018       RE: Merissa Silverman  [address]  [address]  [place] [address]     Dear Colleague,    Thank you for referring your patient, Merissa Silverman, to the [facility]. Please see a copy of my visit note below.    NEURO-ONCOLOGY VISIT  06/04/2018    CHIEF COMPLAINT: Ms. Merissa Silverman is a 32 year old right-handed woman with medulloblastoma (SHH-pathway activated and AO09-hmngmezo, T2, M0 (spinal imaging and CSF negative) arising from the left cerebellum, diagnosed following gross total resection on 4/13/2018. Currently undergoing craniospinal radiation, concurrent vincristine was stopped in the setting of pancytopenia.    She is presenting in follow-up for continued evaluation and recommendations on treatment accompanied by Tito ().       HISTORY OF PRESENT ILLNESS  -Merissa is feeling well, mild soreness of her throat when she swallows some food, not liquids.  -Elevated temperature of 100 x 1 on Saturday, rechecked hourly and it did not increase.   -With position change, she at times has double vision that lasts for seconds.   -No problems with strength/ walking.  -No confusion, no headaches.   -Nausea controlled with Zofran.  -Had last radiation to the spine.    REVIEW OF SYSTEMS  A comprehensive ROS negative except as in HPI.      MEDICATIONS   Current Outpatient Prescriptions   Medication     acetaminophen (TYLENOL) 325 MG tablet     LORazepam (ATIVAN) 0.5 MG tablet     ondansetron (ZOFRAN-ODT) 8 MG ODT tab     sennosides (SENOKOT) 8.6 MG tablet     Current Facility-Administered Medications   Medication     heparin 100 UNIT/ML injection 5 mL     DRUG ALLERGIES   Allergies   Allergen Reactions     Sulfa Drugs Unknown       ONCOLOGIC HISTORY  -PRESENTATION: Progressively worsening headaches. Additionally was with abrupt position changes resulted in dizziness/ syncope. CTH at an outside hospital showed a mass in the left cerebellum. Transferred to [facility]  [place].  -4/11/2018 MRB showed a 3.5cm mass in the left cerebellar hemisphere that was associated with mild cerebellar tonsillar herniation and hydrocephalus.  -MRI spine showed no evidence of disease.   -4/13/2018 SURGERY: Suboccipital craniotomy with resection of the left cerebellar mass. Immediate post-operative MRI demonstrated a gross total resection.   PATHOLOGY: Medulloblastoma; Molecular markers pending.   -5/4/2018 NEURO-ONC: LP performed. Evaluated by radiation oncology. Recommending craniospinal radiation + concurrent vincristine followed by eight 6-week cycles of cisplatin, lomustine, and vincristine.  -5/4/2018 LP with CSF analysis: WBC 1/ RBC 1, protein 25, glucose 62, negative cytology.   -5/14 - 6/25/2018 CHEMORADS with vincristine 2 mg/m2 (stopped after 3 infusions due to pancytopenia).  -5/17/2018 Audiology- Baseline hearing testing with no hearing loss.  -6/4/2018 NEURO-ONC: Doing well, no new neurological symptoms. Tolerating treatment well. Ophtho referral. Labs improved.    SOCIAL HISTORY   Tobacco use: Former smoker, E-cigs stopped on 5/3/2018  Alcohol use: None.   Drug use: Denies marijuana use.  Supplement, complimentary/ alternative medicine: None.   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      PHYSICAL EXAMINATION  /80  Pulse 82  Temp 98.2  F (36.8  C) (Oral)  Resp 16  Wt 89.8 kg (198 lb)  SpO2 100%  BMI 38.67 kg/m2   Vitals:    06/04/18 1043   Weight: 89.8 kg (198 lb)     Ht Readings from Last 2 Encounters:   05/09/18 1.524 m (5')   05/04/18 1.524 m (5')     KPS: 100    -Generally well appearing.  -Throat: No oral thrush. No redness of throat. No lymphadenopathy.   -Respiratory: Normal breath sounds, no audible wheezing.   -Skin: No rashes. Healed head incision. Shaved head.   -Hematologic/ lymphatic: No abnormal bruising. No leg swelling.  -Psychiatric: Normal mood and affect. Pleasant, talkative.  -Neurologic:   MENTAL STATUS:     Alert,  oriented to date.    Recall: Immediate 3/3, delayed 3/3.    Speech fluent. Comprehension intact to multi-step commands.   Normal naming, repetition. Able to read.   Good right-left orientation.     CRANIAL NERVES:     Discs flat on fundoscopy.    Pupils are equal, round, reactive to light.     Extraocular movements full, patient denies diplopia. Mild eye jerks noted on pursuit.    Visual fields full.     Facial sensation intact to light touch.   Symmetric facial movements.   Hearing intact.   Palate moves symmetrically.     Sternocleidomastoid and trapezius strength intact.    Tongue midline.  MOTOR:    Normal and symmetric tone.   Grossly 5/5 throughout.    No pronation or drift. No orbiting.   Able to rise from a chair without use of arms.   On toe/ heel walk, equal distance from floor to heels/ toes.   SENSATION:    Intact to light touch throughout.  COORDINATION:   Intact finger-nose with eyes open and closed, mild impairment on the left.   REFLEXES:    Brisk, but symmetric.    Toes not tested. No clonus. No Hoffmans.   No grasp.    GAIT:  Walks without assistance.   Good speed. Normal stride length and heel strike. Normal turns. Normal arm swing.   Able to toe, heel walk. Able to tandem walk.       MEDICAL RECORDS  Obtained and personally reviewed all available outside medical records in addition to reviewing any records available in our electronic system.     LABS  Personally reviewed all available lab results.     IMAGING  No new neuro-imaging to review.       IMPRESSION  For the 30 minute appointment, more than 50% of the encounter was spent discussing in detail the nature of this tumor, providing emotional support, answering questions pertaining to my recommendations, and devising the treatment plan as outlined below.    We briefly discussed updates in molecular testing; SHH subtype with no TP53 mutations, translating to a fair/ intermediate prognosis. Next generation sequencing via [employer] is still  pending.    With regard to cancer-directed therapy, will permanently stop weekly vincristine infusions during radiation due to chemoradiotherapy induced pancytopenia. Counts have nicely improved, however, WBC remains low and Hb and platelets are borderline low. Four to six weeks post-radiation, will start up to eight cycles of maintenance chemotherapy with cisplatin, lomustine, and vincristine. Of note, this regimen is highly toxic and in clinical studies, treatment was terminated or dose reduced due to side effects in nearly 60 percent of patients by cycle four. Clinical trial options remain a strong consideration for recurrent disease.     PROBLEM LIST  Medulloblastoma  Steroid intolerance    PLAN  -CANCER-DIRECTED THERAPY-  -As above.  -Next generation sequencing via [employer] pending.  -Radiation per Dr. Borges.   -Stopping concomitant vincristin.  -Continue weekly labs to monitor counts.  -Merissa and Tito declined referral for fertility preservation.   -Referral to Cancer Risk Management given the rarity of her cancer.     -STEROIDS-  -Currently off dexamethasone.  -Dose may increase while undergoing radiation.  -Poor tolerance for steroids.     -SEIZURE MANAGEMENT-  -While this patient is at increased risk of having seizures, given the lack of seizure history, there is no indication to prescribe an antiepileptic at this time.     -Quality of life/ MOOD/ FATIGUE-  -Denies any mood issues.  -Continue to monitor mood as untreated/ undertreated depression can worsen fatigue, dysorexia, and quality of life.    -RETINAL HEMORRHAGE-  -Identified when inpatient on presentation.  -Follow-up dilated eye examination recommended by inpatient ophtho team.  -Referral to Dr. Kennedy Rivera for repeat examination.     Return to clinic on 6/25 at completion of radiation.     In the meantime, Merissa knows to call with questions or concerns or to report new complaints and can be seen sooner if needed. Urgent evaluation is needed in the  setting of acute onset of severe headache, abrupt change in mental status, on-going seizures, new focal deficits, or new leg swelling/ pain. Everyone in attendance voiced understanding.    Anupama Morrison MD  Neuro-oncology

## 2018-06-04 NOTE — PATIENT INSTRUCTIONS
Treatment plan:  1. Craniospinal local-boost radiotherapy. No more chemotherapy.  2. Then a 4-6 week break with no cancer-directed treatment.  3. Then eight 6-week cycles of cisplatin, Lomustine, and vincristine.   Dosage: Cisplatin DAY 1                    Lomustine DAY 1                     Vincristine  DAY 1 and 8    Labs today, then continue with weekly labs.     Monitor temperature.     Referral to Dr. Kennedy Rivera for ophtho follow-up.     Return to clinic on 6/25.     Anupama Morrison MD  Neuro-oncology  6/4/2018

## 2018-06-05 ENCOUNTER — APPOINTMENT (OUTPATIENT)
Dept: RADIATION ONCOLOGY | Facility: CLINIC | Age: 33
End: 2018-06-05
Attending: RADIOLOGY
Payer: COMMERCIAL

## 2018-06-05 PROCEDURE — 77386 ZZH IMRT TREATMENT DELIVERY, COMPLEX: CPT | Performed by: RADIOLOGY

## 2018-06-06 ENCOUNTER — APPOINTMENT (OUTPATIENT)
Dept: RADIATION ONCOLOGY | Facility: CLINIC | Age: 33
End: 2018-06-06
Attending: RADIOLOGY
Payer: COMMERCIAL

## 2018-06-06 PROCEDURE — 77386 ZZH IMRT TREATMENT DELIVERY, COMPLEX: CPT | Performed by: RADIOLOGY

## 2018-06-07 ENCOUNTER — APPOINTMENT (OUTPATIENT)
Dept: RADIATION ONCOLOGY | Facility: CLINIC | Age: 33
End: 2018-06-07
Attending: RADIOLOGY
Payer: COMMERCIAL

## 2018-06-07 PROCEDURE — 77386 ZZH IMRT TREATMENT DELIVERY, COMPLEX: CPT | Performed by: RADIOLOGY

## 2018-06-07 PROCEDURE — 77300 RADIATION THERAPY DOSE PLAN: CPT | Performed by: RADIOLOGY

## 2018-06-08 ENCOUNTER — APPOINTMENT (OUTPATIENT)
Dept: RADIATION ONCOLOGY | Facility: CLINIC | Age: 33
End: 2018-06-08
Attending: RADIOLOGY
Payer: COMMERCIAL

## 2018-06-08 PROCEDURE — 77336 RADIATION PHYSICS CONSULT: CPT | Performed by: RADIOLOGY

## 2018-06-08 PROCEDURE — 77386 ZZH IMRT TREATMENT DELIVERY, COMPLEX: CPT | Performed by: RADIOLOGY

## 2018-06-11 ENCOUNTER — ONCOLOGY VISIT (OUTPATIENT)
Dept: ONCOLOGY | Facility: CLINIC | Age: 33
End: 2018-06-11
Attending: DIETITIAN, REGISTERED
Payer: COMMERCIAL

## 2018-06-11 ENCOUNTER — APPOINTMENT (OUTPATIENT)
Dept: RADIATION ONCOLOGY | Facility: CLINIC | Age: 33
End: 2018-06-11
Attending: RADIOLOGY
Payer: COMMERCIAL

## 2018-06-11 VITALS — WEIGHT: 196 LBS | BODY MASS INDEX: 38.28 KG/M2

## 2018-06-11 DIAGNOSIS — D61.810 ANTINEOPLASTIC CHEMOTHERAPY INDUCED PANCYTOPENIA (H): ICD-10-CM

## 2018-06-11 DIAGNOSIS — T45.1X5A ANTINEOPLASTIC CHEMOTHERAPY INDUCED PANCYTOPENIA (H): ICD-10-CM

## 2018-06-11 DIAGNOSIS — C71.6 MEDULLOBLASTOMA OF CEREBELLUM (H): Primary | ICD-10-CM

## 2018-06-11 DIAGNOSIS — C71.6 MEDULLOBLASTOMA OF CEREBELLUM (H): ICD-10-CM

## 2018-06-11 LAB
BASOPHILS # BLD AUTO: 0 10E9/L (ref 0–0.2)
BASOPHILS NFR BLD AUTO: 0.6 %
DIFFERENTIAL METHOD BLD: ABNORMAL
EOSINOPHIL # BLD AUTO: 0 10E9/L (ref 0–0.7)
EOSINOPHIL NFR BLD AUTO: 0.3 %
ERYTHROCYTE [DISTWIDTH] IN BLOOD BY AUTOMATED COUNT: 14.8 % (ref 10–15)
HCT VFR BLD AUTO: 29.4 % (ref 35–47)
HGB BLD-MCNC: 10.1 G/DL (ref 11.7–15.7)
IMM GRANULOCYTES # BLD: 0 10E9/L (ref 0–0.4)
IMM GRANULOCYTES NFR BLD: 0.9 %
LYMPHOCYTES # BLD AUTO: 0.2 10E9/L (ref 0.8–5.3)
LYMPHOCYTES NFR BLD AUTO: 4.6 %
MCH RBC QN AUTO: 30.3 PG (ref 26.5–33)
MCHC RBC AUTO-ENTMCNC: 34.4 G/DL (ref 31.5–36.5)
MCV RBC AUTO: 88 FL (ref 78–100)
MONOCYTES # BLD AUTO: 0.5 10E9/L (ref 0–1.3)
MONOCYTES NFR BLD AUTO: 14.4 %
NEUTROPHILS # BLD AUTO: 2.6 10E9/L (ref 1.6–8.3)
NEUTROPHILS NFR BLD AUTO: 79.2 %
NRBC # BLD AUTO: 0 10*3/UL
NRBC BLD AUTO-RTO: 0 /100
PLATELET # BLD AUTO: 183 10E9/L (ref 150–450)
RBC # BLD AUTO: 3.33 10E12/L (ref 3.8–5.2)
WBC # BLD AUTO: 3.3 10E9/L (ref 4–11)

## 2018-06-11 PROCEDURE — 77386 ZZH IMRT TREATMENT DELIVERY, COMPLEX: CPT | Performed by: RADIOLOGY

## 2018-06-11 PROCEDURE — 85025 COMPLETE CBC W/AUTO DIFF WBC: CPT | Performed by: PSYCHIATRY & NEUROLOGY

## 2018-06-11 PROCEDURE — 36591 DRAW BLOOD OFF VENOUS DEVICE: CPT

## 2018-06-11 PROCEDURE — 97802 MEDICAL NUTRITION INDIV IN: CPT | Mod: ZF | Performed by: DIETITIAN, REGISTERED

## 2018-06-11 PROCEDURE — 25000128 H RX IP 250 OP 636: Performed by: PSYCHIATRY & NEUROLOGY

## 2018-06-11 RX ORDER — HEPARIN SODIUM (PORCINE) LOCK FLUSH IV SOLN 100 UNIT/ML 100 UNIT/ML
5 SOLUTION INTRAVENOUS EVERY 8 HOURS PRN
Status: DISCONTINUED | OUTPATIENT
Start: 2018-06-11 | End: 2018-06-19 | Stop reason: HOSPADM

## 2018-06-11 RX ORDER — LORAZEPAM 0.5 MG/1
0.5 TABLET ORAL EVERY 6 HOURS PRN
Qty: 30 TABLET | Refills: 0 | Status: SHIPPED | OUTPATIENT
Start: 2018-06-11 | End: 2018-06-25

## 2018-06-11 RX ADMIN — SODIUM CHLORIDE, PRESERVATIVE FREE 5 ML: 5 INJECTION INTRAVENOUS at 12:03

## 2018-06-11 NOTE — LETTER
"6/11/2018       RE: Merissa Silverman  [address]  [address] [place] [address]     Dear Colleague,    Thank you for referring your patient, Merissa Silverman, to the [facility]. Please see a copy of my visit note below.    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  She denies swallowing difficulties.  She reports that she does not want to eat solid foods as she does not want to have taste aversions for the future.    She has been drinking mostly Ensure Plus and CIB to meet >75% of her calorie/protein needs.   She is also eating tapioca pudding, jello, yogurt, Naked smoothies and chicken noodle soup.   She has been drinking water and Gatorade, coffee and hot chocolate.   She reports that chocolate and spicy foods tend to taste the best for her.       ANTHROPOMETRICS  Height: 60\"  Weight: 198 lbs/89kg  BMI: 38.7  Weight Status:  Obesity Grade II BMI 35-39.9  IBW: 100 lbs  % IBW: 198%  Weight History: down 2 lbs x past 1 week.  Wt Readings from Last 5 Encounters:   06/11/18 88.9 kg (196 lb)   06/04/18 89.8 kg (198 lb)   06/04/18 89.8 kg (198 lb)   05/30/18 90.1 kg (198 lb 9.6 oz)   05/29/18 90.4 kg (199 lb 6.4 oz)       Dosing Weight: 122 lbs/55kg      Medications/vitamins/minerals/herbals:   Reviewed    LABS  Labs reviewed    ASSESSED NUTRITION NEEDS:  Estimated Energy Needs: 1700 kcals (30 Kcal/Kg)  Justification: maintenance  Estimated Protein Needs: 70 grams protein (1.2 g pro/Kg)  Justification: increased needs with " radiation  Estimated Fluid Needs: 2000  mL   Justification: maintenance    MALNUTRITION:  % Weight Loss:  Weight loss does not meet criteria for malnutrition   % Intake:  Decreased intake does not meet criteria for malnutrition   Subcutaneous Fat Loss:  None observed  Muscle Loss:  None observed  Fluid Retention:  None noted    Malnutrition Diagnosis: Patient does not meet two of the above criteria necessary for diagnosing malnutrition but is at risk.     NUTRITION DIAGNOSIS:  Predicted suboptimal nutrient intake related to dysgeusia    INTERVENTIONS  Nutrition Education     Provided written & verbal education on:   - Ways to optimize kcal and protein intake. Discussed calorie and protein needs for maintenance of weight and nutrition status.  Advised pt to aim for at least 1700kcal and 70g protein via 5-6 small frequent meals.   - Coping with barriers - as radiation progresses, eating may become more difficult and discussed ways to cope with this. (Coping with Taste Changes).   - ONS (Ensure Enlive, Ensure Plus/Boost Plus, CIB, Benecalorie, Scandi shake etc). Suggested ways to incorporate these supplements to avoid flavor fatigue.  Provided pt with recipe booklet for home made smoothies.       Provided pt with corresponding education materials/handouts on:  Protein Sources.      Goals  1  Aim for 1700kcal and 70g protein/day  2. Weight maintenance/1-2 lb wt loss/wk okay through cancer treatment      Follow-Up Plans: Pt has RD contact information for questions.      Marsha Reeder, DANNY, LD

## 2018-06-11 NOTE — PROGRESS NOTES
"RADIATION ONCOLOGY WEEKLY ON TREATMENT VISIT   Encounter Date: 2018    Patient Name: Merissa Silverman  MRN: [id number]  : 1985     Disease and Stage: Medulloblastoma   Treatment Site: Craniospinal  Current Dose/Planned Total Dose: 3780 / 5400 cGy  Daily Fraction Size: 180 cGy/day, 5 times/week  Concurrent Chemotherapy: Yes  Drug and Frequency: Weekly vincristine     Treatment Summary:  -18: Doing well. No issues  -18: Worsening esophagitis, fatigue and alopecia. Weight down 5 lbs from last week  -2018: Continued esophagitis, well controlled with tylenol. No other issues.   -2018: Esophagitis well controlled with tylenol. Worsening dysgeusia. Notes scalp erythema.     ED Visits/Hospitalizations: None   Unplanned Treatment Breaks: None  Pain Management: Tylenol for esophagitis   Chemo:   -2018: Week 3 vincristine infusion held for ANC of 1.0.     Subjective: Ms. Silverman presents to clinic today for her weekly on treatment visit. She has continued issues with esophagitis, but this is still well-controlled with PRN Tylenol. It is still quite mild at this point and she is not interested in using viscous lidocaine. Her energy level is \"great\". She otherwise has no complaints.     ROS:   Nutrition Alteration  Diet Type: Patient's Preference    Skin  Skin Reaction: No changes     ENT and Mouth Exam  Mucositis - Current: None      Gastrointestinal  Nausea: None      Pain Assessment  0-10 Pain Scale: 0    Objective:   Weight: 88.9 kg  Weight last week: 89.8 kg  Starting Weight: 94.8 kg    General: Alert, NAD  HEENT: NC/AT, no alopecia, EOMI, PERRL  Skin: No erythema    Treatment-related toxicities (CTCAE v5.0):  Alopecia: Grade 2: Hair loss >=50% of normal; readily apparent to others; wig or hair piece necessary to camoflage  Esophagitis: Grade 2: Symptomatic; altered eating/swallowing; oral supplements indicated  Dermatitis: Grade 1: Faint erythema or dry " desquamation    Assessment:    Ms. 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 She is currently undergoing adjuvant chemoradiotherapy for improved local disease control.    Plan:   1. Continue radiotherapy  2. Continue ativan for anxiety during radiotherapy  3. Continue zofran for nausea  4. Continue to press oral intake  5. Recommend at least daily moisturizer to scalp     Mosaiq chart and setup information reviewed  MVCT images reviewed    Medication Review  Med list reviewed with patient?: Yes    Kaushal Garcia MD  Department of Radiation Oncology  [facility]      Attending addendum:   I saw and examined the patient with the resident and agree with the documented plan of care.    Mike Borges MD/PhD  Dept of Radiation Oncology  [facility]

## 2018-06-11 NOTE — NURSING NOTE
Chief Complaint   Patient presents with     Port Draw     No lab orders. JIC purple and green lab tubes drawn. Paged nurse coordinator to order labs if appropriate. No other appointments, No VS taken.     Angelia Bassett RN

## 2018-06-11 NOTE — MR AVS SNAPSHOT
After Visit Summary   6/11/2018    Merissa Silverman    MRN: [id number]           Patient Information     Date Of Birth          1985        Visit Information        Provider Department      6/11/2018 10:15 AM Mike Borges MD Radiation Oncology Clinic        Today's Diagnoses     Medulloblastoma of cerebellum (H)           Follow-ups after your visit        Your next 10 appointments already scheduled     Jun 12, 2018 10:00 AM CDT   EXTERNAL RADIATION TREATMENT with [facility] RAD ONC DANISHA   Radiation Oncology Clinic ([facility])    [facility]  1st Floor  [address] [address]   [phone]            Jun 13, 2018 10:00 AM CDT   EXTERNAL RADIATION TREATMENT with [facility] RAD ONC DANISHA   Radiation Oncology Clinic ([facility])    [facility]  1st Floor  [address] [address]   [phone]            Jun 14, 2018  7:45 AM CDT   NEW RETINA with Keri Norton MD   Eye Clinic ([facility])    [address]  [facility] [address]   [phone]            Jun 14, 2018 10:00 AM CDT   EXTERNAL RADIATION TREATMENT with [facility] RAD ONC DANISHA   Radiation Oncology Clinic ([facility])    [facility]  1st Floor  [address] [address]   [phone]            Heron 15, 2018 10:00 AM CDT   EXTERNAL RADIATION TREATMENT with [facility] RAD ONC DANISHA   Radiation Oncology Clinic ([facility])    [facility]  1st Floor  [address] [address]   [phone]            Jun 18, 2018 10:00 AM CDT   EXTERNAL RADIATION TREATMENT with [facility] RAD ONC DANISHA   Radiation Oncology Clinic ([facility])    [facility]  1st Floor  [address] [address]   [phone]            Jun 18, 2018 10:15 AM  CDT   ON TREATMENT VISIT with Mike Borges MD   Radiation Oncology Clinic ([facility])    [facility]  1st Floor  [address]  [facility] [address]   [phone]            Jun 18, 2018 11:15 AM CDT   [facility] Draw with  [facility]   [facility] Draw ([facility])    [address]  [address]  [facility] [address] [phone]            Jun 19, 2018 10:00 AM CDT   EXTERNAL RADIATION TREATMENT with [facility] RAD ONC DANISHA   Radiation Oncology Clinic ([facility])    [facility]  1st Floor  [address]  [facility] [address]   [phone]            Jun 20, 2018 10:00 AM CDT   EXTERNAL RADIATION TREATMENT with [facility] RAD ONC DANISHA   Radiation Oncology Clinic ([facility])    [facility]  1st Floor  [address] [address] [phone]              Future tests that were ordered for you today     Open Standing Orders        Priority Remaining Interval Expires Ordered    CBC with platelets differential Routine 51/52 weekly 6/11/2019 6/11/2018            Who to contact     Please call your clinic at [phone] to:    Ask questions about your health    Make or cancel appointments    Discuss your medicines    Learn about your test results    Speak to your doctor            Additional Information About Your Visit        [employer] Information     [employer] gives you secure access to your electronic health record. If you see a primary care provider, you can also send messages to your care team and make appointments. If you have questions, please call your primary care clinic.  If you do not have a primary care provider, please call [phone] and they will assist you.      [employer] is an electronic gateway that provides easy, online access to your medical records. With [employer], you can request a clinic appointment, read your  test results, renew a prescription or communicate with your care team.     To access your existing account, please contact your [facility] or call [phone] for assistance.        Care EveryWhere ID     This is your Care EveryWhere ID. This could be used by other organizations to access your [place] medical records  [id number]        Your Vitals Were     BMI (Body Mass Index)                   38.28 kg/m2            Blood Pressure from Last 3 Encounters:   06/04/18 (P) 119/86   06/04/18 113/80   05/30/18 122/81    Weight from Last 3 Encounters:   06/11/18 88.9 kg (196 lb)   06/04/18 89.8 kg (198 lb)   06/04/18 89.8 kg (198 lb)              Today, you had the following     No orders found for display         Where to get your medicines      Some of these will need a paper prescription and others can be bought over the counter.  Ask your nurse if you have questions.     Bring a paper prescription for each of these medications     LORazepam 0.5 MG tablet          Primary Care Provider Fax #    Physician No Ref-Primary [phone]       No address on file        Equal Access to Services     WANDER HAMMER : Hadlindy Beck, wawil varela, cherelle calderon, toño bowman. So [facility] [phone].    ATENCIÓN: Si habla español, tiene a mccurdy disposición servicios gratuitos de asistencia lingüística. Cuco al [phone].    We comply with applicable federal civil rights laws and [place] laws. We do not discriminate on the basis of race, color, national origin, age, disability, sex, sexual orientation, or gender identity.            Thank you!     Thank you for choosing RADIATION ONCOLOGY CLINIC  for your care. Our goal is always to provide you with excellent care. Hearing back from our patients is one way we can continue to improve our services. Please take a few minutes to complete the written survey that you may receive in the mail  after your visit with us. Thank you!             Your Updated Medication List - Protect others around you: Learn how to safely use, store and throw away your medicines at www.disposemymeds.org.          This list is accurate as of 6/11/18  1:58 PM.  Always use your most recent med list.                   Brand Name Dispense Instructions for use Diagnosis    acetaminophen 325 MG tablet    TYLENOL     Take 650 mg by mouth        LORazepam 0.5 MG tablet    ATIVAN    30 tablet    Take 1 tablet (0.5 mg) by mouth every 6 hours as needed for anxiety    Medulloblastoma of cerebellum (H)       ondansetron 8 MG ODT tab    ZOFRAN-ODT    60 tablet    Take 1 tablet (8 mg) by mouth every 8 hours as needed for nausea    Medulloblastoma of cerebellum (H)       sennosides 8.6 MG tablet    SENOKOT     Take 1 tablet by mouth daily

## 2018-06-11 NOTE — PROGRESS NOTES
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  She denies swallowing difficulties.  She reports that she does not want to eat solid foods as she does not want to have taste aversions for the future.    She has been drinking mostly Ensure Plus and CIB to meet >75% of her calorie/protein needs.   She is also eating tapioca pudding, jello, yogurt, Naked smoothies and chicken noodle soup.   She has been drinking water and Gatorade, coffee and hot chocolate.   She reports that chocolate and spicy foods tend to taste the best for her.       ANTHROPOMETRICS  Height: 60\"  Weight: 198 lbs/89kg  BMI: 38.7  Weight Status:  Obesity Grade II BMI 35-39.9  IBW: 100 lbs  % IBW: 198%  Weight History: down 2 lbs x past 1 week.  Wt Readings from Last 5 Encounters:   06/11/18 88.9 kg (196 lb)   06/04/18 89.8 kg (198 lb)   06/04/18 89.8 kg (198 lb)   05/30/18 90.1 kg (198 lb 9.6 oz)   05/29/18 90.4 kg (199 lb 6.4 oz)       Dosing Weight: 122 lbs/55kg      Medications/vitamins/minerals/herbals:   Reviewed    LABS  Labs reviewed    ASSESSED NUTRITION NEEDS:  Estimated Energy Needs: 1700 kcals (30 Kcal/Kg)  Justification: maintenance  Estimated Protein Needs: 70 grams protein (1.2 g pro/Kg)  Justification: increased needs with radiation  Estimated Fluid Needs: 2000  mL   Justification: maintenance    MALNUTRITION:  % Weight Loss:  Weight loss does not meet criteria for malnutrition   % Intake:  Decreased intake does not meet criteria for malnutrition   Subcutaneous Fat Loss:  None " observed  Muscle Loss:  None observed  Fluid Retention:  None noted    Malnutrition Diagnosis: Patient does not meet two of the above criteria necessary for diagnosing malnutrition but is at risk.     NUTRITION DIAGNOSIS:  Predicted suboptimal nutrient intake related to dysgeusia    INTERVENTIONS  Nutrition Education     Provided written & verbal education on:   - Ways to optimize kcal and protein intake. Discussed calorie and protein needs for maintenance of weight and nutrition status.  Advised pt to aim for at least 1700kcal and 70g protein via 5-6 small frequent meals.   - Coping with barriers - as radiation progresses, eating may become more difficult and discussed ways to cope with this. (Coping with Taste Changes).   - ONS (Ensure Enlive, Ensure Plus/Boost Plus, CIB, Benecalorie, Scandi shake etc). Suggested ways to incorporate these supplements to avoid flavor fatigue.  Provided pt with recipe booklet for home made smoothies.       Provided pt with corresponding education materials/handouts on:  Protein Sources.      Goals  1  Aim for 1700kcal and 70g protein/day  2. Weight maintenance/1-2 lb wt loss/wk okay through cancer treatment      Follow-Up Plans: Pt has RD contact information for questions.      Marsha Reeder RD, LD

## 2018-06-11 NOTE — MR AVS SNAPSHOT
After Visit Summary   6/11/2018    Merissa Silverman    MRN: [id number]           Patient Information     Date Of Birth          1985        Visit Information        Provider Department      6/11/2018 1:00 PM Marsha Tadeo RD M [facility]        Today's Diagnoses     Medulloblastoma of cerebellum (H)    -  1       Follow-ups after your visit        Your next 10 appointments already scheduled     Jun 12, 2018 10:00 AM CDT   EXTERNAL RADIATION TREATMENT with [facility] RAD ONC DANISHA   Radiation Oncology Clinic ([facility])    [facility]  1st Floor  [address] [address]   [phone]            Jun 13, 2018 10:00 AM CDT   EXTERNAL RADIATION TREATMENT with [facility] RAD ONC DANISHA   Radiation Oncology Clinic ([facility])    [facility]  1st Floor  [address] [address]   [phone]            Jun 14, 2018  7:45 AM CDT   NEW RETINA with Keri Norton MD   Eye Clinic ([facility])    [address]  [facility] [address]   [phone]            Jun 14, 2018 10:00 AM CDT   EXTERNAL RADIATION TREATMENT with [facility] RAD ONC DANISHA   Radiation Oncology Clinic ([facility])    [facility]  1st Floor  [address] [address]   [phone]            Heron 15, 2018 10:00 AM CDT   EXTERNAL RADIATION TREATMENT with [facility] RAD ONC DANISHA   Radiation Oncology Clinic ([facility])    [facility]  1st Floor  [address] [address]   [phone]            Jun 18, 2018 10:00 AM CDT   EXTERNAL RADIATION TREATMENT with [facility] RAD ONC DANISHA   Radiation Oncology Clinic ([facility])    [facility]  1st Floor  [address] [address]   [phone]            Jun 18, 2018  10:15 AM CDT   ON TREATMENT VISIT with Mike Borges MD   Radiation Oncology Clinic ([facility])    [facility]  1st Floor  [address] [address]   [phone]            Jun 18, 2018 11:15 AM CDT   Masonic Lab Draw with  MASONIC LAB DRAW   [facility] Masonic Lab Draw ([facility])    [address]  [address]  [facility] [address] [phone]            Jun 19, 2018 10:00 AM CDT   EXTERNAL RADIATION TREATMENT with [facility] RAD ONC DANISHA   Radiation Oncology Clinic ([facility])    [facility]  1st Floor  [address] [address]   [phone]            Jun 20, 2018 10:00 AM CDT   EXTERNAL RADIATION TREATMENT with [facility] RAD ONC DANISHA   Radiation Oncology Clinic ([facility])    [facility]  1st Floor  [address] [address]   [phone]              Future tests that were ordered for you today     Open Standing Orders        Priority Remaining Interval Expires Ordered    CBC with platelets differential Routine 51/52 weekly 6/11/2019 6/11/2018            Who to contact     If you have questions or need follow up information about today's clinic visit or your schedule please contact [facility] directly at [phone].  Normal or non-critical lab and imaging results will be communicated to you by MyChart, letter or phone within 4 business days after the clinic has received the results. If you do not hear from us within 7 days, please contact the clinic through MyChart or phone. If you have a critical or abnormal lab result, we will notify you by phone as soon as possible.  Submit refill requests through [employer] or call your pharmacy and they will forward the refill request to us. Please allow 3 business days for your refill to be completed.          Additional Information About Your Visit         [employer] Information     [employer] gives you secure access to your electronic health record. If you see a primary care provider, you can also send messages to your care team and make appointments. If you have questions, please call your primary care clinic.  If you do not have a primary care provider, please call [phone] and they will assist you.        Care EveryWhere ID     This is your Care EveryWhere ID. This could be used by other organizations to access your [place] medical records  [id number]         Blood Pressure from Last 3 Encounters:   06/04/18 (P) 119/86   06/04/18 113/80   05/30/18 122/81    Weight from Last 3 Encounters:   06/11/18 88.9 kg (196 lb)   06/04/18 89.8 kg (198 lb)   06/04/18 89.8 kg (198 lb)              We Performed the Following     MNT INDIVIDUAL INITIAL EA 15 MIN          Where to get your medicines      Some of these will need a paper prescription and others can be bought over the counter.  Ask your nurse if you have questions.     Bring a paper prescription for each of these medications     LORazepam 0.5 MG tablet          Primary Care Provider Fax #    Physician No Ref-Primary [phone]       No address on file        Equal Access to Services     WANDER HAMMER : Hadii handy nielsono Ebony, waaxda luteddy, qaybta kaalmada yumiko, toño bowman. So [facility] [phone].    ATENCIÓN: Si habla español, tiene a mccurdy disposición servicios gratuitos de asistencia lingüística. Llame al [phone].    We comply with applicable federal civil rights laws and [place] laws. We do not discriminate on the basis of race, color, national origin, age, disability, sex, sexual orientation, or gender identity.            Thank you!     Thank you for choosing [facility]  for your care. Our goal is always to provide you with excellent care. Hearing back from our patients is one way we can continue to improve our services. Please take a few  minutes to complete the written survey that you may receive in the mail after your visit with us. Thank you!             Your Updated Medication List - Protect others around you: Learn how to safely use, store and throw away your medicines at www.disposemymeds.org.          This list is accurate as of 6/11/18  1:44 PM.  Always use your most recent med list.                   Brand Name Dispense Instructions for use Diagnosis    acetaminophen 325 MG tablet    TYLENOL     Take 650 mg by mouth        LORazepam 0.5 MG tablet    ATIVAN    30 tablet    Take 1 tablet (0.5 mg) by mouth every 6 hours as needed for anxiety    Medulloblastoma of cerebellum (H)       ondansetron 8 MG ODT tab    ZOFRAN-ODT    60 tablet    Take 1 tablet (8 mg) by mouth every 8 hours as needed for nausea    Medulloblastoma of cerebellum (H)       sennosides 8.6 MG tablet    SENOKOT     Take 1 tablet by mouth daily

## 2018-06-12 ENCOUNTER — APPOINTMENT (OUTPATIENT)
Dept: RADIATION ONCOLOGY | Facility: CLINIC | Age: 33
End: 2018-06-12
Attending: RADIOLOGY
Payer: COMMERCIAL

## 2018-06-12 PROCEDURE — 77386 ZZH IMRT TREATMENT DELIVERY, COMPLEX: CPT | Performed by: RADIOLOGY

## 2018-06-13 ENCOUNTER — APPOINTMENT (OUTPATIENT)
Dept: RADIATION ONCOLOGY | Facility: CLINIC | Age: 33
End: 2018-06-13
Attending: RADIOLOGY
Payer: COMMERCIAL

## 2018-06-13 PROCEDURE — 77386 ZZH IMRT TREATMENT DELIVERY, COMPLEX: CPT | Performed by: RADIOLOGY

## 2018-06-14 ENCOUNTER — OFFICE VISIT (OUTPATIENT)
Dept: OPHTHALMOLOGY | Facility: CLINIC | Age: 33
End: 2018-06-14
Attending: OPHTHALMOLOGY
Payer: COMMERCIAL

## 2018-06-14 ENCOUNTER — APPOINTMENT (OUTPATIENT)
Dept: RADIATION ONCOLOGY | Facility: CLINIC | Age: 33
End: 2018-06-14
Attending: RADIOLOGY
Payer: COMMERCIAL

## 2018-06-14 DIAGNOSIS — H52.13 MYOPIA OF BOTH EYES: Primary | ICD-10-CM

## 2018-06-14 DIAGNOSIS — H35.60 RETINAL HEMORRHAGE, UNSPECIFIED LATERALITY: ICD-10-CM

## 2018-06-14 PROCEDURE — 92015 DETERMINE REFRACTIVE STATE: CPT | Mod: ZF

## 2018-06-14 PROCEDURE — 77386 ZZH IMRT TREATMENT DELIVERY, COMPLEX: CPT | Performed by: RADIOLOGY

## 2018-06-14 PROCEDURE — G0463 HOSPITAL OUTPT CLINIC VISIT: HCPCS | Mod: ZF

## 2018-06-14 ASSESSMENT — TONOMETRY
IOP_METHOD: TONOPEN
OS_IOP_MMHG: 14
OD_IOP_MMHG: 14

## 2018-06-14 ASSESSMENT — REFRACTION_WEARINGRX
OD_SPHERE: -5.50
OS_CYLINDER: +0.75
OS_AXIS: 109
SPECS_TYPE: SVL
OD_CYLINDER: +1.00
OS_SPHERE: -4.50
OD_AXIS: 085

## 2018-06-14 ASSESSMENT — REFRACTION_MANIFEST
OS_CYLINDER: +1.25
OS_SPHERE: -4.75
OD_CYLINDER: +0.75
OS_AXIS: 120
OD_SPHERE: -5.75
OD_AXIS: 090

## 2018-06-14 ASSESSMENT — VISUAL ACUITY
OS_CC: 20/25
OD_CC: 20/20
CORRECTION_TYPE: GLASSES
METHOD: SNELLEN - LINEAR
OD_CC+: -2
OS_CC+: -1

## 2018-06-14 ASSESSMENT — CONF VISUAL FIELD
OS_NORMAL: 1
OD_NORMAL: 1
METHOD: COUNTING FINGERS

## 2018-06-14 ASSESSMENT — CUP TO DISC RATIO
OS_RATIO: .2
OD_RATIO: .2

## 2018-06-14 ASSESSMENT — EXTERNAL EXAM - LEFT EYE: OS_EXAM: NORMAL

## 2018-06-14 ASSESSMENT — SLIT LAMP EXAM - LIDS
COMMENTS: NORMAL
COMMENTS: NORMAL

## 2018-06-14 ASSESSMENT — EXTERNAL EXAM - RIGHT EYE: OD_EXAM: NORMAL

## 2018-06-14 NOTE — NURSING NOTE
Chief Complaints and History of Present Illnesses   Patient presents with     New Patient     Retinal Hemmorage RE     HPI    Last Eye Exam:  4/12/18   Affected eye(s):  Right   Symptoms:     No floaters   No flashes   No redness   No tearing   No Dryness      Frequency:  Constant       Do you have eye pain now?:  No      Comments:  Pt states that prior to surgery to remove brain tumor doctor noticed what could be a hemmorage in RE.  Pt states LE is normal. Vision in both eyes is normal.     Marilyn Angeles June 14, 2018 8:20 AM   Gt MIN June 14, 2018 9:00 AM

## 2018-06-14 NOTE — PROGRESS NOTES
I have confirmed the patient's and reviewed Past Medical History, Past Surgical History, Social History, Family History, Problem List, Medication List and agree with Tech note.    CC: blurry vision both eyes     HPI: consult for retinal hemorrhage seen in April befire craniotomy for medulla blastoma  Patient had two rounds of chemotherapy that induced pancytopenia    Assessment/plan:   1.  Retinal hemorrhage OD   - resolved   - will monitor patient since she will undergo radiation and additional chemotherapy     2.  Myopia both eyes    -new prescription for glasses issued today     RTC 4 months for dilated fundus exam     Keri Flores MD PhD.  Professor & Chair

## 2018-06-14 NOTE — LETTER
6/14/2018       RE: Merissa Silverman  [address]  [address] [place], [place] [id number]     Dear Colleague,    Thank you for referring your patient, Merissa Silverman, [facility] Please see a copy of my visit note below.    I have confirmed the patient's and reviewed Past Medical History, Past Surgical History, Social History, Family History, Problem List, Medication List and agree with Tech note.    CC: blurry vision both eyes     HPI: consult for retinal hemorrhage seen in April befire craniotomy for medulla blastoma  Patient had two rounds of chemotherapy that induced pancytopenia    Assessment/plan:   1.  Retinal hemorrhage OD   - resolved   - will monitor patient since she will undergo radiation and additional chemotherapy     2.  Myopia both eyes    -new prescription for glasses issued today     RTC 4 months for dilated fundus exam     Keri Flores MD PhD.  Professor & Chair

## 2018-06-14 NOTE — MR AVS SNAPSHOT
After Visit Summary   6/14/2018    Merissa Silverman    MRN: [id number]           Patient Information     Date Of Birth          1985        Visit Information        Provider Department      6/14/2018 7:45 AM Keri Norton MD Eye Clinic        Today's Diagnoses     Myopia of both eyes    -  1    Retinal hemorrhage, unspecified laterality           Follow-ups after your visit        Follow-up notes from your care team     Return in about 4 months (around 10/14/2018) for chemotherapy with pancytopenia, DFE.      Your next 10 appointments already scheduled     Jun 14, 2018 10:00 AM CDT   EXTERNAL RADIATION TREATMENT with [facility] RAD ONC DANISHA   Radiation Oncology Clinic ([facility])    [facility]  1st Floor  [address] [address]   [phone]            Heron 15, 2018 10:00 AM CDT   EXTERNAL RADIATION TREATMENT with [facility] RAD ONC DANISHA   Radiation Oncology Clinic ([facility])    [facility]  1st Floor  [address] [address]   [phone]            Jun 18, 2018 10:00 AM CDT   EXTERNAL RADIATION TREATMENT with [facility] RAD ONC DANISHA   Radiation Oncology Clinic ([facility])    [facility]  1st Floor  [address] [address]   [phone]            Jun 18, 2018 10:15 AM CDT   ON TREATMENT VISIT with Mike Borges MD   Radiation Oncology Clinic ([facility])    [facility]  1st Floor  [address] [address]   [phone]            Jun 18, 2018 11:15 AM CDT   Masonic Lab Draw with  MASONIC LAB DRAW   [facility] Masonic Lab Draw ([facility])    [address]  [address]  [facility] [address]   [phone]            Jun 19, 2018 10:00 AM CDT   EXTERNAL RADIATION TREATMENT with [facility] RAD ONC DANISHA   Radiation Oncology Clinic ([facility])    [facility]  1st Floor  [address] [address]   [phone]            Jun 20, 2018 10:00 AM CDT   EXTERNAL RADIATION TREATMENT with [facility] RAD ONC DANISHA   Radiation Oncology Clinic ([facility])    [facility]  1st Floor  [address] [address]   [phone]            Jun 21, 2018 10:00 AM CDT   EXTERNAL RADIATION TREATMENT with [facility] RAD ONC DANISHA   Radiation Oncology Clinic ([facility])    [facility]  1st Floor  [address] [address]   [phone]            Jun 22, 2018 10:00 AM CDT   EXTERNAL RADIATION TREATMENT with [facility] RAD ONC DANISHA   Radiation Oncology Clinic ([facility])    [facility]  1st Floor  [address] [address]   [phone]            Jun 25, 2018  8:30 AM CDT   Masonic Lab Draw with  MASONIC LAB DRAW   [facility] Masonic Lab Draw ([facility])    [address]  [address]  [facility] [address] [phone]              Who to contact     Please call your clinic at [phone] to:    Ask questions about your health    Make or cancel appointments    Discuss your medicines    Learn about your test results    Speak to your doctor            Additional Information About Your Visit        [employer] Information     [employer] gives you secure access to your electronic health record. If you see a primary care provider, you can also send messages to your care team and make appointments. If you have questions, please call your primary care clinic.  If you do not have a primary care provider, please call [phone] and they will assist you.      [employer] is an electronic gateway that provides easy, online access to your medical records. With [employer], you can request a clinic appointment, read your test results, renew a prescription or communicate with  your care team.     To access your existing account, please contact your [facility] or call [phone] for assistance.        Care EveryWhere ID     This is your Care EveryWhere ID. This could be used by other organizations to access your [place] medical records  [id number]         Blood Pressure from Last 3 Encounters:   06/04/18 (P) 119/86   06/04/18 113/80   05/30/18 122/81    Weight from Last 3 Encounters:   06/11/18 88.9 kg (196 lb)   06/04/18 89.8 kg (198 lb)   06/04/18 89.8 kg (198 lb)              Today, you had the following     No orders found for display       Primary Care Provider Fax #    Physician No Ref-Primary [phone]       No address on file        Equal Access to Services     WANDER HAMMER : Lisa Beck, waaxda luqadaha, qaybta kaalmada adedeannayatu, toño jade . So [place] -[phone].    ATENCIÓN: Si habla español, tiene a mccurdy disposición servicios gratuitos de asistencia lingüística. Llame al [phone].    We comply with applicable federal civil rights laws and [place] laws. We do not discriminate on the basis of race, color, national origin, age, disability, sex, sexual orientation, or gender identity.            Thank you!     Thank you for choosing EYE CLINIC  for your care. Our goal is always to provide you with excellent care. Hearing back from our patients is one way we can continue to improve our services. Please take a few minutes to complete the written survey that you may receive in the mail after your visit with us. Thank you!             Your Updated Medication List - Protect others around you: Learn how to safely use, store and throw away your medicines at www.disposemymeds.org.          This list is accurate as of 6/14/18  9:29 AM.  Always use your most recent med list.                   Brand Name Dispense Instructions for use Diagnosis    acetaminophen 325 MG tablet    TYLENOL     Take 650 mg  by mouth        LORazepam 0.5 MG tablet    ATIVAN    30 tablet    Take 1 tablet (0.5 mg) by mouth every 6 hours as needed for anxiety    Medulloblastoma of cerebellum (H)       ondansetron 8 MG ODT tab    ZOFRAN-ODT    60 tablet    Take 1 tablet (8 mg) by mouth every 8 hours as needed for nausea    Medulloblastoma of cerebellum (H)       sennosides 8.6 MG tablet    SENOKOT     Take 1 tablet by mouth daily

## 2018-06-15 ENCOUNTER — APPOINTMENT (OUTPATIENT)
Dept: RADIATION ONCOLOGY | Facility: CLINIC | Age: 33
End: 2018-06-15
Attending: RADIOLOGY
Payer: COMMERCIAL

## 2018-06-15 PROCEDURE — 77386 ZZH IMRT TREATMENT DELIVERY, COMPLEX: CPT | Performed by: RADIOLOGY

## 2018-06-15 PROCEDURE — 77336 RADIATION PHYSICS CONSULT: CPT | Performed by: RADIOLOGY

## 2018-06-18 ENCOUNTER — APPOINTMENT (OUTPATIENT)
Dept: RADIATION ONCOLOGY | Facility: CLINIC | Age: 33
End: 2018-06-18
Attending: RADIOLOGY
Payer: COMMERCIAL

## 2018-06-18 VITALS — BODY MASS INDEX: 38.08 KG/M2 | WEIGHT: 195 LBS

## 2018-06-18 DIAGNOSIS — C71.6 MEDULLOBLASTOMA OF CEREBELLUM (H): ICD-10-CM

## 2018-06-18 DIAGNOSIS — T45.1X5A ANTINEOPLASTIC CHEMOTHERAPY INDUCED PANCYTOPENIA (H): ICD-10-CM

## 2018-06-18 DIAGNOSIS — C71.6 MEDULLOBLASTOMA OF CEREBELLUM (H): Primary | ICD-10-CM

## 2018-06-18 DIAGNOSIS — D61.810 ANTINEOPLASTIC CHEMOTHERAPY INDUCED PANCYTOPENIA (H): ICD-10-CM

## 2018-06-18 LAB
BASOPHILS # BLD AUTO: 0 10E9/L (ref 0–0.2)
BASOPHILS NFR BLD AUTO: 0.4 %
DIFFERENTIAL METHOD BLD: ABNORMAL
EOSINOPHIL # BLD AUTO: 0 10E9/L (ref 0–0.7)
EOSINOPHIL NFR BLD AUTO: 1.1 %
ERYTHROCYTE [DISTWIDTH] IN BLOOD BY AUTOMATED COUNT: 17.2 % (ref 10–15)
HCT VFR BLD AUTO: 28.5 % (ref 35–47)
HGB BLD-MCNC: 9.6 G/DL (ref 11.7–15.7)
IMM GRANULOCYTES # BLD: 0 10E9/L (ref 0–0.4)
IMM GRANULOCYTES NFR BLD: 0.4 %
LYMPHOCYTES # BLD AUTO: 0.2 10E9/L (ref 0.8–5.3)
LYMPHOCYTES NFR BLD AUTO: 6.4 %
MCH RBC QN AUTO: 30.8 PG (ref 26.5–33)
MCHC RBC AUTO-ENTMCNC: 33.7 G/DL (ref 31.5–36.5)
MCV RBC AUTO: 91 FL (ref 78–100)
MONOCYTES # BLD AUTO: 0.5 10E9/L (ref 0–1.3)
MONOCYTES NFR BLD AUTO: 18.7 %
NEUTROPHILS # BLD AUTO: 2.1 10E9/L (ref 1.6–8.3)
NEUTROPHILS NFR BLD AUTO: 73 %
NRBC # BLD AUTO: 0 10*3/UL
NRBC BLD AUTO-RTO: 0 /100
PLATELET # BLD AUTO: 184 10E9/L (ref 150–450)
RBC # BLD AUTO: 3.12 10E12/L (ref 3.8–5.2)
WBC # BLD AUTO: 2.8 10E9/L (ref 4–11)

## 2018-06-18 PROCEDURE — 36591 DRAW BLOOD OFF VENOUS DEVICE: CPT

## 2018-06-18 PROCEDURE — 85025 COMPLETE CBC W/AUTO DIFF WBC: CPT | Performed by: PSYCHIATRY & NEUROLOGY

## 2018-06-18 PROCEDURE — 77386 ZZH IMRT TREATMENT DELIVERY, COMPLEX: CPT | Performed by: RADIOLOGY

## 2018-06-18 PROCEDURE — 25000128 H RX IP 250 OP 636: Performed by: PSYCHIATRY & NEUROLOGY

## 2018-06-18 RX ORDER — HEPARIN SODIUM (PORCINE) LOCK FLUSH IV SOLN 100 UNIT/ML 100 UNIT/ML
5 SOLUTION INTRAVENOUS EVERY 8 HOURS
Status: DISCONTINUED | OUTPATIENT
Start: 2018-06-18 | End: 2018-06-26 | Stop reason: HOSPADM

## 2018-06-18 RX ADMIN — SODIUM CHLORIDE, PRESERVATIVE FREE 5 ML: 5 INJECTION INTRAVENOUS at 11:57

## 2018-06-18 NOTE — PROGRESS NOTES
RADIATION ONCOLOGY WEEKLY ON TREATMENT VISIT   Encounter Date: 2018    Patient Name: Merissa Silverman  MRN: [id number]  : 1985     Disease and Stage: Medulloblastoma  Treatment Site: Craniospinal  Current Dose/Planned Total Dose: 4680 / 5400 cGy  Daily Fraction Size: 180 cGy/day, 5 times/week  Concurrent Chemotherapy: Yes  Drug and Frequency: Weekly vincristine    Treatment Summary:  -18: Doing well. No issues  -18: Worsening esophagitis, fatigue and alopecia. Weight down 5 lbs from last week  -2018: Continued esophagitis, well controlled with tylenol. No other issues.   -2018: Esophagitis well controlled with tylenol. Worsening dysgeusia. Notes scalp erythema.     ED visits/Hospitalizations:  None    Missed Treatments:  None    Subjective: Ms. Silverman presents to clinic today for her weekly on-treatment visit. She continues to tolerate radiotherapy very well and has no pressing concerns or complaints on examination today. She has noted some slightly increased dermatitis most prominently involving the posterior scalp for which she is using a daily moisturizer as well as sunscreen/barrier clothing when outdoors. She otherwise has no reported nausea/vomiting, headaches, dyspnea, chest pain, abdominal pain or new neurologic deficits including motor weakness, sensory deficits or gait instability. Her remaining ROS are otherwise unchanged from last week.    ROS:   Nutrition Alteration  Diet Type: Patient's Preference    Skin  Skin Reaction: Mild dermatitis involving the scalp    CNS Alteration  CNS note: Occasional blurry vision, goes away in few seconds    ENT and Mouth Exam  Mucositis - Current: None   Esophagitis: Mild     Gastrointestinal  Nausea: None (symptoms currently controlled with PRN Zofran)      Pain Assessment  0-10 Pain Scale: 0    Objective:   Weight: 88.5 kg    General: Mildly fatigued-appearing but in no acute distress  HEENT: Marked alopecia. No visible mucositis  or thrush.  Cardiac: Extremities are warm and well-perfused  Respiratory: No wheezing, stridor or respiratory distress  Skin: Mild diffuse erythema over treatment field    Treatment-related toxicities (CTCAE v5.0):  1. Alopecia: Grade 2: Hair loss >=50% of normal; readily apparent to others; wig or hair piece necessary to camoflage  2. Esophagitis: Grade 1: Asymptomatic; clinical or diagnostic observations only; intervention not indicated  3. Dermatitis: Grade 1: Faint erythema or dry desquamation    Assessment:    Ms. 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 She is undergoing adjuvant chemoradiotherapy for improved local disease control and is tolerating treatment well with the anticipated acute radiation-induced toxicities, namely dermatitis and alopecia.    Plan:   Medulloblastoma:    Complete radiotherapy Friday, 6/22/2018    Follow-up in radiation oncology clinic with NP in 2 weeks and with MD in 4 weeks    Continue to follow-up with neuro oncology (Dr. Morrison) as scheduled for ongoing disease management    Pain management:    Currently not requiring any PRN pain medication    Fluids/Electrolytes/Nutrition:    Continue diet as tolerated    Mosaiq chart and setup information reviewed  MVCT images reviewed    Medication Review  Med list reviewed with patient?: Yes    Mike Borges MD/PhD  Department of Radiation Oncology  [facility]

## 2018-06-18 NOTE — Clinical Note
Follow-up with NP in 2 weeks. With me in 4 weeks. Coordinate with neuro-onc (Dr. Morrison) visits if possible.

## 2018-06-18 NOTE — LETTER
2018      RE: Merissa Silverman  [address]  [address]  [place] [address]       RADIATION ONCOLOGY WEEKLY ON TREATMENT VISIT   Encounter Date: 2018    Patient Name: Merissa Silverman  MRN: [id number]  : 1985     Disease and Stage: Medulloblastoma  Treatment Site: Craniospinal  Current Dose/Planned Total Dose: 4680 / 5400 cGy  Daily Fraction Size: 180 cGy/day, 5 times/week  Concurrent Chemotherapy: Yes  Drug and Frequency: Weekly vincristine    Treatment Summary:  -18: Doing well. No issues  -18: Worsening esophagitis, fatigue and alopecia. Weight down 5 lbs from last week  -2018: Continued esophagitis, well controlled with tylenol. No other issues.   -2018: Esophagitis well controlled with tylenol. Worsening dysgeusia. Notes scalp erythema.     ED visits/Hospitalizations:  None    Missed Treatments:  None    Subjective: Ms. Silverman presents to clinic today for her weekly on-treatment visit. She continues to tolerate radiotherapy very well and has no pressing concerns or complaints on examination today. She has noted some slightly increased dermatitis most prominently involving the posterior scalp for which she is using a daily moisturizer as well as sunscreen/barrier clothing when outdoors. She otherwise has no reported nausea/vomiting, headaches, dyspnea, chest pain, abdominal pain or new neurologic deficits including motor weakness, sensory deficits or gait instability. Her remaining ROS are otherwise unchanged from last week.    ROS:   Nutrition Alteration  Diet Type: Patient's Preference    Skin  Skin Reaction: Mild dermatitis involving the scalp    CNS Alteration  CNS note: Occasional blurry vision, goes away in few seconds    ENT and Mouth Exam  Mucositis - Current: None   Esophagitis: Mild     Gastrointestinal  Nausea: None (symptoms currently controlled with PRN Zofran)      Pain Assessment  0-10 Pain Scale: 0    Objective:   Weight: 88.5 kg    General: Mildly  fatigued-appearing but in no acute distress  HEENT: Marked alopecia. No visible mucositis or thrush.  Cardiac: Extremities are warm and well-perfused  Respiratory: No wheezing, stridor or respiratory distress  Skin: Mild diffuse erythema over treatment field    Treatment-related toxicities (CTCAE v5.0):  1. Alopecia: Grade 2: Hair loss >=50% of normal; readily apparent to others; wig or hair piece necessary to camoflage  2. Esophagitis: Grade 1: Asymptomatic; clinical or diagnostic observations only; intervention not indicated  3. Dermatitis: Grade 1: Faint erythema or dry desquamation    Assessment:    Ms. 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 She is undergoing adjuvant chemoradiotherapy for improved local disease control and is tolerating treatment well with the anticipated acute radiation-induced toxicities, namely dermatitis and alopecia.    Plan:   Medulloblastoma:    Complete radiotherapy Friday, 6/22/2018    Follow-up in radiation oncology clinic with NP in 2 weeks and with MD in 4 weeks    Continue to follow-up with neuro oncology (Dr. Morrison) as scheduled for ongoing disease management    Pain management:    Currently not requiring any PRN pain medication    Fluids/Electrolytes/Nutrition:    Continue diet as tolerated    Mosaiq chart and setup information reviewed  MVCT images reviewed    Medication Review  Med list reviewed with patient?: Yes    Mike Borges MD/PhD  Department of Radiation Oncology  [facility]

## 2018-06-18 NOTE — MR AVS SNAPSHOT
After Visit Summary   6/18/2018    Merissa Silverman    MRN: [id number]           Patient Information     Date Of Birth          1985        Visit Information        Provider Department      6/18/2018 10:15 AM Mike Borges MD Radiation Oncology Clinic        Today's Diagnoses     Medulloblastoma of cerebellum (H)    -  1       Follow-ups after your visit        Your next 10 appointments already scheduled     Jun 18, 2018 11:15 AM CDT   [facility] Draw with  [facility]   [facility] Draw ([facility])    [address]  [address]  [place] [address]   [phone]            Jun 19, 2018 10:00 AM CDT   EXTERNAL RADIATION TREATMENT with [facility] RAD ONC DANISHA   Radiation Oncology Clinic ([facility])    [facility]  1st Floor  [address] [address]   [phone]            Jun 20, 2018 10:00 AM CDT   EXTERNAL RADIATION TREATMENT with P RAD ONC DANISHA   Radiation Oncology Clinic ([facility])    [facility]  1st Floor  [address] [address]   [phone]            Jun 21, 2018 10:00 AM CDT   EXTERNAL RADIATION TREATMENT with P RAD ONC DANISHA   Radiation Oncology Clinic ([facility])    [facility]  1st Floor  [address]  [place] [address]   [phone]            Jun 22, 2018 10:00 AM CDT   EXTERNAL RADIATION TREATMENT with [facility] RAD ONC DANISHA   Radiation Oncology Clinic ([facility])    [facility]  1st Floor  [address] [address]   [phone]            Jun 25, 2018  8:30 AM CDT   [facility] Draw with [facility]   [facility] Draw ([facility])    [address]  [address]  [place] [address]   [phone]            Jun 25, 2018  9:00 AM CDT   (Arrive by 8:45 AM)    Return Visit with Anupama Morrison MD   [facility] ([facility])    [address]  [address]  [place] [address] [phone]              Who to contact     Please call your clinic at [phone] to:    Ask questions about your health    Make or cancel appointments    Discuss your medicines    Learn about your test results    Speak to your doctor            Additional Information About Your Visit        [employer] Information     [employer] gives you secure access to your electronic health record. If you see a primary care provider, you can also send messages to your care team and make appointments. If you have questions, please call your primary care clinic.  If you do not have a primary care provider, please call [phone] and they will assist you.      [employer] is an electronic gateway that provides easy, online access to your medical records. With [employer], you can request a clinic appointment, read your test results, renew a prescription or communicate with your care team.     To access your existing account, please contact your [facility] or call [phone] for assistance.        Care EveryWhere ID     This is your Care EveryWhere ID. This could be used by other organizations to access your [facility] medical records  [id number]        Your Vitals Were     BMI (Body Mass Index)                   38.08 kg/m2            Blood Pressure from Last 3 Encounters:   06/04/18 (P) 119/86   06/04/18 113/80   05/30/18 122/81    Weight from Last 3 Encounters:   06/18/18 88.5 kg (195 lb)   06/11/18 88.9 kg (196 lb)   06/04/18 89.8 kg (198 lb)              Today, you had the following     No orders found for display       Primary Care Provider Fax #    Physician No Ref-Primary [phone]       No address on file        Equal Access to Services     WANDER HAMMER : perry Reynolds qaybta  toño hector clem jade ah. So [facility] [phone].    ATENCIÓN: Si livia cox, tiene a mccurdy disposición servicios gratuitos de asistencia lingüística. Cuco al [phone].    We comply with applicable federal civil rights laws and [place] laws. We do not discriminate on the basis of race, color, national origin, age, disability, sex, sexual orientation, or gender identity.            Thank you!     Thank you for choosing [facility] ONCOLOGY CLINIC  for your care. Our goal is always to provide you with excellent care. Hearing back from our patients is one way we can continue to improve our services. Please take a few minutes to complete the written survey that you may receive in the mail after your visit with us. Thank you!             Your Updated Medication List - Protect others around you: Learn how to safely use, store and throw away your medicines at www.disposemymeds.org.          This list is accurate as of 6/18/18 10:54 AM.  Always use your most recent med list.                   Brand Name Dispense Instructions for use Diagnosis    acetaminophen 325 MG tablet    TYLENOL     Take 650 mg by mouth        LORazepam 0.5 MG tablet    ATIVAN    30 tablet    Take 1 tablet (0.5 mg) by mouth every 6 hours as needed for anxiety    Medulloblastoma of cerebellum (H)       ondansetron 8 MG ODT tab    ZOFRAN-ODT    60 tablet    Take 1 tablet (8 mg) by mouth every 8 hours as needed for nausea    Medulloblastoma of cerebellum (H)       sennosides 8.6 MG tablet    SENOKOT     Take 1 tablet by mouth daily

## 2018-06-19 ENCOUNTER — APPOINTMENT (OUTPATIENT)
Dept: RADIATION ONCOLOGY | Facility: CLINIC | Age: 33
End: 2018-06-19
Attending: RADIOLOGY
Payer: COMMERCIAL

## 2018-06-19 PROCEDURE — 77386 ZZH IMRT TREATMENT DELIVERY, COMPLEX: CPT | Performed by: RADIOLOGY

## 2018-06-20 ENCOUNTER — APPOINTMENT (OUTPATIENT)
Dept: RADIATION ONCOLOGY | Facility: CLINIC | Age: 33
End: 2018-06-20
Attending: RADIOLOGY
Payer: COMMERCIAL

## 2018-06-20 PROCEDURE — 77386 ZZH IMRT TREATMENT DELIVERY, COMPLEX: CPT | Performed by: RADIOLOGY

## 2018-06-21 ENCOUNTER — APPOINTMENT (OUTPATIENT)
Dept: RADIATION ONCOLOGY | Facility: CLINIC | Age: 33
End: 2018-06-21
Attending: RADIOLOGY
Payer: COMMERCIAL

## 2018-06-21 PROCEDURE — 77386 ZZH IMRT TREATMENT DELIVERY, COMPLEX: CPT | Performed by: RADIOLOGY

## 2018-06-22 ENCOUNTER — APPOINTMENT (OUTPATIENT)
Dept: RADIATION ONCOLOGY | Facility: CLINIC | Age: 33
End: 2018-06-22
Attending: RADIOLOGY
Payer: COMMERCIAL

## 2018-06-22 PROCEDURE — 77386 ZZH IMRT TREATMENT DELIVERY, COMPLEX: CPT | Performed by: RADIOLOGY

## 2018-06-22 PROCEDURE — 77336 RADIATION PHYSICS CONSULT: CPT | Performed by: RADIOLOGY

## 2018-06-23 ENCOUNTER — ONCOLOGY VISIT (OUTPATIENT)
Dept: RADIATION ONCOLOGY | Facility: CLINIC | Age: 33
End: 2018-06-23

## 2018-06-25 ENCOUNTER — ALLIED HEALTH/NURSE VISIT (OUTPATIENT)
Dept: ONCOLOGY | Facility: CLINIC | Age: 33
End: 2018-06-25

## 2018-06-25 ENCOUNTER — ONCOLOGY VISIT (OUTPATIENT)
Dept: ONCOLOGY | Facility: CLINIC | Age: 33
End: 2018-06-25
Attending: PSYCHIATRY & NEUROLOGY
Payer: COMMERCIAL

## 2018-06-25 VITALS
HEART RATE: 71 BPM | TEMPERATURE: 96.9 F | DIASTOLIC BLOOD PRESSURE: 74 MMHG | HEIGHT: 60 IN | SYSTOLIC BLOOD PRESSURE: 106 MMHG | OXYGEN SATURATION: 99 % | WEIGHT: 189.3 LBS | RESPIRATION RATE: 16 BRPM | BODY MASS INDEX: 37.16 KG/M2

## 2018-06-25 DIAGNOSIS — T45.1X5A CHEMOTHERAPY INDUCED NEUTROPENIA (H): ICD-10-CM

## 2018-06-25 DIAGNOSIS — R11.2 CHEMOTHERAPY-INDUCED NAUSEA AND VOMITING: ICD-10-CM

## 2018-06-25 DIAGNOSIS — T45.1X5A CHEMOTHERAPY-INDUCED NAUSEA AND VOMITING: ICD-10-CM

## 2018-06-25 DIAGNOSIS — H93.8X9 OTOTOXICITY, UNSPECIFIED LATERALITY: ICD-10-CM

## 2018-06-25 DIAGNOSIS — T45.1X5A ANTINEOPLASTIC CHEMOTHERAPY INDUCED PANCYTOPENIA (H): ICD-10-CM

## 2018-06-25 DIAGNOSIS — D70.1 CHEMOTHERAPY INDUCED NEUTROPENIA (H): ICD-10-CM

## 2018-06-25 DIAGNOSIS — D61.810 ANTINEOPLASTIC CHEMOTHERAPY INDUCED PANCYTOPENIA (H): ICD-10-CM

## 2018-06-25 DIAGNOSIS — C71.6 MEDULLOBLASTOMA OF CEREBELLUM (H): ICD-10-CM

## 2018-06-25 DIAGNOSIS — Z91.89 HIGH RISK FOR CHEMOTHERAPY-INDUCED INFECTIOUS COMPLICATION: ICD-10-CM

## 2018-06-25 DIAGNOSIS — C71.6 MEDULLOBLASTOMA (H): Primary | ICD-10-CM

## 2018-06-25 DIAGNOSIS — Z71.9 VISIT FOR COUNSELING: Primary | ICD-10-CM

## 2018-06-25 DIAGNOSIS — Z51.11 CHEMOTHERAPY MANAGEMENT, ENCOUNTER FOR: ICD-10-CM

## 2018-06-25 LAB
BASOPHILS # BLD AUTO: 0 10E9/L (ref 0–0.2)
BASOPHILS NFR BLD AUTO: 0.4 %
DIFFERENTIAL METHOD BLD: ABNORMAL
EOSINOPHIL # BLD AUTO: 0 10E9/L (ref 0–0.7)
EOSINOPHIL NFR BLD AUTO: 1.7 %
ERYTHROCYTE [DISTWIDTH] IN BLOOD BY AUTOMATED COUNT: 18.6 % (ref 10–15)
HCT VFR BLD AUTO: 30.6 % (ref 35–47)
HGB BLD-MCNC: 10.5 G/DL (ref 11.7–15.7)
IMM GRANULOCYTES # BLD: 0 10E9/L (ref 0–0.4)
IMM GRANULOCYTES NFR BLD: 0.9 %
LYMPHOCYTES # BLD AUTO: 0.3 10E9/L (ref 0.8–5.3)
LYMPHOCYTES NFR BLD AUTO: 12.1 %
MCH RBC QN AUTO: 31.3 PG (ref 26.5–33)
MCHC RBC AUTO-ENTMCNC: 34.3 G/DL (ref 31.5–36.5)
MCV RBC AUTO: 91 FL (ref 78–100)
MONOCYTES # BLD AUTO: 0.6 10E9/L (ref 0–1.3)
MONOCYTES NFR BLD AUTO: 24.7 %
NEUTROPHILS # BLD AUTO: 1.4 10E9/L (ref 1.6–8.3)
NEUTROPHILS NFR BLD AUTO: 60.2 %
NRBC # BLD AUTO: 0 10*3/UL
NRBC BLD AUTO-RTO: 0 /100
PLATELET # BLD AUTO: 266 10E9/L (ref 150–450)
PLATELET # BLD EST: ABNORMAL 10*3/UL
RBC # BLD AUTO: 3.35 10E12/L (ref 3.8–5.2)
RBC MORPH BLD: ABNORMAL
WBC # BLD AUTO: 2.3 10E9/L (ref 4–11)

## 2018-06-25 PROCEDURE — 25000128 H RX IP 250 OP 636: Mod: ZF | Performed by: PSYCHIATRY & NEUROLOGY

## 2018-06-25 PROCEDURE — 99214 OFFICE O/P EST MOD 30 MIN: CPT | Mod: ZP | Performed by: PSYCHIATRY & NEUROLOGY

## 2018-06-25 PROCEDURE — 36592 COLLECT BLOOD FROM PICC: CPT

## 2018-06-25 PROCEDURE — G0463 HOSPITAL OUTPT CLINIC VISIT: HCPCS | Mod: ZF

## 2018-06-25 PROCEDURE — 85025 COMPLETE CBC W/AUTO DIFF WBC: CPT | Performed by: PSYCHIATRY & NEUROLOGY

## 2018-06-25 RX ORDER — HEPARIN SODIUM (PORCINE) LOCK FLUSH IV SOLN 100 UNIT/ML 100 UNIT/ML
5 SOLUTION INTRAVENOUS EVERY 8 HOURS
Status: DISCONTINUED | OUTPATIENT
Start: 2018-06-25 | End: 2018-06-25 | Stop reason: HOSPADM

## 2018-06-25 RX ADMIN — SODIUM CHLORIDE, PRESERVATIVE FREE 5 ML: 5 INJECTION INTRAVENOUS at 09:27

## 2018-06-25 ASSESSMENT — PAIN SCALES - GENERAL: PAINLEVEL: MILD PAIN (3)

## 2018-06-25 NOTE — NURSING NOTE
Oncology Rooming Note    June 25, 2018 9:52 AM   Merissa Silverman is a 32 year old female who presents for:    Chief Complaint   Patient presents with     Port Draw     Port labs collected by RN.      Oncology Clinic Visit     F/U Medulloblastoma     Initial Vitals: /74  Pulse 71  Temp 96.9  F (36.1  C) (Oral)  Resp 16  Ht 1.524 m (5')  Wt 85.9 kg (189 lb 4.8 oz)  SpO2 99%  BMI 36.97 kg/m2 Estimated body mass index is 36.97 kg/(m^2) as calculated from the following:    Height as of this encounter: 1.524 m (5').    Weight as of this encounter: 85.9 kg (189 lb 4.8 oz). Body surface area is 1.91 meters squared.  Mild Pain (3) Comment: Rt side of face   No LMP recorded.  Allergies reviewed: Yes  Medications reviewed: Yes    Medications: Medication refills not needed today.  Pharmacy name entered into EPIC: [facility] - [place], [place] - [address] [place] [address]    Clinical concerns: Yes, patient complains of pain on the left side of her face. Dr Morrison was notified.    10 minutes for nursing intake (face to face time)     TRACIE YANG LPN
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
## 2018-06-25 NOTE — LETTER
6/25/2018       RE: Merissa Silverman  [address]  [address]  [place] [address]     Dear Colleague,    Thank you for referring your patient, Merissa Silverman, to the [facility]. Please see a copy of my visit note below.    NEURO-ONCOLOGY VISIT  06/25/2018    CHIEF COMPLAINT: Ms. 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 Completed craniospinal radiation with concurrent vincristine x 3 doses, but treatment was stopped in the setting of pancytopenia. Planned treatment break until counts recover.     She is presenting in follow-up for continued evaluation and recommendations on treatment accompanied by Rebecca (aunt) and Pepe (son).     HISTORY OF PRESENT ILLNESS  -Merissa is feeling well.  -Painful facial burn following RT.   -Energy improving.   -With position change, she at times has double vision that lasts for seconds; less frequent now.   -No problems with strength/ walking. No confusion, no headaches.   -Infrequent nausea. Better appetite, able to swallow without pain, taste is improving.   -Had last radiation fraction on Friday.    REVIEW OF SYSTEMS  A comprehensive ROS negative except as in HPI.      MEDICATIONS   Current Outpatient Prescriptions   Medication     ondansetron (ZOFRAN-ODT) 8 MG ODT tab     acetaminophen (TYLENOL) 325 MG tablet     sennosides (SENOKOT) 8.6 MG tablet     Current Facility-Administered Medications   Medication     heparin 100 UNIT/ML injection 5 mL     Facility-Administered Medications Ordered in Other Visits   Medication     heparin 100 UNIT/ML injection 5 mL     DRUG ALLERGIES   Allergies   Allergen Reactions     Sulfa Drugs Unknown     ONCOLOGIC HISTORY  -PRESENTATION: Progressively worsening headaches. Additionally was with abrupt position changes  resulted in dizziness/ syncope. CTH at an outside hospital showed a mass in the left cerebellum. Transferred to [facility].  -4/11/2018 MRB showed a 3.5cm mass in the left cerebellar hemisphere that was associated with mild cerebellar tonsillar herniation and hydrocephalus.  -MRI spine showed no evidence of disease.   -4/13/2018 SURGERY: Suboccipital craniotomy with resection of the left cerebellar mass. Immediate post-operative MRI demonstrated a gross total resection.   PATHOLOGY: Medulloblastoma; Molecular markers pending.   -5/4/2018 NEURO-ONC: LP performed. Evaluated by radiation oncology. Recommending craniospinal radiation + concurrent vincristine followed by eight 6-week cycles of cisplatin, lomustine, and vincristine.  -5/4/2018 LP with CSF analysis: WBC 1/ RBC 1, protein 25, glucose 62, negative cytology.   -5/14 - 6/22/2018 CHEMORADS with vincristine 2 mg/m2 (stopped after 3 infusions due to pancytopenia).  -5/17/2018 Audiology- Baseline hearing testing with no hearing loss.  -6/4/2018 NEURO-ONC: Doing well, no new neurological symptoms. Tolerating treatment well. Ophtho referral. Labs improved.  -6/4/2018 NGS via [employer]: SMO mutation, TERT promotor mutation. Negative for microsatellite instability.   -6/25/2018 NEURO-ONC: Clinically stable, painful radiation-induced burn. Monitoring labs. Cancer Risk Assessment referral.    SOCIAL HISTORY   Tobacco use: Former smoker, E-cigs stopped on 5/3/2018  Alcohol use: None.   Drug use: Denies marijuana use.  Supplement, complimentary/ alternative medicine: None.   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    PHYSICAL EXAMINATION  /74  Pulse 71  Temp 96.9  F (36.1  C) (Oral)  Resp 16  Ht 1.524 m (5')  Wt 85.9 kg (189 lb 4.8 oz)  SpO2 99%  BMI 36.97 kg/m2   Vitals:    06/25/18 0918   Weight: 85.9 kg (189 lb 4.8 oz)     Ht Readings from Last 2 Encounters:   06/25/18 1.524 m (5')   05/09/18 1.524 m (5')      KPS: 100    -Generally well appearing.  -Throat: No oral thrush. No redness of throat. No lymphadenopathy.   -Respiratory: Normal breath sounds, no audible wheezing.   -Skin: No rashes. Healed head incision. Shaved head. Right cheek with radiation burn.  -Hematologic/ lymphatic: No abnormal bruising. No leg swelling.  -Psychiatric: Normal mood and affect. Pleasant, talkative.  -Neurologic:   MENTAL STATUS:     Alert, oriented to date.    Recall: Immediate 3/3, delayed 3/3.    Speech fluent. Comprehension intact to multi-step commands.   Normal naming, repetition. Able to read.   Good right-left orientation.     CRANIAL NERVES:     Discs flat on fundoscopy.    Pupils are equal, round, reactive to light.     Extraocular movements full, patient denies diplopia. Mild eye jerks noted on pursuit.    Visual fields full.     Facial sensation intact to light touch.   Symmetric facial movements.   Hearing intact.   Palate moves symmetrically.     Sternocleidomastoid and trapezius strength intact.    Tongue midline.  MOTOR:    Normal and symmetric tone.   Grossly 5/5 throughout.    No pronation or drift. No orbiting.   Able to rise from a chair without use of arms.   On toe/ heel walk, equal distance from floor to heels/ toes.   SENSATION:    Intact to light touch throughout.  COORDINATION:   Intact finger-nose with eyes open and closed.   REFLEXES:    Muted, symmetric.    Toes not tested. No clonus. No Hoffmans.   No grasp.    GAIT:  Walks without assistance.   Good speed. Normal stride length and heel strike. Normal turns. Normal arm swing.   Able to toe, heel walk. Able to tandem walk.     MEDICAL RECORDS  Obtained and personally reviewed all available outside medical records in addition to reviewing any records available in our electronic system.     LABS  Personally reviewed all available lab results.   HB and platelets trending up.   WBC remains low.     IMAGING  No new neuro-imaging to review.     IMPRESSION  For  the 30 minute appointment, more than 50% of the encounter was spent discussing in detail the nature of this tumor and the results of next generation sequencing. This was in addition to providing emotional support, answering questions pertaining to my recommendations, and devising the treatment plan as outlined below.    We briefly discussed updates in molecular testing by next generation sequencing via [employer] which showed a targetable mutation in SMO.    With regard to cancer-directed therapy, Merissa has completed chemoradiotherapy. Now entering a planned treatment break as counts recover. She is clinically stable. Will start cycle 1 day 1 on 8/13, per patient request, following her brother's wedding. Counts continue to improved, however, WBC remains low. Continue with weekly labs until counts normalize. Adjuvant chemotherapy will be up to eight cycles of cisplatin, lomustine, and vincristine. Of note, this regimen is highly toxic and in clinical studies, treatment was terminated or dose reduced due to side effects in nearly 60 percent of patients by cycle four. Clinical trial options remain a strong consideration for recurrent disease, as does off label use of SMO inhibitors.     I will contact Dr. Borges about treatment options for the likely radiation-induced facial burn.     PROBLEM LIST  Medulloblastoma  Steroid intolerance  Chemotherapy-induced pancytopenia  Radiation burn  Fatigue, cancer and treatment related    PLAN  -CANCER-DIRECTED THERAPY-  -As above. Plan for cycle 1, day 1 on 8/13.   Cisplatin DAY 1              Lomustine DAY 1              Vincristine  DAY 1 and 8  -Repeat MR brain imaging prior to starting chemotherapy.   -Next generation sequencing via [employer] results; SMO mutation.  -Continue weekly labs to monitor counts.  -Merissa and Tito declined referral for fertility preservation.    -Referral placed to Cancer Risk Management given the rarity of her cancer.     -STEROIDS-  -Currently off  dexamethasone.  -Poor tolerance for steroids.     -SEIZURE MANAGEMENT-  -While this patient is at increased risk of having seizures, given the lack of seizure history, there is no indication to prescribe an antiepileptic at this time.     -Quality of life/ MOOD/ FATIGUE-  -Denies any mood issues.  -Continue to monitor mood as untreated/ undertreated depression can worsen fatigue, dysorexia, and quality of life.    -RETINAL HEMORRHAGE-  -Identified when inpatient on presentation.  -Follow-up dilated eye examination negative. Nothing further indicated.    Return to clinic on 8/13 for labs, MRI brain, appointment with me, infusion.     In the meantime, Merissa knows to call with questions or concerns or to report new complaints and can be seen sooner if needed. Urgent evaluation is needed in the setting of acute onset of severe headache, abrupt change in mental status, on-going seizures, new focal deficits, or new leg swelling/ pain. Everyone in attendance voiced understanding.    Anupama Morrison MD  Neuro-oncology

## 2018-06-25 NOTE — PROGRESS NOTES
Social Work Follow-Up Encounter Visit  Oncology Clinic    Data/Intervention:  Patient Name:  Merissa Silverman  /Age:  1985 (32 year old)    Reason for Follow-Up:  [employer]    Collaborated With:    -ACS navigator -Halima  -patient    Resources Provided:  [employer]    Assessment:  Met with pt prior to her appt to follow up re: interest in applying for [employer] emergency financial assistance.  Pt reviewed her current situation and requested SW to submit [employer] application.  SW will submit application via online portal through [employer].  Pt states otherwise doing well and no other concerns.      Plan:  Previously provided patient/family with SW's contact information and availability.       Addressed patient's distress through today. No other concerns at this time.  SW available as necessary.       Antonietta Juarez (Martens), ASIYA, MSW   - [facility]  Phone: [phone]  Pager: [phone]  2018

## 2018-06-25 NOTE — MR AVS SNAPSHOT
After Visit Summary   6/25/2018    Merissa Silverman    MRN: [id number]           Patient Information     Date Of Birth          1985        Visit Information        Provider Department      6/25/2018 9:00 AM Anupama Morrison MD [facility]        Today's Diagnoses     Medulloblastoma (H)    -  1    Chemotherapy induced neutropenia (H)        Chemotherapy-induced nausea and vomiting        High risk for chemotherapy-induced infectious complication        Chemotherapy management, encounter for        Ototoxicity, unspecified laterality          Care Instructions    Cancer Risk Assessment appointment/ referral.    Plan to start chemotherapy on 8/13;  Then four to eight 6 to 8 week cycles of cisplatin, lomustine, and vincristine.                        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      Labs weekly until blood counts normalize (7/9) - To be done in [facility].      MR brain imaging prior to cycle 1, day 1.     I will contact Dr. Borges about facial burn.     Return to clinic on 8/13; labs, MRI brain, appointment with me, irwin.     Anupama Morrison MD  Neuro-oncology  6/25/2018            Follow-ups after your visit        Additional Services     CANCER RISK MGMT/CANCER GENETIC COUNSELING REFERRAL       Your provider has referred you to the Cancer Risk Management Program - Cancer Genetic Counseling.    Reason for Referral: Javid for risk assessment.     We have a sent a notice to a staff member of the Cancer Risk Management Program to give you a call to assist with scheduling your appointment.  You may also call  [phone] ([phone]) to initiate scheduling.    Please be aware that coverage of these services is subject to the  terms and limitations of your health insurance plan.  Call member services at your health plan with any benefit or coverage questions.      Please bring the completed family history sheet to your appointment in addition to any available outside medical records documenting your cancer diagnosis.                  Your next 10 appointments already scheduled     Jul 09, 2018 10:30 AM CDT   LAB with EC LAB   [facility]    [address] [id number] [phone]           OUTSIDE LABS: Please include name of facility and Physician that is requesting outside labs be drawn.  Please indicate if labs are fasting or non-fasting on appt notes.  Be as specific as you can on which labs are being drawn.            Jul 13, 2018 10:30 AM CDT   Return Visit with PHIL Lopez CNP   Radiation Oncology Clinic ([facility])    [facility]  1st Floor  [address] [address]   [phone]            Jul 27, 2018 10:00 AM CDT   Return Visit with Mike Borges MD   Radiation Oncology Clinic ([facility])    [facility]  1st Floor  [address] [address]   [phone]            Aug 13, 2018  1:00 PM CDT   MR BRAIN W/O & W CONTRAST with [address] [facility] ([facility])    [address]  1st [facility] [address] [phone]           Take your medicines as usual, unless your doctor tells you not to. Bring a list of your current medicines to your exam (including vitamins, minerals and over-the-counter drugs).  You may or may not receive intravenous (IV) contrast for this exam pending the discretion of the Radiologist.  You do not need to do anything special to prepare.  The MRI machine uses a strong magnet. Please wear clothes without metal (snaps, zippers). A sweatsuit  works well, or we may give you a hospital gown.  Please remove any body piercings and hair extensions before you arrive. You will also remove watches, jewelry, hairpins, wallets, dentures, partial dental plates and hearing aids. You may wear contact lenses, and you may be able to wear your rings. We have a safe place to keep your personal items, but it is safer to leave them at home.  **IMPORTANT** THE INSTRUCTIONS BELOW ARE ONLY FOR THOSE PATIENTS WHO HAVE BEEN PRESCRIBED SEDATION OR GENERAL ANESTHESIA DURING THEIR MRI PROCEDURE:  IF YOUR DOCTOR PRESCRIBED ORAL SEDATION (take medicine to help you relax during your exam):   You must get the medicine from your doctor (oral medication) before you arrive. Bring the medicine to the exam. Do not take it at home. You ll be told when to take it upon arriving for your exam.   Arrive one hour early. Bring someone who can take you home after the test. Your medicine will make you sleepy. After the exam, you may not drive, take a bus or take a taxi by yourself.  IF YOUR DOCTOR PRESCRIBED IV SEDATION:   Arrive one hour early. Bring someone who can take you home after the test. Your medicine will make you sleepy. After the exam, you may not drive, take a bus or take a taxi by yourself.   No eating 6 hours before your exam. You may have clear liquids up until 4 hours before your exam. (Clear liquids include water, clear tea, black coffee and fruit juice without pulp.)  IF YOUR DOCTOR PRESCRIBED ANESTHESIA (be asleep for your exam):   Arrive 1 1/2 hours early. Bring someone who can take you home after the test. You may not drive, take a bus or take a taxi by yourself.   No eating 8 hours before your exam. You may have clear liquids up until 4 hours before your exam. (Clear liquids include water, clear tea, black coffee and fruit juice without pulp.)   You will spend four to five hours in the recovery room.  Please call the Imaging Department at your exam site with any questions.             Aug 13, 2018  3:00 PM CDT   [facility] Lab Draw with [facility] LAB DRAW   [facility] ([facility])    [address]  [address]  [facility] [id number] [phone]            Aug 13, 2018  3:30 PM CDT   (Arrive by 3:15 PM)   Return Visit with Anupama Morrison MD   [facility] ([facility])    [address]  [address]  [facility] [address] [phone]            Aug 13, 2018  4:00 PM CDT   Infusion 60 with UC ONCOLOGY INFUSION, UC 12 ATC   [facility] ([facility])    [address]  [address]  [facility] [address] [phone]              Who to contact     If you have questions or need follow up information about today's clinic visit or your schedule please contact [facility] directly at [phone].  Normal or non-critical lab and imaging results will be communicated to you by YAMAPhart, letter or phone within 4 business days after the clinic has received the results. If you do not hear from us within 7 days, please contact the clinic through YAMAPhart or phone. If you have a critical or abnormal lab result, we will notify you by phone as soon as possible.  Submit refill requests through Sensorin or call your pharmacy and they will forward the refill request to us. Please allow 3 business days for your refill to be completed.          Additional Information About Your Visit        [employer] Information     Sensorin gives you secure access to your electronic health record. If you see a primary care provider, you can also send messages to your care team and make appointments. If you have questions, please call your primary care clinic.  If you do not have a primary care provider, please call [phone] and they will assist you.        Care EveryWhere ID     This is your Care EveryWhere ID. This could be used by other  organizations to access your [place] medical records  [id number]        Your Vitals Were     Pulse Temperature Respirations Height Pulse Oximetry BMI (Body Mass Index)    71 96.9  F (36.1  C) (Oral) 16 1.524 m (5') 99% 36.97 kg/m2       Blood Pressure from Last 3 Encounters:   06/25/18 106/74   06/04/18 (P) 119/86   06/04/18 113/80    Weight from Last 3 Encounters:   06/25/18 85.9 kg (189 lb 4.8 oz)   06/18/18 88.5 kg (195 lb)   06/11/18 88.9 kg (196 lb)              We Performed the Following     CANCER RISK MGMT/CANCER GENETIC COUNSELING REFERRAL          Today's Medication Changes          These changes are accurate as of 6/25/18 11:59 PM.  If you have any questions, ask your nurse or doctor.               Stop taking these medicines if you haven't already. Please contact your care team if you have questions.     LORazepam 0.5 MG tablet   Commonly known as:  ATIVAN   Stopped by:  Anupama Morrison MD                    Primary Care Provider Fax #    Physician No Ref-Primary [phone]       No address on file        Equal Access to Services     [facility]: Hadii handy Beck, waaxda luevaadaha, qaybta kaalmada adeamelia, toño jade . So [facility] [phone].    ATENCIÓN: Si habla español, tiene a mccurdy disposición servicios gratuitos de asistencia lingüística. Llame al [phone].    We comply with applicable federal civil rights laws and [place] laws. We do not discriminate on the basis of race, color, national origin, age, disability, sex, sexual orientation, or gender identity.            Thank you!     Thank you for choosing [facility]  for your care. Our goal is always to provide you with excellent care. Hearing back from our patients is one way we can continue to improve our services. Please take a few minutes to complete the written survey that you may receive in the mail after your visit with us. Thank you!             Your Updated  Medication List - Protect others around you: Learn how to safely use, store and throw away your medicines at www.disposemymeds.org.          This list is accurate as of 6/25/18 11:59 PM.  Always use your most recent med list.                   Brand Name Dispense Instructions for use Diagnosis    acetaminophen 325 MG tablet    TYLENOL     Take 650 mg by mouth        ondansetron 8 MG ODT tab    ZOFRAN-ODT    60 tablet    Take 1 tablet (8 mg) by mouth every 8 hours as needed for nausea    Medulloblastoma of cerebellum (H)       sennosides 8.6 MG tablet    SENOKOT     Take 1 tablet by mouth daily

## 2018-06-25 NOTE — MR AVS SNAPSHOT
After Visit Summary   6/25/2018    Merissa Silverman    MRN: [id number]           Patient Information     Date Of Birth          1985        Visit Information        Provider Department      6/25/2018 10:01 AM Antonietta Juarez MSW [facility]        Today's Diagnoses     Visit for counseling    -  1       Follow-ups after your visit        Future tests that were ordered for you today     Open Standing Orders        Priority Remaining Interval Expires Ordered    Comprehensive metabolic panel Routine 12/12 28 days 6/25/2019 6/25/2018    CBC with platelets differential Routine 52/52 7 days 6/25/2019 6/25/2018          Open Future Orders        Priority Expected Expires Ordered    MR Brain w/o & w Contrast Routine  6/25/2019 6/25/2018            Who to contact     If you have questions or need follow up information about today's clinic visit or your schedule please contact [facility] [place] directly at [phone].  Normal or non-critical lab and imaging results will be communicated to you by Event Farmhart, letter or phone within 4 business days after the clinic has received the results. If you do not hear from us within 7 days, please contact the clinic through Event Farm[employer] or phone. If you have a critical or abnormal lab result, we will notify you by phone as soon as possible.  Submit refill requests through Reviewspotter or call your pharmacy and they will forward the refill request to us. Please allow 3 business days for your refill to be completed.          Additional Information About Your Visit        MyChart Information     [employer] gives you secure access to your electronic health record. If you see a primary care provider, you can also send messages to your care team and make appointments. If you have questions, please call your primary care clinic.  If you do not have a primary care provider, please call [phone] and they will assist you.        Care EveryWhere ID      This is your Care EveryWhere ID. This could be used by other organizations to access your [facility] medical records  [id number]         Blood Pressure from Last 3 Encounters:   06/25/18 106/74   06/04/18 (P) 119/86   06/04/18 113/80    Weight from Last 3 Encounters:   06/25/18 85.9 kg (189 lb 4.8 oz)   06/18/18 88.5 kg (195 lb)   06/11/18 88.9 kg (196 lb)              Today, you had the following     No orders found for display         Today's Medication Changes          These changes are accurate as of 6/25/18  4:33 PM.  If you have any questions, ask your nurse or doctor.               Stop taking these medicines if you haven't already. Please contact your care team if you have questions.     LORazepam 0.5 MG tablet   Commonly known as:  ATIVAN   Stopped by:  Anupama Morrison MD                    Primary Care Provider Fax #    Physician No Ref-Primary [phone]       No address on file        Equal Access to Services     [facility] : Hadii aad ku hadasho Soraul, waaxda luqadaha, qaybta kaalmada adedeannayatu, toño jade . So [facility] [phone].    ATENCIÓN: Si habla español, tiene a mccurdy disposición servicios gratuitos de asistencia lingüística. [facility] [phone].    We comply with applicable federal civil rights laws and [place] laws. We do not discriminate on the basis of race, color, national origin, age, disability, sex, sexual orientation, or gender identity.            Thank you!     Thank you for choosing [facility]  for your care. Our goal is always to provide you with excellent care. Hearing back from our patients is one way we can continue to improve our services. Please take a few minutes to complete the written survey that you may receive in the mail after your visit with us. Thank you!             Your Updated Medication List - Protect others around you: Learn how to safely use, store and throw away your medicines at  www.disposemymeds.org.          This list is accurate as of 6/25/18  4:33 PM.  Always use your most recent med list.                   Brand Name Dispense Instructions for use Diagnosis    acetaminophen 325 MG tablet    TYLENOL     Take 650 mg by mouth        ondansetron 8 MG ODT tab    ZOFRAN-ODT    60 tablet    Take 1 tablet (8 mg) by mouth every 8 hours as needed for nausea    Medulloblastoma of cerebellum (H)       sennosides 8.6 MG tablet    SENOKOT     Take 1 tablet by mouth daily

## 2018-06-25 NOTE — PROGRESS NOTES
NEURO-ONCOLOGY VISIT  06/25/2018    CHIEF COMPLAINT: Ms. 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 Completed craniospinal radiation with concurrent vincristine x 3 doses, but treatment was stopped in the setting of pancytopenia. Planned treatment break until counts recover.     She is presenting in follow-up for continued evaluation and recommendations on treatment accompanied by Rebecca (aunt) and Pepe (son).       HISTORY OF PRESENT ILLNESS  -Merissa is feeling well.  -Painful facial burn following RT.   -Energy improving.   -With position change, she at times has double vision that lasts for seconds; less frequent now.   -No problems with strength/ walking. No confusion, no headaches.   -Infrequent nausea. Better appetite, able to swallow without pain, taste is improving.   -Had last radiation fraction on Friday.    REVIEW OF SYSTEMS  A comprehensive ROS negative except as in HPI.      MEDICATIONS   Current Outpatient Prescriptions   Medication     ondansetron (ZOFRAN-ODT) 8 MG ODT tab     acetaminophen (TYLENOL) 325 MG tablet     sennosides (SENOKOT) 8.6 MG tablet     Current Facility-Administered Medications   Medication     heparin 100 UNIT/ML injection 5 mL     Facility-Administered Medications Ordered in Other Visits   Medication     heparin 100 UNIT/ML injection 5 mL     DRUG ALLERGIES   Allergies   Allergen Reactions     Sulfa Drugs Unknown       ONCOLOGIC HISTORY  -PRESENTATION: Progressively worsening headaches. Additionally was with abrupt position changes resulted in dizziness/ syncope. CTH at an outside hospital showed a mass in the left cerebellum. Transferred to [facility].  -4/11/2018 MRB showed a 3.5cm mass in the left cerebellar hemisphere that was associated with mild  cerebellar tonsillar herniation and hydrocephalus.  -MRI spine showed no evidence of disease.   -4/13/2018 SURGERY: Suboccipital craniotomy with resection of the left cerebellar mass. Immediate post-operative MRI demonstrated a gross total resection.   PATHOLOGY: Medulloblastoma; Molecular markers pending.   -5/4/2018 NEURO-ONC: LP performed. Evaluated by radiation oncology. Recommending craniospinal radiation + concurrent vincristine followed by eight 6-week cycles of cisplatin, lomustine, and vincristine.  -5/4/2018 LP with CSF analysis: WBC 1/ RBC 1, protein 25, glucose 62, negative cytology.   -5/14 - 6/22/2018 CHEMORADS with vincristine 2 mg/m2 (stopped after 3 infusions due to pancytopenia).  -5/17/2018 Audiology- Baseline hearing testing with no hearing loss.  -6/4/2018 NEURO-ONC: Doing well, no new neurological symptoms. Tolerating treatment well. Ophtho referral. Labs improved.  -6/4/2018 NGS via [employer]: SMO mutation, TERT promotor mutation. Negative for microsatellite instability.   -6/25/2018 NEURO-ONC: Clinically stable, painful radiation-induced burn. Monitoring labs. Cancer Risk Assessment referral.    SOCIAL HISTORY   Tobacco use: Former smoker, E-cigs stopped on 5/3/2018  Alcohol use: None.   Drug use: Denies marijuana use.  Supplement, complimentary/ alternative medicine: None.   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      PHYSICAL EXAMINATION  /74  Pulse 71  Temp 96.9  F (36.1  C) (Oral)  Resp 16  Ht 1.524 m (5')  Wt 85.9 kg (189 lb 4.8 oz)  SpO2 99%  BMI 36.97 kg/m2   Vitals:    06/25/18 0918   Weight: 85.9 kg (189 lb 4.8 oz)     Ht Readings from Last 2 Encounters:   06/25/18 1.524 m (5')   05/09/18 1.524 m (5')     KPS: 100    -Generally well appearing.  -Throat: No oral thrush. No redness of throat. No lymphadenopathy.   -Respiratory: Normal breath sounds, no audible wheezing.   -Skin: No rashes. Healed head incision. Shaved head. Right cheek with  radiation burn.  -Hematologic/ lymphatic: No abnormal bruising. No leg swelling.  -Psychiatric: Normal mood and affect. Pleasant, talkative.  -Neurologic:   MENTAL STATUS:     Alert, oriented to date.    Recall: Immediate 3/3, delayed 3/3.    Speech fluent. Comprehension intact to multi-step commands.   Normal naming, repetition. Able to read.   Good right-left orientation.     CRANIAL NERVES:     Discs flat on fundoscopy.    Pupils are equal, round, reactive to light.     Extraocular movements full, patient denies diplopia. Mild eye jerks noted on pursuit.    Visual fields full.     Facial sensation intact to light touch.   Symmetric facial movements.   Hearing intact.   Palate moves symmetrically.     Sternocleidomastoid and trapezius strength intact.    Tongue midline.  MOTOR:    Normal and symmetric tone.   Grossly 5/5 throughout.    No pronation or drift. No orbiting.   Able to rise from a chair without use of arms.   On toe/ heel walk, equal distance from floor to heels/ toes.   SENSATION:    Intact to light touch throughout.  COORDINATION:   Intact finger-nose with eyes open and closed.   REFLEXES:    Muted, symmetric.    Toes not tested. No clonus. No Hoffmans.   No grasp.    GAIT:  Walks without assistance.   Good speed. Normal stride length and heel strike. Normal turns. Normal arm swing.   Able to toe, heel walk. Able to tandem walk.       MEDICAL RECORDS  Obtained and personally reviewed all available outside medical records in addition to reviewing any records available in our electronic system.     LABS  Personally reviewed all available lab results.   HB and platelets trending up.   WBC remains low.     IMAGING  No new neuro-imaging to review.       IMPRESSION  For the 30 minute appointment, more than 50% of the encounter was spent discussing in detail the nature of this tumor and the results of next generation sequencing. This was in addition to providing emotional support, answering questions  pertaining to my recommendations, and devising the treatment plan as outlined below.    We briefly discussed updates in molecular testing by next generation sequencing via [employer] which showed a targetable mutation in SMO.    With regard to cancer-directed therapy, Merissa has completed chemoradiotherapy. Now entering a planned treatment break as counts recover. She is clinically stable. Will start cycle 1 day 1 on 8/13, per patient request, following her brother's wedding. Counts continue to improved, however, WBC remains low. Continue with weekly labs until counts normalize. Adjuvant chemotherapy will be up to eight cycles of cisplatin, lomustine, and vincristine. Of note, this regimen is highly toxic and in clinical studies, treatment was terminated or dose reduced due to side effects in nearly 60 percent of patients by cycle four. Clinical trial options remain a strong consideration for recurrent disease, as does off label use of SMO inhibitors.     I will contact Dr. Borges about treatment options for the likely radiation-induced facial burn.     PROBLEM LIST  Medulloblastoma  Steroid intolerance  Chemotherapy-induced pancytopenia  Radiation burn  Fatigue, cancer and treatment related    PLAN  -CANCER-DIRECTED THERAPY-  -As above. Plan for cycle 1, day 1 on 8/13.   Cisplatin DAY 1              Lomustine DAY 1              Vincristine  DAY 1 and 8  -Repeat MR brain imaging prior to starting chemotherapy.   -Next generation sequencing via [employer] results; SMO mutation.  -Continue weekly labs to monitor counts.  -Merissa and Tito declined referral for fertility preservation.    -Referral placed to Cancer Risk Management given the rarity of her cancer.     -STEROIDS-  -Currently off dexamethasone.  -Poor tolerance for steroids.     -SEIZURE MANAGEMENT-  -While this patient is at increased risk of having seizures, given the lack of seizure history, there is no indication to prescribe an antiepileptic at this time.      -Quality of life/ MOOD/ FATIGUE-  -Denies any mood issues.  -Continue to monitor mood as untreated/ undertreated depression can worsen fatigue, dysorexia, and quality of life.    -RETINAL HEMORRHAGE-  -Identified when inpatient on presentation.  -Follow-up dilated eye examination negative. Nothing further indicated.    Return to clinic on 8/13 for labs, MRI brain, appointment with me, infusion.     In the meantime, Merissa knows to call with questions or concerns or to report new complaints and can be seen sooner if needed. Urgent evaluation is needed in the setting of acute onset of severe headache, abrupt change in mental status, on-going seizures, new focal deficits, or new leg swelling/ pain. Everyone in attendance voiced understanding.    Anupama Morrison MD  Neuro-oncology

## 2018-06-27 ENCOUNTER — TELEPHONE (OUTPATIENT)
Dept: RADIATION ONCOLOGY | Facility: CLINIC | Age: 33
End: 2018-06-27

## 2018-06-27 NOTE — TELEPHONE ENCOUNTER
Called patient regarding a rash she developed on her cheek.  She stated it is getting much better.  Recommended applying aquaphor or an aloe.  No further questions or concerns at this time.

## 2018-06-27 NOTE — PROCEDURES
Radiotherapy Treatment Summary          Date of Report: 2018     PATIENT: EMILIO LOVELL  MEDICAL RECORD NO: [id number]  : 1985     DIAGNOSIS: C71.6 Malignant neoplasm of cerebellum  INTENT OF RADIOTHERAPY: Cure  PATHOLOGY: Medulloblastoma                                  CONCURRENT SYSTEMIC THERAPY: Weekly vincristine                    Details of the treatments summarized below are found in records kept in the Department of Radiation Oncology at [facility].     Treatment Summary:  Treatment Site Dose  Modality From  To  Elapsed Days  Fx.  Craniospinal   3,600 cGy 06 X  5/14/2018 2018 25   20  Post fossa boost 1,800 cGy 06 X  6/11/2018 2018 11   10     Dose per Fraction: 180 cGy        Cumulative Dose:     Craniospinal: 3,600 cGy  Posterior fossa: 5,400 cGy            COMMENTS:                      Ms. Lovell is a 32 year old female with a diagnosis of a medulloblastoma of the left cerebellar hemisphere status post gross total resection. Spinal imaging and CSF cytology were negative for disease. She was referred to our clinic for treatment with craniospinal irradiation. She received a total of 3,600 cGy to the craniospinal axis followed by a reduced volume boost of 1,800 cGy to the surgical bed with a 1.5 cm margin. All treatment was delivered with 6 MV photons using a Tomotherapy unit. Radiotherapy was administered with concurrent weekly vincristine. She tolerated treatment well with the expected acute toxicities, including grade 2 alopecia, grade 1 esophagitis, and grade 1 dermatitis. She required no hospitalizations or unplanned treatment breaks during radiation.         TOXICITIES (CTCAE v5.0)  1. Alopecia: Grade 2: Hair loss >=50% of normal; readily apparent to others; wig or hair piece necessary to camouflage   2. Esophagitis: Grade 1: Asymptomatic; clinical or diagnostic observations only; intervention not indicated  3. Dermatitis: Grade 1: Faint erythema or dry desquamation      PAIN MANAGEMENT:       Not requiring any PRN pain medication at the completion of radiation therapy     FOLLOW UP PLAN:                         --Follow-up in radiation oncology clinic with NP in 2 weeks and with MD in 4 weeks  --Continue to follow-up with neuro oncology (Dr. Morrison) as scheduled for ongoing disease management     Resident Physician: Kaushal Garcia M.D.   Staff Physician: Mike Borges M.D.  Physicist: Arianne Hartman, PhD     CC:   Anupama Morrison MD                                    Radiation Oncology:  [facility] [address] [place] [address]

## 2018-07-09 DIAGNOSIS — Z51.11 CHEMOTHERAPY MANAGEMENT, ENCOUNTER FOR: ICD-10-CM

## 2018-07-09 DIAGNOSIS — T45.1X5A CHEMOTHERAPY-INDUCED NAUSEA AND VOMITING: ICD-10-CM

## 2018-07-09 DIAGNOSIS — T45.1X5A ANTINEOPLASTIC CHEMOTHERAPY INDUCED PANCYTOPENIA (H): ICD-10-CM

## 2018-07-09 DIAGNOSIS — D61.810 ANTINEOPLASTIC CHEMOTHERAPY INDUCED PANCYTOPENIA (H): ICD-10-CM

## 2018-07-09 DIAGNOSIS — R11.2 CHEMOTHERAPY-INDUCED NAUSEA AND VOMITING: ICD-10-CM

## 2018-07-09 DIAGNOSIS — C71.6 MEDULLOBLASTOMA OF CEREBELLUM (H): ICD-10-CM

## 2018-07-09 LAB
BASOPHILS # BLD AUTO: 0 10E9/L (ref 0–0.2)
BASOPHILS NFR BLD AUTO: 0.3 %
DIFFERENTIAL METHOD BLD: ABNORMAL
EOSINOPHIL # BLD AUTO: 0 10E9/L (ref 0–0.7)
EOSINOPHIL NFR BLD AUTO: 1.1 %
ERYTHROCYTE [DISTWIDTH] IN BLOOD BY AUTOMATED COUNT: 18.5 % (ref 10–15)
HCT VFR BLD AUTO: 33.9 % (ref 35–47)
HGB BLD-MCNC: 11.1 G/DL (ref 11.7–15.7)
LYMPHOCYTES # BLD AUTO: 0.4 10E9/L (ref 0.8–5.3)
LYMPHOCYTES NFR BLD AUTO: 10 %
MCH RBC QN AUTO: 31.4 PG (ref 26.5–33)
MCHC RBC AUTO-ENTMCNC: 32.7 G/DL (ref 31.5–36.5)
MCV RBC AUTO: 96 FL (ref 78–100)
MONOCYTES # BLD AUTO: 0.4 10E9/L (ref 0–1.3)
MONOCYTES NFR BLD AUTO: 10.5 %
NEUTROPHILS # BLD AUTO: 3 10E9/L (ref 1.6–8.3)
NEUTROPHILS NFR BLD AUTO: 78.1 %
PLATELET # BLD AUTO: 238 10E9/L (ref 150–450)
RBC # BLD AUTO: 3.53 10E12/L (ref 3.8–5.2)
WBC # BLD AUTO: 3.8 10E9/L (ref 4–11)

## 2018-07-09 PROCEDURE — 85025 COMPLETE CBC W/AUTO DIFF WBC: CPT | Performed by: PSYCHIATRY & NEUROLOGY

## 2018-07-09 PROCEDURE — 36415 COLL VENOUS BLD VENIPUNCTURE: CPT | Performed by: PSYCHIATRY & NEUROLOGY

## 2018-07-09 PROCEDURE — 80053 COMPREHEN METABOLIC PANEL: CPT | Performed by: PSYCHIATRY & NEUROLOGY

## 2018-07-10 LAB
ALBUMIN SERPL-MCNC: 3.9 G/DL (ref 3.4–5)
ALP SERPL-CCNC: 58 U/L (ref 40–150)
ALT SERPL W P-5'-P-CCNC: 36 U/L (ref 0–50)
ANION GAP SERPL CALCULATED.3IONS-SCNC: 13 MMOL/L (ref 3–14)
AST SERPL W P-5'-P-CCNC: 29 U/L (ref 0–45)
BILIRUB SERPL-MCNC: 0.5 MG/DL (ref 0.2–1.3)
BUN SERPL-MCNC: 5 MG/DL (ref 7–30)
CALCIUM SERPL-MCNC: 9 MG/DL (ref 8.5–10.1)
CHLORIDE SERPL-SCNC: 109 MMOL/L (ref 94–109)
CO2 SERPL-SCNC: 20 MMOL/L (ref 20–32)
CREAT SERPL-MCNC: 0.56 MG/DL (ref 0.52–1.04)
GFR SERPL CREATININE-BSD FRML MDRD: >90 ML/MIN/1.7M2
GLUCOSE SERPL-MCNC: 95 MG/DL (ref 70–99)
POTASSIUM SERPL-SCNC: 4 MMOL/L (ref 3.4–5.3)
PROT SERPL-MCNC: 7.2 G/DL (ref 6.8–8.8)
SODIUM SERPL-SCNC: 142 MMOL/L (ref 133–144)

## 2018-07-13 ENCOUNTER — OFFICE VISIT (OUTPATIENT)
Dept: RADIATION ONCOLOGY | Facility: CLINIC | Age: 33
End: 2018-07-13
Attending: NURSE PRACTITIONER
Payer: COMMERCIAL

## 2018-07-13 VITALS
BODY MASS INDEX: 37.61 KG/M2 | OXYGEN SATURATION: 99 % | HEART RATE: 68 BPM | DIASTOLIC BLOOD PRESSURE: 88 MMHG | SYSTOLIC BLOOD PRESSURE: 139 MMHG | WEIGHT: 192.6 LBS

## 2018-07-13 DIAGNOSIS — C71.6 MEDULLOBLASTOMA OF CEREBELLUM (H): Primary | ICD-10-CM

## 2018-07-13 PROCEDURE — G0463 HOSPITAL OUTPT CLINIC VISIT: HCPCS | Performed by: NURSE PRACTITIONER

## 2018-07-13 NOTE — PROGRESS NOTES
FOLLOW-UP VISIT    Patient Name: Merissa Silverman      : 1985     Age: 32 year old        ______________________________________________________________________________     Chief Complaint   Patient presents with     Cancer     Follow up for radiation, Craniospinal     /88  Pulse 68  Wt 87.4 kg (192 lb 9.6 oz)  SpO2 99%  BMI 37.61 kg/m2     Date Radiation Completed: Craniospinal 5400 cGy completed on 18    Pain  Denies    Labs  Other Labs: No    Imaging  None    Other Appointments: No    MD Name:  Appointment Date:    MD Name: Appointment Date:   MD Name: Appointment Date:   Other Appointment Notes:     Residual Radiation side effect: Energy back, taste back, back to work.     Additional Instructions: Chemo will start again 18    Nurse face-to-face time: Level 3:  10 min face to face time

## 2018-07-13 NOTE — PROGRESS NOTES
Radiation Oncology Follow-up Visit  2018    Merissa Silverman  MRN: [id number]   : 1985     DISEASE TREATED:   Medulloblastoma of left cerebellar hemisphere    RADIATION THERAPY DELIVERED:   3,600 cGy craniospinal  1,800 cGy boost to posterior fossa/surgical bed for a total dose of 5,400 cGy to post fossa  Concurrent with weekly vincristine x3, stopped r/t pancytopenia  Completed 18    INTERVAL SINCE COMPLETION OF RADIATION THERAPY:   3 weeks    HPI:    Merissa is 32 year old diagnosed with medulloblastoma of left cerebellar hemisphere who underwent gross total resection on 18.  Spinal imaging and CSF cytology were negative for disease.      SUBJECTIVE:   Merissa Silverman is a 32 year old female who is here today for routine follow up after completing radiation therapy. She states she is doing great.  Energy level is good.  She can taste again, eating well and has gained 3 lbs.  Her nausea and esophagitis have resolved.  She denies any headaches.  She denies any neurological symptoms.  She is back to work.  She did have a skin reaction that she states was much worse after radiation finished, but that has now completely resolved.  She has been using sunscreen and protective clothing and hats. Continues to have alopecia. Her 8 year old son is here today with her.  She is looking forward to her brothers upcoming wedding.      ROS:  Complete review of systems is negative except for symptoms discussed in subjective above.    Current Outpatient Prescriptions   Medication     LORazepam (ATIVAN PO)     acetaminophen (TYLENOL) 325 MG tablet     ondansetron (ZOFRAN-ODT) 8 MG ODT tab     No current facility-administered medications for this visit.           Allergies   Allergen Reactions     Sulfa Drugs Unknown       Past Medical History:   Diagnosis Date     Gastroesophageal reflux disease      Medulloblastoma (H)          PHYSICAL EXAM:  /88  Pulse 68  Wt 87.4 kg (192 lb 9.6 oz)  SpO2 99%   BMI 37.61 kg/m2  Gen: Alert, in NAD  Neck::  Supple.  No masses or lymphaednopathy palpated.  Oral mucosa without thrush or mucositis.   Eyes:  PERRLA.  Extra occular movements are intact.  Pulm: No wheezing, stridor or respiratory distress  CV: Well-perfused, no cyanosis, no pedal edema  Skin: Normal color and turgor.  Radiation dermatitis has completely resolved.  Well healed incision.  Neuro:  Speech is clear.  Gait is stable.  Strength good and equal in bilateral upper and lower extremities.  Psychiatric: Appropriate mood and affect      LABS AND IMAGING:  Reviewed.    IMPRESSION:   Ms. Silverman is a 32 year old female with a medulloblastoma of the left cerebellar hemisphere s/p gross total resection and now 3 weeks out from concurrent chemoradiation and doing great.    PLAN:   Patient has recovered nicely from acute side effects of radiation therapy. She has seen complete resolution of her side effects from radiation and is feeling great.  She did have labs done on Monday and counts are up but because of her brothers wedding she will start chemotherapy (cisplatin, lomustine and vincristine)  on 8/13/18.  F/u MRI on 8/13 also.  She will see Dr. Borges on 7/27/18 and continues to follow with Dr. Morrison.        Elma Knight, NP  Radiation Oncology  [facility]    CC:  Patient Care Team:  No Ref-Primary, Physician as PCP - General  Melvin Gupta MD as Referring Physician (Oncology)  Anupama Morrison MD as MD (Neurology)  Celsa Cantu, RN as Nurse Coordinator (Oncology)  Mike Borges MD as MD (Radiation Oncology)

## 2018-07-13 NOTE — LETTER
2018       RE: Merissa Silverman  [address]  [address] [place] [id number]     Dear Colleague,    Thank you for referring your patient, Merissa Silverman, to the RADIATION ONCOLOGY CLINIC. Please see a copy of my visit note below.    FOLLOW-UP VISIT    Patient Name: Merissa Silverman      : 1985     Age: 32 year old        ______________________________________________________________________________     Chief Complaint   Patient presents with     Cancer     Follow up for radiation, Craniospinal     /88  Pulse 68  Wt 87.4 kg (192 lb 9.6 oz)  SpO2 99%  BMI 37.61 kg/m2     Date Radiation Completed: Craniospinal 5400 cGy completed on 18    Pain  Denies    Labs  Other Labs: No    Imaging  None    Other Appointments: No    MD Name:  Appointment Date:    MD Name: Appointment Date:   MD Name: Appointment Date:   Other Appointment Notes:     Residual Radiation side effect: Energy back, taste back, back to work.     Additional Instructions: Chemo will start again 18    Nurse face-to-face time: Level 3:  10 min face to face time          Radiation Oncology Follow-up Visit  2018    Merissa Silverman  MRN: [id number]   : 1985     DISEASE TREATED:   Medulloblastoma of left cerebellar hemisphere    RADIATION THERAPY DELIVERED:   3,600 cGy craniospinal  1,800 cGy boost to posterior fossa/surgical bed for a total dose of 5,400 cGy to post fossa  Concurrent with weekly vincristine x3, stopped r/t pancytopenia  Completed 18    INTERVAL SINCE COMPLETION OF RADIATION THERAPY:   3 weeks    HPI:    Merissa is 32 year old diagnosed with medulloblastoma of left cerebellar hemisphere who underwent gross total resection on 18.  Spinal imaging and CSF cytology were negative for disease.      SUBJECTIVE:   Merissa Silverman is a 32 year old female who is here today for routine follow up after completing radiation therapy. She states she is doing great.  Energy level is good.   She can taste again, eating well and has gained 3 lbs.  Her nausea and esophagitis have resolved.  She denies any headaches.  She denies any neurological symptoms.  She is back to work.  She did have a skin reaction that she states was much worse after radiation finished, but that has now completely resolved.  She has been using sunscreen and protective clothing and hats. Continues to have alopecia. Her 8 year old son is here today with her.  She is looking forward to her brothers upcoming wedding.      ROS:  Complete review of systems is negative except for symptoms discussed in subjective above.    Current Outpatient Prescriptions   Medication     LORazepam (ATIVAN PO)     acetaminophen (TYLENOL) 325 MG tablet     ondansetron (ZOFRAN-ODT) 8 MG ODT tab     No current facility-administered medications for this visit.           Allergies   Allergen Reactions     Sulfa Drugs Unknown       Past Medical History:   Diagnosis Date     Gastroesophageal reflux disease      Medulloblastoma (H)          PHYSICAL EXAM:  /88  Pulse 68  Wt 87.4 kg (192 lb 9.6 oz)  SpO2 99%  BMI 37.61 kg/m2  Gen: Alert, in NAD  Neck::  Supple.  No masses or lymphaednopathy palpated.  Oral mucosa without thrush or mucositis.   Eyes:  PERRLA.  Extra occular movements are intact.  Pulm: No wheezing, stridor or respiratory distress  CV: Well-perfused, no cyanosis, no pedal edema  Skin: Normal color and turgor.  Radiation dermatitis has completely resolved.  Well healed incision.  Neuro:  Speech is clear.  Gait is stable.  Strength good and equal in bilateral upper and lower extremities.  Psychiatric: Appropriate mood and affect      LABS AND IMAGING:  Reviewed.    IMPRESSION:   Ms. Silverman is a 32 year old female with a medulloblastoma of the left cerebellar hemisphere s/p gross total resection and now 3 weeks out from concurrent chemoradiation and doing great.    PLAN:   Patient has recovered nicely from acute side effects of radiation  therapy. She has seen complete resolution of her side effects from radiation and is feeling great.  She did have labs done on Monday and counts are up but because of her brothers wedding she will start chemotherapy (cisplatin, lomustine and vincristine)  on 8/13/18.  F/u MRI on 8/13 also.  She will see Dr. Borges on 7/27/18 and continues to follow with Dr. Morrison.        Elma Knight NP  Radiation Oncology  [facility]    CC:  Patient Care Team:  No Ref-Primary, Physician as PCP - General  Melvin Gupta MD as Referring Physician (Oncology)  Anupama Morrison MD as MD (Neurology)  Celsa Cantu, RYAN as Nurse Coordinator (Oncology)  Mike Borges MD as MD (Radiation Oncology)

## 2018-07-13 NOTE — MR AVS SNAPSHOT
After Visit Summary   7/13/2018    Merissa Silverman    MRN: [id number]           Patient Information     Date Of Birth          1985        Visit Information        Provider Department      7/13/2018 10:30 AM Elma Knight APRN CNP Radiation Oncology Clinic        Today's Diagnoses     Medulloblastoma of cerebellum (H)    -  1       Follow-ups after your visit        Your next 10 appointments already scheduled     Jul 27, 2018 10:00 AM CDT   Return Visit with Mike Borges MD   Radiation Oncology Clinic ([facility])    [facility]  1st Floor  [address] [address]   [phone]            Aug 13, 2018  1:00 PM CDT   MR BRAIN W/O & W CONTRAST with [address] [facility] [place] [facility] ([facility])    [address]  [address] [facility] [address] [phone]           Take your medicines as usual, unless your doctor tells you not to. Bring a list of your current medicines to your exam (including vitamins, minerals and over-the-counter drugs).  You may or may not receive intravenous (IV) contrast for this exam pending the discretion of the Radiologist.  You do not need to do anything special to prepare.  The MRI machine uses a strong magnet. Please wear clothes without metal (snaps, zippers). A sweatsuit works well, or we may give you a hospital gown.  Please remove any body piercings and hair extensions before you arrive. You will also remove watches, jewelry, hairpins, wallets, dentures, partial dental plates and hearing aids. You may wear contact lenses, and you may be able to wear your rings. We have a safe place to keep your personal items, but it is safer to leave them at home.  **IMPORTANT** THE INSTRUCTIONS BELOW ARE ONLY FOR THOSE PATIENTS WHO HAVE BEEN PRESCRIBED SEDATION OR GENERAL ANESTHESIA DURING THEIR MRI PROCEDURE:  IF YOUR DOCTOR PRESCRIBED ORAL SEDATION (take  medicine to help you relax during your exam):   You must get the medicine from your doctor (oral medication) before you arrive. Bring the medicine to the exam. Do not take it at home. You ll be told when to take it upon arriving for your exam.   Arrive one hour early. Bring someone who can take you home after the test. Your medicine will make you sleepy. After the exam, you may not drive, take a bus or take a taxi by yourself.  IF YOUR DOCTOR PRESCRIBED IV SEDATION:   Arrive one hour early. Bring someone who can take you home after the test. Your medicine will make you sleepy. After the exam, you may not drive, take a bus or take a taxi by yourself.   No eating 6 hours before your exam. You may have clear liquids up until 4 hours before your exam. (Clear liquids include water, clear tea, black coffee and fruit juice without pulp.)  IF YOUR DOCTOR PRESCRIBED ANESTHESIA (be asleep for your exam):   Arrive 1 1/2 hours early. Bring someone who can take you home after the test. You may not drive, take a bus or take a taxi by yourself.   No eating 8 hours before your exam. You may have clear liquids up until 4 hours before your exam. (Clear liquids include water, clear tea, black coffee and fruit juice without pulp.)   You will spend four to five hours in the recovery room.  Please call the Imaging Department at your exam site with any questions.            Aug 13, 2018  3:00 PM CDT   [facility] DRAW   [facility] Lab Draw ([facility])    [address]  [address]  [facility] [place] [facility] [id number] [phone]            Aug 13, 2018  3:30 PM CDT   (Arrive by 3:15 PM)   Return Visit with Anupama Morrison MD   [facility] ([facility])    [address]  [address]  [place] [id number] [phone]            Aug 13, 2018  4:00 PM CDT   Infusion 60 with  ONCOLOGY INFUSION,  12 ATC   [facility]  Cancer Clinic ([facility])    [address]  [address]  [place] [id number] [phone]            Aug 21, 2018 12:00 PM CDT   (Arrive by 11:45 AM)   NEW WITH ROOM with Kennedi Brunner GC,  2 116 CONSULT RM   [facility] ([facility])    [address]  [address]  [facility] [place] [id number] [phone]            Aug 21, 2018  1:15 PM CDT   [facility] Lab Draw with [facility] LAB DRAW   [facility] Lab Draw ([facility])    [address]  [address]  [place] [address] [phone]              Who to contact     Please call your clinic at [phone] to:    Ask questions about your health    Make or cancel appointments    Discuss your medicines    Learn about your test results    Speak to your doctor            Additional Information About Your Visit        [employer] Information     [employer] gives you secure access to your electronic health record. If you see a primary care provider, you can also send messages to your care team and make appointments. If you have questions, please call your primary care clinic.  If you do not have a primary care provider, please call [phone] and they will assist you.      [employer] is an electronic gateway that provides easy, online access to your medical records. With [employer], you can request a clinic appointment, read your test results, renew a prescription or communicate with your care team.     To access your existing account, please contact your [facility] or call [phone] for assistance.        Care EveryWhere ID     This is your Care EveryWhere ID. This could be used by other organizations to access your [place] medical records  [id number]        Your Vitals Were     Pulse Pulse Oximetry BMI (Body Mass Index)             68 99% 37.61 kg/m2          Blood Pressure from Last 3 Encounters:   07/13/18  139/88   06/25/18 106/74   06/04/18 (P) 119/86    Weight from Last 3 Encounters:   07/13/18 87.4 kg (192 lb 9.6 oz)   06/25/18 85.9 kg (189 lb 4.8 oz)   06/18/18 88.5 kg (195 lb)              Today, you had the following     No orders found for display       Primary Care Provider Fax #    Physician No Ref-Primary [phone]       No address on file        Equal Access to Services     WANDER HAMMER : Hadii aad ku hadasho Soomaali, waaxda luqadaha, qaybta kaalmada adeegyada, waxay bryantin jasmyn jade . So [facility] [phone].    ATENCIÓN: Si habla español, tiene a mccurdy disposición servicios gratuitos de asistencia lingüística. [facility] [phone].    We comply with applicable federal civil rights laws and [place] laws. We do not discriminate on the basis of race, color, national origin, age, disability, sex, sexual orientation, or gender identity.            Thank you!     Thank you for choosing RADIATION ONCOLOGY CLINIC  for your care. Our goal is always to provide you with excellent care. Hearing back from our patients is one way we can continue to improve our services. Please take a few minutes to complete the written survey that you may receive in the mail after your visit with us. Thank you!             Your Updated Medication List - Protect others around you: Learn how to safely use, store and throw away your medicines at www.disposemymeds.org.          This list is accurate as of 7/13/18  1:34 PM.  Always use your most recent med list.                   Brand Name Dispense Instructions for use Diagnosis    acetaminophen 325 MG tablet    TYLENOL     Take 650 mg by mouth        ATIVAN PO      Take 0.5 mg by mouth as needed for anxiety (At night if difficulty falling asleep)        ondansetron 8 MG ODT tab    ZOFRAN-ODT    60 tablet    Take 1 tablet (8 mg) by mouth every 8 hours as needed for nausea    Medulloblastoma of cerebellum (H)

## 2018-07-17 DIAGNOSIS — T45.1X5A CHEMOTHERAPY INDUCED NEUTROPENIA (H): ICD-10-CM

## 2018-07-17 DIAGNOSIS — Z91.89 HIGH RISK FOR CHEMOTHERAPY-INDUCED INFECTIOUS COMPLICATION: ICD-10-CM

## 2018-07-17 DIAGNOSIS — D70.1 CHEMOTHERAPY INDUCED NEUTROPENIA (H): ICD-10-CM

## 2018-07-17 DIAGNOSIS — Z51.11 CHEMOTHERAPY MANAGEMENT, ENCOUNTER FOR: ICD-10-CM

## 2018-07-17 DIAGNOSIS — R11.2 CHEMOTHERAPY-INDUCED NAUSEA AND VOMITING: ICD-10-CM

## 2018-07-17 DIAGNOSIS — T45.1X5A CHEMOTHERAPY-INDUCED NAUSEA AND VOMITING: ICD-10-CM

## 2018-07-17 LAB
BASOPHILS # BLD AUTO: 0 10E9/L (ref 0–0.2)
BASOPHILS NFR BLD AUTO: 0.3 %
DIFFERENTIAL METHOD BLD: ABNORMAL
EOSINOPHIL # BLD AUTO: 0.1 10E9/L (ref 0–0.7)
EOSINOPHIL NFR BLD AUTO: 2 %
ERYTHROCYTE [DISTWIDTH] IN BLOOD BY AUTOMATED COUNT: 17.2 % (ref 10–15)
HCT VFR BLD AUTO: 34.3 % (ref 35–47)
HGB BLD-MCNC: 11.3 G/DL (ref 11.7–15.7)
LYMPHOCYTES # BLD AUTO: 0.4 10E9/L (ref 0.8–5.3)
LYMPHOCYTES NFR BLD AUTO: 13 %
MCH RBC QN AUTO: 32.3 PG (ref 26.5–33)
MCHC RBC AUTO-ENTMCNC: 32.9 G/DL (ref 31.5–36.5)
MCV RBC AUTO: 98 FL (ref 78–100)
MONOCYTES # BLD AUTO: 0.4 10E9/L (ref 0–1.3)
MONOCYTES NFR BLD AUTO: 14 %
NEUTROPHILS # BLD AUTO: 2.1 10E9/L (ref 1.6–8.3)
NEUTROPHILS NFR BLD AUTO: 70.7 %
PLATELET # BLD AUTO: 276 10E9/L (ref 150–450)
RBC # BLD AUTO: 3.5 10E12/L (ref 3.8–5.2)
WBC # BLD AUTO: 3 10E9/L (ref 4–11)

## 2018-07-17 PROCEDURE — 36415 COLL VENOUS BLD VENIPUNCTURE: CPT | Performed by: PSYCHIATRY & NEUROLOGY

## 2018-07-17 PROCEDURE — 80053 COMPREHEN METABOLIC PANEL: CPT | Performed by: PSYCHIATRY & NEUROLOGY

## 2018-07-17 PROCEDURE — 85025 COMPLETE CBC W/AUTO DIFF WBC: CPT | Performed by: PSYCHIATRY & NEUROLOGY

## 2018-07-18 LAB
ALBUMIN SERPL-MCNC: 3.9 G/DL (ref 3.4–5)
ALP SERPL-CCNC: 66 U/L (ref 40–150)
ALT SERPL W P-5'-P-CCNC: 40 U/L (ref 0–50)
ANION GAP SERPL CALCULATED.3IONS-SCNC: 11 MMOL/L (ref 3–14)
AST SERPL W P-5'-P-CCNC: 32 U/L (ref 0–45)
BILIRUB SERPL-MCNC: 0.4 MG/DL (ref 0.2–1.3)
BUN SERPL-MCNC: 11 MG/DL (ref 7–30)
CALCIUM SERPL-MCNC: 8.6 MG/DL (ref 8.5–10.1)
CHLORIDE SERPL-SCNC: 112 MMOL/L (ref 94–109)
CO2 SERPL-SCNC: 21 MMOL/L (ref 20–32)
CREAT SERPL-MCNC: 0.63 MG/DL (ref 0.52–1.04)
GFR SERPL CREATININE-BSD FRML MDRD: >90 ML/MIN/1.7M2
GLUCOSE SERPL-MCNC: 80 MG/DL (ref 70–99)
POTASSIUM SERPL-SCNC: 4.3 MMOL/L (ref 3.4–5.3)
PROT SERPL-MCNC: 7.1 G/DL (ref 6.8–8.8)
SODIUM SERPL-SCNC: 144 MMOL/L (ref 133–144)

## 2018-07-24 DIAGNOSIS — D70.1 CHEMOTHERAPY INDUCED NEUTROPENIA (H): ICD-10-CM

## 2018-07-24 DIAGNOSIS — Z91.89 HIGH RISK FOR CHEMOTHERAPY-INDUCED INFECTIOUS COMPLICATION: ICD-10-CM

## 2018-07-24 DIAGNOSIS — Z51.11 CHEMOTHERAPY MANAGEMENT, ENCOUNTER FOR: ICD-10-CM

## 2018-07-24 DIAGNOSIS — T45.1X5A CHEMOTHERAPY INDUCED NEUTROPENIA (H): ICD-10-CM

## 2018-07-24 LAB
BASOPHILS # BLD AUTO: 0 10E9/L (ref 0–0.2)
BASOPHILS NFR BLD AUTO: 0.5 %
DIFFERENTIAL METHOD BLD: ABNORMAL
EOSINOPHIL # BLD AUTO: 0.1 10E9/L (ref 0–0.7)
EOSINOPHIL NFR BLD AUTO: 1.8 %
ERYTHROCYTE [DISTWIDTH] IN BLOOD BY AUTOMATED COUNT: 15.9 % (ref 10–15)
HCT VFR BLD AUTO: 35.2 % (ref 35–47)
HGB BLD-MCNC: 11.9 G/DL (ref 11.7–15.7)
LYMPHOCYTES # BLD AUTO: 0.5 10E9/L (ref 0.8–5.3)
LYMPHOCYTES NFR BLD AUTO: 11.6 %
MCH RBC QN AUTO: 32.6 PG (ref 26.5–33)
MCHC RBC AUTO-ENTMCNC: 33.8 G/DL (ref 31.5–36.5)
MCV RBC AUTO: 96 FL (ref 78–100)
MONOCYTES # BLD AUTO: 0.3 10E9/L (ref 0–1.3)
MONOCYTES NFR BLD AUTO: 8.6 %
NEUTROPHILS # BLD AUTO: 3.1 10E9/L (ref 1.6–8.3)
NEUTROPHILS NFR BLD AUTO: 77.5 %
PLATELET # BLD AUTO: 282 10E9/L (ref 150–450)
RBC # BLD AUTO: 3.65 10E12/L (ref 3.8–5.2)
WBC # BLD AUTO: 4 10E9/L (ref 4–11)

## 2018-07-24 PROCEDURE — 85025 COMPLETE CBC W/AUTO DIFF WBC: CPT | Performed by: PSYCHIATRY & NEUROLOGY

## 2018-07-24 PROCEDURE — 36415 COLL VENOUS BLD VENIPUNCTURE: CPT | Performed by: PSYCHIATRY & NEUROLOGY

## 2018-07-31 DIAGNOSIS — Z91.89 HIGH RISK FOR CHEMOTHERAPY-INDUCED INFECTIOUS COMPLICATION: ICD-10-CM

## 2018-07-31 DIAGNOSIS — T45.1X5A CHEMOTHERAPY INDUCED NEUTROPENIA (H): ICD-10-CM

## 2018-07-31 DIAGNOSIS — D70.1 CHEMOTHERAPY INDUCED NEUTROPENIA (H): ICD-10-CM

## 2018-07-31 DIAGNOSIS — T45.1X5A CHEMOTHERAPY-INDUCED NAUSEA AND VOMITING: ICD-10-CM

## 2018-07-31 DIAGNOSIS — R11.2 CHEMOTHERAPY-INDUCED NAUSEA AND VOMITING: ICD-10-CM

## 2018-07-31 DIAGNOSIS — Z51.11 CHEMOTHERAPY MANAGEMENT, ENCOUNTER FOR: ICD-10-CM

## 2018-07-31 LAB
BASOPHILS # BLD AUTO: 0 10E9/L (ref 0–0.2)
BASOPHILS NFR BLD AUTO: 0.4 %
DIFFERENTIAL METHOD BLD: ABNORMAL
EOSINOPHIL # BLD AUTO: 0.1 10E9/L (ref 0–0.7)
EOSINOPHIL NFR BLD AUTO: 1.8 %
ERYTHROCYTE [DISTWIDTH] IN BLOOD BY AUTOMATED COUNT: 14.7 % (ref 10–15)
HCT VFR BLD AUTO: 34.7 % (ref 35–47)
HGB BLD-MCNC: 11.7 G/DL (ref 11.7–15.7)
LYMPHOCYTES # BLD AUTO: 0.4 10E9/L (ref 0.8–5.3)
LYMPHOCYTES NFR BLD AUTO: 8.4 %
MCH RBC QN AUTO: 32.7 PG (ref 26.5–33)
MCHC RBC AUTO-ENTMCNC: 33.7 G/DL (ref 31.5–36.5)
MCV RBC AUTO: 97 FL (ref 78–100)
MONOCYTES # BLD AUTO: 0.3 10E9/L (ref 0–1.3)
MONOCYTES NFR BLD AUTO: 7 %
NEUTROPHILS # BLD AUTO: 4 10E9/L (ref 1.6–8.3)
NEUTROPHILS NFR BLD AUTO: 82.4 %
PLATELET # BLD AUTO: 265 10E9/L (ref 150–450)
RBC # BLD AUTO: 3.58 10E12/L (ref 3.8–5.2)
WBC # BLD AUTO: 4.9 10E9/L (ref 4–11)

## 2018-07-31 PROCEDURE — 80053 COMPREHEN METABOLIC PANEL: CPT | Performed by: PSYCHIATRY & NEUROLOGY

## 2018-07-31 PROCEDURE — 85025 COMPLETE CBC W/AUTO DIFF WBC: CPT | Performed by: PSYCHIATRY & NEUROLOGY

## 2018-07-31 PROCEDURE — 36415 COLL VENOUS BLD VENIPUNCTURE: CPT | Performed by: PSYCHIATRY & NEUROLOGY

## 2018-08-01 DIAGNOSIS — F40.240 CLAUSTROPHOBIA: ICD-10-CM

## 2018-08-01 DIAGNOSIS — F41.9 ANXIETY: Primary | ICD-10-CM

## 2018-08-01 LAB
ALBUMIN SERPL-MCNC: 4.1 G/DL (ref 3.4–5)
ALP SERPL-CCNC: 58 U/L (ref 40–150)
ALT SERPL W P-5'-P-CCNC: 32 U/L (ref 0–50)
ANION GAP SERPL CALCULATED.3IONS-SCNC: 11 MMOL/L (ref 3–14)
AST SERPL W P-5'-P-CCNC: 22 U/L (ref 0–45)
BILIRUB SERPL-MCNC: 0.6 MG/DL (ref 0.2–1.3)
BUN SERPL-MCNC: 6 MG/DL (ref 7–30)
CALCIUM SERPL-MCNC: 8.9 MG/DL (ref 8.5–10.1)
CHLORIDE SERPL-SCNC: 107 MMOL/L (ref 94–109)
CO2 SERPL-SCNC: 22 MMOL/L (ref 20–32)
CREAT SERPL-MCNC: 0.61 MG/DL (ref 0.52–1.04)
GFR SERPL CREATININE-BSD FRML MDRD: >90 ML/MIN/1.7M2
GLUCOSE SERPL-MCNC: 89 MG/DL (ref 70–99)
POTASSIUM SERPL-SCNC: 3.7 MMOL/L (ref 3.4–5.3)
PROT SERPL-MCNC: 7.3 G/DL (ref 6.8–8.8)
SODIUM SERPL-SCNC: 140 MMOL/L (ref 133–144)

## 2018-08-01 RX ORDER — LORAZEPAM 0.5 MG/1
TABLET ORAL
Qty: 2 TABLET | Refills: 0
Start: 2018-08-01 | End: 2018-08-13

## 2018-08-07 DIAGNOSIS — T45.1X5A ANTINEOPLASTIC CHEMOTHERAPY INDUCED PANCYTOPENIA (H): ICD-10-CM

## 2018-08-07 DIAGNOSIS — C71.6 MEDULLOBLASTOMA OF CEREBELLUM (H): ICD-10-CM

## 2018-08-07 DIAGNOSIS — D61.810 ANTINEOPLASTIC CHEMOTHERAPY INDUCED PANCYTOPENIA (H): ICD-10-CM

## 2018-08-07 LAB
BASOPHILS # BLD AUTO: 0 10E9/L (ref 0–0.2)
BASOPHILS NFR BLD AUTO: 0.2 %
DIFFERENTIAL METHOD BLD: ABNORMAL
EOSINOPHIL # BLD AUTO: 0.1 10E9/L (ref 0–0.7)
EOSINOPHIL NFR BLD AUTO: 2.3 %
ERYTHROCYTE [DISTWIDTH] IN BLOOD BY AUTOMATED COUNT: 13.6 % (ref 10–15)
HCT VFR BLD AUTO: 38.9 % (ref 35–47)
HGB BLD-MCNC: 13.3 G/DL (ref 11.7–15.7)
LYMPHOCYTES # BLD AUTO: 0.5 10E9/L (ref 0.8–5.3)
LYMPHOCYTES NFR BLD AUTO: 11.8 %
MCH RBC QN AUTO: 33.1 PG (ref 26.5–33)
MCHC RBC AUTO-ENTMCNC: 34.2 G/DL (ref 31.5–36.5)
MCV RBC AUTO: 97 FL (ref 78–100)
MONOCYTES # BLD AUTO: 0.6 10E9/L (ref 0–1.3)
MONOCYTES NFR BLD AUTO: 13.2 %
NEUTROPHILS # BLD AUTO: 3.1 10E9/L (ref 1.6–8.3)
NEUTROPHILS NFR BLD AUTO: 72.5 %
PLATELET # BLD AUTO: 263 10E9/L (ref 150–450)
RBC # BLD AUTO: 4.02 10E12/L (ref 3.8–5.2)
WBC # BLD AUTO: 4.3 10E9/L (ref 4–11)

## 2018-08-07 PROCEDURE — 36415 COLL VENOUS BLD VENIPUNCTURE: CPT | Performed by: PSYCHIATRY & NEUROLOGY

## 2018-08-07 PROCEDURE — 85025 COMPLETE CBC W/AUTO DIFF WBC: CPT | Performed by: PSYCHIATRY & NEUROLOGY

## 2018-08-13 ENCOUNTER — APPOINTMENT (OUTPATIENT)
Dept: LAB | Facility: CLINIC | Age: 33
End: 2018-08-13
Attending: PSYCHIATRY & NEUROLOGY
Payer: COMMERCIAL

## 2018-08-13 ENCOUNTER — RADIANT APPOINTMENT (OUTPATIENT)
Dept: MRI IMAGING | Facility: CLINIC | Age: 33
End: 2018-08-13
Attending: PSYCHIATRY & NEUROLOGY
Payer: COMMERCIAL

## 2018-08-13 ENCOUNTER — INFUSION THERAPY VISIT (OUTPATIENT)
Dept: ONCOLOGY | Facility: CLINIC | Age: 33
End: 2018-08-13
Attending: PSYCHIATRY & NEUROLOGY
Payer: COMMERCIAL

## 2018-08-13 ENCOUNTER — DOCUMENTATION ONLY (OUTPATIENT)
Dept: ONCOLOGY | Facility: CLINIC | Age: 33
End: 2018-08-13

## 2018-08-13 ENCOUNTER — OFFICE VISIT (OUTPATIENT)
Dept: RADIATION ONCOLOGY | Facility: CLINIC | Age: 33
End: 2018-08-13
Attending: RADIOLOGY
Payer: COMMERCIAL

## 2018-08-13 VITALS
RESPIRATION RATE: 16 BRPM | DIASTOLIC BLOOD PRESSURE: 95 MMHG | OXYGEN SATURATION: 100 % | HEIGHT: 60 IN | BODY MASS INDEX: 37.05 KG/M2 | HEART RATE: 63 BPM | SYSTOLIC BLOOD PRESSURE: 143 MMHG | TEMPERATURE: 97.7 F | WEIGHT: 188.7 LBS

## 2018-08-13 VITALS — HEART RATE: 61 BPM | OXYGEN SATURATION: 100 % | SYSTOLIC BLOOD PRESSURE: 153 MMHG | DIASTOLIC BLOOD PRESSURE: 104 MMHG

## 2018-08-13 DIAGNOSIS — F40.240 CLAUSTROPHOBIA: ICD-10-CM

## 2018-08-13 DIAGNOSIS — Z51.11 CHEMOTHERAPY MANAGEMENT, ENCOUNTER FOR: ICD-10-CM

## 2018-08-13 DIAGNOSIS — R11.2 CHEMOTHERAPY-INDUCED NAUSEA AND VOMITING: ICD-10-CM

## 2018-08-13 DIAGNOSIS — C71.6 MEDULLOBLASTOMA OF CEREBELLUM (H): Primary | ICD-10-CM

## 2018-08-13 DIAGNOSIS — H93.8X9 OTOTOXICITY, UNSPECIFIED LATERALITY: ICD-10-CM

## 2018-08-13 DIAGNOSIS — T45.1X5A CHEMOTHERAPY INDUCED NEUTROPENIA (H): ICD-10-CM

## 2018-08-13 DIAGNOSIS — T45.1X5A CHEMOTHERAPY-INDUCED NAUSEA AND VOMITING: ICD-10-CM

## 2018-08-13 DIAGNOSIS — C71.6 MEDULLOBLASTOMA (H): ICD-10-CM

## 2018-08-13 DIAGNOSIS — D70.1 CHEMOTHERAPY INDUCED NEUTROPENIA (H): ICD-10-CM

## 2018-08-13 DIAGNOSIS — Z91.89 HIGH RISK FOR CHEMOTHERAPY-INDUCED INFECTIOUS COMPLICATION: ICD-10-CM

## 2018-08-13 LAB
ALBUMIN SERPL-MCNC: 3.9 G/DL (ref 3.4–5)
ALP SERPL-CCNC: 62 U/L (ref 40–150)
ALT SERPL W P-5'-P-CCNC: 26 U/L (ref 0–50)
ANION GAP SERPL CALCULATED.3IONS-SCNC: 9 MMOL/L (ref 3–14)
AST SERPL W P-5'-P-CCNC: 22 U/L (ref 0–45)
BASOPHILS # BLD AUTO: 0 10E9/L (ref 0–0.2)
BASOPHILS NFR BLD AUTO: 0.3 %
BILIRUB SERPL-MCNC: 0.4 MG/DL (ref 0.2–1.3)
BUN SERPL-MCNC: 7 MG/DL (ref 7–30)
CALCIUM SERPL-MCNC: 8.8 MG/DL (ref 8.5–10.1)
CHLORIDE SERPL-SCNC: 108 MMOL/L (ref 94–109)
CO2 SERPL-SCNC: 22 MMOL/L (ref 20–32)
CREAT SERPL-MCNC: 0.63 MG/DL (ref 0.52–1.04)
DIFFERENTIAL METHOD BLD: ABNORMAL
EOSINOPHIL # BLD AUTO: 0.1 10E9/L (ref 0–0.7)
EOSINOPHIL NFR BLD AUTO: 2.4 %
ERYTHROCYTE [DISTWIDTH] IN BLOOD BY AUTOMATED COUNT: 12.6 % (ref 10–15)
GFR SERPL CREATININE-BSD FRML MDRD: >90 ML/MIN/1.7M2
GLUCOSE SERPL-MCNC: 105 MG/DL (ref 70–99)
HCT VFR BLD AUTO: 35.2 % (ref 35–47)
HGB BLD-MCNC: 12.1 G/DL (ref 11.7–15.7)
IMM GRANULOCYTES # BLD: 0 10E9/L (ref 0–0.4)
IMM GRANULOCYTES NFR BLD: 0 %
LYMPHOCYTES # BLD AUTO: 0.3 10E9/L (ref 0.8–5.3)
LYMPHOCYTES NFR BLD AUTO: 10.6 %
MCH RBC QN AUTO: 32.4 PG (ref 26.5–33)
MCHC RBC AUTO-ENTMCNC: 34.4 G/DL (ref 31.5–36.5)
MCV RBC AUTO: 94 FL (ref 78–100)
MONOCYTES # BLD AUTO: 0.3 10E9/L (ref 0–1.3)
MONOCYTES NFR BLD AUTO: 10.6 %
NEUTROPHILS # BLD AUTO: 2.2 10E9/L (ref 1.6–8.3)
NEUTROPHILS NFR BLD AUTO: 76.1 %
NRBC # BLD AUTO: 0 10*3/UL
NRBC BLD AUTO-RTO: 0 /100
PLATELET # BLD AUTO: 218 10E9/L (ref 150–450)
POTASSIUM SERPL-SCNC: 3.7 MMOL/L (ref 3.4–5.3)
PROT SERPL-MCNC: 7.2 G/DL (ref 6.8–8.8)
RBC # BLD AUTO: 3.73 10E12/L (ref 3.8–5.2)
SODIUM SERPL-SCNC: 139 MMOL/L (ref 133–144)
WBC # BLD AUTO: 2.9 10E9/L (ref 4–11)

## 2018-08-13 PROCEDURE — 96375 TX/PRO/DX INJ NEW DRUG ADDON: CPT

## 2018-08-13 PROCEDURE — 25000128 H RX IP 250 OP 636: Mod: ZF | Performed by: PSYCHIATRY & NEUROLOGY

## 2018-08-13 PROCEDURE — 99215 OFFICE O/P EST HI 40 MIN: CPT | Mod: ZP | Performed by: PSYCHIATRY & NEUROLOGY

## 2018-08-13 PROCEDURE — G0463 HOSPITAL OUTPT CLINIC VISIT: HCPCS | Mod: 25 | Performed by: RADIOLOGY

## 2018-08-13 PROCEDURE — 80053 COMPREHEN METABOLIC PANEL: CPT | Performed by: PSYCHIATRY & NEUROLOGY

## 2018-08-13 PROCEDURE — 85025 COMPLETE CBC W/AUTO DIFF WBC: CPT | Performed by: PSYCHIATRY & NEUROLOGY

## 2018-08-13 PROCEDURE — 96411 CHEMO IV PUSH ADDL DRUG: CPT

## 2018-08-13 PROCEDURE — 96367 TX/PROPH/DG ADDL SEQ IV INF: CPT

## 2018-08-13 PROCEDURE — 96415 CHEMO IV INFUSION ADDL HR: CPT

## 2018-08-13 PROCEDURE — 96413 CHEMO IV INFUSION 1 HR: CPT

## 2018-08-13 PROCEDURE — G0463 HOSPITAL OUTPT CLINIC VISIT: HCPCS | Mod: ZF

## 2018-08-13 RX ORDER — ALBUTEROL SULFATE 0.83 MG/ML
2.5 SOLUTION RESPIRATORY (INHALATION)
Status: CANCELLED | OUTPATIENT
Start: 2018-08-20

## 2018-08-13 RX ORDER — DIPHENHYDRAMINE HYDROCHLORIDE 50 MG/ML
50 INJECTION INTRAMUSCULAR; INTRAVENOUS
Status: CANCELLED
Start: 2018-08-13

## 2018-08-13 RX ORDER — HEPARIN SODIUM (PORCINE) LOCK FLUSH IV SOLN 100 UNIT/ML 100 UNIT/ML
5 SOLUTION INTRAVENOUS ONCE
Status: COMPLETED | OUTPATIENT
Start: 2018-08-13 | End: 2018-08-13

## 2018-08-13 RX ORDER — EPINEPHRINE 0.3 MG/.3ML
0.3 INJECTION SUBCUTANEOUS EVERY 5 MIN PRN
Status: CANCELLED | OUTPATIENT
Start: 2018-08-20

## 2018-08-13 RX ORDER — EPINEPHRINE 1 MG/ML
0.3 INJECTION, SOLUTION INTRAMUSCULAR; SUBCUTANEOUS EVERY 5 MIN PRN
Status: CANCELLED | OUTPATIENT
Start: 2018-08-13

## 2018-08-13 RX ORDER — EPINEPHRINE 0.3 MG/.3ML
0.3 INJECTION SUBCUTANEOUS EVERY 5 MIN PRN
Status: CANCELLED | OUTPATIENT
Start: 2018-08-13

## 2018-08-13 RX ORDER — LORAZEPAM 2 MG/ML
0.5 INJECTION INTRAMUSCULAR EVERY 4 HOURS PRN
Status: CANCELLED
Start: 2018-08-20

## 2018-08-13 RX ORDER — MEPERIDINE HYDROCHLORIDE 25 MG/ML
25 INJECTION INTRAMUSCULAR; INTRAVENOUS; SUBCUTANEOUS EVERY 30 MIN PRN
Status: CANCELLED | OUTPATIENT
Start: 2018-08-13

## 2018-08-13 RX ORDER — MEPERIDINE HYDROCHLORIDE 25 MG/ML
25 INJECTION INTRAMUSCULAR; INTRAVENOUS; SUBCUTANEOUS EVERY 30 MIN PRN
Status: CANCELLED | OUTPATIENT
Start: 2018-08-20

## 2018-08-13 RX ORDER — HEPARIN SODIUM (PORCINE) LOCK FLUSH IV SOLN 100 UNIT/ML 100 UNIT/ML
5 SOLUTION INTRAVENOUS ONCE
Status: CANCELLED
Start: 2018-08-13 | End: 2018-08-13

## 2018-08-13 RX ORDER — HEPARIN SODIUM (PORCINE) LOCK FLUSH IV SOLN 100 UNIT/ML 100 UNIT/ML
5 SOLUTION INTRAVENOUS ONCE
Status: CANCELLED
Start: 2018-08-20 | End: 2018-08-13

## 2018-08-13 RX ORDER — SODIUM CHLORIDE 9 MG/ML
1000 INJECTION, SOLUTION INTRAVENOUS CONTINUOUS PRN
Status: CANCELLED
Start: 2018-08-13

## 2018-08-13 RX ORDER — ONDANSETRON 8 MG/1
8 TABLET, ORALLY DISINTEGRATING ORAL EVERY 8 HOURS PRN
Qty: 60 TABLET | Refills: 3 | Status: SHIPPED | OUTPATIENT
Start: 2018-08-13 | End: 2019-09-23

## 2018-08-13 RX ORDER — DIPHENHYDRAMINE HYDROCHLORIDE 50 MG/ML
50 INJECTION INTRAMUSCULAR; INTRAVENOUS
Status: CANCELLED
Start: 2018-08-20

## 2018-08-13 RX ORDER — METHYLPREDNISOLONE SODIUM SUCCINATE 125 MG/2ML
125 INJECTION, POWDER, LYOPHILIZED, FOR SOLUTION INTRAMUSCULAR; INTRAVENOUS
Status: CANCELLED
Start: 2018-08-13

## 2018-08-13 RX ORDER — ALBUTEROL SULFATE 0.83 MG/ML
2.5 SOLUTION RESPIRATORY (INHALATION)
Status: CANCELLED | OUTPATIENT
Start: 2018-08-13

## 2018-08-13 RX ORDER — SODIUM CHLORIDE 9 MG/ML
1000 INJECTION, SOLUTION INTRAVENOUS CONTINUOUS PRN
Status: CANCELLED
Start: 2018-08-20

## 2018-08-13 RX ORDER — GADOBUTROL 604.72 MG/ML
7.5 INJECTION INTRAVENOUS ONCE
Status: COMPLETED | OUTPATIENT
Start: 2018-08-13 | End: 2018-08-13

## 2018-08-13 RX ORDER — LORAZEPAM 0.5 MG/1
0.5 TABLET ORAL EVERY 6 HOURS PRN
Qty: 60 TABLET | Refills: 2 | Status: SHIPPED | OUTPATIENT
Start: 2018-08-13 | End: 2018-12-04

## 2018-08-13 RX ORDER — ALBUTEROL SULFATE 90 UG/1
1-2 AEROSOL, METERED RESPIRATORY (INHALATION)
Status: CANCELLED
Start: 2018-08-13

## 2018-08-13 RX ORDER — EPINEPHRINE 1 MG/ML
0.3 INJECTION, SOLUTION INTRAMUSCULAR; SUBCUTANEOUS EVERY 5 MIN PRN
Status: CANCELLED | OUTPATIENT
Start: 2018-08-20

## 2018-08-13 RX ORDER — PALONOSETRON 0.05 MG/ML
0.25 INJECTION, SOLUTION INTRAVENOUS ONCE
Status: COMPLETED | OUTPATIENT
Start: 2018-08-13 | End: 2018-08-13

## 2018-08-13 RX ORDER — LORAZEPAM 2 MG/ML
0.5 INJECTION INTRAMUSCULAR EVERY 4 HOURS PRN
Status: CANCELLED
Start: 2018-08-13

## 2018-08-13 RX ORDER — PALONOSETRON 0.05 MG/ML
0.25 INJECTION, SOLUTION INTRAVENOUS ONCE
Status: CANCELLED
Start: 2018-08-13 | End: 2018-08-13

## 2018-08-13 RX ORDER — METHYLPREDNISOLONE SODIUM SUCCINATE 125 MG/2ML
125 INJECTION, POWDER, LYOPHILIZED, FOR SOLUTION INTRAMUSCULAR; INTRAVENOUS
Status: CANCELLED
Start: 2018-08-20

## 2018-08-13 RX ORDER — ALBUTEROL SULFATE 90 UG/1
1-2 AEROSOL, METERED RESPIRATORY (INHALATION)
Status: CANCELLED
Start: 2018-08-20

## 2018-08-13 RX ADMIN — GADOBUTROL 7.5 ML: 604.72 INJECTION INTRAVENOUS at 07:23

## 2018-08-13 RX ADMIN — SODIUM CHLORIDE 1000 ML: 9 INJECTION, SOLUTION INTRAVENOUS at 12:10

## 2018-08-13 RX ADMIN — VINCRISTINE SULFATE 2 MG: 1 INJECTION, SOLUTION INTRAVENOUS at 16:00

## 2018-08-13 RX ADMIN — SODIUM CHLORIDE, PRESERVATIVE FREE 5 ML: 5 INJECTION INTRAVENOUS at 16:06

## 2018-08-13 RX ADMIN — CISPLATIN 140 MG: 1 INJECTION, SOLUTION INTRAVENOUS at 13:15

## 2018-08-13 RX ADMIN — SODIUM CHLORIDE, PRESERVATIVE FREE 5 ML: 5 INJECTION INTRAVENOUS at 10:24

## 2018-08-13 RX ADMIN — PALONOSETRON HYDROCHLORIDE 0.25 MG: 0.25 INJECTION INTRAVENOUS at 12:10

## 2018-08-13 RX ADMIN — DEXAMETHASONE SODIUM PHOSPHATE: 10 INJECTION, SOLUTION INTRAMUSCULAR; INTRAVENOUS at 12:10

## 2018-08-13 ASSESSMENT — PAIN SCALES - GENERAL: PAINLEVEL: NO PAIN (0)

## 2018-08-13 NOTE — PATIENT INSTRUCTIONS
Contact Numbers    [facility] Main Line: [phone]  [facility] Triage and after hours / weekends / holidays:  [phone]      Please call the triage or after hours line if you experience a temperature greater than or equal to 100.5, shaking chills, have uncontrolled nausea, vomiting and/or diarrhea, dizziness, shortness of breath, chest pain, bleeding, unexplained bruising, or if you have any other new/concerning symptoms, questions or concerns.      If you are having any concerning symptoms or wish to speak to a provider before your next infusion visit, please call your care coordinator or triage to notify them so we can adequately serve you.     If you need a refill on a narcotic prescription or other medication, please call before your infusion appointment.                   August 2018 Sunday Monday Tuesday Wednesday Thursday Friday Saturday                  1     2     3     4       5     6     7     LAB    1:15 PM   (15 min.)    LAB   [facility] 8     9     10     11       12     13     MR BRAIN WWO    7:45 AM   (45 min.)   [id number]   [facility] MRI     [facility] DRAW   10:00 AM   (15 min.)   [facility]   [facility] Lab Draw     [facility] RETURN   10:15 AM   (30 min.)   Anupama Morrison MD   [facility] ONC INFUSION 360   11:00 AM   (360 min.)    ONCOLOGY INFUSION   [facility] RETURN    3:30 PM   (30 min.)   Mike Borges MD   [facility] 14     15     16     17     18       19     20     [facility] DRAW   12:45 PM   (15 min.)   [facility] D[facility]   [facility] Lab Draw     [facility] ONC INFUSION 60    1:30 PM   (60 min.)    ONCOLOGY INFUSION   [facility] 21     [facility] NEW WITH ROOM   11:45 AM   (75 min.)   Kennedi Brunner GC   [facility] DRAW    1:15 PM   (15 min.)   [facility] D[facility]   [facility]  Lab Draw 22     23     24     25       26     27     28     29 30 31 September 2018 Sunday Monday Tuesday Wednesday Thursday Friday Saturday                                 1       2     3     4     5     6     7     8       9     10     11     12  Happy Birthday!     13     14     15       16     17     18     19     20     21     22       23     24     [facility] DRAW    9:15 AM   (15 min.)    [facility]   [facility] Lab Draw     P RETURN    9:45 AM   (30 min.)   Anupama Morrison MD   [facility] ONC INFUSION 360   10:30 AM   (360 min.)    ONCOLOGY INFUSION   [facility] 25     26     27     28     29       30                                               Recent Results (from the past 24 hour(s))   CBC with platelets differential    Collection Time: 08/13/18 10:26 AM   Result Value Ref Range    WBC 2.9 (L) 4.0 - 11.0 10e9/L    RBC Count 3.73 (L) 3.8 - 5.2 10e12/L    Hemoglobin 12.1 11.7 - 15.7 g/dL    Hematocrit 35.2 35.0 - 47.0 %    MCV 94 78 - 100 fl    MCH 32.4 26.5 - 33.0 pg    MCHC 34.4 31.5 - 36.5 g/dL    RDW 12.6 10.0 - 15.0 %    Platelet Count 218 150 - 450 10e9/L    Diff Method Automated Method     % Neutrophils 76.1 %    % Lymphocytes 10.6 %    % Monocytes 10.6 %    % Eosinophils 2.4 %    % Basophils 0.3 %    % Immature Granulocytes 0.0 %    Nucleated RBCs 0 0 /100    Absolute Neutrophil 2.2 1.6 - 8.3 10e9/L    Absolute Lymphocytes 0.3 (L) 0.8 - 5.3 10e9/L    Absolute Monocytes 0.3 0.0 - 1.3 10e9/L    Absolute Eosinophils 0.1 0.0 - 0.7 10e9/L    Absolute Basophils 0.0 0.0 - 0.2 10e9/L    Abs Immature Granulocytes 0.0 0 - 0.4 10e9/L    Absolute Nucleated RBC 0.0    Comprehensive metabolic panel    Collection Time: 08/13/18 10:26 AM   Result Value Ref Range    Sodium 139 133 - 144 mmol/L    Potassium 3.7 3.4 - 5.3 mmol/L    Chloride 108 94 - 109 mmol/L    Carbon Dioxide 22 20 - 32 mmol/L    Anion  Gap 9 3 - 14 mmol/L    Glucose 105 (H) 70 - 99 mg/dL    Urea Nitrogen 7 7 - 30 mg/dL    Creatinine 0.63 0.52 - 1.04 mg/dL    GFR Estimate >90 >60 mL/min/1.7m2    GFR Estimate If Black >90 >60 mL/min/1.7m2    Calcium 8.8 8.5 - 10.1 mg/dL    Bilirubin Total 0.4 0.2 - 1.3 mg/dL    Albumin 3.9 3.4 - 5.0 g/dL    Protein Total 7.2 6.8 - 8.8 g/dL    Alkaline Phosphatase 62 40 - 150 U/L    ALT 26 0 - 50 U/L    AST 22 0 - 45 U/L

## 2018-08-13 NOTE — DISCHARGE INSTRUCTIONS
MRI Contrast Discharge Instructions    The IV contrast you received today will pass out of your body in your  urine. This will happen in the next 24 hours. You will not feel this process.  Your urine will not change color.    Drink at least 4 extra glasses of water or juice today (unless your doctor  has restricted your fluids). This reduces the stress on your kidneys.  You may take your regular medicines.    If you are on dialysis: It is best to have dialysis today.    If you have a reaction: Most reactions happen right away. If you have  any new symptoms after leaving the hospital (such as hives or swelling),  call your hospital at the correct number below. Or call your family doctor.  If you have breathing distress or wheezing, call 911.    Special instructions: ***    I have read and understand the above information.    Signature:______________________________________ Date:___________    Staff:__________________________________________ Date:___________     Time:__________    [place] [facility] Departments:    ___[place]: [phone]  ___[facility]: [phone]  ___[place]: [phone] ___[facility]: [phone]  ___[facility]: [phone]  ___[facility]: [phone]  ___[phone]  ___[facility]: [phone]  ___[phone]

## 2018-08-13 NOTE — NURSING NOTE
FOLLOW-UP VISIT    Patient Name: Merissa Silverman      : 1985     Age: 32 year old        ______________________________________________________________________________     Chief Complaint   Patient presents with     Cancer     Follow up for cranial spinal radiation     BP (!) 153/104  Pulse 61  LMP 2018  SpO2 100%     Date Radiation Completed: Craniospinal 5400 cGy completed on 18    Pain  Denies    Labs  Other Labs: Yes: before chemo today    Imaging  MRI: today     Other Appointments: yes    MD Name: Medical oncolgy Appointment Date:    MD Name: Appointment Date:   MD Name: Appointment Date:   Other Appointment Notes:     Residual Radiation side effect: Pt feels she has healed well r/t radiation     Additional Instructions:     Nurse face-to-face time: Level 3:  10 min face to face time

## 2018-08-13 NOTE — LETTER
8/13/2018       RE: Merissa Silverman  [address]  [address]  [place] [address]     Dear Colleague,    Thank you for referring your patient, Merissa Silverman, to the [facility]. Please see a copy of my visit note below.    NEURO-ONCOLOGY VISIT  08/13/2018    CHIEF COMPLAINT: Ms. 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 Completed craniospinal radiation with concurrent vincristine x 3 doses, but concurrent chemotherapy was stopped in the setting of pancytopenia. She is now to start adjuvant chemotherapy with vincristine, CCNU and cisplatin.     She is presenting in follow-up for continued evaluation and recommendations on treatment accompanied by grandmother, Shaji.       HISTORY OF PRESENT ILLNESS  -Merissa is feeling well.  -Energy improving.   -Double vision has resolved and has not had any other vision changes.   -No problems with strength/ walking. No confusion, no headaches.   -Better appetite, able to swallow without pain  -No trouble with walking or balance  -Has been taking Ativan about 1-2x week for sleep due to racing thought. Worse when she has to have visits with us, infusions, other big events or if her  is gone. Feels that her mood and her emotional handle of her prognosis is good during the day. Denies being depressed.   -Has continued to work though she would like to get a letter to excuse her from work for 8 weeks so that she can recover and get through chemo.     REVIEW OF SYSTEMS  A comprehensive ROS negative except as in HPI.      MEDICATIONS   Current Outpatient Prescriptions   Medication     acetaminophen (TYLENOL) 325 MG tablet     LORazepam (ATIVAN) 0.5 MG tablet     ondansetron (ZOFRAN-ODT) 8 MG ODT tab     lomustine (CEENU) 40 MG capsule CHEMO     Current  Facility-Administered Medications   Medication     sodium chloride (PF) 0.9% PF flush 20 mL     DRUG ALLERGIES   Allergies   Allergen Reactions     Sulfa Drugs Unknown       ONCOLOGIC HISTORY  -PRESENTATION: Progressively worsening headaches. Additionally was with abrupt position changes resulted in dizziness/ syncope. CTH at an outside hospital showed a mass in the left cerebellum. Transferred to [facility].  -4/11/2018 MRB showed a 3.5cm mass in the left cerebellar hemisphere that was associated with mild cerebellar tonsillar herniation and hydrocephalus.  -MRI spine showed no evidence of disease.   -4/13/2018 SURGERY: Suboccipital craniotomy with resection of the left cerebellar mass. Immediate post-operative MRI demonstrated a gross total resection.   PATHOLOGY: Medulloblastoma; Molecular markers pending.   -5/4/2018 NEURO-ONC: LP performed. Evaluated by radiation oncology. Recommending craniospinal radiation + concurrent vincristine followed by eight 6-week cycles of cisplatin, lomustine, and vincristine.  -5/4/2018 LP with CSF analysis: WBC 1/ RBC 1, protein 25, glucose 62, negative cytology.   -5/14 - 6/22/2018 CHEMORADS with vincristine 2 mg/m2 (stopped after 3 infusions due to pancytopenia).  -5/17/2018 Audiology- Baseline hearing testing with no hearing loss.  -6/4/2018 NEURO-ONC: Doing well, no new neurological symptoms. Tolerating treatment well. Ophtho referral. Labs improved.  -6/4/2018 NGS via [facility]: SMO mutation, TERT promotor mutation. Negative for microsatellite instability.   -6/25/2018 NEURO-ONC: Clinically stable, painful radiation-induced burn. Monitoring labs. Cancer Risk Assessment referral.  -8/13/2018 NEURO-ONC/MRB/ CHEMO: Clinically stable. Imaging shows expected post operative changes with no evidence of new disease. C1D1 of vincristine, CCNU and cisplatin.     SOCIAL HISTORY   Tobacco use: Former smoker, E-cigs stopped on 5/3/2018  Alcohol use: None.   Drug use:  Denies marijuana use.  Supplement, complimentary/ alternative medicine: None.   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      PHYSICAL EXAMINATION  BP (!) 143/95 (BP Location: Right arm, Cuff Size: Adult Regular)  Pulse 63  Temp 97.7  F (36.5  C) (Oral)  Resp 16  Ht 1.524 m (5')  Wt 85.6 kg (188 lb 11.2 oz)  LMP 08/12/2018  SpO2 100%  Breastfeeding? No  BMI 36.85 kg/m2   Vitals:    08/13/18 1023   Weight: 85.6 kg (188 lb 11.2 oz)     Ht Readings from Last 2 Encounters:   08/13/18 1.524 m (5')   06/25/18 1.524 m (5')     KPS: 100    -Generally well appearing.  -Throat: No oral thrush. No redness of throat. No lymphadenopathy.   -Respiratory: Normal breath sounds, no audible wheezing.   -Skin: No rashes. Healed head incision. Shaved head.   -Hematologic/ lymphatic: No abnormal bruising. No leg swelling.  -Psychiatric: Normal mood and affect. Pleasant, talkative.  -Neurologic:   MENTAL STATUS:     Alert, oriented to date.    Recall: Immediate 3/3, delayed 3/3.    Speech fluent. Comprehension intact to multi-step commands.   Normal naming, repetition. Able to read.   Good right-left orientation.     CRANIAL NERVES:     Discs flat on fundoscopy.    Pupils are equal, round, reactive to light.     Extraocular movements full, patient denies diplopia. Previous mild eye jerks noted on pursuit.    Visual fields full.     Facial sensation intact to light touch.   Symmetric facial movements.   Hearing intact.   Palate moves symmetrically.     Sternocleidomastoid and trapezius strength intact.    Tongue midline.  MOTOR:    Normal and symmetric tone.   Grossly 5/5 throughout.    No pronation or drift. No orbiting.   Able to rise from a chair without use of arms.   On toe/ heel walk, equal distance from floor to heels/ toes.   SENSATION:    Intact to light touch throughout.  COORDINATION:   Intact finger-nose with eyes open and closed.   REFLEXES:    Muted, symmetric.    Toes not tested. No  clonus. No Hoffmans.   No grasp.    GAIT:   Walks without assistance.   Good speed. Normal stride length and heel strike. Normal turns. Normal arm swing.   Able to toe, heel walk. Able to tandem walk.       MEDICAL RECORDS  Obtained and personally reviewed all available outside medical records in addition to reviewing any records available in our electronic system.     LABS  Personally reviewed all available lab results.   Continue lymphopenia. Decreased WBC 2.9 (previously 4.3 on 8/7).   Plt and Hgb WNL.    IMAGING  Personally reviewed imaging today compared to the pre-radiation scan from 4/14/18. Showed normal postoperative changes. No new evidence of disease. Obstructive hydrocephalus has no resolved.     Images were shown to and results were reviewed with Merissa and her grandmother.    Imaging and case reviewed and discussed at Brain Tumor Conference.       IMPRESSION  For the 30 minute appointment, more than 50% of the encounter was spent discussing in detail the treatment and potential side effects for adjuvant therapy plus reviewing today's imaging. This was in addition to providing emotional support, answering questions pertaining to my recommendations, and devising the treatment plan as outlined below.    Counts have improved, however, today her WBC decreased. Her ANC is 2.2 so will continue with planned start of adjuvant treatment. Will continue to monitor CBC with weekly labs throughout treatment. The plan is for up to eight cycles of cisplatin, lomustine, and vincristine (with 6 weeks cycles). Of note, this regimen is highly toxic and in clinical studies, treatment was terminated or dose reduced due to side effects in nearly 60 percent of patients by cycle four.     Clinical trial options remain a strong consideration for recurrent disease, as does off label use of SMO inhibitors.     PROBLEM LIST  Medulloblastoma  Steroid intolerance  Chemotherapy-induced pancytopenia  Radiation burn  Fatigue, cancer and  treatment related, improving    PLAN  -CANCER-DIRECTED THERAPY-  -Start Cycle 1, day 1 today (8/13).   Cisplatin DAY 1              Lomustine DAY 1 (oral)              Vincristine  DAY 1 and 8  -Discussed side effects with this regiment including nausea, vomiting, constipation, anorexia, nephrotoxicity, neuropathy, ototoxicity. Instructed to remain hydrated to protect kidneys.   -Supportive medications for nausea: Aloxi and Emend during infusion. Can use Ativan, and Compazine at home as needed.   -Miralax, Colace and/or Senna as needed for constipation.  -MRB showed expected postoperative changes. No evidence of new disease. Will get next MRI of brain before cycle 3.   -Continue weekly labs to monitor counts.  -Return on 8/20 for infusion of vincristine Day 8.   -Work excuse note provided.     -STEROIDS-  -Currently off dexamethasone.  -Poor tolerance for steroids in the past; insomnia and irritability.     -SEIZURE MANAGEMENT-  -While this patient is at increased risk of having seizures, given the lack of seizure history, there is no indication to prescribe an antiepileptic at this time. Will continue to monitor for seizure activity    -Quality of life/ MOOD/ FATIGUE-  -Denies any mood issues.  -Continue to monitor mood as untreated/ undertreated depression can worsen fatigue, dysorexia, and quality of life.  -Has increased anxiety at night a couple times a week for which she takes Ativan to calm her down and help her sleep. Can continue this for now. If anxiety worsens and has an increased need for Ativan, will start her on a medication like Celexa or Lexapro.     -RETINAL HEMORRHAGE-  -Identified when inpatient on presentation.  -Follow-up dilated eye examination negative. Nothing further indicated.  -No change of optic disc, EOM or visual fields today on exam.    Return for C1D8 infusion on 8/20 with labs. Will follow up with Dr. Morrison on 9/24 with labs and infusion (C2D1) and then infusion C2D8 on 10/1.    In  the meantime, Merissa knows to call with questions or concerns or to report new complaints and can be seen sooner if needed. Urgent evaluation is needed in the setting of acute onset of severe headache, abrupt change in mental status, on-going seizures, new focal deficits, or new leg swelling/ pain.     Patient also seen and evaluated by Melida Morrison MD  Neuro-oncology

## 2018-08-13 NOTE — PROGRESS NOTES
NEURO-ONCOLOGY VISIT  08/13/2018    CHIEF COMPLAINT: Ms. 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 Completed craniospinal radiation with concurrent vincristine x 3 doses, but concurrent chemotherapy was stopped in the setting of pancytopenia. She is now to start adjuvant chemotherapy with vincristine, CCNU and cisplatin.     She is presenting in follow-up for continued evaluation and recommendations on treatment accompanied by grandmother, Shaji.       HISTORY OF PRESENT ILLNESS  -Merissa is feeling well.  -Energy improving.   -Double vision has resolved and has not had any other vision changes.   -No problems with strength/ walking. No confusion, no headaches.   -Better appetite, able to swallow without pain  -No trouble with walking or balance  -Has been taking Ativan about 1-2x week for sleep due to racing thought. Worse when she has to have visits with us, infusions, other big events or if her  is gone. Feels that her mood and her emotional handle of her prognosis is good during the day. Denies being depressed.   -Has continued to work though she would like to get a letter to excuse her from work for 8 weeks so that she can recover and get through chemo.     REVIEW OF SYSTEMS  A comprehensive ROS negative except as in HPI.      MEDICATIONS   Current Outpatient Prescriptions   Medication     acetaminophen (TYLENOL) 325 MG tablet     LORazepam (ATIVAN) 0.5 MG tablet     ondansetron (ZOFRAN-ODT) 8 MG ODT tab     lomustine (CEENU) 40 MG capsule CHEMO     Current Facility-Administered Medications   Medication     sodium chloride (PF) 0.9% PF flush 20 mL     DRUG ALLERGIES   Allergies   Allergen Reactions     Sulfa Drugs Unknown       ONCOLOGIC HISTORY  -PRESENTATION: Progressively worsening headaches.  Additionally was with abrupt position changes resulted in dizziness/ syncope. CTH at an outside hospital showed a mass in the left cerebellum. Transferred to [facility].  -4/11/2018 MRB showed a 3.5cm mass in the left cerebellar hemisphere that was associated with mild cerebellar tonsillar herniation and hydrocephalus.  -MRI spine showed no evidence of disease.   -4/13/2018 SURGERY: Suboccipital craniotomy with resection of the left cerebellar mass. Immediate post-operative MRI demonstrated a gross total resection.   PATHOLOGY: Medulloblastoma; Molecular markers pending.   -5/4/2018 NEURO-ONC: LP performed. Evaluated by radiation oncology. Recommending craniospinal radiation + concurrent vincristine followed by eight 6-week cycles of cisplatin, lomustine, and vincristine.  -5/4/2018 LP with CSF analysis: WBC 1/ RBC 1, protein 25, glucose 62, negative cytology.   -5/14 - 6/22/2018 CHEMORADS with vincristine 2 mg/m2 (stopped after 3 infusions due to pancytopenia).  -5/17/2018 Audiology- Baseline hearing testing with no hearing loss.  -6/4/2018 NEURO-ONC: Doing well, no new neurological symptoms. Tolerating treatment well. Ophtho referral. Labs improved.  -6/4/2018 NGS via [facility]: SMO mutation, TERT promotor mutation. Negative for microsatellite instability.   -6/25/2018 NEURO-ONC: Clinically stable, painful radiation-induced burn. Monitoring labs. Cancer Risk Assessment referral.  -8/13/2018 NEURO-ONC/MRB/ CHEMO: Clinically stable. Imaging shows expected post operative changes with no evidence of new disease. C1D1 of vincristine, CCNU and cisplatin.     SOCIAL HISTORY   Tobacco use: Former smoker, E-cigs stopped on 5/3/2018  Alcohol use: None.   Drug use: Denies marijuana use.  Supplement, complimentary/ alternative medicine: None.   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      PHYSICAL EXAMINATION  BP (!) 143/95 (BP Location: Right arm, Cuff Size: Adult Regular)   Pulse 63  Temp 97.7  F (36.5  C) (Oral)  Resp 16  Ht 1.524 m (5')  Wt 85.6 kg (188 lb 11.2 oz)  LMP 08/12/2018  SpO2 100%  Breastfeeding? No  BMI 36.85 kg/m2   Vitals:    08/13/18 1023   Weight: 85.6 kg (188 lb 11.2 oz)     Ht Readings from Last 2 Encounters:   08/13/18 1.524 m (5')   06/25/18 1.524 m (5')     KPS: 100    -Generally well appearing.  -Throat: No oral thrush. No redness of throat. No lymphadenopathy.   -Respiratory: Normal breath sounds, no audible wheezing.   -Skin: No rashes. Healed head incision. Shaved head.   -Hematologic/ lymphatic: No abnormal bruising. No leg swelling.  -Psychiatric: Normal mood and affect. Pleasant, talkative.  -Neurologic:   MENTAL STATUS:     Alert, oriented to date.    Recall: Immediate 3/3, delayed 3/3.    Speech fluent. Comprehension intact to multi-step commands.   Normal naming, repetition. Able to read.   Good right-left orientation.     CRANIAL NERVES:     Discs flat on fundoscopy.    Pupils are equal, round, reactive to light.     Extraocular movements full, patient denies diplopia. Previous mild eye jerks noted on pursuit.    Visual fields full.     Facial sensation intact to light touch.   Symmetric facial movements.   Hearing intact.   Palate moves symmetrically.     Sternocleidomastoid and trapezius strength intact.    Tongue midline.  MOTOR:    Normal and symmetric tone.   Grossly 5/5 throughout.    No pronation or drift. No orbiting.   Able to rise from a chair without use of arms.   On toe/ heel walk, equal distance from floor to heels/ toes.   SENSATION:    Intact to light touch throughout.  COORDINATION:   Intact finger-nose with eyes open and closed.   REFLEXES:    Muted, symmetric.    Toes not tested. No clonus. No Hoffmans.   No grasp.    GAIT:   Walks without assistance.   Good speed. Normal stride length and heel strike. Normal turns. Normal arm swing.   Able to toe, heel walk. Able to tandem walk.       MEDICAL RECORDS  Obtained and personally  reviewed all available outside medical records in addition to reviewing any records available in our electronic system.     LABS  Personally reviewed all available lab results.   Continue lymphopenia. Decreased WBC 2.9 (previously 4.3 on 8/7).   Plt and Hgb WNL.    IMAGING  Personally reviewed imaging today compared to the pre-radiation scan from 4/14/18. Showed normal postoperative changes. No new evidence of disease. Obstructive hydrocephalus has no resolved.     Images were shown to and results were reviewed with Merissa and her grandmother.    Imaging and case reviewed and discussed at Brain Tumor Conference.       IMPRESSION  For the 30 minute appointment, more than 50% of the encounter was spent discussing in detail the treatment and potential side effects for adjuvant therapy plus reviewing today's imaging. This was in addition to providing emotional support, answering questions pertaining to my recommendations, and devising the treatment plan as outlined below.    Counts have improved, however, today her WBC decreased. Her ANC is 2.2 so will continue with planned start of adjuvant treatment. Will continue to monitor CBC with weekly labs throughout treatment. The plan is for up to eight cycles of cisplatin, lomustine, and vincristine (with 6 weeks cycles). Of note, this regimen is highly toxic and in clinical studies, treatment was terminated or dose reduced due to side effects in nearly 60 percent of patients by cycle four.     Clinical trial options remain a strong consideration for recurrent disease, as does off label use of SMO inhibitors.     PROBLEM LIST  Medulloblastoma  Steroid intolerance  Chemotherapy-induced pancytopenia  Radiation burn  Fatigue, cancer and treatment related, improving    PLAN  -CANCER-DIRECTED THERAPY-  -Start Cycle 1, day 1 today (8/13).   Cisplatin DAY 1              Lomustine DAY 1 (oral)              Vincristine  DAY 1 and 8  -Discussed side effects with this regiment including  nausea, vomiting, constipation, anorexia, nephrotoxicity, neuropathy, ototoxicity. Instructed to remain hydrated to protect kidneys.   -Supportive medications for nausea: Aloxi and Emend during infusion. Can use Ativan, and Compazine at home as needed.   -Miralax, Colace and/or Senna as needed for constipation.  -MRB showed expected postoperative changes. No evidence of new disease. Will get next MRI of brain before cycle 3.   -Continue weekly labs to monitor counts.  -Return on 8/20 for infusion of vincristine Day 8.   -Work excuse note provided.     -STEROIDS-  -Currently off dexamethasone.  -Poor tolerance for steroids in the past; insomnia and irritability.     -SEIZURE MANAGEMENT-  -While this patient is at increased risk of having seizures, given the lack of seizure history, there is no indication to prescribe an antiepileptic at this time. Will continue to monitor for seizure activity    -Quality of life/ MOOD/ FATIGUE-  -Denies any mood issues.  -Continue to monitor mood as untreated/ undertreated depression can worsen fatigue, dysorexia, and quality of life.  -Has increased anxiety at night a couple times a week for which she takes Ativan to calm her down and help her sleep. Can continue this for now. If anxiety worsens and has an increased need for Ativan, will start her on a medication like Celexa or Lexapro.     -RETINAL HEMORRHAGE-  -Identified when inpatient on presentation.  -Follow-up dilated eye examination negative. Nothing further indicated.  -No change of optic disc, EOM or visual fields today on exam.    Return for C1D8 infusion on 8/20 with labs. Will follow up with Dr. Morrison on 9/24 with labs and infusion (C2D1) and then infusion C2D8 on 10/1.    In the meantime, Merissa knows to call with questions or concerns or to report new complaints and can be seen sooner if needed. Urgent evaluation is needed in the setting of acute onset of severe headache, abrupt change in mental status, on-going  seizures, new focal deficits, or new leg swelling/ pain.     Patient also seen and evaluated by Melida Morrison MD  Neuro-oncology

## 2018-08-13 NOTE — LETTER
2018      RE: Merissa Silverman  [address]  [address]  [place] [address]          Department of Radiation Oncology  [facility]  [address] [place] [address]  [phone]       Radiation Oncology Follow-up Visit  2018      Merissa Silverman  MRN: [id number]   : 1985     DISEASE TREATED:   Medulloblastoma of left cerebellar hemisphere    RADIATION THERAPY DELIVERED:   3,600 cGy craniospinal  1,800 cGy boost to posterior fossa/surgical bed for a total dose of 5,400 cGy to post fossa  Completed 2018     SYSTEMIC THERAPY:  Concurrent: Vincristine 2mg 18 & 18 which was discontinued given development of pancytopenia  Post-Radiation: Vincristine 2mg, Cisplatin 140mg, Lomustine 160 mg qD    INTERVAL SINCE COMPLETION OF RADIATION THERAPY:  ~7 weeks    HPI:   Ms. Silverman is a 32 year old female with a diagnosis of a medulloblastoma of the left cerebellar hemisphere status post gross total resection and adjuvant chemoradiotherapy. Spinal imaging and CSF cytology were negative for disease. She was referred to our clinic for treatment with craniospinal irradiation. She received a total of 3,600 cGy to the craniospinal axis followed by a reduced volume boost of 1,800 cGy to the surgical bed with a 1.5 cm margin. All treatment was delivered with 6 MV photons using a Tomotherapy unit. Radiotherapy was administered with concurrent weekly vincristine. She tolerated treatment well with the expected acute toxicities, including grade 2 alopecia, grade 1 esophagitis, and grade 1 dermatitis. She required no hospitalizations or unplanned treatment breaks during radiation.     She had her 2 month post-treatment MRI today which showed only normal post-operative changes and resolution of obstructive hydrocephalus. She then went on this morning to receive the first dose of her planned follow-up adjuvant chemotherapy regimen consisting of vincristine,  cisplatin, and lomustine. She tolerated her infusion well with no acute events.      SUBJECTIVE:   On interview today Ms. Ji reports that she is in her usual state of health. She arrives in clinic after her chemotherapy infusion which she reports was well tolerated. She denies fevers, chills, gait instability, nausea, vomiting, or seizures. She reports that her hair has started to regrow specifically in her posterior occiput. The remainder of her 12 point review of systems are negative.    PHYSICAL EXAM:  Weight: 85.6 kg  BP: 153/104  Pulse: 61    Gen: Alert, in NAD  Eyes: PERRL, EOMI, sclera anicteric  HENT     Head: Marked alopecia. Posterior occipital incision site is intact with no underlying induration or masses appreciated.     Ears: No external auricular lesions     Nose/sinus: No rhinorrhea or epistaxis     Oral Cavity/Oropharynx: Moist mucous members. No visible oral cavity mucositis or thrush.  Pulm: Breathing comfortably on room air  CV: Extremities are warm and well-perfused. No pedal edema.  Musculoskeletal: Normal bulk and tone    Skin: Normal color and turgor. No residual hyperpigmentation nor erythema/desquamation within the partial brain radiation treatment fields.  Neurologic: A/Ox3, CN II-XII intact    LABS AND IMAGIN2018 MRI Brain:  Post-operative changes with no evidence of residual disease.     IMPRESSION:   Ms. Silverman is a 32 year old female with a diagnosis of a medulloblastoma of the left cerebellar hemisphere status post gross total resection and adjuvant chemoradiotherapy. She has received her first cycle of additional adjuvant chemotherapy with infusional cisplatin and vincristine today (2018). Clinically, she has recovered well from her acute radiation-induced toxicities with no evidence of residual disease on radiographic examination.    PLAN:     Continue follow-up as scheduled with Dr. Morrison for ongoing disease management.    Follow-up in radiation oncology  clinic on an as-needed basis.    Jose Brown MD  Radiation Oncology Resident, PGY-4  [facility]  Phone: [phone]      Attending addendum:   I saw and examined the patient with the resident and agree with the documented plan of care.    Mike Borges MD/PhD  Dept of Radiation Oncology  [facility]      Mike Borges MD

## 2018-08-13 NOTE — PROGRESS NOTES
Oral Chemotherapy Monitoring Program    Primary Oncologist: Dr. Morrison  Primary Oncology Clinic: [facility]  Cancer Diagnosis: Medulloblastoma     Drug: Lomustine 160 mg (4 x 40 mg) by mouth once  Start Date: C1D1=8/13/18  Dose is appropriate for patients: 1.9 m^2 BSA, renal and hepatic okay   Expected duration of therapy: Until disease progression or unacceptable toxicity    Drug Interaction Assessment: No clinically relevant drug interactions     Lab Monitoring Plan  CBC weekly for 6 weeks and CMP every 28 days- standing labs already put in by Dr. Morrison  Subjective/Objective:  Merissa Silverman is a 32 year old female seen in clinic for an initial visit for oral chemotherapy education.      ORAL CHEMOTHERAPY 8/13/2018   Drug Name (No Data)   Current Dosage 160mg   Current Schedule Daily   Cycle Details Other   Start Date of Last Cycle 8/13/2018     Assessment:  Patient is appropriate to start therapy. The ativan will only be used as needed for anxiety; currently she is only using it a couple times per week. The most common side effects of concern are severe myelosuppression and nausea.     Plan:  Basic chemotherapy teaching was reviewed with the patient including indication, start date of therapy, dose, administration, adverse effects, missed doses, food and drug interactions, monitoring, side effect management, office contact information, and safe handling. Written materials were provided and all questions answered. Weekly labs.    Follow-Up:  8/20/18 initial assessment in clinic     Rama Amaro   Pharmacy Intern  [facility]   [phone]

## 2018-08-13 NOTE — MR AVS SNAPSHOT
After Visit Summary   8/13/2018    Merissa Silverman    MRN: [id number]           Patient Information     Date Of Birth          1985        Visit Information        Provider Department      8/13/2018 11:00 AM  23 ATC;  ONCOLOGY INFUSION [facility]ly [facility]        Today's Diagnoses     Medulloblastoma of cerebellum (H)    -  1      Care Instructions    Contact Numbers    [facility] Main Line: [phone]  [facility] Triage and after hours / weekends / holidays:  [phone]      Please call the triage or after hours line if you experience a temperature greater than or equal to 100.5, shaking chills, have uncontrolled nausea, vomiting and/or diarrhea, dizziness, shortness of breath, chest pain, bleeding, unexplained bruising, or if you have any other new/concerning symptoms, questions or concerns.      If you are having any concerning symptoms or wish to speak to a provider before your next infusion visit, please call your care coordinator or triage to notify them so we can adequately serve you.     If you need a refill on a narcotic prescription or other medication, please call before your infusion appointment.                   August 2018 Sunday Monday Tuesday Wednesday Thursday Friday Saturday                  1     2     3     4       5     6     7     LAB    1:15 PM   (15 min.)    LAB   [facility] 8     9     10     11       12     13     MR BRAIN WWO    7:45 AM   (45 min.)   [id number]   [facility] MRI     [facility]ON[facility] LAB DRAW   10:00 AM   (15 min.)   [facility] LAB DRAW   [facility] Lab Draw     [facility] RETURN   10:15 AM   (30 min.)   Anupama Morrison MD   [facility] ONC INFUSION 360   11:00 AM   (360 min.)    ONCOLOGY INFUSION   [facility] RETURN    3:30 PM   (30 min.)   Mike Borges MD   [facility] 14     15     16     17     18       19     20     [facility]  MASONIC LAB DRAW   12:45 PM   (15 min.)    MASONIC LAB DRAW   [facility] Masonic Lab Draw     [facility] ONC INFUSION 60    1:30 PM   (60 min.)    ONCOLOGY INFUSION   [facility] 21     [facility] NEW WITH ROOM   11:45 AM   (75 min.)   Kennedi Brunner GC   [facility] MASON[facility] LAB DRAW    1:15 PM   (15 min.)    [facility] LAB DRAW   [facility] 22     23     24     25       26     27     28     29     30     31 September 2018 Sunday Monday Tuesday Wednesday Thursday Friday Saturday                                 1       2     3     4     5     6     7     8       9     10     11     12  Happy Birthday!     13     14     15       16     17     18     19     20     21     22       23     24     [facility] MASONIC LAB DRAW    9:15 AM   (15 min.)    [facility] LAB DRAW   [facility] Draw     [facility] RETURN    9:45 AM   (30 min.)   Anupama Morrison MD   [facility] ONC INFUSION 360   10:30 AM   (360 min.)    ONCOLOGY INFUSION   [facility] 25     26     27     28     29       30                                               Recent Results (from the past 24 hour(s))   CBC with platelets differential    Collection Time: 08/13/18 10:26 AM   Result Value Ref Range    WBC 2.9 (L) 4.0 - 11.0 10e9/L    RBC Count 3.73 (L) 3.8 - 5.2 10e12/L    Hemoglobin 12.1 11.7 - 15.7 g/dL    Hematocrit 35.2 35.0 - 47.0 %    MCV 94 78 - 100 fl    MCH 32.4 26.5 - 33.0 pg    MCHC 34.4 31.5 - 36.5 g/dL    RDW 12.6 10.0 - 15.0 %    Platelet Count 218 150 - 450 10e9/L    Diff Method Automated Method     % Neutrophils 76.1 %    % Lymphocytes 10.6 %    % Monocytes 10.6 %    % Eosinophils 2.4 %    % Basophils 0.3 %    % Immature Granulocytes 0.0 %    Nucleated RBCs 0 0 /100    Absolute Neutrophil 2.2 1.6 - 8.3 10e9/L    Absolute Lymphocytes 0.3 (L) 0.8 - 5.3 10e9/L    Absolute Monocytes 0.3 0.0 - 1.3 10e9/L    Absolute  Eosinophils 0.1 0.0 - 0.7 10e9/L    Absolute Basophils 0.0 0.0 - 0.2 10e9/L    Abs Immature Granulocytes 0.0 0 - 0.4 10e9/L    Absolute Nucleated RBC 0.0    Comprehensive metabolic panel    Collection Time: 08/13/18 10:26 AM   Result Value Ref Range    Sodium 139 133 - 144 mmol/L    Potassium 3.7 3.4 - 5.3 mmol/L    Chloride 108 94 - 109 mmol/L    Carbon Dioxide 22 20 - 32 mmol/L    Anion Gap 9 3 - 14 mmol/L    Glucose 105 (H) 70 - 99 mg/dL    Urea Nitrogen 7 7 - 30 mg/dL    Creatinine 0.63 0.52 - 1.04 mg/dL    GFR Estimate >90 >60 mL/min/1.7m2    GFR Estimate If Black >90 >60 mL/min/1.7m2    Calcium 8.8 8.5 - 10.1 mg/dL    Bilirubin Total 0.4 0.2 - 1.3 mg/dL    Albumin 3.9 3.4 - 5.0 g/dL    Protein Total 7.2 6.8 - 8.8 g/dL    Alkaline Phosphatase 62 40 - 150 U/L    ALT 26 0 - 50 U/L    AST 22 0 - 45 U/L                 Follow-ups after your visit        Your next 10 appointments already scheduled     Aug 13, 2018  3:30 PM CDT   Return Visit with Mike Borges MD   Radiation Oncology Clinic ([facility])    [facility]  1st Floor  [address] [address] [phone]            Aug 20, 2018 12:45 PM CDT   Masonic Lab Draw with UC MASONIC LAB DRAW   [facility] Lab Draw ([facility])    [address] [place] [address]  [address]  [facility] [id number] [phone]            Aug 20, 2018  1:30 PM CDT   Infusion 60 with  ONCOLOGY INFUSION, UC 28 ATC   [facility])    [address] [place] [address]  [address]  [facility] [address] [phone]            Aug 21, 2018 12:00 PM CDT   (Arrive by 11:45 AM)   NEW WITH ROOM with Kennedi Brunner GC,  2 116 CONSULT [facility])    [address] [place] [address]  [address]  [facility] [address]   [phone]            Aug 21, 2018  1:15 PM CDT   Masonic Lab Draw with   MASONIC LAB DRAW   [facility] Lab Draw ([facility])    [address]  [address]  [facility] [address]   [phone]            Sep 24, 2018  9:15 AM CDT   Masonic Lab Draw with  MASONIC LAB DRAW   [facility] Lab Draw ([facility])    [address]  [address]  [facility] [address]   [phone]            Sep 24, 2018 10:00 AM CDT   (Arrive by 9:45 AM)   Return Visit with Anupama Morrison MD   [facility] ([facility])    [address]  [address]  [facility] [address]   [phone]            Sep 24, 2018 10:30 AM CDT   Infusion 360 with UC ONCOLOGY INFUSION   [facility] ([facility])    [address] [place] [address]  [address]  [facility] [address] [phone]              Who to contact     If you have questions or need follow up information about today's clinic visit or your schedule please contact [facility] directly at [phone].  Normal or non-critical lab and imaging results will be communicated to you by MyChart, letter or phone within 4 business days after the clinic has received the results. If you do not hear from us within 7 days, please contact the clinic through [employer] or phone. If you have a critical or abnormal lab result, we will notify you by phone as soon as possible.  Submit refill requests through [employer] or call your pharmacy and they will forward the refill request to us. Please allow 3 business days for your refill to be completed.          Additional Information About Your Visit        [employer] Information     [employer] gives you secure access to your electronic health record. If you see a primary care provider, you can also send messages to your care team and make appointments. If you have questions, please call your primary care clinic.  If you do not have a primary care  provider, please call [phone] and they will assist you.        Care EveryWhere ID     This is your Care EveryWhere ID. This could be used by other organizations to access your [place] medical records  [id number]        Your Vitals Were     Last Period                   08/12/2018            Blood Pressure from Last 3 Encounters:   08/13/18 (!) 143/95   07/13/18 139/88   06/25/18 106/74    Weight from Last 3 Encounters:   08/13/18 85.6 kg (188 lb 11.2 oz)   07/13/18 87.4 kg (192 lb 9.6 oz)   06/25/18 85.9 kg (189 lb 4.8 oz)              We Performed the Following     CBC with platelets differential     Comprehensive metabolic panel          Today's Medication Changes          These changes are accurate as of 8/13/18  2:46 PM.  If you have any questions, ask your nurse or doctor.               Start taking these medicines.        Dose/Directions    lomustine 40 MG capsule CHEMO   Commonly known as:  CEENU   Used for:  Medulloblastoma of cerebellum (H)        Dose:  75 mg/m2   Take 4 capsules (160 mg) by mouth once for 1 dose   Quantity:  4 capsule   Refills:  0         These medicines have changed or have updated prescriptions.        Dose/Directions    * ATIVAN PO   This may have changed:  Another medication with the same name was added. Make sure you understand how and when to take each.   Changed by:  Anupama Morrison MD        Dose:  0.5 mg   Take 0.5 mg by mouth as needed for anxiety (At night if difficulty falling asleep)   Refills:  0       * LORazepam 0.5 MG tablet   Commonly known as:  ATIVAN   This may have changed:  Another medication with the same name was added. Make sure you understand how and when to take each.   Used for:  Anxiety, Claustrophobia   Changed by:  Anupama Morrison MD        Take 1 tablet 30 minutes before scans. If needed take 2nd tablet.   Quantity:  2 tablet   Refills:  0       * LORazepam 0.5 MG tablet   Commonly known as:  ATIVAN   This may have changed:   You were already taking a medication with the same name, and this prescription was added. Make sure you understand how and when to take each.   Used for:  Medulloblastoma of cerebellum (H)   Changed by:  Anupama Morrison MD        Dose:  0.5 mg   Take 1 tablet (0.5 mg) by mouth every 6 hours as needed for anxiety   Quantity:  60 tablet   Refills:  2       * Notice:  This list has 3 medication(s) that are the same as other medications prescribed for you. Read the directions carefully, and ask your doctor or other care provider to review them with you.         Where to get your medicines      These medications were sent to [address]  [address] [place] [address] [address] [address]    Hours:  TRANSPLANT PHONE NUMBER [phone] Phone:  [phone]     lomustine 40 MG capsule CHEMO    ondansetron 8 MG ODT tab         Some of these will need a paper prescription and others can be bought over the counter.  Ask your nurse if you have questions.     Bring a paper prescription for each of these medications     LORazepam 0.5 MG tablet                Primary Care Provider Office Phone # Fax #    Anupama Morrison [phone]       [address] [place] [address]        Equal Access to Services     WANDER HAMMER AH: Lisa nielsono Soraul, waaxda luqadaha, qaybta kaalmada adeegyada, toño bowman. So [facility] [phone].    ATENCIÓN: Si habla español, tiene a mccurdy disposición servicios gratuitos de asistencia lingüística. Llame al [phone].    We comply with applicable federal civil rights laws and [place] laws. We do not discriminate on the basis of race, color, national origin, age, disability, sex, sexual orientation, or gender identity.            Thank you!     Thank you for choosing [facility]  for your care. Our goal is always to provide you with  excellent care. Hearing back from our patients is one way we can continue to improve our services. Please take a few minutes to complete the written survey that you may receive in the mail after your visit with us. Thank you!             Your Updated Medication List - Protect others around you: Learn how to safely use, store and throw away your medicines at www.disposemymeds.org.          This list is accurate as of 8/13/18  2:46 PM.  Always use your most recent med list.                   Brand Name Dispense Instructions for use Diagnosis    acetaminophen 325 MG tablet    TYLENOL     Take 650 mg by mouth        * ATIVAN PO      Take 0.5 mg by mouth as needed for anxiety (At night if difficulty falling asleep)        * LORazepam 0.5 MG tablet    ATIVAN    2 tablet    Take 1 tablet 30 minutes before scans. If needed take 2nd tablet.    Anxiety, Claustrophobia       * LORazepam 0.5 MG tablet    ATIVAN    60 tablet    Take 1 tablet (0.5 mg) by mouth every 6 hours as needed for anxiety    Medulloblastoma of cerebellum (H)       lomustine 40 MG capsule CHEMO    CEENU    4 capsule    Take 4 capsules (160 mg) by mouth once for 1 dose    Medulloblastoma of cerebellum (H)       ondansetron 8 MG ODT tab    ZOFRAN-ODT    60 tablet    Take 1 tablet (8 mg) by mouth every 8 hours as needed for nausea    Medulloblastoma of cerebellum (H)       * Notice:  This list has 3 medication(s) that are the same as other medications prescribed for you. Read the directions carefully, and ask your doctor or other care provider to review them with you.

## 2018-08-13 NOTE — PATIENT INSTRUCTIONS
Start regiment of vincristine, CCNU and cisplatin.   -Side effects include: nausea, vomiting, constipation, lack of appetite, kidney injury, extremity nerve damage, hearing loss. Please call if concerned about any of these symptoms or symptoms are worsening.     For nausea/vomiting: will get Aloxi and Emend during infusion. Can use Ativan, and Compazine at home as needed. Do not use Zofran for the 1st three days after infusion.     May sure and drink more than 64 oz of fluid a day to protect your kidneys.    Use Miralax, Colace and/or Senna as needed for constipation    -Will schedule for weekly labs to follow your blood counts.   -Return on 8/20 for infusion of vincristine Day 8.   -Will followup with Dr. Morrison in 6 weeks with labs and start of cycle 2 (around 9/24).   -Will get next MRI of brain before cycle 3.     Work excuse note to be written.

## 2018-08-13 NOTE — MR AVS SNAPSHOT
After Visit Summary   8/13/2018    Merissa Silverman    MRN: [id number]           Patient Information     Date Of Birth          1985        Visit Information        Provider Department      8/13/2018 3:30 PM Mike Borges MD Radiation Oncology Clinic        Today's Diagnoses     Medulloblastoma of cerebellum (H)    -  1       Follow-ups after your visit        Your next 10 appointments already scheduled     Aug 20, 2018 12:45 PM CDT   [facility] Lab Draw with UC MASONIC LAB DRAW   [facility] Masonic Lab Draw ([facility])    [address] [place] [address]  [address]  [facility] [address]   [phone]            Aug 20, 2018  1:30 PM CDT   Infusion 60 with UC ONCOLOGY INFUSION, UC 28 ATC   [facility] ([facility])    [address] [place] [address]  [address]  [facility] [address]   [phone]            Aug 21, 2018 12:00 PM CDT   (Arrive by 11:45 AM)   NEW WITH ROOM with Kennedi Brunner GC,  2 116 CONSULT RM   [facility] ([facility])    [address] [place] [address]  [address]  [facility] [address]   [phone]            Aug 21, 2018  1:15 PM CDT   Masonic Lab Draw with UC MASONIC LAB DRAW   [facility] Masonic Lab Draw ([facility])    [address] [place] [address]  [address]  [facility] [address]   [phone]            Sep 24, 2018  9:15 AM CDT   Masonic Lab Draw with UC MASONIC LAB DRAW   [facility] Masonic Lab Draw ([facility])    [address] [place] [address]  [address]  [facility] [address]   [phone]            Sep 24, 2018 10:00 AM CDT   (Arrive by 9:45 AM)   Return Visit with Anupama Morrison MD   [facility] ([facility])    [address] [place] [address]  [address]  [facility] [address]   [phone]            Sep 24, 2018 10:30 AM CDT   Infusion 360 with UC ONCOLOGY  INFUSION,  [facility] ([facility])    [address]  [address]  [facility] [address] [phone]              Who to contact     Please call your clinic at [phone] to:    Ask questions about your health    Make or cancel appointments    Discuss your medicines    Learn about your test results    Speak to your doctor            Additional Information About Your Visit        [employer] Information     [employer] gives you secure access to your electronic health record. If you see a primary care provider, you can also send messages to your care team and make appointments. If you have questions, please call your primary care clinic.  If you do not have a primary care provider, please call [phone] and they will assist you.      [employer] is an electronic gateway that provides easy, online access to your medical records. With [employer], you can request a clinic appointment, read your test results, renew a prescription or communicate with your care team.     To access your existing account, please contact your [facility] or call [phone] for assistance.        Care EveryWhere ID     This is your Care EveryWhere ID. This could be used by other organizations to access your [place] medical records  [id number]        Your Vitals Were     Pulse Last Period Pulse Oximetry             61 08/12/2018 100%          Blood Pressure from Last 3 Encounters:   08/13/18 (!) 153/104   08/13/18 (!) 143/95   07/13/18 139/88    Weight from Last 3 Encounters:   08/13/18 85.6 kg (188 lb 11.2 oz)   07/13/18 87.4 kg (192 lb 9.6 oz)   06/25/18 85.9 kg (189 lb 4.8 oz)              Today, you had the following     No orders found for display         Today's Medication Changes          These changes are accurate as of 8/13/18  5:18 PM.  If you have any questions, ask your nurse or doctor.               Start taking these medicines.         Dose/Directions    lomustine 40 MG capsule CHEMO   Commonly known as:  CEENU   Used for:  Medulloblastoma of cerebellum (H)        Dose:  75 mg/m2   Take 4 capsules (160 mg) by mouth once for 1 dose   Quantity:  4 capsule   Refills:  0       LORazepam 0.5 MG tablet   Commonly known as:  ATIVAN   Used for:  Medulloblastoma of cerebellum (H)   Started by:  Anupama Morrison MD        Dose:  0.5 mg   Take 1 tablet (0.5 mg) by mouth every 6 hours as needed for anxiety   Quantity:  60 tablet   Refills:  2            Where to get your medicines      These medications were sent to [facility] [address]  [address] [address]    Hours:  TRANSPLANT PHONE NUMBER [phone] Phone:  [phone]     lomustine 40 MG capsule CHEMO    ondansetron 8 MG ODT tab         Some of these will need a paper prescription and others can be bought over the counter.  Ask your nurse if you have questions.     Bring a paper prescription for each of these medications     LORazepam 0.5 MG tablet                Primary Care Provider Office Phone # Fax #    Anupama Morrison [phone]       [address] [place] [address]        Equal Access to Services     WANDER HAMMER AH: Lisa nielsono Sorebeccaali, waaxda luqadaha, qaybta kaalmada adeegyada, toño bowman. So [facility] [phone].    ATENCIÓN: Si habla español, tiene a mccurdy disposición servicios gratuitos de asistencia lingüística. Llame al [phone].    We comply with applicable federal civil rights laws and [place] laws. We do not discriminate on the basis of race, color, national origin, age, disability, sex, sexual orientation, or gender identity.            Thank you!     Thank you for choosing [facility]  for your care. Our goal is always to provide you with excellent care. Hearing back from our patients is one way  we can continue to improve our services. Please take a few minutes to complete the written survey that you may receive in the mail after your visit with us. Thank you!             Your Updated Medication List - Protect others around you: Learn how to safely use, store and throw away your medicines at www.disposemymeds.org.          This list is accurate as of 8/13/18  5:18 PM.  Always use your most recent med list.                   Brand Name Dispense Instructions for use Diagnosis    acetaminophen 325 MG tablet    TYLENOL     Take 650 mg by mouth        lomustine 40 MG capsule CHEMO    CEENU    4 capsule    Take 4 capsules (160 mg) by mouth once for 1 dose    Medulloblastoma of cerebellum (H)       LORazepam 0.5 MG tablet    ATIVAN    60 tablet    Take 1 tablet (0.5 mg) by mouth every 6 hours as needed for anxiety    Medulloblastoma of cerebellum (H)       ondansetron 8 MG ODT tab    ZOFRAN-ODT    60 tablet    Take 1 tablet (8 mg) by mouth every 8 hours as needed for nausea    Medulloblastoma of cerebellum (H)

## 2018-08-13 NOTE — PROGRESS NOTES
Infusion Nursing Note:  Merissa Silverman presents today for Cycle 2 Day 1 Cisplatin and Vincristine.    Patient seen and examined by Dr Morrison in clinic prior to infusion.      Intravenous Access:  Implanted Port.  Vincristine infused into free flowing NS.  Positive blood return pre, mid, and post infuison.  Port site without redness or swelling.    Treatment Conditions:  Lab Results   Component Value Date    HGB 12.1 08/13/2018     Lab Results   Component Value Date    WBC 2.9 08/13/2018      Lab Results   Component Value Date    ANEU 2.2 08/13/2018     Lab Results   Component Value Date     08/13/2018      Lab Results   Component Value Date     08/13/2018                   Lab Results   Component Value Date    POTASSIUM 3.7 08/13/2018           No results found for: MAG         Lab Results   Component Value Date    CR 0.63 08/13/2018                   Lab Results   Component Value Date    ANAND 8.8 08/13/2018                Lab Results   Component Value Date    BILITOTAL 0.4 08/13/2018           Lab Results   Component Value Date    ALBUMIN 3.9 08/13/2018                    Lab Results   Component Value Date    ALT 26 08/13/2018           Lab Results   Component Value Date    AST 22 08/13/2018       Results reviewed, labs MET treatment parameters, ok to proceed with treatment.    Note: Pt voided adequately throughout today's infusion.      Post Infusion Assessment:  Patient tolerated infusion without incident.    Discharge Plan:   Prescription refills given for lomustine, ativan, and zofran.  Lomustine teaching done by pharmacy prior to discharge  Pt had aloxi today and is aware not to start her zofran until Thursday.   Copy of AVS reviewed with patient and/or family.  Patient will return 8/20/18 for cycle 2 day 8.    Face to Face time: 0.    Sangita Valdez RN

## 2018-08-13 NOTE — MR AVS SNAPSHOT
After Visit Summary   8/13/2018    Merissa Silverman    MRN: [id number]           Patient Information     Date Of Birth          1985        Visit Information        Provider Department      8/13/2018 10:30 AM Anupama Morrison MD [facility]        Today's Diagnoses     Medulloblastoma of cerebellum (H)    -  1      Care Instructions    Start regiment of vincristine, CCNU and cisplatin.   -Side effects include: nausea, vomiting, constipation, lack of appetite, kidney injury, extremity nerve damage, hearing loss. Please call if concerned about any of these symptoms or symptoms are worsening.     For nausea/vomiting: will get Aloxi and Emend during infusion. Can use Ativan, and Compazine at home as needed. Do not use Zofran for the 1st three days after infusion.     May sure and drink more than 64 oz of fluid a day to protect your kidneys.    Use Miralax, Colace and/or Senna as needed for constipation    -Will schedule for weekly labs to follow your blood counts.   -Return on 8/20 for infusion of vincristine Day 8.   -Will followup with Dr. Morrison in 6 weeks with labs and start of cycle 2 (around 9/24).   -Will get next MRI of brain before cycle 3.     Work excuse note to be written.               Follow-ups after your visit        Your next 10 appointments already scheduled     Aug 13, 2018  3:30 PM CDT   Return Visit with Mike Borges MD   Radiation Oncology Clinic ([facility])    [facility]  1st Floor  [address] [address] [phone]            Aug 21, 2018 12:00 PM CDT   (Arrive by 11:45 AM)   NEW WITH ROOM with Kennedi Brunenr GC,  2 116 CONSULT [facility] ([facility])    [address]  [address]  [facility] [place] [id number] [phone]            Aug 21, 2018  1:15 PM T   [facility] Lab Draw with [facility] LAB DRAW   [facility] ([facility])    [address]  [address]  [facility] [place] [address] [phone]              Who to contact     If you have questions or need follow up information about today's clinic visit or your schedule please contact [facility] directly at [phone].  Normal or non-critical lab and imaging results will be communicated to you by MyChart, letter or phone within 4 business days after the clinic has received the results. If you do not hear from us within 7 days, please contact the clinic through [employer] or phone. If you have a critical or abnormal lab result, we will notify you by phone as soon as possible.  Submit refill requests through [employer] or call your pharmacy and they will forward the refill request to us. Please allow 3 business days for your refill to be completed.          Additional Information About Your Visit        [facility] Information     [employer] gives you secure access to your electronic health record. If you see a primary care provider, you can also send messages to your care team and make appointments. If you have questions, please call your primary care clinic.  If you do not have a primary care provider, please call [phone] and they will assist you.        Care EveryWhere ID     This is your Care EveryWhere ID. This could be used by other organizations to access your [facility] medical records  [id number]        Your Vitals Were     Pulse Temperature Respirations Height Last Period Pulse Oximetry    63 97.7  F (36.5  C) (Oral) 16 1.524 m (5') 08/12/2018 100%    Breastfeeding? BMI (Body Mass Index)                No 36.85 kg/m2           Blood Pressure from Last 3 Encounters:   08/13/18 (!) 143/95   07/13/18 139/88   06/25/18 106/74    Weight from Last 3 Encounters:   08/13/18 85.6 kg (188 lb 11.2 oz)   07/13/18 87.4 kg (192 lb 9.6 oz)   06/25/18 85.9 kg (189 lb 4.8 oz)              Today, you had the following      No orders found for display         Today's Medication Changes          These changes are accurate as of 8/13/18 11:35 AM.  If you have any questions, ask your nurse or doctor.               These medicines have changed or have updated prescriptions.        Dose/Directions    * ATIVAN PO   This may have changed:  Another medication with the same name was added. Make sure you understand how and when to take each.   Changed by:  Anupama Morrison MD        Dose:  0.5 mg   Take 0.5 mg by mouth as needed for anxiety (At night if difficulty falling asleep)   Refills:  0       * LORazepam 0.5 MG tablet   Commonly known as:  ATIVAN   This may have changed:  Another medication with the same name was added. Make sure you understand how and when to take each.   Used for:  Anxiety, Claustrophobia   Changed by:  Anupama Morrison MD        Take 1 tablet 30 minutes before scans. If needed take 2nd tablet.   Quantity:  2 tablet   Refills:  0       * LORazepam 0.5 MG tablet   Commonly known as:  ATIVAN   This may have changed:  You were already taking a medication with the same name, and this prescription was added. Make sure you understand how and when to take each.   Used for:  Medulloblastoma of cerebellum (H)   Changed by:  Anupama Morrison MD        Dose:  0.5 mg   Take 1 tablet (0.5 mg) by mouth every 6 hours as needed for anxiety   Quantity:  60 tablet   Refills:  2       * Notice:  This list has 3 medication(s) that are the same as other medications prescribed for you. Read the directions carefully, and ask your doctor or other care provider to review them with you.         Where to get your medicines      These medications were sent to [place], [place] - [address] [place] [address] [address] [address]    Hours:  TRANSPLANT PHONE NUMBER [phone] Phone:  [phone]     ondansetron 8 MG ODT tab         Some of these will need  a paper prescription and others can be bought over the counter.  Ask your nurse if you have questions.     Bring a paper prescription for each of these medications     LORazepam 0.5 MG tablet                Primary Care Provider Office Phone # Fax #    Anupama Marly Morrison [phone] [address] [address]        Equal Access to Services     [facility] : Hadii aad ku hadasho Soomaali, waaxda luqadaha, qaybta kaalmada adeegyada, waxay idiin hayaan adeeg kharash la'niteshn ah. So [facility] [phone].    ATENCIÓN: Si habla español, tiene a mccurdy disposición servicios gratuitos de asistencia lingüística. Cuco al [phone].    We comply with applicable federal civil rights laws and [place] laws. We do not discriminate on the basis of race, color, national origin, age, disability, sex, sexual orientation, or gender identity.            Thank you!     Thank you for choosing [facility]  for your care. Our goal is always to provide you with excellent care. Hearing back from our patients is one way we can continue to improve our services. Please take a few minutes to complete the written survey that you may receive in the mail after your visit with us. Thank you!             Your Updated Medication List - Protect others around you: Learn how to safely use, store and throw away your medicines at www.disposemymeds.org.          This list is accurate as of 8/13/18 11:35 AM.  Always use your most recent med list.                   Brand Name Dispense Instructions for use Diagnosis    acetaminophen 325 MG tablet    TYLENOL     Take 650 mg by mouth        * ATIVAN PO      Take 0.5 mg by mouth as needed for anxiety (At night if difficulty falling asleep)        * LORazepam 0.5 MG tablet    ATIVAN    2 tablet    Take 1 tablet 30 minutes before scans. If needed take 2nd tablet.    Anxiety, Claustrophobia       * LORazepam 0.5 MG tablet    ATIVAN    60 tablet    Take 1  tablet (0.5 mg) by mouth every 6 hours as needed for anxiety    Medulloblastoma of cerebellum (H)       ondansetron 8 MG ODT tab    ZOFRAN-ODT    60 tablet    Take 1 tablet (8 mg) by mouth every 8 hours as needed for nausea    Medulloblastoma of cerebellum (H)       * Notice:  This list has 3 medication(s) that are the same as other medications prescribed for you. Read the directions carefully, and ask your doctor or other care provider to review them with you.

## 2018-08-13 NOTE — PROGRESS NOTES
Department of Radiation Oncology  [facility]  [address] [place] [address]  [phone]       Radiation Oncology Follow-up Visit  2018      Merissa Silverman  MRN: [id number]   : 1985     DISEASE TREATED:   Medulloblastoma of left cerebellar hemisphere    RADIATION THERAPY DELIVERED:   3,600 cGy craniospinal  1,800 cGy boost to posterior fossa/surgical bed for a total dose of 5,400 cGy to post fossa  Completed 2018     SYSTEMIC THERAPY:  Concurrent: Vincristine 2mg 18 & 18 which was discontinued given development of pancytopenia  Post-Radiation: Vincristine 2mg, Cisplatin 140mg, Lomustine 160 mg qD    INTERVAL SINCE COMPLETION OF RADIATION THERAPY:  ~7 weeks    HPI:   Ms. Silverman is a 32 year old female with a diagnosis of a medulloblastoma of the left cerebellar hemisphere status post gross total resection and adjuvant chemoradiotherapy. Spinal imaging and CSF cytology were negative for disease. She was referred to our clinic for treatment with craniospinal irradiation. She received a total of 3,600 cGy to the craniospinal axis followed by a reduced volume boost of 1,800 cGy to the surgical bed with a 1.5 cm margin. All treatment was delivered with 6 MV photons using a Tomotherapy unit. Radiotherapy was administered with concurrent weekly vincristine. She tolerated treatment well with the expected acute toxicities, including grade 2 alopecia, grade 1 esophagitis, and grade 1 dermatitis. She required no hospitalizations or unplanned treatment breaks during radiation.     She had her 2 month post-treatment MRI today which showed only normal post-operative changes and resolution of obstructive hydrocephalus. She then went on this morning to receive the first dose of her planned follow-up adjuvant chemotherapy regimen consisting of vincristine, cisplatin, and lomustine. She tolerated her infusion well with no acute events.       SUBJECTIVE:   On interview today Ms. Ji reports that she is in her usual state of health. She arrives in clinic after her chemotherapy infusion which she reports was well tolerated. She denies fevers, chills, gait instability, nausea, vomiting, or seizures. She reports that her hair has started to regrow specifically in her posterior occiput. The remainder of her 12 point review of systems are negative.    PHYSICAL EXAM:  Weight: 85.6 kg  BP: 153/104  Pulse: 61    Gen: Alert, in NAD  Eyes: PERRL, EOMI, sclera anicteric  HENT     Head: Marked alopecia. Posterior occipital incision site is intact with no underlying induration or masses appreciated.     Ears: No external auricular lesions     Nose/sinus: No rhinorrhea or epistaxis     Oral Cavity/Oropharynx: Moist mucous members. No visible oral cavity mucositis or thrush.  Pulm: Breathing comfortably on room air  CV: Extremities are warm and well-perfused. No pedal edema.  Musculoskeletal: Normal bulk and tone    Skin: Normal color and turgor. No residual hyperpigmentation nor erythema/desquamation within the partial brain radiation treatment fields.  Neurologic: A/Ox3, CN II-XII intact    LABS AND IMAGIN2018 MRI Brain:  Post-operative changes with no evidence of residual disease.     IMPRESSION:   Ms. Silverman is a 32 year old female with a diagnosis of a medulloblastoma of the left cerebellar hemisphere status post gross total resection and adjuvant chemoradiotherapy. She has received her first cycle of additional adjuvant chemotherapy with infusional cisplatin and vincristine today (2018). Clinically, she has recovered well from her acute radiation-induced toxicities with no evidence of residual disease on radiographic examination.    PLAN:     Continue follow-up as scheduled with Dr. Morrison for ongoing disease management.    Follow-up in radiation oncology clinic on an as-needed basis.    Jose Brown MD  Radiation Oncology Resident,  PGY-4  [facility]  Phone: [phone]      Attending addendum:   I saw and examined the patient with the resident and agree with the documented plan of care.    Mike Borges MD/PhD  Dept of Radiation Oncology  [facility]

## 2018-08-13 NOTE — NURSING NOTE
Oncology Rooming Note    August 13, 2018 10:34 AM   Merissa Silverman is a 32 year old female who presents for:    Chief Complaint   Patient presents with     Port Draw     Labs drawn from port by RN. Line flushed with saline and heparin. Vs taken and pt checked in for next appt.     Oncology Clinic Visit     Return Medulloblastoma      Initial Vitals: BP (!) 143/95 (BP Location: Right arm, Cuff Size: Adult Regular)  Pulse 63  Temp 97.7  F (36.5  C) (Oral)  Resp 16  Ht 1.524 m (5')  Wt 85.6 kg (188 lb 11.2 oz)  LMP 08/12/2018  SpO2 100%  Breastfeeding? No  BMI 36.85 kg/m2 Estimated body mass index is 36.85 kg/(m^2) as calculated from the following:    Height as of this encounter: 1.524 m (5').    Weight as of this encounter: 85.6 kg (188 lb 11.2 oz). Body surface area is 1.9 meters squared.  No Pain (0) Comment: Data Unavailable   Patient's last menstrual period was 08/12/2018.  Allergies reviewed: Yes  Medications reviewed: Yes    Medications: Medication refills not needed today.  Pharmacy name entered into [employer]: [facility] - [place], [place] - [address]    Clinical concerns: Refill Zofran and Ativan     6 minutes for nursing intake (face to face time)     Naomi Nino, [facility]

## 2018-08-13 NOTE — NURSING NOTE
Chief Complaint   Patient presents with     Port Draw     Labs drawn from port by RN. Line flushed with saline and heparin. Vs taken and pt checked in for next appt.     Labs drawn from port by RN. Line flushed with saline and heparin. Vs taken and checked in for appt.    Mayelin Greene RN

## 2018-08-15 ENCOUNTER — CARE COORDINATION (OUTPATIENT)
Dept: ONCOLOGY | Facility: CLINIC | Age: 33
End: 2018-08-15

## 2018-08-15 NOTE — PROGRESS NOTES
Placed call to patient to update on having labs drawn at [facility]. Per staff at [facility], patient would have to have MD within their system (preferably [facility] or [facility]). This MD would need to be able to cosign lab orders. Nursing staff would then be able to draw from patient's port. Unable to reach patient and voicemail full. Will also send patient mychart with above information

## 2018-08-20 ENCOUNTER — DOCUMENTATION ONLY (OUTPATIENT)
Dept: ONCOLOGY | Facility: CLINIC | Age: 33
End: 2018-08-20

## 2018-08-20 ENCOUNTER — APPOINTMENT (OUTPATIENT)
Dept: LAB | Facility: CLINIC | Age: 33
End: 2018-08-20
Attending: PSYCHIATRY & NEUROLOGY
Payer: COMMERCIAL

## 2018-08-20 ENCOUNTER — INFUSION THERAPY VISIT (OUTPATIENT)
Dept: ONCOLOGY | Facility: CLINIC | Age: 33
End: 2018-08-20
Attending: PSYCHIATRY & NEUROLOGY
Payer: COMMERCIAL

## 2018-08-20 VITALS
BODY MASS INDEX: 36.21 KG/M2 | DIASTOLIC BLOOD PRESSURE: 89 MMHG | SYSTOLIC BLOOD PRESSURE: 123 MMHG | HEART RATE: 79 BPM | TEMPERATURE: 98.2 F | RESPIRATION RATE: 16 BRPM | OXYGEN SATURATION: 100 % | WEIGHT: 185.4 LBS

## 2018-08-20 DIAGNOSIS — C71.6 MEDULLOBLASTOMA OF CEREBELLUM (H): Primary | ICD-10-CM

## 2018-08-20 LAB
ALBUMIN SERPL-MCNC: 4.2 G/DL (ref 3.4–5)
ALP SERPL-CCNC: 64 U/L (ref 40–150)
ALT SERPL W P-5'-P-CCNC: 53 U/L (ref 0–50)
ANION GAP SERPL CALCULATED.3IONS-SCNC: 9 MMOL/L (ref 3–14)
AST SERPL W P-5'-P-CCNC: 37 U/L (ref 0–45)
BASOPHILS # BLD AUTO: 0 10E9/L (ref 0–0.2)
BASOPHILS NFR BLD AUTO: 0.3 %
BILIRUB SERPL-MCNC: 0.6 MG/DL (ref 0.2–1.3)
BUN SERPL-MCNC: 14 MG/DL (ref 7–30)
CALCIUM SERPL-MCNC: 9.1 MG/DL (ref 8.5–10.1)
CHLORIDE SERPL-SCNC: 100 MMOL/L (ref 94–109)
CO2 SERPL-SCNC: 26 MMOL/L (ref 20–32)
CREAT SERPL-MCNC: 1.18 MG/DL (ref 0.52–1.04)
DIFFERENTIAL METHOD BLD: ABNORMAL
EOSINOPHIL # BLD AUTO: 0 10E9/L (ref 0–0.7)
EOSINOPHIL NFR BLD AUTO: 0.3 %
ERYTHROCYTE [DISTWIDTH] IN BLOOD BY AUTOMATED COUNT: 11.8 % (ref 10–15)
GFR SERPL CREATININE-BSD FRML MDRD: 53 ML/MIN/1.7M2
GLUCOSE SERPL-MCNC: 95 MG/DL (ref 70–99)
HCT VFR BLD AUTO: 36.9 % (ref 35–47)
HGB BLD-MCNC: 13.1 G/DL (ref 11.7–15.7)
IMM GRANULOCYTES # BLD: 0 10E9/L (ref 0–0.4)
IMM GRANULOCYTES NFR BLD: 0.9 %
LYMPHOCYTES # BLD AUTO: 0.3 10E9/L (ref 0.8–5.3)
LYMPHOCYTES NFR BLD AUTO: 8.7 %
MCH RBC QN AUTO: 31.6 PG (ref 26.5–33)
MCHC RBC AUTO-ENTMCNC: 35.5 G/DL (ref 31.5–36.5)
MCV RBC AUTO: 89 FL (ref 78–100)
MONOCYTES # BLD AUTO: 0.3 10E9/L (ref 0–1.3)
MONOCYTES NFR BLD AUTO: 10.2 %
NEUTROPHILS # BLD AUTO: 2.6 10E9/L (ref 1.6–8.3)
NEUTROPHILS NFR BLD AUTO: 79.6 %
NRBC # BLD AUTO: 0 10*3/UL
NRBC BLD AUTO-RTO: 0 /100
PLATELET # BLD AUTO: 204 10E9/L (ref 150–450)
POTASSIUM SERPL-SCNC: 3.7 MMOL/L (ref 3.4–5.3)
PROT SERPL-MCNC: 7.6 G/DL (ref 6.8–8.8)
RBC # BLD AUTO: 4.14 10E12/L (ref 3.8–5.2)
SODIUM SERPL-SCNC: 135 MMOL/L (ref 133–144)
WBC # BLD AUTO: 3.2 10E9/L (ref 4–11)

## 2018-08-20 PROCEDURE — 85025 COMPLETE CBC W/AUTO DIFF WBC: CPT | Performed by: PSYCHIATRY & NEUROLOGY

## 2018-08-20 PROCEDURE — 25000128 H RX IP 250 OP 636: Mod: ZF | Performed by: PSYCHIATRY & NEUROLOGY

## 2018-08-20 PROCEDURE — 96409 CHEMO IV PUSH SNGL DRUG: CPT

## 2018-08-20 PROCEDURE — 96361 HYDRATE IV INFUSION ADD-ON: CPT

## 2018-08-20 PROCEDURE — 80053 COMPREHEN METABOLIC PANEL: CPT | Performed by: PSYCHIATRY & NEUROLOGY

## 2018-08-20 RX ORDER — HEPARIN SODIUM (PORCINE) LOCK FLUSH IV SOLN 100 UNIT/ML 100 UNIT/ML
5 SOLUTION INTRAVENOUS
Status: COMPLETED | OUTPATIENT
Start: 2018-08-20 | End: 2018-08-20

## 2018-08-20 RX ORDER — BISACODYL 5 MG/1
5 TABLET, DELAYED RELEASE ORAL DAILY PRN
COMMUNITY
End: 2019-02-26

## 2018-08-20 RX ORDER — HEPARIN SODIUM (PORCINE) LOCK FLUSH IV SOLN 100 UNIT/ML 100 UNIT/ML
5 SOLUTION INTRAVENOUS ONCE
Status: COMPLETED | OUTPATIENT
Start: 2018-08-20 | End: 2018-08-20

## 2018-08-20 RX ADMIN — SODIUM CHLORIDE 500 ML: 9 INJECTION, SOLUTION INTRAVENOUS at 13:56

## 2018-08-20 RX ADMIN — SODIUM CHLORIDE 500 ML: 9 INJECTION, SOLUTION INTRAVENOUS at 14:28

## 2018-08-20 RX ADMIN — SODIUM CHLORIDE, PRESERVATIVE FREE 5 ML: 5 INJECTION INTRAVENOUS at 12:26

## 2018-08-20 RX ADMIN — VINCRISTINE SULFATE 2 MG: 1 INJECTION, SOLUTION INTRAVENOUS at 15:14

## 2018-08-20 RX ADMIN — SODIUM CHLORIDE, PRESERVATIVE FREE 5 ML: 5 INJECTION INTRAVENOUS at 15:19

## 2018-08-20 ASSESSMENT — PAIN SCALES - GENERAL: PAINLEVEL: NO PAIN (0)

## 2018-08-20 NOTE — NURSING NOTE
"Chief Complaint   Patient presents with     Port Draw     labs drawn from port by rn.  vs taken     Port accessed with 20 gauge 3/4\" gripper needle and labs drawn by rn.  Port flushed with NS and heparin.  Pt tolerated well.  VS taken.  Pt checked in for next appt.  Mae Prado RN      "

## 2018-08-20 NOTE — MR AVS SNAPSHOT
After Visit Summary   8/20/2018    Merissa Silverman    MRN: [id number]           Patient Information     Date Of Birth          1985        Visit Information        Provider Department      8/20/2018 1:30 PM  28 ATC;  ONCOLOGY INFUSION [facility]        Today's Diagnoses     Medulloblastoma of cerebellum (H)    -  1      Care Instructions    For Constipation Dr Morrison suggests you try the following medications:   -senna 8.6mg pills (laxative), 1-2 tabs every night before bed  -docusate 100mg pills (stool softener), 1-2 pills every morning  -miralax powder (laxative) 17g mid day daily, mix in juice or water    -STOP taking the medications above if you develop loose stools or any diarrhea  -continue to drink a lot of fluids  -call if your back pain is worsening or if you develop any other new urinary symptoms including urgency, burning, frequency, blood in your urine, or any foul smell.  -call if your ringing in the ears is getting worse or becomes more continuous    -Have your labs rechecked on Thursday or Friday of this week        Contact Numbers    [facility] Main Line: [phone]  [facility] Triage and after hours / weekends / holidays:  [phone]      Please call the triage or after hours line if you experience a temperature greater than or equal to 100.5, shaking chills, have uncontrolled nausea, vomiting and/or diarrhea, dizziness, shortness of breath, chest pain, bleeding, unexplained bruising, or if you have any other new/concerning symptoms, questions or concerns.      If you are having any concerning symptoms or wish to speak to a provider before your next infusion visit, please call your care coordinator or triage to notify them so we can adequately serve you.     If you need a refill on a narcotic prescription or other medication, please call before your infusion appointment.           August 2018 Sunday Monday Tuesday Wednesday Thursday Friday Saturday                   1     2     3     4       5     6     7     LAB    1:15 PM   (15 min.)   EC LAB   [facility] 8     9     10     11       12     13     MR BRAIN WWO    7:45 AM   (45 min.)   [id number]   [facility] MRI     [facility] LAB DRAW   10:00 AM   (15 min.)    [facility] DRAW   [facility]     [facility] RETURN   10:15 AM   (30 min.)   Anupama Morrison MD   [facility] ONC INFUSION 360   11:00 AM   (360 min.)    ONCOLOGY INFUSION   [facility]     [facility] RETURN    3:30 PM   (30 min.)   Mike Borges MD   [facility] 14     15     16     17     18       19     20     [facility] LAB DRAW   12:45 PM   (15 min.)    [facility] DRAW   [facility] Draw     [facility] ONC INFUSION 60    1:30 PM   (60 min.)    ONCOLOGY INFUSION   [facility] 21     [facility] NEW WITH ROOM   11:45 AM   (75 min.)   Kennedi Brunner GC   [facility] LAB DRAW    1:15 PM   (15 min.)    [facility] LAB DRAW   [facility] Draw 22     23     24     25       26     27     28     29     30     31 September 2018 Sunday Monday Tuesday Wednesday Thursday Friday Saturday                                 1       2     3     4     5     6     7     8       9     10     11     12  Happy Birthday!     13     14     15       16     17     18     19     20     21     22       23     24     [facility] LAB DRAW    9:15 AM   (15 min.)    [facility] LAB DRAW   [facility]     [facility] RETURN    9:45 AM   (30 min.)   Anupama Morrison MD   [facility] ONC INFUSION 360   10:30 AM   (360 min.)    ONCOLOGY INFUSION   [facility] 25     26     27     28     29       30                                               Recent Results (from the past 24 hour(s))   CBC with platelets differential     Collection Time: 08/20/18 12:33 PM   Result Value Ref Range    WBC 3.2 (L) 4.0 - 11.0 10e9/L    RBC Count 4.14 3.8 - 5.2 10e12/L    Hemoglobin 13.1 11.7 - 15.7 g/dL    Hematocrit 36.9 35.0 - 47.0 %    MCV 89 78 - 100 fl    MCH 31.6 26.5 - 33.0 pg    MCHC 35.5 31.5 - 36.5 g/dL    RDW 11.8 10.0 - 15.0 %    Platelet Count 204 150 - 450 10e9/L    Diff Method Automated Method     % Neutrophils 79.6 %    % Lymphocytes 8.7 %    % Monocytes 10.2 %    % Eosinophils 0.3 %    % Basophils 0.3 %    % Immature Granulocytes 0.9 %    Nucleated RBCs 0 0 /100    Absolute Neutrophil 2.6 1.6 - 8.3 10e9/L    Absolute Lymphocytes 0.3 (L) 0.8 - 5.3 10e9/L    Absolute Monocytes 0.3 0.0 - 1.3 10e9/L    Absolute Eosinophils 0.0 0.0 - 0.7 10e9/L    Absolute Basophils 0.0 0.0 - 0.2 10e9/L    Abs Immature Granulocytes 0.0 0 - 0.4 10e9/L    Absolute Nucleated RBC 0.0    Comprehensive metabolic panel    Collection Time: 08/20/18 12:33 PM   Result Value Ref Range    Sodium 135 133 - 144 mmol/L    Potassium 3.7 3.4 - 5.3 mmol/L    Chloride 100 94 - 109 mmol/L    Carbon Dioxide 26 20 - 32 mmol/L    Anion Gap 9 3 - 14 mmol/L    Glucose 95 70 - 99 mg/dL    Urea Nitrogen 14 7 - 30 mg/dL    Creatinine 1.18 (H) 0.52 - 1.04 mg/dL    GFR Estimate 53 (L) >60 mL/min/1.7m2    GFR Estimate If Black 64 >60 mL/min/1.7m2    Calcium 9.1 8.5 - 10.1 mg/dL    Bilirubin Total 0.6 0.2 - 1.3 mg/dL    Albumin 4.2 3.4 - 5.0 g/dL    Protein Total 7.6 6.8 - 8.8 g/dL    Alkaline Phosphatase 64 40 - 150 U/L    ALT 53 (H) 0 - 50 U/L    AST 37 0 - 45 U/L                   Follow-ups after your visit        Your next 10 appointments already scheduled     Aug 21, 2018 12:00 PM CDT   (Arrive by 11:45 AM)   NEW WITH ROOM with Kennedi Brunner GC, UC 2 116 CONSULT [facility]ly [facility] ([facility])    [address] [place] [address]  [address] [place] [address]   [phone]            Aug 21, 2018  1:15 PM CDT   [facility] Lab Draw with UC  [facility] LAB DRAW   [facility] Lab Draw ([facility])    [address]  [address]  [facility] [address]   [phone]            Sep 24, 2018  9:15 AM CDT   [facility] Lab Draw with UC MAS[facility] LAB DRAW   [facility] Lab Draw ([facility])    [address]  [address]  [facility] [address]   [phone]            Sep 24, 2018 10:00 AM CDT   (Arrive by 9:45 AM)   Return Visit with Anupama Morrison MD   [facility] ([facility])    [address]  [address]  [facility] [address]   [phone]            Sep 24, 2018 10:30 AM CDT   Infusion 360 with UC ONCOLOGY INFUSION, UC 21 ATC   [facility] ([facility])    [address]  [address]  [facility] [address]   [phone]            Oct 01, 2018 12:15 PM CDT   [facility] Lab Draw with UC MASON[facility] LAB DRAW   [facility] Lab Draw ([facility])    [address]  [address]  [facility] [address]   [phone]            Oct 01, 2018  1:00 PM CDT   Infusion 60 with UC ONCOLOGY INFUSION, UC 11 ATC   [facility] ([facility])    [address] [place] [address]  [address]  [facility] [address]   [phone]              Who to contact     If you have questions or need follow up information about today's clinic visit or your schedule please contact [facility] directly at [phone].  Normal or non-critical lab and imaging results will be communicated to you by MyChart, letter or phone within 4 business days after the clinic has received the results. If you do not hear from us within 7 days, please contact the clinic through MyChart or phone. If you have a critical or abnormal lab result, we will notify you by phone as soon as possible.  Submit refill requests through "[email or web address]hart or call your  pharmacy and they will forward the refill request to us. Please allow 3 business days for your refill to be completed.          Additional Information About Your Visit        MyChart Information     [employer] gives you secure access to your electronic health record. If you see a primary care provider, you can also send messages to your care team and make appointments. If you have questions, please call your primary care clinic.  If you do not have a primary care provider, please call [phone] and they will assist you.        Care EveryWhere ID     This is your Care EveryWhere ID. This could be used by other organizations to access your [place] medical records  [id number]        Your Vitals Were     Pulse Temperature Respirations Last Period Pulse Oximetry BMI (Body Mass Index)    79 98.2  F (36.8  C) (Oral) 16 08/12/2018 100% 36.21 kg/m2       Blood Pressure from Last 3 Encounters:   08/20/18 123/89   08/13/18 (!) 153/104   08/13/18 (!) 143/95    Weight from Last 3 Encounters:   08/20/18 84.1 kg (185 lb 6.4 oz)   08/13/18 85.6 kg (188 lb 11.2 oz)   07/13/18 87.4 kg (192 lb 9.6 oz)              We Performed the Following     CBC with platelets differential     Comprehensive metabolic panel        Primary Care Provider Office Phone # Fax #    Anupama Marly Morrison [phone]       [address] [facility] [address]        Equal Access to Services     [facility] : Hadii handy nielsono Ebony, waaxda luqadaha, qaybta kaalmada yumiko, waxay angel bowman. So [facility] [phone].    ATENCIÓN: Si habla español, tiene a mccurdy disposición servicios gratuitos de asistencia lingüística. Llame al [phone].    We comply with applicable federal civil rights laws and [place] laws. We do not discriminate on the basis of race, color, national origin, age, disability, sex, sexual orientation, or gender identity.            Thank you!     Thank you for choosing [facility]  for your care. Our goal is always to provide you with excellent care. Hearing back from our patients is one way we can continue to improve our services. Please take a few minutes to complete the written survey that you may receive in the mail after your visit with us. Thank you!             Your Updated Medication List - Protect others around you: Learn how to safely use, store and throw away your medicines at www.disposemymeds.org.          This list is accurate as of 8/20/18  3:26 PM.  Always use your most recent med list.                   Brand Name Dispense Instructions for use Diagnosis    acetaminophen 325 MG tablet    TYLENOL     Take 650 mg by mouth        DULCOLAX 5 MG EC tablet   Generic drug:  bisacodyl      Take 5 mg by mouth daily as needed for constipation        lomustine 40 MG capsule CHEMO    CEENU    4 capsule    Take 4 capsules (160 mg) by mouth once for 1 dose    Medulloblastoma of cerebellum (H)       LORazepam 0.5 MG tablet    ATIVAN    60 tablet    Take 1 tablet (0.5 mg) by mouth every 6 hours as needed for anxiety    Medulloblastoma of cerebellum (H)       ondansetron 8 MG ODT tab    ZOFRAN-ODT    60 tablet    Take 1 tablet (8 mg) by mouth every 8 hours as needed for nausea    Medulloblastoma of cerebellum (H)

## 2018-08-20 NOTE — PROGRESS NOTES
Infusion Nursing Note:  Merissa Silverman presents today for cycle 2, day 8 vincristine.    Patient seen by provider today: No   present during visit today: Not Applicable.    Note: Patient had cisplatin and vincristine last week on day 1.  She states that she has had some intermittent ringing in her hears since infusion last week.  She had some mild nausea and vomited x1 in the first couple of days after her infusion but has not had any nausea or vomiting since.  She has been drinking a lot of water at home (10-15 glasses per day).  She did struggle with constipation following her infusion last week, did not have a bowel movement for 7 days. She has been taking dulcolax prn and finally had a bowel movement 2 days ago.  Her creatinine today is 1.18, up from her baseline creatinine of 0.5-0.6.  Patient denies any symptoms of dysuria.  She does report some very mild lower back/flank pain for the past 2 days.     [facility]2018 [facility]/Barbara Espinoza RN  -ok to proceed with vincristine today  -give patient an additional 500cc NS IV today in addition to the ordered 500cc on her treatment plan for a total of 1 liter NS   -have patient get her labs rechecked on Thursday or Friday of this week  -have patient continue to monitor the ringing in her ears and call if it is worsening or becoming more continuous  -instruct patient to take senna 2 tabs at hs, docusate 2 tabs every morning, and miralax one dose at noon to help with potential constipation related to vincristine infusion today. Patient can  medications OTC  -instruct patient to call if her back pain is worsening or if she develops any other new urinary symptoms      Patient provided with the instructions above.  She was told to hold laxatives and stool softeners if she develops any loose stools or diarrhea.  Patient and mother verbalized understanding of all of the instructions.     Intravenous Access:  Implanted Port.    Treatment  Conditions:  Lab Results   Component Value Date    HGB 13.1 08/20/2018     Lab Results   Component Value Date    WBC 3.2 08/20/2018      Lab Results   Component Value Date    ANEU 2.6 08/20/2018     Lab Results   Component Value Date     08/20/2018      Lab Results   Component Value Date     08/20/2018                   Lab Results   Component Value Date    POTASSIUM 3.7 08/20/2018           No results found for: MAG         Lab Results   Component Value Date    CR 1.18 08/20/2018                   Lab Results   Component Value Date    ANAND 9.1 08/20/2018                Lab Results   Component Value Date    BILITOTAL 0.6 08/20/2018           Lab Results   Component Value Date    ALBUMIN 4.2 08/20/2018                    Lab Results   Component Value Date    ALT 53 08/20/2018           Lab Results   Component Value Date    AST 37 08/20/2018       Results reviewed, labs MET treatment parameters, ok to proceed with treatment.        Post Infusion Assessment:  Patient tolerated infusion without incident.  Blood return noted pre and post infusion and pre, mid, and post vincristine infusion by gravity  Site patent and intact, free from redness, edema or discomfort.  No evidence of extravasations.  Access discontinued per protocol.    Discharge Plan:   Patient declined prescription refills.  Discharge instructions reviewed with: Patient.  Patient and/or family verbalized understanding of discharge instructions and all questions answered.  Copy of AVS reviewed with patient and/or family.  Patient will return 9/24 for next appointment.  Patient will call to make an appointment to have labs checked at her local  Clinic in [place] on Friday 8/24.  Patient discharged in stable condition accompanied by: self and mother.  Departure Mode: Ambulatory.  Face to Face time: 7 minutes.    Barbara Espinoza RN

## 2018-08-20 NOTE — PROGRESS NOTES
Oral Chemotherapy Monitoring Program    Primary Oncologist: Dr. Morrison  Primary Oncology Clinic: [facility]  Cancer Diagnosis: Medulloblastoma    Drug: Lomustine 160 mg (4 x 40 mg) by mouth once  Start Date: C1D1=8/13/18    Drug Interaction Assessment: No clinically relevant drug interactions.    Lab Monitoring Plan  CBC weekly for 6 weeks and CMP every 28 days  Subjective/Objective:  Merissa Silverman is a 32 year old female seen in clinic for a follow-up visit for oral chemotherapy. Merissa began her first cycle of Lomustine therapy on 8/13. She is tolerating therapy with moderate side effects. Merissa expressed that her side effects were most severe during the first several days after her first dose of therapy. She reported feeling very fatigued within the first 3 days after her dose. Merissa also expressed that the medication was making her have mood swings, specifically feeling very emotional. Both the fatigue and emotional mood swings have improved in the past week. We discussed how getting in short periods of activity can help with fatigue. Merissa reports that she had mild nausea for several days after her dose. The nausea is being effectively controlled with Ativan. Merissa reports experiencing mild constipation while on therapy. She reports that taking Doc-Q-Lace helps with the constipation. Merissa noted that she has had ringing in her ears as well. Merissa denies vomiting, loss of appetite, mouth sores, diarrhea, and shortness of breath. Dr. Morrison is working with Merissa and her infusion nurse to decide on having labs drawn again tomorrow since her creatinine was at 1.18, up from 0.63 on 8/13. Merissa reports that she is drinking plenty of water (up to 15 water bottle) daily. She thanked us for the visit.    ORAL CHEMOTHERAPY 8/13/2018 8/20/2018   Drug Name (No Data) (No Data)   Current Dosage 160mg 160mg   Current Schedule Daily Daily   Cycle Details Other Other   Start Date of Last Cycle 8/13/2018 8/13/2018   Doses  missed in last 2 weeks - 0   Adherence Assessment - Adherent   Adverse Effects - Nausea;Constipation;Fatigue   Nausea - Grade 1   Pharmacist Intervention(nausea) - Yes   Intervention(s) - Patient education   Constipation - Grade 1   Pharmacist Intervention(constipation) - Yes   Intervention(s) - Patient education   Fatigue - Grade 1   Pharmacist Intervention(fatigue) - Yes   Intervention(s) - Patient education     Vitals:  BP:   BP Readings from Last 1 Encounters:   08/20/18 123/89     Wt Readings from Last 1 Encounters:   08/20/18 84.1 kg (185 lb 6.4 oz)     Estimated body surface area is 1.89 meters squared as calculated from the following:    Height as of 8/13/18: 1.524 m (5').    Weight as of an earlier encounter on 8/20/18: 84.1 kg (185 lb 6.4 oz).    Labs:  _  Result Component Current Result Ref Range   Sodium 135 (8/20/2018) 133 - 144 mmol/L     _  Result Component Current Result Ref Range   Potassium 3.7 (8/20/2018) 3.4 - 5.3 mmol/L     _  Result Component Current Result Ref Range   Calcium 9.1 (8/20/2018) 8.5 - 10.1 mg/dL     No results found for Mag within last 30 days.     No results found for Phos within last 30 days.     _  Result Component Current Result Ref Range   Albumin 4.2 (8/20/2018) 3.4 - 5.0 g/dL     _  Result Component Current Result Ref Range   Urea Nitrogen 14 (8/20/2018) 7 - 30 mg/dL     _  Result Component Current Result Ref Range   Creatinine 1.18 (H) (8/20/2018) 0.52 - 1.04 mg/dL       _  Result Component Current Result Ref Range   AST 37 (8/20/2018) 0 - 45 U/L     _  Result Component Current Result Ref Range   ALT 53 (H) (8/20/2018) 0 - 50 U/L     _  Result Component Current Result Ref Range   Bilirubin Total 0.6 (8/20/2018) 0.2 - 1.3 mg/dL       _  Result Component Current Result Ref Range   WBC 3.2 (L) (8/20/2018) 4.0 - 11.0 10e9/L     _  Result Component Current Result Ref Range   Hemoglobin 13.1 (8/20/2018) 11.7 - 15.7 g/dL     _  Result Component Current Result Ref Range   Platelet  Count 204 (8/20/2018) 150 - 450 10e9/L     _  Result Component Current Result Ref Range   Absolute Neutrophil 2.6 (8/20/2018) 1.6 - 8.3 10e9/L       Assessment:  Merissa is tolerating Lomustine therapy with moderate side effects. Both constipation and nausea are being controlled with home medications. Emotional mood swings and fatigue have improved since taking first dose, but fatigue continues to be an issue.     Plan:  -Continue Lomustine therapy.  -Monitor kidney function closely, creatinine elevated on 8/20  -Monitor for continuing or worsening side effects    Follow-Up:  Follow-up with possible labs on 8/21, weekly CBC.    Brandie Quintana  Pharmacy Intern  [facility]  [phone]

## 2018-08-20 NOTE — PATIENT INSTRUCTIONS
For Constipation Dr Morrison suggests you try the following medications:   -senna 8.6mg pills (laxative), 1-2 tabs every night before bed  -docusate 100mg pills (stool softener), 1-2 pills every morning  -miralax powder (laxative) 17g mid day daily, mix in juice or water    -STOP taking the medications above if you develop loose stools or any diarrhea  -continue to drink a lot of fluids  -call if your back pain is worsening or if you develop any other new urinary symptoms including urgency, burning, frequency, blood in your urine, or any foul smell.  -call if your ringing in the ears is getting worse or becomes more continuous    -Have your labs rechecked on Thursday or Friday of this week        Contact Numbers    [facility] Main Line: [phone]  [facility] Triage and after hours / weekends / holidays:  [phone]      Please call the triage or after hours line if you experience a temperature greater than or equal to 100.5, shaking chills, have uncontrolled nausea, vomiting and/or diarrhea, dizziness, shortness of breath, chest pain, bleeding, unexplained bruising, or if you have any other new/concerning symptoms, questions or concerns.      If you are having any concerning symptoms or wish to speak to a provider before your next infusion visit, please call your care coordinator or triage to notify them so we can adequately serve you.     If you need a refill on a narcotic prescription or other medication, please call before your infusion appointment.           August 2018 Sunday Monday Tuesday Wednesday Thursday Friday Saturday                  1     2     3     4       5     6     7     LAB    1:15 PM   (15 min.)    LAB   [facility] [place] 8     9     10     11       12     13     MR BRAIN WWO    7:45 AM   (45 min.)   [id number]   [facility] MRI     [facility] DRAW   10:00 AM   (15 min.)    [facility] DRAW   [facility] Draw     [facility] RETURN   10:15 AM   (30 min.)   Anupama Morrison  MD Marly   [facility] ONC INFUSION 360   11:00 AM   (360 min.)    ONCOLOGY INFUSION   [facility] RETURN    3:30 PM   (30 min.)   Mike Borges MD   [facility] 14     15     16     17     18       19     20     [facility] DRAW   12:45 PM   (15 min.)    [facility] DRAW   [facility] Lab Draw     [facility] ONC INFUSION 60    1:30 PM   (60 min.)   [facility] ONCOLOGY INFUSION   [facility] Cancer [facility] 21     [facility] NEW WITH ROOM   11:45 AM   (75 min.)   Kennedi Brunner GC   [facility] DRAW    1:15 PM   (15 min.)    [facility] DRAW   [facility] Lab Draw 22     23     24     25       26     27     28     29     30     31 September 2018 Sunday Monday Tuesday Wednesday Thursday Friday Saturday                                 1       2     3     4     5     6     7     8       9     10     11     12  Happy Birthday!     13     14     15       16     17     18     19     20     21     22       23     24     [facility] DRAW    9:15 AM   (15 min.)    [facility] DRAW   [facility] Lab Draw     [facility] RETURN    9:45 AM   (30 min.)   Anupama Morrison MD   [facility] ONC INFUSION 360   10:30 AM   (360 min.)    ONCOLOGY INFUSION   [facility] 25     26     27     28     29       30                                               Recent Results (from the past 24 hour(s))   CBC with platelets differential    Collection Time: 08/20/18 12:33 PM   Result Value Ref Range    WBC 3.2 (L) 4.0 - 11.0 10e9/L    RBC Count 4.14 3.8 - 5.2 10e12/L    Hemoglobin 13.1 11.7 - 15.7 g/dL    Hematocrit 36.9 35.0 - 47.0 %    MCV 89 78 - 100 fl    MCH 31.6 26.5 - 33.0 pg    MCHC 35.5 31.5 - 36.5 g/dL    RDW 11.8 10.0 - 15.0 %    Platelet Count 204 150 - 450 10e9/L    Diff Method Automated Method     % Neutrophils 79.6  %    % Lymphocytes 8.7 %    % Monocytes 10.2 %    % Eosinophils 0.3 %    % Basophils 0.3 %    % Immature Granulocytes 0.9 %    Nucleated RBCs 0 0 /100    Absolute Neutrophil 2.6 1.6 - 8.3 10e9/L    Absolute Lymphocytes 0.3 (L) 0.8 - 5.3 10e9/L    Absolute Monocytes 0.3 0.0 - 1.3 10e9/L    Absolute Eosinophils 0.0 0.0 - 0.7 10e9/L    Absolute Basophils 0.0 0.0 - 0.2 10e9/L    Abs Immature Granulocytes 0.0 0 - 0.4 10e9/L    Absolute Nucleated RBC 0.0    Comprehensive metabolic panel    Collection Time: 08/20/18 12:33 PM   Result Value Ref Range    Sodium 135 133 - 144 mmol/L    Potassium 3.7 3.4 - 5.3 mmol/L    Chloride 100 94 - 109 mmol/L    Carbon Dioxide 26 20 - 32 mmol/L    Anion Gap 9 3 - 14 mmol/L    Glucose 95 70 - 99 mg/dL    Urea Nitrogen 14 7 - 30 mg/dL    Creatinine 1.18 (H) 0.52 - 1.04 mg/dL    GFR Estimate 53 (L) >60 mL/min/1.7m2    GFR Estimate If Black 64 >60 mL/min/1.7m2    Calcium 9.1 8.5 - 10.1 mg/dL    Bilirubin Total 0.6 0.2 - 1.3 mg/dL    Albumin 4.2 3.4 - 5.0 g/dL    Protein Total 7.6 6.8 - 8.8 g/dL    Alkaline Phosphatase 64 40 - 150 U/L    ALT 53 (H) 0 - 50 U/L    AST 37 0 - 45 U/L

## 2018-08-21 ENCOUNTER — OFFICE VISIT (OUTPATIENT)
Dept: ONCOLOGY | Facility: CLINIC | Age: 33
End: 2018-08-21
Attending: GENETIC COUNSELOR, MS
Payer: COMMERCIAL

## 2018-08-21 DIAGNOSIS — Z80.0 FAMILY HISTORY OF COLON CANCER: ICD-10-CM

## 2018-08-21 DIAGNOSIS — C71.6 MEDULLOBLASTOMA OF CEREBELLUM (H): Primary | ICD-10-CM

## 2018-08-21 DIAGNOSIS — C71.6 MEDULLOBLASTOMA OF CEREBELLUM (H): ICD-10-CM

## 2018-08-21 LAB — MISCELLANEOUS TEST: NORMAL

## 2018-08-21 PROCEDURE — 96040 ZZH GENETIC COUNSELING, EACH 30 MINUTES: CPT | Mod: ZF | Performed by: GENETIC COUNSELOR, MS

## 2018-08-21 PROCEDURE — 25000128 H RX IP 250 OP 636: Performed by: GENETIC COUNSELOR, MS

## 2018-08-21 PROCEDURE — 36591 DRAW BLOOD OFF VENOUS DEVICE: CPT

## 2018-08-21 RX ORDER — HEPARIN SODIUM (PORCINE) LOCK FLUSH IV SOLN 100 UNIT/ML 100 UNIT/ML
5 SOLUTION INTRAVENOUS EVERY 8 HOURS
Status: DISCONTINUED | OUTPATIENT
Start: 2018-08-21 | End: 2018-08-29 | Stop reason: HOSPADM

## 2018-08-21 RX ADMIN — SODIUM CHLORIDE, PRESERVATIVE FREE 5 ML: 5 INJECTION INTRAVENOUS at 13:49

## 2018-08-21 NOTE — LETTER
Cancer Risk Management  Program Locations    [facility]  [facility]  Mailing Address  Cancer Risk Management Program  [address]  [place] [address]    New patient appointments  [phone]  August 22, 2018    Merissa Silverman  [address]  [address] [address]      Dear Merissa,    It was a pleasure meeting with you at the [facility] on August 21, 2018. Here is a copy of the progress note from your recent genetic counseling visit to the Cancer Risk Management Program. If you have any additional questions, please feel free to call.    8/21/2018    Referring Provider: Anupama Morrison MD    Presenting Information:   I met with Merissa Andra today for genetic counseling at the Cancer Risk Management Program at the [facility] to discuss her personal history of a medulloblastoma and family history of colon cancer. She is here today to review this history, cancer screening recommendations, and available genetic testing options.    Personal History:  Merissa is a 32 year old female. She was diagnosed with a medulloblastoma of the cerebellum, classical type and SHH-pathway activated, at age 32; treatment has included a resection, chemotherapy, and radiation. She also reports having a benign vaginal mass removed several years ago and a dermoid ovarian cyst removed in her 30's.      She had her first menstrual period at age 12, her first child at age 24, and is premenopausal. Merissa has her ovaries, fallopian tubes and uterus in place, and she has had no ovarian cancer screening to date. She reports a one year history of oral contraceptive use and that she has never used hormone replacement therapy.      She reports that her most recent OB-GYN exam and Pap smear were two years ago and  normal. Merissa has not had a mammogram or colonoscopy and does not regularly do any other cancer screening at this time. Merissa reported that she quit smoking in 2018 and occasionally drinks alcohol.    Family History: (Please see scanned pedigree for detailed family history information)    Merissa's maternal grandmother is 74 and has had two sites of skin cancer removed, though the exact type is unknown. Her niece was diagnosed with thyroid cancer in her 20's and her great uncle passed away from lung cancer (smoker).    Her brother reportedly had alpha-1 antitrypsin deficiency. We briefly reviewed that this is a genetic condition affecting the lungs and liver, though the exact symptoms vary depending upon the specific gene mutations an individual carries.     Merissa's grandmother is strongly encouraged to share the family history of alpha-1 antitrypsin deficiency with her primary care provider, as she and other relatives are at risk to have the same condition.      Merissa's paternal grandmother was diagnosed with colon cancer at age 60 and is currently 81. She has also had an unknown skin cancer removed.    Merissa's paternal grandfather was diagnosed with colon cancer in his 50's and passed away at age 70; he reportedly also had a calcium cyst in his brain and it is unclear if this could have been related to his  service.    Of note, Merissa denies a personal or family history of palmar pits, known basal cell carcinomas, jaw cysts/tumors, CHRPE, extra teeth, or osteomas. She reports that she and several of her close relatives have a large head size.    Her maternal ethnicity is Sinhala, Moroccan, Polish, and [place]. Her paternal ethnicity is [place], [place], and [place]. There is no known Ashkenazi Zoroastrianism ancestry on either side of her family. There is no reported consanguinity.    Discussion:    Merissa's personal history of a medulloblastoma may be suggestive of a hereditary cancer syndrome.    We reviewed the features of  sporadic, familial, and hereditary cancers. In looking at Merissa's family history, it is possible that a cancer susceptibility gene is present as she was diagnosed with a rare brain tumor in adulthood, as it is more often seen in childhood. That being said, Merissa has many other relatives that have not been diagnosed with a cancer and/or were not diagnosed under age 50. This may reduce the chance there is a hereditary cancer syndrome present in Merissa's family.    We discussed the natural history and genetics of several hereditary cancer syndromes, including Familial Adenomatous Polyposis (FAP) and Li-Fraumeni syndrome (LFS). A detailed handout regarding these syndromes and the information we discussed was provided to Merissa at the end of our appointment today and can be found in the after visit summary. Topics included: inheritance pattern, cancer risks, cancer screening recommendations, and also risks, benefits and limitations of testing.    We discussed that there are several hereditary cancer syndromes that may present with a medulloblastoma.     For example, mutations in the APC gene cause FAP. Individuals with classic FAP typically have 100-1000s of colon polyps and a very significant risk for colon cancer. Other cancers can be seen, such as medulloblastomas. A milder form of FAP can also been seen, called Attenuated FAP (AFAP). AFAP may present with fewer polyps and lower colon cancer risk, but also much lower risks for other cancers (i.e. medulloblastoma).    Another potential explanation is LFS, which is caused by mutations in the TP53 gene. Individuals with LFS are at significantly increased risk for many types of cancer, including medulloblastomas.    Of note, though Merissa does not otherwise have a family history suggestive of either condition, there is a small percentage of individuals with FAP and LFS that are the first individuals in their family with the condition.    We discussed that there are additional  genes that could cause increased risk for brain tumors. Gorlin syndrome (caused by PTCH1 and SUFU mutations) can cause medulloblastomas and other cancers/physical features, though the medulloblastoma is typically see in very early childhood. As many of these genes present with overlapping features in a family and accurate cancer risk cannot always be established based upon the pedigree analysis alone, it would be reasonable for Merissa to consider panel genetic testing to analyze multiple genes at once.    Merissa explained that she would be interested in learning as much information as possible from genetic testing, even if the chance of identifying a mutation is likely low. As such, we discussed that genetic testing is available for 30 genes associated with increased risk for brain and related cancers: CustomNext-Cancer (AIP, ALK, APC, BRCA2, CDKN1B, CDKN2A, DICER1, EPCAM, MEN1, MLH1, MSH2, MSH6, NBN, NF1, NF2, PALB2, PHOX2B, PMS2, POT1, FJDRF6G, PTCH1, PTEN, SMARCA4, SMARCB1, SMARCE1, SUFU, TP53, TSC1, TSC2, and VHL). This custom panel is a combination of the BrainTumorNext panel and the genes BRCA2, EPCAM, and PALB2.    We discussed that many of the genes in the CustomNext-Cancer panel are associated with specific hereditary cancer syndromes and published management guidelines: Hereditary Breast and Ovarian Cancer syndrome (BRCA2), Guzman syndrome (MLH1, MSH2, MSH6, PMS2, EPCAM), Familial Adenomatous Polyposis (APC), Familial Atypical Multiple Mole Melanoma syndrome (CDKN2A), Cowden syndrome (PTEN), Li Fraumeni syndrome (TP53), Tuberous sclerosis complex (TSC1, TSC2), Von Hippel-Lindau disease (VHL), Neurofibromatosis type 1 (NF1), Neurofibromatosis type 2 (NF2), Multiple endocrine neoplasia type 1 (MEN1), Multiple endocrine neoplasia type 4 (CDKN1B), Gorlin syndrome (SUFU, PTCH1), and Pennington complex (AIP, [id number]).    The NBN and PALB2 genes are associated with increased cancer risk and have published management  guidelines for certain cancers.      The remaining genes (ALK, DICER1, PHOX2B, POT1, SMARCA4, SMARCB1, and SMARCE1) are associated with increased cancer risk and may allow us to make medical recommendations when mutations are identified.      Merissa was provided with a detailed brochure from [employer] explaining the CustomNext-Cancer testing.    Consent was obtained and genetic testing for CustomNext-Cancer was sent to [facility]. Turn around time: 3-4 weeks.     Medical Management: For Merissa, we reviewed that the information from genetic testing may determine:    additional cancer screening for which Merissa may qualify (i.e. annual mammogram and breast MRI, more frequent colonoscopies, more frequent dermatologic exams, regular blood work, regular MRIs, etc.),    options for risk reducing surgeries Merissa could consider (i.e. bilateral mastectomy, surgery to remove her ovaries and/or uterus, etc.),      and targeted chemotherapies for Merissa's active cancer, or if she were to develop certain cancers in the future (i.e. immunotherapy for individuals with Guzman syndrome, PARP inhibitors, etc.).     These recommendations and possible targeted chemotherapies will be discussed in detail once genetic testing is completed.     Plan:  1) Today Merissa elected to proceed with genetic testing using a 30 gene CustomNext-Cancer panel offered by [employer].  2) This information should be available in 3-4 weeks.  3) Merissa will return to clinic to discuss the results.    Face to face time: 45 minutes    Kennedi Brunner MS, [facility]  Licensed Genetic Counselor  Office: [phone]  Pager: [phone]

## 2018-08-21 NOTE — PROGRESS NOTES
8/21/2018    Referring Provider: Anupama Morrison MD    Presenting Information:   I met with Merissa Silverman today for genetic counseling at the Cancer Risk Management Program at the [facility] to discuss her personal history of a medulloblastoma and family history of colon cancer. She is here today to review this history, cancer screening recommendations, and available genetic testing options.    Personal History:  Merissa is a 32 year old female. She was diagnosed with a medulloblastoma of the cerebellum, classical type and SHH-pathway activated, at age 32; treatment has included a resection, chemotherapy, and radiation. She also reports having a benign vaginal mass removed several years ago and a dermoid ovarian cyst removed in her 30's.      She had her first menstrual period at age 12, her first child at age 24, and is premenopausal. Merissa has her ovaries, fallopian tubes and uterus in place, and she has had no ovarian cancer screening to date. She reports a one year history of oral contraceptive use and that she has never used hormone replacement therapy.      She reports that her most recent OB-GYN exam and Pap smear were two years ago and normal. Merissa has not had a mammogram or colonoscopy and does not regularly do any other cancer screening at this time. Merissa reported that she quit smoking in 2018 and occasionally drinks alcohol.    Family History: (Please see scanned pedigree for detailed family history information)    Merissa's maternal grandmother is 74 and has had two sites of skin cancer removed, though the exact type is unknown. Her niece was diagnosed with thyroid cancer in her 20's and her great uncle passed away from lung cancer (smoker).    Her brother reportedly had alpha-1 antitrypsin deficiency. We briefly reviewed that this is a genetic condition affecting the lungs and liver, though the exact symptoms vary depending upon the specific gene mutations an individual carries.     Merissa's  grandmother is strongly encouraged to share the family history of alpha-1 antitrypsin deficiency with her primary care provider, as she and other relatives are at risk to have the same condition.      Merissa's paternal grandmother was diagnosed with colon cancer at age 60 and is currently 81. She has also had an unknown skin cancer removed.    Merissa's paternal grandfather was diagnosed with colon cancer in his 50's and passed away at age 70; he reportedly also had a calcium cyst in his brain and it is unclear if this could have been related to his  service.    Of note, Merissa denies a personal or family history of palmar pits, known basal cell carcinomas, jaw cysts/tumors, CHRPE, extra teeth, or osteomas. She reports that she and several of her close relatives have a large head size.    Her maternal ethnicity is Romansh, [place] Islander, Polish, and English. Her paternal ethnicity is [place], Taisha, and English. There is no known Ashkenazi Episcopalian ancestry on either side of her family. There is no reported consanguinity.    Discussion:    Merissa's personal history of a medulloblastoma may be suggestive of a hereditary cancer syndrome.    We reviewed the features of sporadic, familial, and hereditary cancers. In looking at Merissa's family history, it is possible that a cancer susceptibility gene is present as she was diagnosed with a rare brain tumor in adulthood, as it is more often seen in childhood. That being said, Merissa has many other relatives that have not been diagnosed with a cancer and/or were not diagnosed under age 50. This may reduce the chance there is a hereditary cancer syndrome present in Merissa's family.    We discussed the natural history and genetics of several hereditary cancer syndromes, including Familial Adenomatous Polyposis (FAP) and Li-Fraumeni syndrome (LFS). A detailed handout regarding these syndromes and the information we discussed was provided to Merissa at the end of our appointment today and can be  found in the after visit summary. Topics included: inheritance pattern, cancer risks, cancer screening recommendations, and also risks, benefits and limitations of testing.    We discussed that there are several hereditary cancer syndromes that may present with a medulloblastoma.     For example, mutations in the APC gene cause FAP. Individuals with classic FAP typically have 100-1000s of colon polyps and a very significant risk for colon cancer. Other cancers can be seen, such as medulloblastomas. A milder form of FAP can also been seen, called Attenuated FAP (AFAP). AFAP may present with fewer polyps and lower colon cancer risk, but also much lower risks for other cancers (i.e. medulloblastoma).    Another potential explanation is LFS, which is caused by mutations in the TP53 gene. Individuals with LFS are at significantly increased risk for many types of cancer, including medulloblastomas.    Of note, though Merissa does not otherwise have a family history suggestive of either condition, there is a small percentage of individuals with FAP and LFS that are the first individuals in their family with the condition.    We discussed that there are additional genes that could cause increased risk for brain tumors. Gorlin syndrome (caused by PTCH1 and SUFU mutations) can cause medulloblastomas and other cancers/physical features, though the medulloblastoma is typically see in very early childhood. As many of these genes present with overlapping features in a family and accurate cancer risk cannot always be established based upon the pedigree analysis alone, it would be reasonable for Merissa to consider panel genetic testing to analyze multiple genes at once.    Merissa explained that she would be interested in learning as much information as possible from genetic testing, even if the chance of identifying a mutation is likely low. As such, we discussed that genetic testing is available for 30 genes associated with increased  risk for brain and related cancers: CustomNext-Cancer (AIP, ALK, APC, BRCA2, CDKN1B, CDKN2A, DICER1, EPCAM, MEN1, MLH1, MSH2, MSH6, NBN, NF1, NF2, PALB2, PHOX2B, PMS2, POT1, XBGKX1W, PTCH1, PTEN, SMARCA4, SMARCB1, SMARCE1, SUFU, TP53, TSC1, TSC2, and VHL). This custom panel is a combination of the BrainTumorNext panel and the genes BRCA2, EPCAM, and PALB2.    We discussed that many of the genes in the CustomNext-Cancer panel are associated with specific hereditary cancer syndromes and published management guidelines: Hereditary Breast and Ovarian Cancer syndrome (BRCA2), Guzman syndrome (MLH1, MSH2, MSH6, PMS2, EPCAM), Familial Adenomatous Polyposis (APC), Familial Atypical Multiple Mole Melanoma syndrome (CDKN2A), Cowden syndrome (PTEN), Li Fraumeni syndrome (TP53), Tuberous sclerosis complex (TSC1, TSC2), Von Hippel-Lindau disease (VHL), Neurofibromatosis type 1 (NF1), Neurofibromatosis type 2 (NF2), Multiple endocrine neoplasia type 1 (MEN1), Multiple endocrine neoplasia type 4 (CDKN1B), Gorlin syndrome (SUFU, PTCH1), and Pennington complex (AIP, [id number]).    The NBN and PALB2 genes are associated with increased cancer risk and have published management guidelines for certain cancers.      The remaining genes (ALK, DICER1, PHOX2B, POT1, SMARCA4, SMARCB1, and SMARCE1) are associated with increased cancer risk and may allow us to make medical recommendations when mutations are identified.      Merissa was provided with a detailed brochure from [employer] explaining the CustomNext-Cancer testing.    Consent was obtained and genetic testing for CustomNext-Cancer was sent to [facility]. Turn around time: 3-4 weeks.     Medical Management: For Merissa, we reviewed that the information from genetic testing may determine:    additional cancer screening for which Merissa may qualify (i.e. annual mammogram and breast MRI, more frequent colonoscopies, more frequent dermatologic exams, regular blood work, regular MRIs,  etc.),    options for risk reducing surgeries Merissa could consider (i.e. bilateral mastectomy, surgery to remove her ovaries and/or uterus, etc.),      and targeted chemotherapies for Mreissa's active cancer, or if she were to develop certain cancers in the future (i.e. immunotherapy for individuals with Guzman syndrome, PARP inhibitors, etc.).     These recommendations and possible targeted chemotherapies will be discussed in detail once genetic testing is completed.     Plan:  1) Today Merissa elected to proceed with genetic testing using a 30 gene CustomNext-Cancer panel offered by [employer].  2) This information should be available in 3-4 weeks.  3) Merissa will return to clinic to discuss the results.    Face to face time: 45 minutes    Kennedi Brunner MS, [facility]  Licensed Genetic Counselor  Office: [phone]  Pager: [phone]

## 2018-08-21 NOTE — PATIENT INSTRUCTIONS
Assessing Cancer Risk  Only about 5-10% of cancers are thought to be due to an inherited cancer susceptibility gene.    These families often have:    Several people with the same or related types of cancer    Cancers diagnosed at a young age (before age 50)    Individuals with more than one primary cancer    Multiple generations of the family affected with cancer    Li-Fraumeni Syndrome (LFS)  LFS is a cancer predisposition syndrome. Individuals with LFS are at an increased risk for developing cancer at a young age. The general lifetime risk for development of cancer is 50% by age 30 and 90% by age 60.  LFS is caused by a mutation in the TP53 gene.     TP53 Gene  We each inherit two copies of every gene in our bodies: one from our mother and one from our father. Each gene has a specific job to do. When a gene has a mistake or  mutation  in it, it does not work like it should. Everyone has two copies of the TP53 gene. A single mutation in one of the copies of TP53 increases the risk for multiple cancers.     Core Cancers: Sarcomas, Breast, Brain, Lung, Leukemias/Lymphomas, Adrenocortical carcinomas  Other Cancers: Gastrointestinal, Thyroid, Skin, Genitourinary    Inheriting a mutation does not mean a person has cancer or will develop cancer, but it does significantly increase his or her risk above the general population risk.    Inheritance   TP53 mutations are inherited in an autosomal dominant pattern.  This means that if a parent has a mutation, each of his or her children will have a 50% chance of inheriting that same mutation.  Therefore, each child--male or female--would have a 50% chance of being at increased risk for developing cancer.    Some individuals with LFS (up to 20%) have a de nadira mutation, which means that their TP53 mutation was not inherited from a parent.  This means, the mutation occurred for the first time in them.  Now that they have the TP53 mutation, however, it can be passed on to future  generations.    Image obtained from Genetics Home Reference, 2013     Genetic Testing  Genetic testing involves a simple blood test and will look at the genetic information in the TP53 gene for any harmful mutations that are associated with increased cancer risk.  If possible, it is recommended that the person(s) who has had cancer be tested before other family members.  That person will give us the most useful information about whether or not a specific gene is associated with the cancer in the family.    Results  There are three possible results of TP53 genetic testing:    Positive--a harmful mutation was identified     Negative--no mutation was identified     Variant of unknown significance--a variation in TP53 was identified, but it is unclear how this impacts cancer risk in the family    Advantages and Disadvantages  There are advantages and disadvantages to genetic testing of TP53.    Advantages    May clarify your cancer risk    Can help you make medical decisions    May explain the cancers in your family    May give useful information to your family members (if you share your results)    Disadvantages    Possible negative emotional impact of learning about inherited cancer risk    Uncertainty in interpreting a negative test result in some situations    Possible genetic discrimination concerns (see below)  Genetic Information Nondiscrimination Act (KETAN)  KETAN is a federal law that protects individuals from health insurance or employment discrimination based on a genetic test result alone.  Although rare, there are currently no legal protections in terms of life insurance, long term care, or disability insurances.  Visit the National Human Genome Research [place] genome.gov/50306162 to learn more.    Reducing Cancer Risk  Current screening recommendations for people with Li Fraumeni Syndrome include1,2:    Breast:  o Breast self-exams starting at age 18; repeated monthly  o Clinical breast exams starting at  age 20-25; repeated every 6-12 months  o Annual mammogram and breast MRI starting at age 20-25 (or possibly younger)  o Consideration of preventative mastectomy    Colorectal: Consider colonoscopy starting no later than 25; repeated every 2-5 years     Other Cancers:   o Annual comprehensive physical exam  o Consider novel screening techniques and clinical trials: brain MRI, whole body MRI, abdominal and pelvic ultrasound    Skin: Consider annual dermatology exam    Radiation therapy for cancer should be used with caution in individuals with Li Fraumeni Syndrome.    Questions to Think About Regarding Genetic Testing    What effect will the test result have on me and my relationship with my family members if I have an inherited gene mutation?  If I don t have a gene mutation?    Should I share my test results, and how will my family react to this news, which may also affect them?    Are my children ready to learn new information that may one day affect their own health?    Resources  Li-Fraumeni Syndrome Association lfsassociation.org   Cancer Care cancercare.org   American Cancer Society (ACS) cancer.org   National Cancer [place] (NCI) cancer.gov     Cancer Risk Management Program [phone] ([phone])    ? Nargis Shae, MS, [facility]  [phone]  ? Arianna Landon, MS, [facility]  [phone]  ? Kennedi Brunner, MS, [facility]  [phone]  ? Wendy Dudley, MS, [facility]  [phone]   ? Kareen Rviera, MS, [facility]  [phone]  References  1. National Comprehensive Cancer Network. Clinical practice guidelines in oncology, genetic/familial high-risk assessment: breast and ovarian. Available online (registration required). 2013.  2. Manuel MARTI, Carlene U, Marlee J, Jeff MARTI, Jesús MCCLOUD, Phill H, Olivia A, Cecily J, Michel CHIN. Biochemical and imaging surveillance in germline TP53 mutation carriers with Li-Fraumeni syndrome: A prospective observational study. Lancet Oncol. 2011;12:559-67

## 2018-08-21 NOTE — MR AVS SNAPSHOT
After Visit Summary   8/21/2018    Merissa Silverman    MRN: [id number]           Patient Information     Date Of Birth          1985        Visit Information        Provider Department      8/21/2018 12:00 PM Kennedi Brunner GC;  [address] CONSULT [facility]        Today's Diagnoses     Medulloblastoma of cerebellum (H)    -  1    Family history of colon cancer          Care Instructions      Assessing Cancer Risk  Only about 5-10% of cancers are thought to be due to an inherited cancer susceptibility gene.    These families often have:    Several people with the same or related types of cancer    Cancers diagnosed at a young age (before age 50)    Individuals with more than one primary cancer    Multiple generations of the family affected with cancer    Li-Fraumeni Syndrome (LFS)  LFS is a cancer predisposition syndrome. Individuals with LFS are at an increased risk for developing cancer at a young age. The general lifetime risk for development of cancer is 50% by age 30 and 90% by age 60.  LFS is caused by a mutation in the TP53 gene.     TP53 Gene  We each inherit two copies of every gene in our bodies: one from our mother and one from our father. Each gene has a specific job to do. When a gene has a mistake or  mutation  in it, it does not work like it should. Everyone has two copies of the TP53 gene. A single mutation in one of the copies of TP53 increases the risk for multiple cancers.     Core Cancers: Sarcomas, Breast, Brain, Lung, Leukemias/Lymphomas, Adrenocortical carcinomas  Other Cancers: Gastrointestinal, Thyroid, Skin, Genitourinary    Inheriting a mutation does not mean a person has cancer or will develop cancer, but it does significantly increase his or her risk above the general population risk.    Inheritance   TP53 mutations are inherited in an autosomal dominant pattern.  This means that if a parent has a mutation, each of his or her children  will have a 50% chance of inheriting that same mutation.  Therefore, each child--male or female--would have a 50% chance of being at increased risk for developing cancer.    Some individuals with LFS (up to 20%) have a de nadira mutation, which means that their TP53 mutation was not inherited from a parent.  This means, the mutation occurred for the first time in them.  Now that they have the TP53 mutation, however, it can be passed on to future generations.    Image obtained from Genetics Home Reference, 2013     Genetic Testing  Genetic testing involves a simple blood test and will look at the genetic information in the TP53 gene for any harmful mutations that are associated with increased cancer risk.  If possible, it is recommended that the person(s) who has had cancer be tested before other family members.  That person will give us the most useful information about whether or not a specific gene is associated with the cancer in the family.    Results  There are three possible results of TP53 genetic testing:    Positive--a harmful mutation was identified     Negative--no mutation was identified     Variant of unknown significance--a variation in TP53 was identified, but it is unclear how this impacts cancer risk in the family    Advantages and Disadvantages  There are advantages and disadvantages to genetic testing of TP53.    Advantages    May clarify your cancer risk    Can help you make medical decisions    May explain the cancers in your family    May give useful information to your family members (if you share your results)    Disadvantages    Possible negative emotional impact of learning about inherited cancer risk    Uncertainty in interpreting a negative test result in some situations    Possible genetic discrimination concerns (see below)  Genetic Information Nondiscrimination Act (KETAN)  KETAN is a federal law that protects individuals from health insurance or employment discrimination based on a  genetic test result alone.  Although rare, there are currently no legal protections in terms of life insurance, long term care, or disability insurances.  Visit the National Human Genome Research [place] genome.gov/63648459 to learn more.    Reducing Cancer Risk  Current screening recommendations for people with Li Fraumeni Syndrome include1,2:    Breast:  o Breast self-exams starting at age 18; repeated monthly  o Clinical breast exams starting at age 20-25; repeated every 6-12 months  o Annual mammogram and breast MRI starting at age 20-25 (or possibly younger)  o Consideration of preventative mastectomy    Colorectal: Consider colonoscopy starting no later than 25; repeated every 2-5 years     Other Cancers:   o Annual comprehensive physical exam  o Consider novel screening techniques and clinical trials: brain MRI, whole body MRI, abdominal and pelvic ultrasound    Skin: Consider annual dermatology exam    Radiation therapy for cancer should be used with caution in individuals with Li Fraumeni Syndrome.    Questions to Think About Regarding Genetic Testing    What effect will the test result have on me and my relationship with my family members if I have an inherited gene mutation?  If I don t have a gene mutation?    Should I share my test results, and how will my family react to this news, which may also affect them?    Are my children ready to learn new information that may one day affect their own health?    Resources  Li-Fraumeni Syndrome Association lfsassociation.org   Cancer Care cancercare.org   American Cancer Society (ACS) cancer.org   National Cancer [place] (NCI) cancer.gov     Cancer Risk Management Program [phone] ([phone])    ? Nargis Kaufman, MS, [facility]  [phone]  ? Arianna Landon, MS, [facility]  [phone]  ? Kennedi Brunner, MS, [facility]  [phone]  ? Wendy Dudley, MS, [facility]  [phone]   ? Kareen Rivera, MS, [facility]  [phone]  References  1. National Comprehensive Cancer Network. Clinical  practice guidelines in oncology, genetic/familial high-risk assessment: breast and ovarian. Available online (registration required). 2013.  2. Manuel MARTI, Carlene U, Marlee J, Jeff A, Jesús MCCLOUD, Phill H, Olivia MARTI, Cecily MCCLOUD, Michel CHIN. Biochemical and imaging surveillance in germline TP53 mutation carriers with Li-Fraumeni syndrome: A prospective observational study. Lancet Oncol. 2011;12:559-67          Follow-ups after your visit        Your next 10 appointments already scheduled     Aug 21, 2018  1:15 PM CDT   LAB with EC LAB   [facility]    [address] [place] [id number] [phone]           OUTSIDE LABS: Please include name of facility and Physician that is requesting outside labs be drawn.  Please indicate if labs are fasting or non-fasting on appt notes.  Be as specific as you can on which labs are being drawn.            Sep 24, 2018  9:15 AM CDT   Masonic Lab Draw with UC MASONIC LAB DRAW   [facility] Masonic Lab Draw ([facility])    [address] [place] [address]  [address]  [facility] [address]   [phone]            Sep 24, 2018 10:00 AM CDT   (Arrive by 9:45 AM)   Return Visit with Anupama Morrison MD   [facility] ([facility])    [address] [place] [address]  [address]  [facility] [address]   [phone]            Sep 24, 2018 10:30 AM CDT   Infusion 360 with UC ONCOLOGY INFUSION, UC 21 ATC   [facility] ([facility])    [address] [place] [address]  [address]  [facility] [address]   [phone]            Oct 01, 2018 12:15 PM CDT   Masonic Lab Draw with UC MASONIC LAB DRAW   [facility]ic Lab Draw ([facility])    [address] [place] [address]  [address]  [facility] [address]   [phone]            Oct 01, 2018  1:00 PM CDT   Infusion 60 with UC ONCOLOGY INFUSION, UC 11 ATC     [facility] [place] ([facility])    [address]  [address]  [facility] [id number] [phone]              Future tests that were ordered for you today     Open Future Orders        Priority Expected Expires Ordered    CustomNext-Cancer genetic testing, [place] Test: Laboratory Miscellaneous Order Routine  8/21/2019 8/21/2018    Comprehensive metabolic panel Routine 8/24/2018 8/20/2019 8/20/2018            Who to contact     If you have questions or need follow up information about today's clinic visit or your schedule please contact [facility] directly at [phone].  Normal or non-critical lab and imaging results will be communicated to you by MyChart, letter or phone within 4 business days after the clinic has received the results. If you do not hear from us within 7 days, please contact the clinic through [email or web address] or phone. If you have a critical or abnormal lab result, we will notify you by phone as soon as possible.  Submit refill requests through [employer] or call your pharmacy and they will forward the refill request to us. Please allow 3 business days for your refill to be completed.          Additional Information About Your Visit        Healthiest YouharPedidosYa / PedidosJÃ¡ Information     [employer] gives you secure access to your electronic health record. If you see a primary care provider, you can also send messages to your care team and make appointments. If you have questions, please call your primary care clinic.  If you do not have a primary care provider, please call [phone] and they will assist you.        Care EveryWhere ID     This is your Care EveryWhere ID. This could be used by other organizations to access your [facility] medical records  [id number]        Your Vitals Were     Last Period                   08/12/2018            Blood Pressure from Last 3 Encounters:   08/20/18 123/89   08/13/18 (!) 153/104   08/13/18 (!) 143/95    Weight from Last 3  Encounters:   08/20/18 84.1 kg (185 lb 6.4 oz)   08/13/18 85.6 kg (188 lb 11.2 oz)   07/13/18 87.4 kg (192 lb 9.6 oz)               Primary Care Provider Office Phone # Fax Agustín    Anupama Marly Morrison [phone] [address] [address]        Equal Access to Services     WANDER HAMMER : Hadii aad ku hadasho Soomaali, waaxda luqadaha, qaybta kaalmada adeegyada, waxay idiin hayaan adeeg khgagejimbo lakeli ah. So [facility] [phone].    ATENCIÓN: Si livia cox, tiene a mccurdy disposición servicios gratuitos de asistencia lingüística. [facility] [phone].    We comply with applicable federal civil rights laws and [place] laws. We do not discriminate on the basis of race, color, national origin, age, disability, sex, sexual orientation, or gender identity.            Thank you!     Thank you for choosing [facility]  for your care. Our goal is always to provide you with excellent care. Hearing back from our patients is one way we can continue to improve our services. Please take a few minutes to complete the written survey that you may receive in the mail after your visit with us. Thank you!             Your Updated Medication List - Protect others around you: Learn how to safely use, store and throw away your medicines at www.disposemymeds.org.          This list is accurate as of 8/21/18 12:56 PM.  Always use your most recent med list.                   Brand Name Dispense Instructions for use Diagnosis    acetaminophen 325 MG tablet    TYLENOL     Take 650 mg by mouth        DULCOLAX 5 MG EC tablet   Generic drug:  bisacodyl      Take 5 mg by mouth daily as needed for constipation        lomustine 40 MG capsule CHEMO    CEENU    4 capsule    Take 4 capsules (160 mg) by mouth once for 1 dose    Medulloblastoma of cerebellum (H)       LORazepam 0.5 MG tablet    ATIVAN    60 tablet    Take 1 tablet (0.5 mg) by mouth every 6 hours as needed for anxiety     Medulloblastoma of cerebellum (H)       ondansetron 8 MG ODT tab    ZOFRAN-ODT    60 tablet    Take 1 tablet (8 mg) by mouth every 8 hours as needed for nausea    Medulloblastoma of cerebellum (H)

## 2018-08-24 DIAGNOSIS — D70.1 CHEMOTHERAPY INDUCED NEUTROPENIA (H): ICD-10-CM

## 2018-08-24 DIAGNOSIS — R11.2 CHEMOTHERAPY-INDUCED NAUSEA AND VOMITING: ICD-10-CM

## 2018-08-24 DIAGNOSIS — Z51.11 CHEMOTHERAPY MANAGEMENT, ENCOUNTER FOR: ICD-10-CM

## 2018-08-24 DIAGNOSIS — Z91.89 HIGH RISK FOR CHEMOTHERAPY-INDUCED INFECTIOUS COMPLICATION: ICD-10-CM

## 2018-08-24 DIAGNOSIS — T45.1X5A CHEMOTHERAPY INDUCED NEUTROPENIA (H): ICD-10-CM

## 2018-08-24 DIAGNOSIS — T45.1X5A CHEMOTHERAPY-INDUCED NAUSEA AND VOMITING: ICD-10-CM

## 2018-08-24 LAB
BASOPHILS # BLD AUTO: 0 10E9/L (ref 0–0.2)
BASOPHILS NFR BLD AUTO: 0.4 %
DIFFERENTIAL METHOD BLD: ABNORMAL
EOSINOPHIL # BLD AUTO: 0 10E9/L (ref 0–0.7)
EOSINOPHIL NFR BLD AUTO: 0 %
ERYTHROCYTE [DISTWIDTH] IN BLOOD BY AUTOMATED COUNT: 11.7 % (ref 10–15)
HCT VFR BLD AUTO: 31.3 % (ref 35–47)
HGB BLD-MCNC: 11 G/DL (ref 11.7–15.7)
LYMPHOCYTES # BLD AUTO: 0.3 10E9/L (ref 0.8–5.3)
LYMPHOCYTES NFR BLD AUTO: 10.4 %
MCH RBC QN AUTO: 31.7 PG (ref 26.5–33)
MCHC RBC AUTO-ENTMCNC: 35.1 G/DL (ref 31.5–36.5)
MCV RBC AUTO: 90 FL (ref 78–100)
MONOCYTES # BLD AUTO: 0.3 10E9/L (ref 0–1.3)
MONOCYTES NFR BLD AUTO: 10.4 %
NEUTROPHILS # BLD AUTO: 1.9 10E9/L (ref 1.6–8.3)
NEUTROPHILS NFR BLD AUTO: 78.8 %
PLATELET # BLD AUTO: 172 10E9/L (ref 150–450)
RBC # BLD AUTO: 3.47 10E12/L (ref 3.8–5.2)
WBC # BLD AUTO: 2.4 10E9/L (ref 4–11)

## 2018-08-24 PROCEDURE — 85025 COMPLETE CBC W/AUTO DIFF WBC: CPT | Performed by: PSYCHIATRY & NEUROLOGY

## 2018-08-24 PROCEDURE — 36415 COLL VENOUS BLD VENIPUNCTURE: CPT | Performed by: PSYCHIATRY & NEUROLOGY

## 2018-08-24 PROCEDURE — 80053 COMPREHEN METABOLIC PANEL: CPT | Performed by: PSYCHIATRY & NEUROLOGY

## 2018-08-25 LAB
ALBUMIN SERPL-MCNC: 4.2 G/DL (ref 3.4–5)
ALP SERPL-CCNC: 59 U/L (ref 40–150)
ALT SERPL W P-5'-P-CCNC: 69 U/L (ref 0–50)
ANION GAP SERPL CALCULATED.3IONS-SCNC: 10 MMOL/L (ref 3–14)
AST SERPL W P-5'-P-CCNC: 58 U/L (ref 0–45)
BILIRUB SERPL-MCNC: 0.5 MG/DL (ref 0.2–1.3)
BUN SERPL-MCNC: 2 MG/DL (ref 7–30)
CALCIUM SERPL-MCNC: 9.2 MG/DL (ref 8.5–10.1)
CHLORIDE SERPL-SCNC: 106 MMOL/L (ref 94–109)
CO2 SERPL-SCNC: 25 MMOL/L (ref 20–32)
CREAT SERPL-MCNC: 0.82 MG/DL (ref 0.52–1.04)
GFR SERPL CREATININE-BSD FRML MDRD: 81 ML/MIN/1.7M2
GLUCOSE SERPL-MCNC: 95 MG/DL (ref 70–99)
POTASSIUM SERPL-SCNC: 3.8 MMOL/L (ref 3.4–5.3)
PROT SERPL-MCNC: 7.2 G/DL (ref 6.8–8.8)
SODIUM SERPL-SCNC: 141 MMOL/L (ref 133–144)

## 2018-08-29 ENCOUNTER — TELEPHONE (OUTPATIENT)
Dept: ONCOLOGY | Facility: CLINIC | Age: 33
End: 2018-08-29

## 2018-08-31 ENCOUNTER — CARE COORDINATION (OUTPATIENT)
Dept: ONCOLOGY | Facility: CLINIC | Age: 33
End: 2018-08-31

## 2018-08-31 DIAGNOSIS — R11.2 CHEMOTHERAPY-INDUCED NAUSEA AND VOMITING: ICD-10-CM

## 2018-08-31 DIAGNOSIS — C71.6 MEDULLOBLASTOMA OF CEREBELLUM (H): ICD-10-CM

## 2018-08-31 DIAGNOSIS — D61.810 ANTINEOPLASTIC CHEMOTHERAPY INDUCED PANCYTOPENIA (H): ICD-10-CM

## 2018-08-31 DIAGNOSIS — T45.1X5A CHEMOTHERAPY-INDUCED NAUSEA AND VOMITING: ICD-10-CM

## 2018-08-31 DIAGNOSIS — Z51.11 CHEMOTHERAPY MANAGEMENT, ENCOUNTER FOR: ICD-10-CM

## 2018-08-31 DIAGNOSIS — T45.1X5A ANTINEOPLASTIC CHEMOTHERAPY INDUCED PANCYTOPENIA (H): ICD-10-CM

## 2018-08-31 LAB
BASOPHILS # BLD AUTO: 0 10E9/L (ref 0–0.2)
BASOPHILS NFR BLD AUTO: 0 %
DIFFERENTIAL METHOD BLD: ABNORMAL
EOSINOPHIL # BLD AUTO: 0 10E9/L (ref 0–0.7)
EOSINOPHIL NFR BLD AUTO: 0 %
ERYTHROCYTE [DISTWIDTH] IN BLOOD BY AUTOMATED COUNT: 11.6 % (ref 10–15)
HCT VFR BLD AUTO: 30.6 % (ref 35–47)
HGB BLD-MCNC: 10.8 G/DL (ref 11.7–15.7)
LYMPHOCYTES # BLD AUTO: 0.2 10E9/L (ref 0.8–5.3)
LYMPHOCYTES NFR BLD AUTO: 16.7 %
MCH RBC QN AUTO: 31.8 PG (ref 26.5–33)
MCHC RBC AUTO-ENTMCNC: 35.3 G/DL (ref 31.5–36.5)
MCV RBC AUTO: 90 FL (ref 78–100)
MONOCYTES # BLD AUTO: 0.3 10E9/L (ref 0–1.3)
MONOCYTES NFR BLD AUTO: 21.4 %
NEUTROPHILS # BLD AUTO: 0.8 10E9/L (ref 1.6–8.3)
NEUTROPHILS NFR BLD AUTO: 61.9 %
PLATELET # BLD AUTO: 207 10E9/L (ref 150–450)
RBC # BLD AUTO: 3.4 10E12/L (ref 3.8–5.2)
WBC # BLD AUTO: 1.3 10E9/L (ref 4–11)

## 2018-08-31 PROCEDURE — 85025 COMPLETE CBC W/AUTO DIFF WBC: CPT | Performed by: PSYCHIATRY & NEUROLOGY

## 2018-08-31 PROCEDURE — 36415 COLL VENOUS BLD VENIPUNCTURE: CPT | Performed by: PSYCHIATRY & NEUROLOGY

## 2018-08-31 PROCEDURE — 80053 COMPREHEN METABOLIC PANEL: CPT | Performed by: PSYCHIATRY & NEUROLOGY

## 2018-08-31 NOTE — PROGRESS NOTES
Reason for Outgoing Call:   Anupama Morrison MD Logan, Alyson, RN; Melida Kovacs PA-C Hi Aly,   WBC is low. Concerning that it may drop more given that platelets have not dropped and it is prior to her projected maria c.   Please have her recheck CBC on Tuesday.   Call and discuss neutropenic precautions, educate about what to do if develops a fever.   Will hold on ppx abx/ etc until ANC < 0.5.   Thanks,   Anupama Morrison MD   Neuro-oncology   8/31/2018       Nursing Action/Patient Instruction:  Placed call to patient to discuss above recommendations. Unable to reach patient. Left detailed message with instructions. Reinforced patient needs to call clinic or go to ED with temp of 100.4 or higher. Will also send instructions via [employer]. Asked patient to return call to clinic with any questions or concerns.

## 2018-09-01 LAB
ALBUMIN SERPL-MCNC: 4.1 G/DL (ref 3.4–5)
ALP SERPL-CCNC: 62 U/L (ref 40–150)
ALT SERPL W P-5'-P-CCNC: 41 U/L (ref 0–50)
ANION GAP SERPL CALCULATED.3IONS-SCNC: 9 MMOL/L (ref 3–14)
AST SERPL W P-5'-P-CCNC: 33 U/L (ref 0–45)
BILIRUB SERPL-MCNC: 0.4 MG/DL (ref 0.2–1.3)
BUN SERPL-MCNC: 6 MG/DL (ref 7–30)
CALCIUM SERPL-MCNC: 8.9 MG/DL (ref 8.5–10.1)
CHLORIDE SERPL-SCNC: 107 MMOL/L (ref 94–109)
CO2 SERPL-SCNC: 26 MMOL/L (ref 20–32)
CREAT SERPL-MCNC: 0.76 MG/DL (ref 0.52–1.04)
GFR SERPL CREATININE-BSD FRML MDRD: 87 ML/MIN/1.7M2
GLUCOSE SERPL-MCNC: 88 MG/DL (ref 70–99)
POTASSIUM SERPL-SCNC: 3.8 MMOL/L (ref 3.4–5.3)
PROT SERPL-MCNC: 7 G/DL (ref 6.8–8.8)
SODIUM SERPL-SCNC: 142 MMOL/L (ref 133–144)

## 2018-09-04 DIAGNOSIS — C71.6 MEDULLOBLASTOMA OF CEREBELLUM (H): ICD-10-CM

## 2018-09-04 DIAGNOSIS — R11.2 CHEMOTHERAPY-INDUCED NAUSEA AND VOMITING: ICD-10-CM

## 2018-09-04 DIAGNOSIS — Z51.11 CHEMOTHERAPY MANAGEMENT, ENCOUNTER FOR: ICD-10-CM

## 2018-09-04 DIAGNOSIS — D61.810 ANTINEOPLASTIC CHEMOTHERAPY INDUCED PANCYTOPENIA (H): ICD-10-CM

## 2018-09-04 DIAGNOSIS — T45.1X5A ANTINEOPLASTIC CHEMOTHERAPY INDUCED PANCYTOPENIA (H): ICD-10-CM

## 2018-09-04 DIAGNOSIS — T45.1X5A CHEMOTHERAPY-INDUCED NAUSEA AND VOMITING: ICD-10-CM

## 2018-09-04 LAB
BASOPHILS # BLD AUTO: 0 10E9/L (ref 0–0.2)
BASOPHILS NFR BLD AUTO: 0 %
DIFFERENTIAL METHOD BLD: ABNORMAL
EOSINOPHIL # BLD AUTO: 0 10E9/L (ref 0–0.7)
EOSINOPHIL NFR BLD AUTO: 0 %
ERYTHROCYTE [DISTWIDTH] IN BLOOD BY AUTOMATED COUNT: 12.3 % (ref 10–15)
HCT VFR BLD AUTO: 31.2 % (ref 35–47)
HGB BLD-MCNC: 11.2 G/DL (ref 11.7–15.7)
LYMPHOCYTES # BLD AUTO: 0.3 10E9/L (ref 0.8–5.3)
LYMPHOCYTES NFR BLD AUTO: 17.2 %
MCH RBC QN AUTO: 31.8 PG (ref 26.5–33)
MCHC RBC AUTO-ENTMCNC: 35.9 G/DL (ref 31.5–36.5)
MCV RBC AUTO: 89 FL (ref 78–100)
MONOCYTES # BLD AUTO: 0.4 10E9/L (ref 0–1.3)
MONOCYTES NFR BLD AUTO: 24.5 %
NEUTROPHILS # BLD AUTO: 1 10E9/L (ref 1.6–8.3)
NEUTROPHILS NFR BLD AUTO: 58.3 %
PLATELET # BLD AUTO: 241 10E9/L (ref 150–450)
RBC # BLD AUTO: 3.52 10E12/L (ref 3.8–5.2)
WBC # BLD AUTO: 1.6 10E9/L (ref 4–11)

## 2018-09-04 PROCEDURE — 80053 COMPREHEN METABOLIC PANEL: CPT | Performed by: PSYCHIATRY & NEUROLOGY

## 2018-09-04 PROCEDURE — 36415 COLL VENOUS BLD VENIPUNCTURE: CPT | Performed by: PSYCHIATRY & NEUROLOGY

## 2018-09-04 PROCEDURE — 85025 COMPLETE CBC W/AUTO DIFF WBC: CPT | Performed by: PSYCHIATRY & NEUROLOGY

## 2018-09-05 LAB
ALBUMIN SERPL-MCNC: 4.3 G/DL (ref 3.4–5)
ALP SERPL-CCNC: 61 U/L (ref 40–150)
ALT SERPL W P-5'-P-CCNC: 53 U/L (ref 0–50)
ANION GAP SERPL CALCULATED.3IONS-SCNC: 9 MMOL/L (ref 3–14)
AST SERPL W P-5'-P-CCNC: 41 U/L (ref 0–45)
BILIRUB SERPL-MCNC: 0.4 MG/DL (ref 0.2–1.3)
BUN SERPL-MCNC: 9 MG/DL (ref 7–30)
CALCIUM SERPL-MCNC: 9.2 MG/DL (ref 8.5–10.1)
CHLORIDE SERPL-SCNC: 104 MMOL/L (ref 94–109)
CO2 SERPL-SCNC: 25 MMOL/L (ref 20–32)
CREAT SERPL-MCNC: 0.71 MG/DL (ref 0.52–1.04)
GFR SERPL CREATININE-BSD FRML MDRD: >90 ML/MIN/1.7M2
GLUCOSE SERPL-MCNC: 90 MG/DL (ref 70–99)
POTASSIUM SERPL-SCNC: 3.7 MMOL/L (ref 3.4–5.3)
PROT SERPL-MCNC: 7.6 G/DL (ref 6.8–8.8)
SODIUM SERPL-SCNC: 138 MMOL/L (ref 133–144)

## 2018-09-07 DIAGNOSIS — D61.810 ANTINEOPLASTIC CHEMOTHERAPY INDUCED PANCYTOPENIA (H): ICD-10-CM

## 2018-09-07 DIAGNOSIS — T45.1X5A ANTINEOPLASTIC CHEMOTHERAPY INDUCED PANCYTOPENIA (H): ICD-10-CM

## 2018-09-07 DIAGNOSIS — T45.1X5A CHEMOTHERAPY-INDUCED NAUSEA AND VOMITING: Primary | ICD-10-CM

## 2018-09-07 DIAGNOSIS — Z51.11 CHEMOTHERAPY MANAGEMENT, ENCOUNTER FOR: ICD-10-CM

## 2018-09-07 DIAGNOSIS — R11.2 CHEMOTHERAPY-INDUCED NAUSEA AND VOMITING: Primary | ICD-10-CM

## 2018-09-07 DIAGNOSIS — D72.810 LYMPHOCYTES DECREASED: ICD-10-CM

## 2018-09-07 DIAGNOSIS — C71.6 MEDULLOBLASTOMA OF CEREBELLUM (H): ICD-10-CM

## 2018-09-07 LAB
BASOPHILS # BLD AUTO: 0 10E9/L (ref 0–0.2)
BASOPHILS NFR BLD AUTO: 0.4 %
DIFFERENTIAL METHOD BLD: ABNORMAL
EOSINOPHIL # BLD AUTO: 0 10E9/L (ref 0–0.7)
EOSINOPHIL NFR BLD AUTO: 0 %
ERYTHROCYTE [DISTWIDTH] IN BLOOD BY AUTOMATED COUNT: 12.4 % (ref 10–15)
HCT VFR BLD AUTO: 29.2 % (ref 35–47)
HGB BLD-MCNC: 10.3 G/DL (ref 11.7–15.7)
LYMPHOCYTES # BLD AUTO: 0.2 10E9/L (ref 0.8–5.3)
LYMPHOCYTES NFR BLD AUTO: 9.2 %
MCH RBC QN AUTO: 32.1 PG (ref 26.5–33)
MCHC RBC AUTO-ENTMCNC: 35.3 G/DL (ref 31.5–36.5)
MCV RBC AUTO: 91 FL (ref 78–100)
MONOCYTES # BLD AUTO: 0.4 10E9/L (ref 0–1.3)
MONOCYTES NFR BLD AUTO: 18.1 %
NEUTROPHILS # BLD AUTO: 1.7 10E9/L (ref 1.6–8.3)
NEUTROPHILS NFR BLD AUTO: 72.3 %
PLATELET # BLD AUTO: 241 10E9/L (ref 150–450)
RBC # BLD AUTO: 3.21 10E12/L (ref 3.8–5.2)
WBC # BLD AUTO: 2.4 10E9/L (ref 4–11)

## 2018-09-07 PROCEDURE — 85025 COMPLETE CBC W/AUTO DIFF WBC: CPT | Performed by: PSYCHIATRY & NEUROLOGY

## 2018-09-07 PROCEDURE — 80053 COMPREHEN METABOLIC PANEL: CPT | Performed by: PSYCHIATRY & NEUROLOGY

## 2018-09-07 PROCEDURE — 36415 COLL VENOUS BLD VENIPUNCTURE: CPT | Performed by: PSYCHIATRY & NEUROLOGY

## 2018-09-07 RX ORDER — PENTAMIDINE ISETHIONATE 300 MG/300MG
300 INHALANT RESPIRATORY (INHALATION)
Status: CANCELLED
Start: 2018-09-10

## 2018-09-07 RX ORDER — ALBUTEROL SULFATE 0.83 MG/ML
2.5 SOLUTION RESPIRATORY (INHALATION) ONCE
Status: CANCELLED
Start: 2018-09-10 | End: 2018-09-10

## 2018-09-08 LAB
ALBUMIN SERPL-MCNC: 4.1 G/DL (ref 3.4–5)
ALP SERPL-CCNC: 62 U/L (ref 40–150)
ALT SERPL W P-5'-P-CCNC: 39 U/L (ref 0–50)
ANION GAP SERPL CALCULATED.3IONS-SCNC: 8 MMOL/L (ref 3–14)
AST SERPL W P-5'-P-CCNC: 28 U/L (ref 0–45)
BILIRUB SERPL-MCNC: 0.4 MG/DL (ref 0.2–1.3)
BUN SERPL-MCNC: 8 MG/DL (ref 7–30)
CALCIUM SERPL-MCNC: 8.7 MG/DL (ref 8.5–10.1)
CHLORIDE SERPL-SCNC: 105 MMOL/L (ref 94–109)
CO2 SERPL-SCNC: 26 MMOL/L (ref 20–32)
CREAT SERPL-MCNC: 0.8 MG/DL (ref 0.52–1.04)
GFR SERPL CREATININE-BSD FRML MDRD: 83 ML/MIN/1.7M2
GLUCOSE SERPL-MCNC: 81 MG/DL (ref 70–99)
POTASSIUM SERPL-SCNC: 3.5 MMOL/L (ref 3.4–5.3)
PROT SERPL-MCNC: 7.2 G/DL (ref 6.8–8.8)
SODIUM SERPL-SCNC: 139 MMOL/L (ref 133–144)

## 2018-09-10 ENCOUNTER — ALLIED HEALTH/NURSE VISIT (OUTPATIENT)
Dept: ONCOLOGY | Facility: CLINIC | Age: 33
End: 2018-09-10
Attending: PSYCHIATRY & NEUROLOGY
Payer: COMMERCIAL

## 2018-09-10 VITALS
TEMPERATURE: 98.1 F | RESPIRATION RATE: 18 BRPM | OXYGEN SATURATION: 100 % | DIASTOLIC BLOOD PRESSURE: 88 MMHG | HEART RATE: 86 BPM | SYSTOLIC BLOOD PRESSURE: 123 MMHG

## 2018-09-10 DIAGNOSIS — D72.810 LYMPHOCYTES DECREASED: ICD-10-CM

## 2018-09-10 DIAGNOSIS — Z91.89 HIGH RISK FOR CHEMOTHERAPY-INDUCED INFECTIOUS COMPLICATION: ICD-10-CM

## 2018-09-10 DIAGNOSIS — Z51.11 CHEMOTHERAPY MANAGEMENT, ENCOUNTER FOR: Primary | ICD-10-CM

## 2018-09-10 PROCEDURE — 94642 AEROSOL INHALATION TREATMENT: CPT

## 2018-09-10 PROCEDURE — 25000125 ZZHC RX 250: Mod: ZF | Performed by: PSYCHIATRY & NEUROLOGY

## 2018-09-10 RX ORDER — ALBUTEROL SULFATE 0.83 MG/ML
2.5 SOLUTION RESPIRATORY (INHALATION) ONCE
Status: COMPLETED | OUTPATIENT
Start: 2018-09-10 | End: 2018-09-10

## 2018-09-10 RX ORDER — PENTAMIDINE ISETHIONATE 300 MG/300MG
300 INHALANT RESPIRATORY (INHALATION)
Status: DISCONTINUED | OUTPATIENT
Start: 2018-09-10 | End: 2018-09-10 | Stop reason: HOSPADM

## 2018-09-10 RX ORDER — ALBUTEROL SULFATE 0.83 MG/ML
2.5 SOLUTION RESPIRATORY (INHALATION) ONCE
Status: CANCELLED
Start: 2018-09-10 | End: 2018-09-10

## 2018-09-10 RX ORDER — ALBUTEROL SULFATE 0.83 MG/ML
2.5 SOLUTION RESPIRATORY (INHALATION) ONCE
Status: DISCONTINUED | OUTPATIENT
Start: 2018-09-10 | End: 2018-09-10

## 2018-09-10 RX ORDER — PENTAMIDINE ISETHIONATE 300 MG/300MG
300 INHALANT RESPIRATORY (INHALATION)
Status: CANCELLED
Start: 2018-09-10

## 2018-09-10 RX ADMIN — ALBUTEROL SULFATE 2.5 MG: 2.5 SOLUTION RESPIRATORY (INHALATION) at 13:14

## 2018-09-10 RX ADMIN — PENTAMIDINE ISETHIONATE 300 MG: 300 INHALANT RESPIRATORY (INHALATION) at 13:27

## 2018-09-10 ASSESSMENT — PAIN SCALES - GENERAL: PAINLEVEL: NO PAIN (0)

## 2018-09-10 NOTE — PROGRESS NOTES
Chief Complaint   Patient presents with     Procedure     patient with High risk for chemotherapy-induced infectious complication - here for vitals, Albuterol Treatment, and Pentamidine Treatment     Patient arrived to clinic for Albuterol and Pentamidine treatment today.  Patient declined to speak with an RN today, although patient states that she has been having SOB the last couple of days - mostly at night.  I notified the RN and she went to talk to patient about the SOB. Per RN - pentamidine will be given today.   Albuterol & Pentamidine given to patient without incident.  Discharge instructions reviewed with: Patient. And .  Patient discharged in stable condition accompanied by:  (Tito).  Patient will return 9/17/18 for next appointment with a provider.    AVS not printed per patient.   Patient Education materials given to patient and all questions answered.

## 2018-09-10 NOTE — MR AVS SNAPSHOT
After Visit Summary   9/10/2018    Merissa Silverman    MRN: [id number]           Patient Information     Date Of Birth          1985        Visit Information        Provider Department      9/10/2018 1:00 PM Nurse, [facility]        Today's Diagnoses     Chemotherapy management, encounter for    -  1    Lymphocytes decreased        High risk for chemotherapy-induced infectious complication           Follow-ups after your visit        Your next 10 appointments already scheduled     Sep 14, 2018  1:15 PM CDT   LAB with EC LAB   [facility] ([address] [address]   [phone]           OUTSIDE LABS: Please include name of facility and Physician that is requesting outside labs be drawn.  Please indicate if labs are fasting or non-fasting on appt notes.  Be as specific as you can on which labs are being drawn.            Sep 17, 2018 10:15 AM CDT   (Arrive by 10:00 AM)   RETURN WITH ROOM with Kennedi Brunner GC,  2 116 CONSULT RM   [facility])    [address] [place] [address]  [address]  [facility] [id number] [phone]            Sep 24, 2018  9:15 AM CDT   [facility] Lab Draw with JONNATHAN MASONIC LAB DRAW   [facility] Lab Draw ([facility])    [address] [place] [address]  [address]  [facility] [id number] [phone]            Sep 24, 2018 10:00 AM CDT   (Arrive by 9:45 AM)   Return Visit with Anupama Morrison MD   [facility] ([facility])    [address] [place] [address]  [address]  [facility] [id number] [phone]            Sep 24, 2018 10:30 AM CDT   Infusion 360 with UC ONCOLOGY INFUSION, UC 21 ATC   [facility])    [address] [place] [address]  [address]  [facility] [address]    [phone]            Oct 01, 2018 12:15 PM CDT   [facility] Lab Draw with UC MASONIC LAB DRAW   [facility] Draw ([facility])    [address]  [address]  [facility] [address] [phone]            Oct 01, 2018  1:00 PM CDT   Infusion 60 with UC ONCOLOGY INFUSION, UC 11 ATC   [facility] ([facility])    [address]  [address]  [facility] [address] [phone]              Who to contact     If you have questions or need follow up information about today's clinic visit or your schedule please contact [facility] directly at [phone].  Normal or non-critical lab and imaging results will be communicated to you by [employer], letter or phone within 4 business days after the clinic has received the results. If you do not hear from us within 7 days, please contact the clinic through [employer] or phone. If you have a critical or abnormal lab result, we will notify you by phone as soon as possible.  Submit refill requests through [employer] or call your pharmacy and they will forward the refill request to us. Please allow 3 business days for your refill to be completed.          Additional Information About Your Visit        [employer] Information     [employer] gives you secure access to your electronic health record. If you see a primary care provider, you can also send messages to your care team and make appointments. If you have questions, please call your primary care clinic.  If you do not have a primary care provider, please call [phone] and they will assist you.        Care EveryWhere ID     This is your Care EveryWhere ID. This could be used by other organizations to access your [facility] medical records  [id number]        Your Vitals Were     Pulse Temperature Respirations Last Period Pulse Oximetry       86 98.1  F (36.7  C) (Oral) 18 08/12/2018 100%        Blood Pressure from Last 3  Encounters:   09/10/18 123/88   08/20/18 123/89   08/13/18 (!) 153/104    Weight from Last 3 Encounters:   08/20/18 84.1 kg (185 lb 6.4 oz)   08/13/18 85.6 kg (188 lb 11.2 oz)   07/13/18 87.4 kg (192 lb 9.6 oz)              Today, you had the following     No orders found for display       Primary Care Provider Office Phone # Fax #    Anupama Marly Morrison [phone] [address] [address]        Equal Access to Services     [facility]: Hadii handy Beck, waaxda avery, qaybta kaalmada yumiko, toño jade . [facility] [phone].    ATENCIÓN: Si habla español, tiene a mccurdy disposición servicios gratuitos de asistencia lingüística. [facility] [phone].    We comply with applicable federal civil rights laws and [place] laws. We do not discriminate on the basis of race, color, national origin, age, disability, sex, sexual orientation, or gender identity.            Thank you!     Thank you for choosing [facility]  for your care. Our goal is always to provide you with excellent care. Hearing back from our patients is one way we can continue to improve our services. Please take a few minutes to complete the written survey that you may receive in the mail after your visit with us. Thank you!             Your Updated Medication List - Protect others around you: Learn how to safely use, store and throw away your medicines at www.disposemymeds.org.          This list is accurate as of 9/10/18  2:11 PM.  Always use your most recent med list.                   Brand Name Dispense Instructions for use Diagnosis    acetaminophen 325 MG tablet    TYLENOL     Take 650 mg by mouth        DULCOLAX 5 MG EC tablet   Generic drug:  bisacodyl      Take 5 mg by mouth daily as needed for constipation        lomustine 40 MG capsule CHEMO    CEENU    4 capsule    Take 4 capsules (160 mg) by mouth once for 1 dose     Medulloblastoma of cerebellum (H)       LORazepam 0.5 MG tablet    ATIVAN    60 tablet    Take 1 tablet (0.5 mg) by mouth every 6 hours as needed for anxiety    Medulloblastoma of cerebellum (H)       ondansetron 8 MG ODT tab    ZOFRAN-ODT    60 tablet    Take 1 tablet (8 mg) by mouth every 8 hours as needed for nausea    Medulloblastoma of cerebellum (H)

## 2018-09-14 DIAGNOSIS — C71.6 MEDULLOBLASTOMA OF CEREBELLUM (H): ICD-10-CM

## 2018-09-14 DIAGNOSIS — T45.1X5A ANTINEOPLASTIC CHEMOTHERAPY INDUCED PANCYTOPENIA (H): ICD-10-CM

## 2018-09-14 DIAGNOSIS — D61.810 ANTINEOPLASTIC CHEMOTHERAPY INDUCED PANCYTOPENIA (H): ICD-10-CM

## 2018-09-14 LAB
BASOPHILS # BLD AUTO: 0 10E9/L (ref 0–0.2)
BASOPHILS NFR BLD AUTO: 0.3 %
DIFFERENTIAL METHOD BLD: ABNORMAL
EOSINOPHIL # BLD AUTO: 0 10E9/L (ref 0–0.7)
EOSINOPHIL NFR BLD AUTO: 0 %
ERYTHROCYTE [DISTWIDTH] IN BLOOD BY AUTOMATED COUNT: 13.2 % (ref 10–15)
HCT VFR BLD AUTO: 31 % (ref 35–47)
HGB BLD-MCNC: 10.6 G/DL (ref 11.7–15.7)
LYMPHOCYTES # BLD AUTO: 0.3 10E9/L (ref 0.8–5.3)
LYMPHOCYTES NFR BLD AUTO: 9.4 %
MCH RBC QN AUTO: 31.3 PG (ref 26.5–33)
MCHC RBC AUTO-ENTMCNC: 34.2 G/DL (ref 31.5–36.5)
MCV RBC AUTO: 91 FL (ref 78–100)
MONOCYTES # BLD AUTO: 0.3 10E9/L (ref 0–1.3)
MONOCYTES NFR BLD AUTO: 9.4 %
NEUTROPHILS # BLD AUTO: 2.9 10E9/L (ref 1.6–8.3)
NEUTROPHILS NFR BLD AUTO: 80.9 %
PLATELET # BLD AUTO: 241 10E9/L (ref 150–450)
RBC # BLD AUTO: 3.39 10E12/L (ref 3.8–5.2)
WBC # BLD AUTO: 3.6 10E9/L (ref 4–11)

## 2018-09-14 PROCEDURE — 85025 COMPLETE CBC W/AUTO DIFF WBC: CPT | Performed by: PSYCHIATRY & NEUROLOGY

## 2018-09-14 PROCEDURE — 36415 COLL VENOUS BLD VENIPUNCTURE: CPT | Performed by: PSYCHIATRY & NEUROLOGY

## 2018-09-17 ENCOUNTER — OFFICE VISIT (OUTPATIENT)
Dept: ONCOLOGY | Facility: CLINIC | Age: 33
End: 2018-09-17
Attending: GENETIC COUNSELOR, MS
Payer: COMMERCIAL

## 2018-09-17 DIAGNOSIS — Z80.0 FAMILY HISTORY OF COLON CANCER: ICD-10-CM

## 2018-09-17 DIAGNOSIS — C71.6 MEDULLOBLASTOMA OF CEREBELLUM (H): Primary | ICD-10-CM

## 2018-09-17 PROCEDURE — 40000072 ZZH STATISTIC GENETIC COUNSELING, < 16 MIN: Mod: ZF | Performed by: GENETIC COUNSELOR, MS

## 2018-09-17 NOTE — LETTER
Cancer Risk Management  Program Locations    [facility]  [facility]  Mailing Address  Cancer Risk Management Program  [address]  [place] [address]    New patient appointments  [phone]  September 19, 2018    Merissa Silverman  [address]  [address] [address]      Dear Merissa,    It was a pleasure meeting with you at again at the [facility] on September 17, 2018. Here is a copy of the progress note from your recent genetic counseling visit to the Cancer Risk Management Program. If you have any additional questions, please feel free to call.    9/17/2018    Referring Provider: Anupama Morrison MD    Presenting Information:  Merissa returned to the Cancer Risk Management Program at the [facility] to discuss her genetic testing results. Her blood was drawn on 8/21/2018. The [employer]Cancer panel was ordered from [employer]. This testing was done because of her personal history of a medulloblastoma and family history of colon cancer.    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 No other variants or mutations were found in the APC gene. Given the uncertain significance of this results, medical management decisions should NOT be made based on this test result alone.    Of note, Merissa tested negative for mutations in the following genes by sequencing and deletion/duplication analysis: AIP, ALK, BRCA2, CDKN1B, CDKN2A, DICER1, EPCAM (deletions/duplications only), MEN1, MLH1, MSH2, MSH6, NBN, NF1, NF2, PALB2, PHOX2B, PMS2, POT1, OUVOS4F, PTCH1, PTEN, SMARCA4, SMARCB1, SMARCE1, SUFU, TP53, TSC1, TSC2, and VHL  "genes. No mutations were found in any of the 30 genes analyzed. This test involved sequencing and deletion/duplication analysis of all genes. Testing did not detect a mutation associated with Hereditary Breast and Ovarian Cancer syndrome (BRCA2), Guzman syndrome (MLH1, MSH2, MSH6, PMS2, EPCAM), Cowden syndrome (PTEN), Li Fraumeni syndrome (TP53), Von Hippel-Lindau disease (VHL), Neurofibromatosis type 1 (NF1), Neurofibromatosis type 2 (NF1), Multiple endocrine neoplasia type 1 (MEN1), Multiple endocrine neoplasia type 4 (CDKN1B), Gorlin syndrome (SUFU, PTCH1), Tuberous sclerosis complex (TSC1 ,TSC2), Familial atypical multiple mole melanoma syndrome (CDKN2A), or Pennington complex (XYFQY8C).      We reviewed the autosomal dominant inheritance of these genes.      Merissa did not pass on a mutation in any of these genes to her son based on this test result. Mutations in these genes do not skip generations.      A copy of the test report can be found in the Laboratory tab, dated 8/21/2018, and named \"[facility]\". The report is scanned in as a linked document.    Interpretation:  We discussed several different interpretations of this inconclusive test result. It is not clear if this variant in the APC gene is associated with increased cancer risk.  1. This variant may be a benign change that does not increase cancer risk.  2. This variant may be a harmful mutation that causes Familial Adenomatous Polyposis (FAP).    Individuals with FAP typically have 100-1000's colon polyps and significantly increased risk for colon, duodenal, stomach, pancreatic, and thyroid cancer.    Individuals with FAP can also be at increased risk for brain tumors, particularly medulloblastomas.    The laboratory is working to determine if this variant is harmful or benign, and they will contact me if it is reclassified. If this variant is determined to be a benign change, there may be a different currently unidentifiable gene or combination of " genes and environment that are associated with the cancers in Merissa and/or her relatives. If that is the case, additional genetic testing may be available in the future that can identify a genetic cause for Merissa's brain tumor. As such, she is encouraged to contact me annually to review any new genetic testing options that may be appropriate for her.      It is also important to consider that her medulloblastoma may be a sporadic cancer caused by random cellular changes.    Inheritance:  We reviewed the autosomal dominant inheritance of this variant in the APC gene. We discussed that Merissa has a 50% chance to pass this variant to her son. Likewise, her brother has a 50% risk of having the same variant. Because it is unclear what, if any, risk is associated with this variant, clinical genetic testing for this APC variant alone is not recommended for relatives.    Screening:  Based on this inconclusive test result, it is important for Merissa and her relatives to refer back to the family history for appropriate cancer screening.      Merissa should continue to follow all medulloblastoma treatment and screening recommendations as made by her medical providers.    Merissa's close relatives are likely at some increased risk for medulloblastomas as compared to the general population risk. Though there are no specific screening recommendations based on family history alone, Merissa's relatives are encouraged to share the family history with their medical providers, as they may have individualized screening recommendations.    Other population cancer screening options, such as those recommended by the American Cancer Society and the National Comprehensive Cancer Network (NCCN), are also appropriate for Merissa and her family. These screening recommendations may change if there are changes to Merissa's personal and/or family history. Final screening recommendations should be made by each individual's managing physician.      Summary:  We do not  have an explanation for her medulloblastoma. Because of that, it is important that she continue with cancer screening based on her personal and family history as discussed above.    Genetic testing is rapidly advancing, and new cancer susceptibility genes will most likely be identified in the future. Therefore, I encouraged Merissa to contact me annually or if there are changes in her personal or family history. This may change how we assess her cancer risk, screening, and the testing we would offer.    Plan:  1. I provided Merissa with a copy of her test results today.    2. She plans to follow-up with her medical providers, including Dr. Morrison.  3. She should contact me annually, or sooner if her family history changes.  4. I will contact Merissa if the laboratory informs me that this APC variant has been reclassified. This may change screening and testing recommendations for Merissa and/or her relatives.    If Merissa has any further questions, I encouraged her to contact me at [phone].    Time spent face to face: 15 minutes.    Kennedi Brunner MS, [facility]  Licensed Genetic Counselor  Office: [phone]  Pager: [phone]

## 2018-09-17 NOTE — PATIENT INSTRUCTIONS
Genetic Testing  You had a blood test that looked at the genetic information in one or more genes associated with increased cancer risk.  The testing looked for any harmful changes that would stop this particular gene from working like it should.  It is important to remember that everyone has variants in multiple genes in their bodies that do not affect how the gene works.  These changes are benign and do not cause disease or increase cancer risk.  The term often used to describe these variants is benign polymorphism.  If an individual is found to have a benign polymorphism, their genetic test result is reported as Negative.    Results  There are three possible results of any genetic test:    Positive--a harmful mutation was identified    Negative--no mutation was identified    Variant of unknown significance--a variation in one of the genes was identified, but it is unclear how this impacts cancer risk in the family    Variant of Unknown Significance (VUS)  A VUS was identified in your blood sample.  Scientists currently do not know if this variant is harmful or if it is a benign polymorphism not associated with any increased risk of cancer.   There are several reasons for this lack of knowledge, but likely your VUS has either never been seen before or has only been seen in a small number of individuals.  Until your VUS is studied in more detail and/or seen in more families, scientists are not able to predict if it is a harmful mutation or a benign polymorphism.  Therefore, the cause of the cancer in you and your relatives is still unknown.          Reclassification  Genetic testing laboratories and researchers are constantly working to determine the importance of variants of unknown significance.  Most laboratories will notify the genetic counselor when a patient s VUS has been reclassified as a harmful mutation or a benign polymorphism.      Some families may be candidates for participation in the  laboratory s variant research programs to help determine the importance of their VUS.  Only the testing laboratory can decide who is eligible for participation.  Participating in these programs does not guarantee that families will learn the significance of their VUS immediately.  It could be months or years before a VUS is reclassified.       Screening Recommendations  Cancer screening recommendations for patients with a VUS should be based on their personal and family history rather than the VUS itself.  This may include increased cancer screening for certain individuals in the family.  Your genetic counselor and health care provider can help make appropriate recommendations for you and your relatives.      It is usually not recommended that other relatives have genetic testing for the VUS unless it is done as part of the laboratory s variant research program because an individual s test results should not influence their cancer screening until we determine the importance of the VUS.  Your genetic counselor can help you and your relatives understand the risks and benefits of all genetic testing and cancer screening options.    Please call us if you have any questions or concerns.     Please call us if you have any questions or concerns.   Cancer Risk Management Program [phone] ([phone])  ? Arianna Landon, MS, [facility]  [phone]  ? Cece uLnd, MS, [facility]  [phone]  ? Wendy Dudley, MS, [facility]  [phone]  ? Kareen Rivera, MS, [facility]  [phone]  ? Kennedi Brunner, MS, [facility] [phone]

## 2018-09-17 NOTE — MR AVS SNAPSHOT
After Visit Summary   9/17/2018    Merissa Silverman    MRN: [id number]           Patient Information     Date Of Birth          1985        Visit Information        Provider Department      9/17/2018 10:15 AM Kennedi Brunner GC;  2 116 CONSULT [facility] [place]        Care Instructions        Genetic Testing  You had a blood test that looked at the genetic information in one or more genes associated with increased cancer risk.  The testing looked for any harmful changes that would stop this particular gene from working like it should.  It is important to remember that everyone has variants in multiple genes in their bodies that do not affect how the gene works.  These changes are benign and do not cause disease or increase cancer risk.  The term often used to describe these variants is benign polymorphism.  If an individual is found to have a benign polymorphism, their genetic test result is reported as Negative.    Results  There are three possible results of any genetic test:    Positive--a harmful mutation was identified    Negative--no mutation was identified    Variant of unknown significance--a variation in one of the genes was identified, but it is unclear how this impacts cancer risk in the family    Variant of Unknown Significance (VUS)  A VUS was identified in your blood sample.  Scientists currently do not know if this variant is harmful or if it is a benign polymorphism not associated with any increased risk of cancer.   There are several reasons for this lack of knowledge, but likely your VUS has either never been seen before or has only been seen in a small number of individuals.  Until your VUS is studied in more detail and/or seen in more families, scientists are not able to predict if it is a harmful mutation or a benign polymorphism.  Therefore, the cause of the cancer in you and your relatives is still unknown.          Reclassification  Genetic  testing laboratories and researchers are constantly working to determine the importance of variants of unknown significance.  Most laboratories will notify the genetic counselor when a patient s VUS has been reclassified as a harmful mutation or a benign polymorphism.      Some families may be candidates for participation in the laboratory s variant research programs to help determine the importance of their VUS.  Only the testing laboratory can decide who is eligible for participation.  Participating in these programs does not guarantee that families will learn the significance of their VUS immediately.  It could be months or years before a VUS is reclassified.       Screening Recommendations  Cancer screening recommendations for patients with a VUS should be based on their personal and family history rather than the VUS itself.  This may include increased cancer screening for certain individuals in the family.  Your genetic counselor and health care provider can help make appropriate recommendations for you and your relatives.      It is usually not recommended that other relatives have genetic testing for the VUS unless it is done as part of the laboratory s variant research program because an individual s test results should not influence their cancer screening until we determine the importance of the VUS.  Your genetic counselor can help you and your relatives understand the risks and benefits of all genetic testing and cancer screening options.    Please call us if you have any questions or concerns.     Please call us if you have any questions or concerns.   Cancer Risk Management Program [phone] ([phone])  ? Arianna Landon, MS, [facility]  [phone]  ? Cece Lund, MS, [facility]  [phone]  ? Wendy Dudley, MS, [facility]  [phone]  ? Kareen Rivera, MS, [facility]  [phone]  ? Kennedi Brunner, MS, [facility] [phone]          Follow-ups after your visit        Your next 10 appointments already scheduled     Sep 17,  2018 10:15 AM CDT   (Arrive by 10:00 AM)   RETURN WITH ROOM with Kennedi Brunner GC,  2 116 CONSULT RM   [facility] ([facility])    [address] [place] [address]  [address]  [facility] [address]   [phone]            Sep 24, 2018  9:15 AM CDT   [facility] Draw with [facility] LAB DRAW   [facility] Draw ([facility])    [address] [place] [address]  [address]  [facility] [address]   [phone]            Sep 24, 2018 10:00 AM CDT   (Arrive by 9:45 AM)   Return Visit with Anupama Morrison MD   [facility] ([facility])    [address] [place] [address]  [address]  [facility] [address]   [phone]            Sep 24, 2018 10:30 AM CDT   Infusion 360 with UC ONCOLOGY INFUSION, UC 21 ATC   [facility] ([facility])    [address] [place] [address]  [address]  [facility] [address]   [phone]            Oct 01, 2018 12:15 PM CDT   [facility] Lab Draw with [facility] LAB DRAW   [facility] ([facility])    [address] [place] [address]  [address]  [facility] [address]   [phone]            Oct 01, 2018  1:00 PM CDT   Infusion 60 with UC ONCOLOGY INFUSION, UC 11 ATC   [facility] ([facility])    [address] [place] [address]  [address]  [facility] [address]   [phone]              Who to contact     If you have questions or need follow up information about today's clinic visit or your schedule please contact [facility] directly at [phone].  Normal or non-critical lab and imaging results will be communicated to you by MyChart, letter or phone within 4 business days after the clinic has received the results. If you do not hear from us within 7 days, please contact the clinic through MyChart or phone. If you have a critical or abnormal lab result,  we will notify you by phone as soon as possible.  Submit refill requests through [employer] or call your pharmacy and they will forward the refill request to us. Please allow 3 business days for your refill to be completed.          Additional Information About Your Visit        [employer] Information     [employer] gives you secure access to your electronic health record. If you see a primary care provider, you can also send messages to your care team and make appointments. If you have questions, please call your primary care clinic.  If you do not have a primary care provider, please call [phone] and they will assist you.        Care EveryWhere ID     This is your Care EveryWhere ID. This could be used by other organizations to access your [facility] medical records  [id number]         Blood Pressure from Last 3 Encounters:   09/10/18 123/88   08/20/18 123/89   08/13/18 (!) 153/104    Weight from Last 3 Encounters:   08/20/18 84.1 kg (185 lb 6.4 oz)   08/13/18 85.6 kg (188 lb 11.2 oz)   07/13/18 87.4 kg (192 lb 9.6 oz)              Today, you had the following     No orders found for display       Primary Care Provider Office Phone # Fax #    Anupama Marly Morirson [phone] [address] [facility] [address]        Equal Access to Services     WANDER HAMMER : Hadii aad ku hadasho Soomaali, waaxda luqadaha, qaybta kaalmada adeegyada, toño bowman. So [facility] [phone].    ATENCIÓN: Si habla español, tiene a mccurdy disposición servicios gratuitos de asistencia lingüística. Cuco al [phone].    We comply with applicable federal civil rights laws and [place] laws. We do not discriminate on the basis of race, color, national origin, age, disability, sex, sexual orientation, or gender identity.            Thank you!     Thank you for choosing [facility]  for your care. Our goal is always to provide you with excellent care. Hearing back from  our patients is one way we can continue to improve our services. Please take a few minutes to complete the written survey that you may receive in the mail after your visit with us. Thank you!             Your Updated Medication List - Protect others around you: Learn how to safely use, store and throw away your medicines at www.disposemymeds.org.          This list is accurate as of 9/17/18 10:11 AM.  Always use your most recent med list.                   Brand Name Dispense Instructions for use Diagnosis    acetaminophen 325 MG tablet    TYLENOL     Take 650 mg by mouth        DULCOLAX 5 MG EC tablet   Generic drug:  bisacodyl      Take 5 mg by mouth daily as needed for constipation        lomustine 40 MG capsule CHEMO    CEENU    4 capsule    Take 4 capsules (160 mg) by mouth once for 1 dose    Medulloblastoma of cerebellum (H)       LORazepam 0.5 MG tablet    ATIVAN    60 tablet    Take 1 tablet (0.5 mg) by mouth every 6 hours as needed for anxiety    Medulloblastoma of cerebellum (H)       ondansetron 8 MG ODT tab    ZOFRAN-ODT    60 tablet    Take 1 tablet (8 mg) by mouth every 8 hours as needed for nausea    Medulloblastoma of cerebellum (H)

## 2018-09-17 NOTE — PROGRESS NOTES
9/17/2018    Referring Provider: Anupama Morrison MD    Presenting Information:  Merissa returned to the Cancer Risk Management Program at the [facility] to discuss her genetic testing results. Her blood was drawn on 8/21/2018. The [employer]-Cancer panel was ordered from [employer]. This testing was done because of her personal history of a medulloblastoma and family history of colon cancer.    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 This variant is called [id number] (also known as c.[id number]). No other variants or mutations were found in the APC gene. Given the uncertain significance of this results, medical management decisions should NOT be made based on this test result alone.    Of note, Merissa tested negative for mutations in the following genes by sequencing and deletion/duplication analysis: AIP, ALK, BRCA2, CDKN1B, CDKN2A, DICER1, EPCAM (deletions/duplications only), MEN1, MLH1, MSH2, MSH6, NBN, NF1, NF2, PALB2, PHOX2B, PMS2, POT1, HACYZ5I, PTCH1, PTEN, SMARCA4, SMARCB1, SMARCE1, SUFU, TP53, TSC1, TSC2, and VHL genes. No mutations were found in any of the 30 genes analyzed. This test involved sequencing and deletion/duplication analysis of all genes. Testing did not detect a mutation associated with Hereditary Breast and Ovarian Cancer syndrome (BRCA2), Guzman syndrome (MLH1, MSH2, MSH6, PMS2, EPCAM), Cowden syndrome (PTEN), Li Fraumeni syndrome (TP53), Von Hippel-Lindau disease (VHL), Neurofibromatosis type 1 (NF1), Neurofibromatosis type 2 (NF1), Multiple endocrine neoplasia type 1 (MEN1), Multiple endocrine neoplasia type 4 (CDKN1B), Gorlin syndrome (SUFU, PTCH1), Tuberous sclerosis complex (TSC1 ,TSC2), Familial atypical multiple mole melanoma syndrome (CDKN2A), or Pennington complex (OIXMB6O).      We reviewed the autosomal dominant inheritance of these genes.      Merissa did not pass on a  "mutation in any of these genes to her son based on this test result. Mutations in these genes do not skip generations.      A copy of the test report can be found in the Laboratory tab, dated 8/21/2018, and named \"SEND OUTS [facility] TEST\". The report is scanned in as a linked document.    Interpretation:  We discussed several different interpretations of this inconclusive test result. It is not clear if this variant in the APC gene is associated with increased cancer risk.  1. This variant may be a benign change that does not increase cancer risk.  2. This variant may be a harmful mutation that causes Familial Adenomatous Polyposis (FAP).    Individuals with FAP typically have 100-1000's colon polyps and significantly increased risk for colon, duodenal, stomach, pancreatic, and thyroid cancer.    Individuals with FAP can also be at increased risk for brain tumors, particularly medulloblastomas.    The laboratory is working to determine if this variant is harmful or benign, and they will contact me if it is reclassified. If this variant is determined to be a benign change, there may be a different currently unidentifiable gene or combination of genes and environment that are associated with the cancers in Merissa and/or her relatives. If that is the case, additional genetic testing may be available in the future that can identify a genetic cause for Merissa's brain tumor. As such, she is encouraged to contact me annually to review any new genetic testing options that may be appropriate for her.      It is also important to consider that her medulloblastoma may be a sporadic cancer caused by random cellular changes.    Inheritance:  We reviewed the autosomal dominant inheritance of this variant in the APC gene. We discussed that Merissa has a 50% chance to pass this variant to her son. Likewise, her brother has a 50% risk of having the same variant. Because it is unclear what, if any, risk is associated with this variant, clinical " genetic testing for this APC variant alone is not recommended for relatives.    Screening:  Based on this inconclusive test result, it is important for Merissa and her relatives to refer back to the family history for appropriate cancer screening.      Merissa should continue to follow all medulloblastoma treatment and screening recommendations as made by her medical providers.    Merissa's close relatives are likely at some increased risk for medulloblastomas as compared to the general population risk. Though there are no specific screening recommendations based on family history alone, Merissa's relatives are encouraged to share the family history with their medical providers, as they may have individualized screening recommendations.    Other population cancer screening options, such as those recommended by the American Cancer Society and the National Comprehensive Cancer Network (NCCN), are also appropriate for Merissa and her family. These screening recommendations may change if there are changes to Merissa's personal and/or family history. Final screening recommendations should be made by each individual's managing physician.      Summary:  We do not have an explanation for her medulloblastoma. Because of that, it is important that she continue with cancer screening based on her personal and family history as discussed above.    Genetic testing is rapidly advancing, and new cancer susceptibility genes will most likely be identified in the future. Therefore, I encouraged Merissa to contact me annually or if there are changes in her personal or family history. This may change how we assess her cancer risk, screening, and the testing we would offer.    Plan:  1. I provided Merissa with a copy of her test results today.    2. She plans to follow-up with her medical providers, including Dr. Morrison.  3. She should contact me annually, or sooner if her family history changes.  4. I will contact Merissa if the laboratory informs me that this APC  variant has been reclassified. This may change screening and testing recommendations for Merissa and/or her relatives.    If Merissa has any further questions, I encouraged her to contact me at [phone].    Time spent face to face: 15 minutes.    Kennedi Brunner MS, [facility]  Licensed Genetic Counselor  Office: [phone]  Pager: [phone]

## 2018-09-24 ENCOUNTER — APPOINTMENT (OUTPATIENT)
Dept: LAB | Facility: CLINIC | Age: 33
End: 2018-09-24
Attending: PSYCHIATRY & NEUROLOGY
Payer: COMMERCIAL

## 2018-09-24 ENCOUNTER — ONCOLOGY VISIT (OUTPATIENT)
Dept: ONCOLOGY | Facility: CLINIC | Age: 33
End: 2018-09-24
Attending: PSYCHIATRY & NEUROLOGY
Payer: COMMERCIAL

## 2018-09-24 VITALS
OXYGEN SATURATION: 96 % | SYSTOLIC BLOOD PRESSURE: 135 MMHG | TEMPERATURE: 97.8 F | BODY MASS INDEX: 34.98 KG/M2 | WEIGHT: 179.1 LBS | HEART RATE: 67 BPM | RESPIRATION RATE: 16 BRPM | DIASTOLIC BLOOD PRESSURE: 98 MMHG

## 2018-09-24 DIAGNOSIS — T45.1X5A CHEMOTHERAPY-INDUCED NAUSEA AND VOMITING: ICD-10-CM

## 2018-09-24 DIAGNOSIS — C71.6 MEDULLOBLASTOMA OF CEREBELLUM (H): Primary | ICD-10-CM

## 2018-09-24 DIAGNOSIS — T45.1X5A ANTINEOPLASTIC CHEMOTHERAPY INDUCED PANCYTOPENIA (H): ICD-10-CM

## 2018-09-24 DIAGNOSIS — Z51.11 CHEMOTHERAPY MANAGEMENT, ENCOUNTER FOR: ICD-10-CM

## 2018-09-24 DIAGNOSIS — T45.1X5A CHEMOTHERAPY INDUCED NEUTROPENIA (H): ICD-10-CM

## 2018-09-24 DIAGNOSIS — R11.2 CHEMOTHERAPY-INDUCED NAUSEA AND VOMITING: ICD-10-CM

## 2018-09-24 DIAGNOSIS — F41.9 ANXIETY: ICD-10-CM

## 2018-09-24 DIAGNOSIS — D61.810 ANTINEOPLASTIC CHEMOTHERAPY INDUCED PANCYTOPENIA (H): ICD-10-CM

## 2018-09-24 DIAGNOSIS — Z91.89 HIGH RISK FOR CHEMOTHERAPY-INDUCED INFECTIOUS COMPLICATION: ICD-10-CM

## 2018-09-24 DIAGNOSIS — H93.8X9 OTOTOXICITY, UNSPECIFIED LATERALITY: ICD-10-CM

## 2018-09-24 DIAGNOSIS — R11.2 NAUSEA AND VOMITING, INTRACTABILITY OF VOMITING NOT SPECIFIED, UNSPECIFIED VOMITING TYPE: ICD-10-CM

## 2018-09-24 DIAGNOSIS — D70.1 CHEMOTHERAPY INDUCED NEUTROPENIA (H): ICD-10-CM

## 2018-09-24 PROCEDURE — 36591 DRAW BLOOD OFF VENOUS DEVICE: CPT

## 2018-09-24 PROCEDURE — 25000128 H RX IP 250 OP 636: Mod: ZF | Performed by: PSYCHIATRY & NEUROLOGY

## 2018-09-24 PROCEDURE — 99214 OFFICE O/P EST MOD 30 MIN: CPT | Mod: ZP | Performed by: PSYCHIATRY & NEUROLOGY

## 2018-09-24 PROCEDURE — G0463 HOSPITAL OUTPT CLINIC VISIT: HCPCS | Mod: ZF

## 2018-09-24 RX ORDER — SODIUM CHLORIDE 9 MG/ML
1000 INJECTION, SOLUTION INTRAVENOUS CONTINUOUS PRN
Status: CANCELLED
Start: 2018-09-28

## 2018-09-24 RX ORDER — PALONOSETRON 0.05 MG/ML
0.25 INJECTION, SOLUTION INTRAVENOUS ONCE
Status: CANCELLED
Start: 2018-09-28 | End: 2018-09-28

## 2018-09-24 RX ORDER — HEPARIN SODIUM (PORCINE) LOCK FLUSH IV SOLN 100 UNIT/ML 100 UNIT/ML
5 SOLUTION INTRAVENOUS ONCE
Status: CANCELLED
Start: 2018-09-28 | End: 2018-09-28

## 2018-09-24 RX ORDER — ALBUTEROL SULFATE 90 UG/1
1-2 AEROSOL, METERED RESPIRATORY (INHALATION)
Status: CANCELLED
Start: 2018-09-28

## 2018-09-24 RX ORDER — HEPARIN SODIUM (PORCINE) LOCK FLUSH IV SOLN 100 UNIT/ML 100 UNIT/ML
5 SOLUTION INTRAVENOUS ONCE
Status: COMPLETED | OUTPATIENT
Start: 2018-09-24 | End: 2018-09-24

## 2018-09-24 RX ORDER — EPINEPHRINE 0.3 MG/.3ML
0.3 INJECTION SUBCUTANEOUS EVERY 5 MIN PRN
Status: CANCELLED | OUTPATIENT
Start: 2018-10-05

## 2018-09-24 RX ORDER — MEPERIDINE HYDROCHLORIDE 25 MG/ML
25 INJECTION INTRAMUSCULAR; INTRAVENOUS; SUBCUTANEOUS EVERY 30 MIN PRN
Status: CANCELLED | OUTPATIENT
Start: 2018-09-28

## 2018-09-24 RX ORDER — METHYLPREDNISOLONE SODIUM SUCCINATE 125 MG/2ML
125 INJECTION, POWDER, LYOPHILIZED, FOR SOLUTION INTRAMUSCULAR; INTRAVENOUS
Status: CANCELLED
Start: 2018-09-28

## 2018-09-24 RX ORDER — EPINEPHRINE 1 MG/ML
0.3 INJECTION, SOLUTION INTRAMUSCULAR; SUBCUTANEOUS EVERY 5 MIN PRN
Status: CANCELLED | OUTPATIENT
Start: 2018-09-28

## 2018-09-24 RX ORDER — LORAZEPAM 2 MG/ML
0.5 INJECTION INTRAMUSCULAR EVERY 4 HOURS PRN
Status: CANCELLED
Start: 2018-10-05

## 2018-09-24 RX ORDER — MEPERIDINE HYDROCHLORIDE 25 MG/ML
25 INJECTION INTRAMUSCULAR; INTRAVENOUS; SUBCUTANEOUS EVERY 30 MIN PRN
Status: CANCELLED | OUTPATIENT
Start: 2018-10-05

## 2018-09-24 RX ORDER — HEPARIN SODIUM (PORCINE) LOCK FLUSH IV SOLN 100 UNIT/ML 100 UNIT/ML
5 SOLUTION INTRAVENOUS ONCE
Status: CANCELLED
Start: 2018-10-05 | End: 2018-10-05

## 2018-09-24 RX ORDER — PROCHLORPERAZINE MALEATE 10 MG
10 TABLET ORAL EVERY 6 HOURS PRN
Qty: 90 TABLET | Refills: 3 | Status: SHIPPED | OUTPATIENT
Start: 2018-09-24 | End: 2019-02-15

## 2018-09-24 RX ORDER — DIPHENHYDRAMINE HYDROCHLORIDE 50 MG/ML
50 INJECTION INTRAMUSCULAR; INTRAVENOUS
Status: CANCELLED
Start: 2018-10-05

## 2018-09-24 RX ORDER — LORAZEPAM 2 MG/ML
0.5 INJECTION INTRAMUSCULAR EVERY 4 HOURS PRN
Status: CANCELLED
Start: 2018-09-28

## 2018-09-24 RX ORDER — ALBUTEROL SULFATE 90 UG/1
1-2 AEROSOL, METERED RESPIRATORY (INHALATION)
Status: CANCELLED
Start: 2018-10-05

## 2018-09-24 RX ORDER — EPINEPHRINE 1 MG/ML
0.3 INJECTION, SOLUTION INTRAMUSCULAR; SUBCUTANEOUS EVERY 5 MIN PRN
Status: CANCELLED | OUTPATIENT
Start: 2018-10-05

## 2018-09-24 RX ORDER — DIPHENHYDRAMINE HYDROCHLORIDE 50 MG/ML
50 INJECTION INTRAMUSCULAR; INTRAVENOUS
Status: CANCELLED
Start: 2018-09-28

## 2018-09-24 RX ORDER — ALBUTEROL SULFATE 0.83 MG/ML
2.5 SOLUTION RESPIRATORY (INHALATION)
Status: CANCELLED | OUTPATIENT
Start: 2018-10-05

## 2018-09-24 RX ORDER — ALBUTEROL SULFATE 0.83 MG/ML
2.5 SOLUTION RESPIRATORY (INHALATION)
Status: CANCELLED | OUTPATIENT
Start: 2018-09-28

## 2018-09-24 RX ORDER — EPINEPHRINE 0.3 MG/.3ML
0.3 INJECTION SUBCUTANEOUS EVERY 5 MIN PRN
Status: CANCELLED | OUTPATIENT
Start: 2018-09-28

## 2018-09-24 RX ORDER — METHYLPREDNISOLONE SODIUM SUCCINATE 125 MG/2ML
125 INJECTION, POWDER, LYOPHILIZED, FOR SOLUTION INTRAMUSCULAR; INTRAVENOUS
Status: CANCELLED
Start: 2018-10-05

## 2018-09-24 RX ORDER — SODIUM CHLORIDE 9 MG/ML
1000 INJECTION, SOLUTION INTRAVENOUS CONTINUOUS PRN
Status: CANCELLED
Start: 2018-10-05

## 2018-09-24 RX ADMIN — Medication 5 ML: at 09:21

## 2018-09-24 ASSESSMENT — PAIN SCALES - GENERAL: PAINLEVEL: NO PAIN (0)

## 2018-09-24 NOTE — LETTER
9/24/2018       RE: Merissa Silverman  [address]  [address]  [place] [address]     Dear Colleague,    Thank you for referring your patient, Merissa Silverman, to the [facility]. Please see a copy of my visit note below.    NEURO-ONCOLOGY VISIT  09/24/2018    CHIEF COMPLAINT: Ms. 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 Completed craniospinal radiation with concurrent vincristine x 3 doses, but concurrent chemotherapy was stopped in the setting of pancytopenia. She is now to start adjuvant chemotherapy with vincristine, CCNU and cisplatin.     She is presenting in follow-up prior to Cycle 2. She is accompanied by grandmother, Shaji.     HISTORY OF PRESENT ILLNESS  -She tolerated her first cycle of chemo poorly. She had frequent nausea/vomiting, increased fatigue, anorexia and neuropathy. She was using Ativan for her nausea the first couple days, though it created more fatigue and did not provide adequate control. The nausea was worse the first couple weeks though she continued to have symptoms for 2 weeks even after that. Zofran provides good control for her  -Has not had any more double vision. Strength and gait unchanged. Had some tinnitus for a week though that resolved after a week. Intermittent numbness and tingling in hands and feet as well as nose. Nose has been the most persistent  -appetite is poor and she gets full fast. She eats lots of small meals. She drinks lots of fluids  -has been using Ativan to sleep though she sleep for 4 hours and then is wide awake again. She has not slept the whole night in several months    REVIEW OF SYSTEMS  A comprehensive ROS negative except as in HPI.      MEDICATIONS   Current Outpatient Prescriptions   Medication     acetaminophen (TYLENOL)  325 MG tablet     bisacodyl (DULCOLAX) 5 MG EC tablet     LORazepam (ATIVAN) 0.5 MG tablet     ondansetron (ZOFRAN-ODT) 8 MG ODT tab     prochlorperazine (COMPAZINE) 10 MG tablet     lomustine (CEENU) 40 MG capsule CHEMO     No current facility-administered medications for this visit.      DRUG ALLERGIES   Allergies   Allergen Reactions     Sulfa Drugs Unknown       ONCOLOGIC HISTORY  -PRESENTATION: Progressively worsening headaches. Additionally was with abrupt position changes resulted in dizziness/ syncope. CTH at an outside hospital showed a mass in the left cerebellum. Transferred to [facility].  -4/11/2018 MRB showed a 3.5cm mass in the left cerebellar hemisphere that was associated with mild cerebellar tonsillar herniation and hydrocephalus.  -MRI spine showed no evidence of disease.   -4/13/2018 SURGERY: Suboccipital craniotomy with resection of the left cerebellar mass. Immediate post-operative MRI demonstrated a gross total resection.   PATHOLOGY: Medulloblastoma; Molecular markers pending.   -5/4/2018 NEURO-ONC: LP performed. Evaluated by radiation oncology. Recommending craniospinal radiation + concurrent vincristine followed by eight 6-week cycles of cisplatin, lomustine, and vincristine.  -5/4/2018 LP with CSF analysis: WBC 1/ RBC 1, protein 25, glucose 62, negative cytology.   -5/14 - 6/22/2018 CHEMORADS with vincristine 2 mg/m2 (stopped after 3 infusions due to pancytopenia).  -5/17/2018 Audiology- Baseline hearing testing with no hearing loss.  -6/4/2018 NEURO-ONC: Doing well, no new neurological symptoms. Tolerating treatment well. Ophtho referral. Labs improved.  -6/4/2018 NGS via [facility]: SMO mutation, TERT promotor mutation. Negative for microsatellite instability.   -6/25/2018 NEURO-ONC: Clinically stable, painful radiation-induced burn. Monitoring labs. Cancer Risk Assessment referral.  -8/13/2018 NEURO-ONC/MRB/ CHEMO: Clinically stable. Imaging shows expected post operative  changes with no evidence of new disease. C1D1 of vincristine, CCNU and cisplatin.   -9/24/2018 NEURO-ONC: Neurologically stable, though did not tolerate first cycle of chemo well 2/2 nausea. Hold on starting C2D1 today due to leukopenia (WBC 2.7).    SOCIAL HISTORY   Tobacco use: Former smoker, E-cigs stopped on 5/3/2018  Alcohol use: None.   Drug use: Denies marijuana use.  Supplement, complimentary/ alternative medicine: None.   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      PHYSICAL EXAMINATION  BP (!) 135/98 (BP Location: Right arm, Patient Position: Sitting, Cuff Size: Adult Regular)  Pulse 67  Temp 97.8  F (36.6  C) (Oral)  Resp 16  Wt 81.2 kg (179 lb 1.6 oz)  SpO2 96%  BMI 34.98 kg/m2   Vitals:    09/24/18 0919   Weight: 81.2 kg (179 lb 1.6 oz)     Ht Readings from Last 2 Encounters:   08/13/18 1.524 m (5')   06/25/18 1.524 m (5')     KPS: 100    -Generally well appearing.  -Throat: No oral thrush. No redness of throat. No lymphadenopathy.   -Respiratory: Normal breath sounds, no audible wheezing.   -Skin: No rashes. Healed head incision. Hair has started to regrow.  -Hematologic/ lymphatic: No abnormal bruising. No leg swelling.  -Psychiatric: Normal mood and affect. Pleasant, talkative.  -Neurologic:   MENTAL STATUS:     Alert, oriented to date.    Recall: Immediate 3/3, delayed 3/3.    Speech fluent. Comprehension intact to multi-step commands.   Normal naming, repetition. Able to read.   Good right-left orientation.     CRANIAL NERVES:     Discs flat on fundoscopy.    Pupils are equal, round, reactive to light.     Extraocular movements full, patient denies diplopia. Previous mild eye jerks noted on pursuit.    Visual fields full.     Facial sensation intact to light touch.   Symmetric facial movements.   Hearing intact.   Palate moves symmetrically.     Sternocleidomastoid and trapezius strength intact.    Tongue midline.  MOTOR:    Normal and symmetric tone.   Grossly  5/5 throughout.    No pronation or drift. No orbiting.   Able to rise from a chair without use of arms.   On toe/ heel walk, equal distance from floor to heels/ toes.   SENSATION:    Intact to light touch throughout.  COORDINATION:   Intact finger-nose with eyes open and closed.   REFLEXES:    Muted, symmetric.    Toes not tested. No clonus. No Hoffmans.   No grasp.    GAIT:   Walks without assistance.   Good speed. Normal stride length and heel strike. Normal turns. Normal arm swing.   Able to toe, heel walk. Some difficulty with tandem walk.       MEDICAL RECORDS  Obtained and personally reviewed all available outside medical records in addition to reviewing any records available in our electronic system.     LABS  Personally reviewed all available lab results.   Continue lymphopenia. Decreased WBC 2.7, ANC 2.0  Plt and Hgb are stable    IMAGING  No new images today      IMPRESSION  For the 30 minute appointment, more than 50% of the encounter was spent discussing in detail the treatment and potential side effects for adjuvant therapy. This was in addition to providing emotional support, answering questions pertaining to my recommendations, and devising the treatment plan as outlined below.    White blood counts did become neutropenia on week 3 though have recovered well. WBC was less than 3 today so we will hold D1 of C2 today. Will repeat CBC later this week and it is more than likely that all values will be at goal.     For nausea, will use compazine + ativan. Declined dexamethasone trial due to poor tolerance in the past.     Will continue to monitor CBC with weekly labs throughout treatment. The plan is for up to eight cycles of cisplatin, lomustine, and vincristine (with 6 weeks cycles). Of note, this regimen is highly toxic and in clinical studies, treatment was terminated or dose reduced due to side effects in nearly 60 percent of patients by cycle four.     Clinical trial options remain a strong  consideration for recurrent disease, as does off label use of SMO inhibitors.     PROBLEM LIST  Medulloblastoma  Steroid intolerance  Chemotherapy-induced pancytopenia  Chemotherapy-induced nausea/ vomiting   Radiation burn  Fatigue, cancer and treatment related, improving    PLAN  -CANCER-DIRECTED THERAPY-  -WBC <3 today, will hold treatment for 4-5 days. Will return on Friday to recheck labs and potentially start cycle 2.  -Supportive medications for nausea: Aloxi, Emend and Dex 12 mg during infusion. Can use Ativan and Compazine at home (gave prescription today). Discussed using medical marijuana, though she was not comfortable with this at this time. Can also consider Olanzapine.  -May have to dose decrease if not able to control symptoms.  -Continue to use Miralax, Colace and/or Senna as needed for constipation.  -Continue weekly labs to monitor counts.    -In December, will need repeat hearing testing given continued use of cisplatin.     -STEROIDS-  -Currently off dexamethasone.  -Poor tolerance for steroids in the past; insomnia and irritability. Could use for nausea and anorexia, though should use as a last resort.     -SEIZURE MANAGEMENT-  -While this patient is at increased risk of having seizures, given the lack of seizure history, there is no indication to prescribe an antiepileptic at this time. Will continue to monitor for seizure activity.    -Quality of life/ MOOD/ FATIGUE-  -Denies any mood issues.  -Continue to monitor mood as untreated/ undertreated depression can worsen fatigue, dysorexia, and quality of life.  -Has increased anxiety at night a couple times a week for which she takes Ativan to calm her down and help her sleep. Can continue this for now. If anxiety worsens and has an increased need for Ativan, will start her on a medication like Celexa or Lexapro.     -SLEEP DISTURBANCE-  -Melatonin trial: Start at 3mg by mouth taken 30-45 minutes prior to bedtime, then increase by 3mg weekly as  needed for insomnia/ sleep disturbance to a max dose of 12mg.     Return in 5 days for repeat labs and hopefully to start cycle 2. Then, return in mid-November for continued examination and to review repeat imaging prior to starting cycle 3.     In the meantime, Merissa knows to call with questions or concerns or to report new complaints and can be seen sooner if needed. Urgent evaluation is needed in the setting of acute onset of severe headache, abrupt change in mental status, on-going seizures, new focal deficits, or new leg swelling/ pain.     Patient also seen and evaluated by Melida Morrison MD  Neuro-oncology

## 2018-09-24 NOTE — MR AVS SNAPSHOT
After Visit Summary   9/24/2018    Merissa Silverman    MRN: [id number]           Patient Information     Date Of Birth          1985        Visit Information        Provider Department      9/24/2018 10:00 AM Anupama Morrison MD [facility]        Today's Diagnoses     Nausea and vomiting, intractability of vomiting not specified, unspecified vomiting type    -  1    Medulloblastoma of cerebellum (H)          Care Instructions    Chemotherapy:  White blood cell count is too low (2.7 needs to be > 3).  Recheck labs + infusion later this week (Thursday or Friday).    Nausea/ decreased appetite:  -Compazine  -Ativan   Call if these medications are not working    Sleep:  -Melatonin trial: Start at 3mg by mouth taken 30-45 minutes prior to bedtime, then increase by 3mg every 3-5 nights as needed for insomnia/ sleep disturbance to a max dose of 12mg.  -Over the counter.     Consideration for medical marijuana or Olanzapine.     Return to clinic on 11/9 for labs, MRI, appt, and infusion.    Anupama Morrison MD  Neuro-oncology  9/24/2018                   Follow-ups after your visit        Your next 10 appointments already scheduled     Sep 27, 2018  9:30 AM CDT   [facility] Lab Draw with  MASONIC LAB DRAW   [facility] Lab Draw ([facility])    [address] [place] [address]  [address]  [facility] [address]   [phone]            Sep 27, 2018 10:30 AM CDT   Infusion 360 with UC ONCOLOGY INFUSION, UC 21 ATC   [facility] ([facility])    [address] [place] [address]  [address]  [facility] [address]   [phone]            Oct 05, 2018 10:00 AM CDT   [facility] Lab Draw with UC MASONIC LAB DRAW   [facility] Lab Draw ([facility])    [address]  [address]  [facility] [address]   [phone]            Oct 05, 2018 10:30 AM CDT   Infusion 60 with UC ONCOLOGY INFUSION,   31 ATC   [facility] ([facility])    [address]  [address]  [facility] [address]   [phone]            Nov 09, 2018  8:30 AM CST   MR BRAIN W/O & W CONTRAST with UCMR1   [facility] MRI ([facility])    [address]  1st Floor  [facility] [address]   [phone]           How do I prepare for my exam? (Food and drink instructions) **If you will be receiving sedation or general anesthesia, please see special notes below.**  How do I prepare for my exam? (Other instructions) Take your medicines as usual, unless your doctor tells you not to. You may or may not receive intravenous (IV) contrast for this exam pending the discretion of the Radiologist.  You do not need to do anything special to prepare.  **If you will be receiving sedation or general anesthesia, please see special notes below.**  What should I wear: The MRI machine uses a strong magnet. Please wear clothes without metal (snaps, zippers). A sweatsuit works well, or we may give you a hospital gown. Please remove any body piercings and hair extensions before you arrive. You will also remove watches, jewelry, hairpins, wallets, dentures, partial dental plates and hearing aids. You may wear contact lenses, and you may be able to wear your rings. We have a safe place to keep your personal items, but it is safer to leave them at home.  How long does the exam take: Most tests take 30 to 60 minutes.  HOWEVER, IF YOUR DOCTOR PRESCRIBES ANESTHESIA please plan on spending four to five hours in the recovery room.  What should I bring:  Bring a list of your current medicines to your exam (including vitamins, minerals and over-the-counter drugs).  Do I need a :  **If you will be receiving sedation or general anesthesia, please see special notes below.**  What should I do after the exam: No Restrictions, You may resume normal activities.  What is this  test: MRI (magnetic resonance imaging) uses a strong magnet and radio waves to look inside the body. An MRA (magnetic resonance angiogram) does the same thing, but it lets us look at your blood vessels. A computer turns the radio waves into pictures showing cross sections of the body, much like slices of bread. This helps us see any problems more clearly. You may receive fluid (called  contrast ) before or during your scan. The fluid helps us see the pictures better. We give the fluid through an IV (small needle in your arm).  Who should I call with questions:  Please call the Imaging Department at your exam site with any questions. Directions, parking instructions, and other information is available on our website, [email or web address].  How do I prepare if I m having sedation or anesthesia? **IMPORTANT** THE INSTRUCTIONS BELOW ARE ONLY FOR THOSE PATIENTS WHO HAVE BEEN TOLD THEY WILL RECEIVE SEDATION OR GENERAL ANESTHESIA DURING THEIR MRI PROCEDURE:  IF YOU WILL RECEIVE SEDATION (take medicine to help you relax during your exam): You must get the medicine from your doctor before you arrive. Bring the medicine to the exam. Do not take it at home. Arrive one hour early. Bring someone who can take you home after the test. Your medicine will make you sleepy. After the exam, you may not drive, take a bus or take a taxi by yourself. No eating 8 hours before your exam. You may have clear liquids up until 4 hours before your exam. (Clear liquids include water, clear tea, black coffee and fruit juice without pulp.)  IF YOU WILL RECEIVE ANESTHESIA (be asleep for your exam): Arrive 1 1/2 hours early. Bring someone who can take you home after the test. You may not drive, take a bus or take a taxi by yourself. No eating 8 hours before your exam. You may have clear liquids up until 4 hours before your exam. (Clear liquids include water, clear tea, black coffee and fruit juice without pulp.)            Nov 09, 2018  9:15 AM CST    [facility] Lab Draw with  MASONIC LAB DRAW   [facility] Lab Draw ([facility])    [address]  [address]  [facility] [address] [phone]            Nov 09, 2018  9:30 AM CST   Infusion 360 with  ONCOLOGY INFUSION, UC 16 ATC   [facility] ([facility])    [address]  [address]  [facility] [address] [phone]              Future tests that were ordered for you today     Open Future Orders        Priority Expected Expires Ordered    MR Brain w/o & w Contrast Routine  9/24/2019 9/24/2018            Who to contact     If you have questions or need follow up information about today's clinic visit or your schedule please contact [facility] directly at [phone].  Normal or non-critical lab and imaging results will be communicated to you by [employer], letter or phone within 4 business days after the clinic has received the results. If you do not hear from us within 7 days, please contact the clinic through mChront or phone. If you have a critical or abnormal lab result, we will notify you by phone as soon as possible.  Submit refill requests through [employer] or call your pharmacy and they will forward the refill request to us. Please allow 3 business days for your refill to be completed.          Additional Information About Your Visit        [employer] Information     [employer] gives you secure access to your electronic health record. If you see a primary care provider, you can also send messages to your care team and make appointments. If you have questions, please call your primary care clinic.  If you do not have a primary care provider, please call [phone] and they will assist you.        Care EveryWhere ID     This is your Care EveryWhere ID. This could be used by other organizations to access your [facility] medical records  [id number]        Your Vitals Were     Pulse Temperature  Respirations Pulse Oximetry BMI (Body Mass Index)       67 97.8  F (36.6  C) (Oral) 16 96% 34.98 kg/m2        Blood Pressure from Last 3 Encounters:   09/24/18 (!) 135/98   09/10/18 123/88   08/20/18 123/89    Weight from Last 3 Encounters:   09/24/18 81.2 kg (179 lb 1.6 oz)   08/20/18 84.1 kg (185 lb 6.4 oz)   08/13/18 85.6 kg (188 lb 11.2 oz)                 Today's Medication Changes          These changes are accurate as of 9/24/18 11:01 AM.  If you have any questions, ask your nurse or doctor.               Start taking these medicines.        Dose/Directions    prochlorperazine 10 MG tablet   Commonly known as:  COMPAZINE   Used for:  Medulloblastoma of cerebellum (H), Nausea and vomiting, intractability of vomiting not specified, unspecified vomiting type   Started by:  Anupama Morrison MD        Dose:  10 mg   Take 1 tablet (10 mg) by mouth every 6 hours as needed for nausea or vomiting   Quantity:  90 tablet   Refills:  3            Where to get your medicines      These medications were sent to [address] [place], [place] [id number]    Hours:  TRANSPLANT PHONE NUMBER [phone] Phone:  [phone]     prochlorperazine 10 MG tablet                Primary Care Provider Office Phone # Fax #    Anupama Morrison [phone]       [address] [place] [address]        Equal Access to Services     [facility] AH: Hadii handy thomas hadasho Soomaali, waaxda luqadaha, qaybta kaalmada adeegyatu, toño bowman. So [facility] [phone].    ATENCIÓN: Si habla español, tiene a mccurdy disposición servicios gratuitos de asistencia lingüística. Llame al [phone].    We comply with applicable federal civil rights laws and [place] laws. We do not discriminate on the basis of race, color, national origin, age, disability, sex, sexual orientation, or gender  identity.            Thank you!     Thank you for choosing [facility]  for your care. Our goal is always to provide you with excellent care. Hearing back from our patients is one way we can continue to improve our services. Please take a few minutes to complete the written survey that you may receive in the mail after your visit with us. Thank you!             Your Updated Medication List - Protect others around you: Learn how to safely use, store and throw away your medicines at www.disposemymeds.org.          This list is accurate as of 9/24/18 11:01 AM.  Always use your most recent med list.                   Brand Name Dispense Instructions for use Diagnosis    acetaminophen 325 MG tablet    TYLENOL     Take 650 mg by mouth        DULCOLAX 5 MG EC tablet   Generic drug:  bisacodyl      Take 5 mg by mouth daily as needed for constipation        lomustine 40 MG capsule CHEMO    CEENU    4 capsule    Take 4 capsules (160 mg) by mouth once for 1 dose    Medulloblastoma of cerebellum (H)       LORazepam 0.5 MG tablet    ATIVAN    60 tablet    Take 1 tablet (0.5 mg) by mouth every 6 hours as needed for anxiety    Medulloblastoma of cerebellum (H)       ondansetron 8 MG ODT tab    ZOFRAN-ODT    60 tablet    Take 1 tablet (8 mg) by mouth every 8 hours as needed for nausea    Medulloblastoma of cerebellum (H)       prochlorperazine 10 MG tablet    COMPAZINE    90 tablet    Take 1 tablet (10 mg) by mouth every 6 hours as needed for nausea or vomiting    Medulloblastoma of cerebellum (H), Nausea and vomiting, intractability of vomiting not specified, unspecified vomiting type

## 2018-09-24 NOTE — PROGRESS NOTES
NEURO-ONCOLOGY VISIT  09/24/2018    CHIEF COMPLAINT: Ms. Merissa Silverman is a 33 year old right-handed woman with medulloblastoma (SHH-pathway activated and [id number]-sqkqlldd, T2, M0 (spinal imaging and CSF negative) arising from the left cerebellum, diagnosed following gross total resection on 4/13/2018. Completed craniospinal radiation with concurrent vincristine x 3 doses, but concurrent chemotherapy was stopped in the setting of pancytopenia. She is now to start adjuvant chemotherapy with vincristine, CCNU and cisplatin.     She is presenting in follow-up prior to Cycle 2. She is accompanied by grandmother, Shaji.     HISTORY OF PRESENT ILLNESS  -She tolerated her first cycle of chemo poorly. She had frequent nausea/vomiting, increased fatigue, anorexia and neuropathy. She was using Ativan for her nausea the first couple days, though it created more fatigue and did not provide adequate control. The nausea was worse the first couple weeks though she continued to have symptoms for 2 weeks even after that. Zofran provides good control for her  -Has not had any more double vision. Strength and gait unchanged. Had some tinnitus for a week though that resolved after a week. Intermittent numbness and tingling in hands and feet as well as nose. Nose has been the most persistent  -appetite is poor and she gets full fast. She eats lots of small meals. She drinks lots of fluids  -has been using Ativan to sleep though she sleep for 4 hours and then is wide awake again. She has not slept the whole night in several months    REVIEW OF SYSTEMS  A comprehensive ROS negative except as in HPI.      MEDICATIONS   Current Outpatient Prescriptions   Medication     acetaminophen (TYLENOL) 325 MG tablet     bisacodyl (DULCOLAX) 5 MG EC tablet     LORazepam (ATIVAN) 0.5 MG tablet     ondansetron (ZOFRAN-ODT) 8 MG ODT tab     prochlorperazine (COMPAZINE) 10 MG tablet     lomustine (CEENU) 40 MG capsule CHEMO     No current  facility-administered medications for this visit.      DRUG ALLERGIES   Allergies   Allergen Reactions     Sulfa Drugs Unknown       ONCOLOGIC HISTORY  -PRESENTATION: Progressively worsening headaches. Additionally was with abrupt position changes resulted in dizziness/ syncope. CTH at an outside hospital showed a mass in the left cerebellum. Transferred to [facility].  -4/11/2018 MRB showed a 3.5cm mass in the left cerebellar hemisphere that was associated with mild cerebellar tonsillar herniation and hydrocephalus.  -MRI spine showed no evidence of disease.   -4/13/2018 SURGERY: Suboccipital craniotomy with resection of the left cerebellar mass. Immediate post-operative MRI demonstrated a gross total resection.   PATHOLOGY: Medulloblastoma; Molecular markers pending.   -5/4/2018 NEURO-ONC: LP performed. Evaluated by radiation oncology. Recommending craniospinal radiation + concurrent vincristine followed by eight 6-week cycles of cisplatin, lomustine, and vincristine.  -5/4/2018 LP with CSF analysis: WBC 1/ RBC 1, protein 25, glucose 62, negative cytology.   -5/14 - 6/22/2018 CHEMORADS with vincristine 2 mg/m2 (stopped after 3 infusions due to pancytopenia).  -5/17/2018 Audiology- Baseline hearing testing with no hearing loss.  -6/4/2018 NEURO-ONC: Doing well, no new neurological symptoms. Tolerating treatment well. Ophtho referral. Labs improved.  -6/4/2018 NGS via [facility]: SMO mutation, TERT promotor mutation. Negative for microsatellite instability.   -6/25/2018 NEURO-ONC: Clinically stable, painful radiation-induced burn. Monitoring labs. Cancer Risk Assessment referral.  -8/13/2018 NEURO-ONC/MRB/ CHEMO: Clinically stable. Imaging shows expected post operative changes with no evidence of new disease. C1D1 of vincristine, CCNU and cisplatin.   -9/24/2018 NEURO-ONC: Neurologically stable, though did not tolerate first cycle of chemo well 2/2 nausea. Hold on starting C2D1 today due to  leukopenia (WBC 2.7).    SOCIAL HISTORY   Tobacco use: Former smoker, E-cigs stopped on 5/3/2018  Alcohol use: None.   Drug use: Denies marijuana use.  Supplement, complimentary/ alternative medicine: None.   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.   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      PHYSICAL EXAMINATION  BP (!) 135/98 (BP Location: Right arm, Patient Position: Sitting, Cuff Size: Adult Regular)  Pulse 67  Temp 97.8  F (36.6  C) (Oral)  Resp 16  Wt 81.2 kg (179 lb 1.6 oz)  SpO2 96%  BMI 34.98 kg/m2   Vitals:    09/24/18 0919   Weight: 81.2 kg (179 lb 1.6 oz)     Ht Readings from Last 2 Encounters:   08/13/18 1.524 m (5')   06/25/18 1.524 m (5')     KPS: 100    -Generally well appearing.  -Throat: No oral thrush. No redness of throat. No lymphadenopathy.   -Respiratory: Normal breath sounds, no audible wheezing.   -Skin: No rashes. Healed head incision. Hair has started to regrow.  -Hematologic/ lymphatic: No abnormal bruising. No leg swelling.  -Psychiatric: Normal mood and affect. Pleasant, talkative.  -Neurologic:   MENTAL STATUS:     Alert, oriented to date.    Recall: Immediate 3/3, delayed 3/3.    Speech fluent. Comprehension intact to multi-step commands.   Normal naming, repetition. Able to read.   Good right-left orientation.     CRANIAL NERVES:     Discs flat on fundoscopy.    Pupils are equal, round, reactive to light.     Extraocular movements full, patient denies diplopia. Previous mild eye jerks noted on pursuit.    Visual fields full.     Facial sensation intact to light touch.   Symmetric facial movements.   Hearing intact.   Palate moves symmetrically.     Sternocleidomastoid and trapezius strength intact.    Tongue midline.  MOTOR:    Normal and symmetric tone.   Grossly 5/5 throughout.    No pronation or drift. No orbiting.   Able to rise from a chair without use of arms.   On toe/ heel walk, equal distance from floor to heels/ toes.   SENSATION:    Intact to light touch  throughout.  COORDINATION:   Intact finger-nose with eyes open and closed.   REFLEXES:    Muted, symmetric.    Toes not tested. No clonus. No Hoffmans.   No grasp.    GAIT:   Walks without assistance.   Good speed. Normal stride length and heel strike. Normal turns. Normal arm swing.   Able to toe, heel walk. Some difficulty with tandem walk.       MEDICAL RECORDS  Obtained and personally reviewed all available outside medical records in addition to reviewing any records available in our electronic system.     LABS  Personally reviewed all available lab results.   Continue lymphopenia. Decreased WBC 2.7, ANC 2.0  Plt and Hgb are stable    IMAGING  No new images today      IMPRESSION  For the 30 minute appointment, more than 50% of the encounter was spent discussing in detail the treatment and potential side effects for adjuvant therapy. This was in addition to providing emotional support, answering questions pertaining to my recommendations, and devising the treatment plan as outlined below.    White blood counts did become neutropenia on week 3 though have recovered well. WBC was less than 3 today so we will hold D1 of C2 today. Will repeat CBC later this week and it is more than likely that all values will be at goal.     For nausea, will use compazine + ativan. Declined dexamethasone trial due to poor tolerance in the past.     Will continue to monitor CBC with weekly labs throughout treatment. The plan is for up to eight cycles of cisplatin, lomustine, and vincristine (with 6 weeks cycles). Of note, this regimen is highly toxic and in clinical studies, treatment was terminated or dose reduced due to side effects in nearly 60 percent of patients by cycle four.     Clinical trial options remain a strong consideration for recurrent disease, as does off label use of SMO inhibitors.     PROBLEM LIST  Medulloblastoma  Steroid intolerance  Chemotherapy-induced pancytopenia  Chemotherapy-induced nausea/ vomiting    Radiation burn  Fatigue, cancer and treatment related, improving    PLAN  -CANCER-DIRECTED THERAPY-  -WBC <3 today, will hold treatment for 4-5 days. Will return on Friday to recheck labs and potentially start cycle 2.  -Supportive medications for nausea: Aloxi, Emend and Dex 12 mg during infusion. Can use Ativan and Compazine at home (gave prescription today). Discussed using medical marijuana, though she was not comfortable with this at this time. Can also consider Olanzapine.  -May have to dose decrease if not able to control symptoms.  -Continue to use Miralax, Colace and/or Senna as needed for constipation.  -Continue weekly labs to monitor counts.    -In December, will need repeat hearing testing given continued use of cisplatin.     -STEROIDS-  -Currently off dexamethasone.  -Poor tolerance for steroids in the past; insomnia and irritability. Could use for nausea and anorexia, though should use as a last resort.     -SEIZURE MANAGEMENT-  -While this patient is at increased risk of having seizures, given the lack of seizure history, there is no indication to prescribe an antiepileptic at this time. Will continue to monitor for seizure activity.    -Quality of life/ MOOD/ FATIGUE-  -Denies any mood issues.  -Continue to monitor mood as untreated/ undertreated depression can worsen fatigue, dysorexia, and quality of life.  -Has increased anxiety at night a couple times a week for which she takes Ativan to calm her down and help her sleep. Can continue this for now. If anxiety worsens and has an increased need for Ativan, will start her on a medication like Celexa or Lexapro.     -SLEEP DISTURBANCE-  -Melatonin trial: Start at 3mg by mouth taken 30-45 minutes prior to bedtime, then increase by 3mg weekly as needed for insomnia/ sleep disturbance to a max dose of 12mg.     Return in 5 days for repeat labs and hopefully to start cycle 2. Then, return in mid-November for continued examination and to review repeat  imaging prior to starting cycle 3.     In the meantime, Merissa knows to call with questions or concerns or to report new complaints and can be seen sooner if needed. Urgent evaluation is needed in the setting of acute onset of severe headache, abrupt change in mental status, on-going seizures, new focal deficits, or new leg swelling/ pain.     Patient also seen and evaluated by Melida Morrison MD  Neuro-oncology

## 2018-09-24 NOTE — NURSING NOTE
Oncology Rooming Note    September 24, 2018 9:41 AM   Merissa Silverman is a 33 year old female who presents for:    Chief Complaint   Patient presents with     Port Draw     port accessed and labs drawn by rn.  vs taken.     Oncology Clinic Visit     Medulloblastoma; Active Tx     Initial Vitals: BP (!) 135/98 (BP Location: Right arm, Patient Position: Sitting, Cuff Size: Adult Regular)  Pulse 67  Temp 97.8  F (36.6  C) (Oral)  Resp 16  Wt 81.2 kg (179 lb 1.6 oz)  SpO2 96%  BMI 34.98 kg/m2 Estimated body mass index is 34.98 kg/(m^2) as calculated from the following:    Height as of 8/13/18: 1.524 m (5').    Weight as of this encounter: 81.2 kg (179 lb 1.6 oz). Body surface area is 1.85 meters squared.  No Pain (0) Comment: Data Unavailable   No LMP recorded.  Allergies reviewed: Yes  Medications reviewed: Yes    Medications: Medication refills not needed today.  Pharmacy name entered into [facility]: [facility] - [place], [place] - [address] [place] [address]    Clinical concerns:      8 minutes for nursing intake (face to face time)     Chelle Ruiz CMA

## 2018-09-24 NOTE — PATIENT INSTRUCTIONS
Chemotherapy:  White blood cell count is too low (2.7 needs to be > 3).  Recheck labs + infusion later this week (Thursday or Friday).    Nausea/ decreased appetite:  -Compazine  -Ativan   Call if these medications are not working    Sleep:  -Melatonin trial: Start at 3mg by mouth taken 30-45 minutes prior to bedtime, then increase by 3mg every 3-5 nights as needed for insomnia/ sleep disturbance to a max dose of 12mg.  -Over the counter.     Consideration for medical marijuana or Olanzapine.     Return to clinic on 11/9 for labs, MRI, appt, and infusion.    Anupama Morrison MD  Neuro-oncology  9/24/2018

## 2018-09-27 ENCOUNTER — INFUSION THERAPY VISIT (OUTPATIENT)
Dept: ONCOLOGY | Facility: CLINIC | Age: 33
End: 2018-09-27
Attending: PSYCHIATRY & NEUROLOGY
Payer: COMMERCIAL

## 2018-09-27 VITALS — SYSTOLIC BLOOD PRESSURE: 126 MMHG | DIASTOLIC BLOOD PRESSURE: 86 MMHG | HEART RATE: 71 BPM

## 2018-09-27 VITALS
DIASTOLIC BLOOD PRESSURE: 94 MMHG | TEMPERATURE: 97.6 F | SYSTOLIC BLOOD PRESSURE: 137 MMHG | OXYGEN SATURATION: 100 % | HEART RATE: 64 BPM | BODY MASS INDEX: 35.11 KG/M2 | WEIGHT: 179.8 LBS

## 2018-09-27 DIAGNOSIS — D61.810 ANTINEOPLASTIC CHEMOTHERAPY INDUCED PANCYTOPENIA (H): ICD-10-CM

## 2018-09-27 DIAGNOSIS — C71.6 MEDULLOBLASTOMA OF CEREBELLUM (H): Primary | ICD-10-CM

## 2018-09-27 DIAGNOSIS — T45.1X5A ANTINEOPLASTIC CHEMOTHERAPY INDUCED PANCYTOPENIA (H): ICD-10-CM

## 2018-09-27 DIAGNOSIS — T45.1X5A CHEMOTHERAPY-INDUCED NAUSEA AND VOMITING: ICD-10-CM

## 2018-09-27 DIAGNOSIS — R11.2 CHEMOTHERAPY-INDUCED NAUSEA AND VOMITING: ICD-10-CM

## 2018-09-27 DIAGNOSIS — Z51.11 CHEMOTHERAPY MANAGEMENT, ENCOUNTER FOR: ICD-10-CM

## 2018-09-27 LAB
ALBUMIN SERPL-MCNC: 4 G/DL (ref 3.4–5)
ALP SERPL-CCNC: 65 U/L (ref 40–150)
ALT SERPL W P-5'-P-CCNC: 31 U/L (ref 0–50)
ANION GAP SERPL CALCULATED.3IONS-SCNC: 7 MMOL/L (ref 3–14)
AST SERPL W P-5'-P-CCNC: 28 U/L (ref 0–45)
BASOPHILS # BLD AUTO: 0 10E9/L (ref 0–0.2)
BASOPHILS NFR BLD AUTO: 0.3 %
BILIRUB SERPL-MCNC: 0.4 MG/DL (ref 0.2–1.3)
BUN SERPL-MCNC: 8 MG/DL (ref 7–30)
CALCIUM SERPL-MCNC: 8.9 MG/DL (ref 8.5–10.1)
CHLORIDE SERPL-SCNC: 106 MMOL/L (ref 94–109)
CO2 SERPL-SCNC: 24 MMOL/L (ref 20–32)
CREAT SERPL-MCNC: 0.77 MG/DL (ref 0.52–1.04)
DIFFERENTIAL METHOD BLD: ABNORMAL
EOSINOPHIL # BLD AUTO: 0 10E9/L (ref 0–0.7)
EOSINOPHIL NFR BLD AUTO: 1 %
ERYTHROCYTE [DISTWIDTH] IN BLOOD BY AUTOMATED COUNT: 13.7 % (ref 10–15)
GFR SERPL CREATININE-BSD FRML MDRD: 87 ML/MIN/1.7M2
GLUCOSE SERPL-MCNC: 81 MG/DL (ref 70–99)
HCT VFR BLD AUTO: 31.7 % (ref 35–47)
HGB BLD-MCNC: 11 G/DL (ref 11.7–15.7)
IMM GRANULOCYTES # BLD: 0 10E9/L (ref 0–0.4)
IMM GRANULOCYTES NFR BLD: 0.3 %
LAB SCANNED RESULT: NORMAL
LYMPHOCYTES # BLD AUTO: 0.3 10E9/L (ref 0.8–5.3)
LYMPHOCYTES NFR BLD AUTO: 9.2 %
MCH RBC QN AUTO: 31.5 PG (ref 26.5–33)
MCHC RBC AUTO-ENTMCNC: 34.7 G/DL (ref 31.5–36.5)
MCV RBC AUTO: 91 FL (ref 78–100)
MONOCYTES # BLD AUTO: 0.3 10E9/L (ref 0–1.3)
MONOCYTES NFR BLD AUTO: 11.6 %
NEUTROPHILS # BLD AUTO: 2.3 10E9/L (ref 1.6–8.3)
NEUTROPHILS NFR BLD AUTO: 77.6 %
NRBC # BLD AUTO: 0 10*3/UL
NRBC BLD AUTO-RTO: 0 /100
PLATELET # BLD AUTO: 280 10E9/L (ref 150–450)
POTASSIUM SERPL-SCNC: 3.7 MMOL/L (ref 3.4–5.3)
PROT SERPL-MCNC: 7.2 G/DL (ref 6.8–8.8)
RBC # BLD AUTO: 3.49 10E12/L (ref 3.8–5.2)
SODIUM SERPL-SCNC: 138 MMOL/L (ref 133–144)
WBC # BLD AUTO: 2.9 10E9/L (ref 4–11)

## 2018-09-27 PROCEDURE — 85025 COMPLETE CBC W/AUTO DIFF WBC: CPT | Performed by: PSYCHIATRY & NEUROLOGY

## 2018-09-27 PROCEDURE — 80053 COMPREHEN METABOLIC PANEL: CPT | Performed by: PSYCHIATRY & NEUROLOGY

## 2018-09-27 PROCEDURE — 96415 CHEMO IV INFUSION ADDL HR: CPT

## 2018-09-27 PROCEDURE — 96375 TX/PRO/DX INJ NEW DRUG ADDON: CPT

## 2018-09-27 PROCEDURE — 25000128 H RX IP 250 OP 636: Mod: ZF | Performed by: PSYCHIATRY & NEUROLOGY

## 2018-09-27 PROCEDURE — 96367 TX/PROPH/DG ADDL SEQ IV INF: CPT

## 2018-09-27 PROCEDURE — 96361 HYDRATE IV INFUSION ADD-ON: CPT

## 2018-09-27 PROCEDURE — 96411 CHEMO IV PUSH ADDL DRUG: CPT

## 2018-09-27 PROCEDURE — 96413 CHEMO IV INFUSION 1 HR: CPT

## 2018-09-27 RX ORDER — PALONOSETRON 0.05 MG/ML
0.25 INJECTION, SOLUTION INTRAVENOUS ONCE
Status: COMPLETED | OUTPATIENT
Start: 2018-09-27 | End: 2018-09-27

## 2018-09-27 RX ORDER — HEPARIN SODIUM (PORCINE) LOCK FLUSH IV SOLN 100 UNIT/ML 100 UNIT/ML
5 SOLUTION INTRAVENOUS ONCE
Status: COMPLETED | OUTPATIENT
Start: 2018-09-27 | End: 2018-09-27

## 2018-09-27 RX ADMIN — CISPLATIN 130 MG: 1 INJECTION, SOLUTION INTRAVENOUS at 13:09

## 2018-09-27 RX ADMIN — Medication 5 ML: at 09:16

## 2018-09-27 RX ADMIN — DEXAMETHASONE SODIUM PHOSPHATE: 10 INJECTION, SOLUTION INTRAMUSCULAR; INTRAVENOUS at 12:19

## 2018-09-27 RX ADMIN — SODIUM CHLORIDE 250 ML: 9 INJECTION, SOLUTION INTRAVENOUS at 13:04

## 2018-09-27 RX ADMIN — VINCRISTINE SULFATE 2 MG: 1 INJECTION, SOLUTION INTRAVENOUS at 13:04

## 2018-09-27 RX ADMIN — SODIUM CHLORIDE 1000 ML: 9 INJECTION, SOLUTION INTRAVENOUS at 10:59

## 2018-09-27 RX ADMIN — PALONOSETRON HYDROCHLORIDE 0.25 MG: 0.25 INJECTION INTRAVENOUS at 11:53

## 2018-09-27 RX ADMIN — Medication 5 ML: at 15:21

## 2018-09-27 ASSESSMENT — PAIN SCALES - GENERAL: PAINLEVEL: NO PAIN (0)

## 2018-09-27 NOTE — PROGRESS NOTES
Infusion Nursing Note:  Merissa Silverman presents today for Cycle #2 Day1 Cisplatin/Mannitol and Vincristine.    Patient seen by provider today: No   present during visit today: Not Applicable.    Note: Pt presents today with no issues. WBC 2.9 today. Per Dr. Morrison's note 9/24, treatment held due to WBC <3. Dr. Morrison notified. TORB 9/27/18 at 1130 Dr. Morrison/Ashely Kelley RN: Okay to proceed with chemo today with WBC 2.9.     Pt voiding prior to infusing Cisplatin    Intravenous Access:  Implanted Port.    Treatment Conditions:  Lab Results   Component Value Date    HGB 11.0 09/27/2018     Lab Results   Component Value Date    WBC 2.9 09/27/2018      Lab Results   Component Value Date    ANEU 2.3 09/27/2018     Lab Results   Component Value Date     09/27/2018      Lab Results   Component Value Date     09/27/2018                   Lab Results   Component Value Date    POTASSIUM 3.7 09/27/2018                 Lab Results   Component Value Date    CR 0.77 09/27/2018                   Lab Results   Component Value Date    ANAND 8.9 09/27/2018                Lab Results   Component Value Date    BILITOTAL 0.4 09/27/2018           Lab Results   Component Value Date    ALBUMIN 4.0 09/27/2018                    Lab Results   Component Value Date    ALT 31 09/27/2018           Lab Results   Component Value Date    AST 28 09/27/2018       Results reviewed, labs MET treatment parameters, ok to proceed with treatment.      Post Infusion Assessment:  Patient tolerated infusion without incident.  Blood return noted pre and post infusion.  Blood return noted during administration of Vincristine every 2-3 cc.  Site patent and intact, free from redness, edema or discomfort.  Access discontinued per protocol.    Discharge Plan:   Prescription refills given for Ativan and Etoposide.  Discharge instructions reviewed with: Patient.  Patient and/or family verbalized understanding of discharge instructions and all  questions answered.  AVS to patient via [employer].  Patient will return 10/5/18 for next appointment.   Patient discharged in stable condition accompanied by: father.    Elena Kelley RN

## 2018-09-27 NOTE — MR AVS SNAPSHOT
After Visit Summary   9/27/2018    Merissa Silverman    MRN: [id number]           Patient Information     Date Of Birth          1985        Visit Information        Provider Department      9/27/2018 10:30 AM  21 ATC;  ONCOLOGY INFUSION [facility]        Today's Diagnoses     Medulloblastoma of cerebellum (H)    -  1    Antineoplastic chemotherapy induced pancytopenia (H)        Chemotherapy-induced nausea and vomiting        Chemotherapy management, encounter for           Follow-ups after your visit        Your next 10 appointments already scheduled     Oct 05, 2018 10:00 AM CDT   Masonic Lab Draw with  MASONIC LAB DRAW   [facility] Lab Draw ([facility])    [address]  [address]  [facility] [address]   [phone]            Oct 05, 2018 10:30 AM CDT   Infusion 60 with  ONCOLOGY INFUSION,  31 ATC   [facility] ([facility])    [address]  [address]  [facility] [address]   [phone]            Nov 09, 2018  8:30 AM CST   MR BRAIN W/O & W CONTRAST with [address] MRI ([facility])    [address]  1st Floor  [facility] [address]   [phone]           How do I prepare for my exam? (Food and drink instructions) **If you will be receiving sedation or general anesthesia, please see special notes below.**  How do I prepare for my exam? (Other instructions) Take your medicines as usual, unless your doctor tells you not to. You may or may not receive intravenous (IV) contrast for this exam pending the discretion of the Radiologist.  You do not need to do anything special to prepare.  **If you will be receiving sedation or general anesthesia, please see special notes below.**  What should I wear: The MRI machine uses a strong magnet. Please wear clothes without metal (snaps, zippers). A sweatsuit works well,  or we may give you a hospital gown. Please remove any body piercings and hair extensions before you arrive. You will also remove watches, jewelry, hairpins, wallets, dentures, partial dental plates and hearing aids. You may wear contact lenses, and you may be able to wear your rings. We have a safe place to keep your personal items, but it is safer to leave them at home.  How long does the exam take: Most tests take 30 to 60 minutes.  HOWEVER, IF YOUR DOCTOR PRESCRIBES ANESTHESIA please plan on spending four to five hours in the recovery room.  What should I bring:  Bring a list of your current medicines to your exam (including vitamins, minerals and over-the-counter drugs).  Do I need a :  **If you will be receiving sedation or general anesthesia, please see special notes below.**  What should I do after the exam: No Restrictions, You may resume normal activities.  What is this test: MRI (magnetic resonance imaging) uses a strong magnet and radio waves to look inside the body. An MRA (magnetic resonance angiogram) does the same thing, but it lets us look at your blood vessels. A computer turns the radio waves into pictures showing cross sections of the body, much like slices of bread. This helps us see any problems more clearly. You may receive fluid (called  contrast ) before or during your scan. The fluid helps us see the pictures better. We give the fluid through an IV (small needle in your arm).  Who should I call with questions:  Please call the Imaging Department at your exam site with any questions. Directions, parking instructions, and other information is available on our website, [email or web address].  How do I prepare if I m having sedation or anesthesia? **IMPORTANT** THE INSTRUCTIONS BELOW ARE ONLY FOR THOSE PATIENTS WHO HAVE BEEN TOLD THEY WILL RECEIVE SEDATION OR GENERAL ANESTHESIA DURING THEIR MRI PROCEDURE:  IF YOU WILL RECEIVE SEDATION (take medicine to help you relax during your exam): You  must get the medicine from your doctor before you arrive. Bring the medicine to the exam. Do not take it at home. Arrive one hour early. Bring someone who can take you home after the test. Your medicine will make you sleepy. After the exam, you may not drive, take a bus or take a taxi by yourself. No eating 8 hours before your exam. You may have clear liquids up until 4 hours before your exam. (Clear liquids include water, clear tea, black coffee and fruit juice without pulp.)  IF YOU WILL RECEIVE ANESTHESIA (be asleep for your exam): Arrive 1 1/2 hours early. Bring someone who can take you home after the test. You may not drive, take a bus or take a taxi by yourself. No eating 8 hours before your exam. You may have clear liquids up until 4 hours before your exam. (Clear liquids include water, clear tea, black coffee and fruit juice without pulp.)            Nov 09, 2018  9:15 AM CST   Masonic Lab Draw with UC MASONIC LAB DRAW   [facility] ([facility])    [address] [place] [address]  [address] [place] [address]   [phone]            Nov 09, 2018  9:30 AM CST   Infusion 360 with UC ONCOLOGY INFUSION, UC 16 ATC   [facility])    [address] [place] [address]  [address] [place] [address]   [phone]            Nov 09, 2018  3:30 PM CST   (Arrive by 3:15 PM)   Return Visit with Anupama Morrison MD   [facility] ([facility])    [address] [place] [address]  [address] [place] [address]   [phone]            Nov 16, 2018 10:00 AM CST   [facility]ic Lab Draw with UC MASONIC LAB DRAW   [facility])    [address] [place] [address]  [address] [place] [address]   [phone]            Nov 16, 2018 10:30 AM CST   Infusion 60 with UC ONCOLOGY INFUSION   [facility] ([facility])     [address]  [address] [place] [id number] [phone]              Who to contact     If you have questions or need follow up information about today's clinic visit or your schedule please contact [facility] directly at [phone].  Normal or non-critical lab and imaging results will be communicated to you by MyChart, letter or phone within 4 business days after the clinic has received the results. If you do not hear from us within 7 days, please contact the clinic through [employer] or phone. If you have a critical or abnormal lab result, we will notify you by phone as soon as possible.  Submit refill requests through [employer] or call your pharmacy and they will forward the refill request to us. Please allow 3 business days for your refill to be completed.          Additional Information About Your Visit        MyChart Information     [employer] gives you secure access to your electronic health record. If you see a primary care provider, you can also send messages to your care team and make appointments. If you have questions, please call your primary care clinic.  If you do not have a primary care provider, please call [phone] and they will assist you.        Care EveryWhere ID     This is your Care EveryWhere ID. This could be used by other organizations to access your [facility] medical records  [id number]        Your Vitals Were     Pulse Temperature Pulse Oximetry BMI (Body Mass Index)          64 97.6  F (36.4  C) (Oral) 100% 35.11 kg/m2         Blood Pressure from Last 3 Encounters:   09/27/18 (!) 137/94   09/27/18 126/86   09/24/18 (!) 135/98    Weight from Last 3 Encounters:   09/27/18 81.6 kg (179 lb 12.8 oz)   09/24/18 81.2 kg (179 lb 1.6 oz)   08/20/18 84.1 kg (185 lb 6.4 oz)              We Performed the Following     CBC with platelets differential     Comprehensive metabolic panel          Today's Medication Changes          These changes are accurate as of  9/27/18  3:47 PM.  If you have any questions, ask your nurse or doctor.               These medicines have changed or have updated prescriptions.        Dose/Directions    * lomustine 40 MG capsule CHEMO   Commonly known as:  CEENU   This may have changed:  Another medication with the same name was added. Make sure you understand how and when to take each.   Used for:  Medulloblastoma of cerebellum (H)        Dose:  75 mg/m2   Take 4 capsules (160 mg) by mouth once for 1 dose   Quantity:  4 capsule   Refills:  0       * lomustine 40 MG capsule CHEMO   Commonly known as:  CEENU   This may have changed:  You were already taking a medication with the same name, and this prescription was added. Make sure you understand how and when to take each.   Used for:  Medulloblastoma of cerebellum (H)        Dose:  75 mg/m2   Take 3 capsules (120 mg) by mouth once for 1 dose   Quantity:  3 capsule   Refills:  0       * Notice:  This list has 2 medication(s) that are the same as other medications prescribed for you. Read the directions carefully, and ask your doctor or other care provider to review them with you.         Where to get your medicines      These medications were sent to [facility] [address] [place] [address] [place], [place] [id number]    Hours:  TRANSPLANT PHONE NUMBER [phone] Phone:  [phone]     lomustine 40 MG capsule CHEMO                Primary Care Provider Office Phone # Fax #    Anupama Morrison [phone]       [address] [place] [id number]        Equal Access to Services     [facility]ILIAN[facility] AH: Hadii handy thomas hadasho Soomaali, waaxda luqadaha, qaybta kaalmada adeegyada, toño bowman. So [facility] [phone].    ATENCIÓN: Si habla español, tiene a mccurdy disposición servicios gratuitos de asistencia lingüística. Llame al [phone].    We comply with applicable federal civil rights  laws and [place] laws. We do not discriminate on the basis of race, color, national origin, age, disability, sex, sexual orientation, or gender identity.            Thank you!     Thank you for choosing [facility]  for your care. Our goal is always to provide you with excellent care. Hearing back from our patients is one way we can continue to improve our services. Please take a few minutes to complete the written survey that you may receive in the mail after your visit with us. Thank you!             Your Updated Medication List - Protect others around you: Learn how to safely use, store and throw away your medicines at www.disposemymeds.org.          This list is accurate as of 9/27/18  3:47 PM.  Always use your most recent med list.                   Brand Name Dispense Instructions for use Diagnosis    acetaminophen 325 MG tablet    TYLENOL     Take 650 mg by mouth        DULCOLAX 5 MG EC tablet   Generic drug:  bisacodyl      Take 5 mg by mouth daily as needed for constipation        * lomustine 40 MG capsule CHEMO    CEENU    4 capsule    Take 4 capsules (160 mg) by mouth once for 1 dose    Medulloblastoma of cerebellum (H)       * lomustine 40 MG capsule CHEMO    CEENU    3 capsule    Take 3 capsules (120 mg) by mouth once for 1 dose    Medulloblastoma of cerebellum (H)       LORazepam 0.5 MG tablet    ATIVAN    60 tablet    Take 1 tablet (0.5 mg) by mouth every 6 hours as needed for anxiety    Medulloblastoma of cerebellum (H)       ondansetron 8 MG ODT tab    ZOFRAN-ODT    60 tablet    Take 1 tablet (8 mg) by mouth every 8 hours as needed for nausea    Medulloblastoma of cerebellum (H)       prochlorperazine 10 MG tablet    COMPAZINE    90 tablet    Take 1 tablet (10 mg) by mouth every 6 hours as needed for nausea or vomiting    Medulloblastoma of cerebellum (H), Nausea and vomiting, intractability of vomiting not specified, unspecified vomiting type       * Notice:  This list has 2  medication(s) that are the same as other medications prescribed for you. Read the directions carefully, and ask your doctor or other care provider to review them with you.

## 2018-09-27 NOTE — NURSING NOTE
Chief Complaint   Patient presents with     Port Draw     labs drawn via port by RN     BP (!) 137/94 (BP Location: Right arm, Patient Position: Chair, Cuff Size: Adult Regular)  Pulse 64  Temp 97.6  F (36.4  C) (Oral)  Wt 81.6 kg (179 lb 12.8 oz)  SpO2 100%  BMI 35.11 kg/m2    Vitals taken. Port accessed by RN. Labs collected and sent. Line flushed with NS & Heparin. Pt tolerated well. Pt checked in for next appointment.    Gale Valentino RN

## 2018-09-28 ENCOUNTER — TELEPHONE (OUTPATIENT)
Dept: ONCOLOGY | Facility: CLINIC | Age: 33
End: 2018-09-28

## 2018-09-28 NOTE — ORAL ONC MGMT
Called Lomustine prescription into [facility] specialty pharmacy this am.  Pharmacy will be changing lomustine to brand name Gleostine. Told pharmacy patient needed to receive this by tomorrow morning and they asked me to notify patient to call pharmacy back in an hour.  I stated I would call patient and tell her to do so.    I spoke to Merissa at 8:30 this morning and notified he that she needed to call [facility] to coordinate delivery of Rx. I provided patient with number and also alerted her Rx had been decreased by 1 capsule. Patient thanked me or phone call and said would follow up with pharmacy.    Marivel Goddard, Pharm.D., [facility]  [phone]  09/28/18

## 2018-10-01 ENCOUNTER — TELEPHONE (OUTPATIENT)
Dept: ONCOLOGY | Facility: CLINIC | Age: 33
End: 2018-10-01

## 2018-10-01 NOTE — TELEPHONE ENCOUNTER
Oral Chemotherapy Monitoring Program.    Per Dr Morrison's instruction, I called patient to advise her to take the Lomustine dose as soon as it arrives (ie 10/2/18 or 10/3/18).    Patient expressed that she understood.    Marivel Diaz, PharmD, BCOP, BCPS  Clinical Pharmacy Specialist/  Oncology Medication Therapy Management Pharmacist  [facility]  Pager [phone]  Phone [phone]

## 2018-10-05 ENCOUNTER — INFUSION THERAPY VISIT (OUTPATIENT)
Dept: ONCOLOGY | Facility: CLINIC | Age: 33
End: 2018-10-05
Attending: PSYCHIATRY & NEUROLOGY
Payer: COMMERCIAL

## 2018-10-05 ENCOUNTER — APPOINTMENT (OUTPATIENT)
Dept: LAB | Facility: CLINIC | Age: 33
End: 2018-10-05
Attending: PSYCHIATRY & NEUROLOGY
Payer: COMMERCIAL

## 2018-10-05 VITALS
DIASTOLIC BLOOD PRESSURE: 87 MMHG | SYSTOLIC BLOOD PRESSURE: 132 MMHG | TEMPERATURE: 97.7 F | HEART RATE: 85 BPM | OXYGEN SATURATION: 100 % | WEIGHT: 174.1 LBS | BODY MASS INDEX: 34 KG/M2

## 2018-10-05 DIAGNOSIS — E87.6 HYPOKALEMIA: ICD-10-CM

## 2018-10-05 DIAGNOSIS — C71.6 MEDULLOBLASTOMA OF CEREBELLUM (H): Primary | ICD-10-CM

## 2018-10-05 DIAGNOSIS — Z51.11 CHEMOTHERAPY MANAGEMENT, ENCOUNTER FOR: Primary | ICD-10-CM

## 2018-10-05 LAB
ALBUMIN SERPL-MCNC: 4 G/DL (ref 3.4–5)
ALP SERPL-CCNC: 72 U/L (ref 40–150)
ALT SERPL W P-5'-P-CCNC: 43 U/L (ref 0–50)
ANION GAP SERPL CALCULATED.3IONS-SCNC: 9 MMOL/L (ref 3–14)
AST SERPL W P-5'-P-CCNC: 26 U/L (ref 0–45)
BASOPHILS # BLD AUTO: 0 10E9/L (ref 0–0.2)
BASOPHILS NFR BLD AUTO: 0.4 %
BILIRUB SERPL-MCNC: 0.4 MG/DL (ref 0.2–1.3)
BUN SERPL-MCNC: 11 MG/DL (ref 7–30)
CALCIUM SERPL-MCNC: 8.8 MG/DL (ref 8.5–10.1)
CHLORIDE SERPL-SCNC: 100 MMOL/L (ref 94–109)
CO2 SERPL-SCNC: 27 MMOL/L (ref 20–32)
CREAT SERPL-MCNC: 1 MG/DL (ref 0.52–1.04)
DIFFERENTIAL METHOD BLD: ABNORMAL
EOSINOPHIL # BLD AUTO: 0 10E9/L (ref 0–0.7)
EOSINOPHIL NFR BLD AUTO: 0.4 %
ERYTHROCYTE [DISTWIDTH] IN BLOOD BY AUTOMATED COUNT: 13.2 % (ref 10–15)
GFR SERPL CREATININE-BSD FRML MDRD: 64 ML/MIN/1.7M2
GLUCOSE SERPL-MCNC: 89 MG/DL (ref 70–99)
HCT VFR BLD AUTO: 29.5 % (ref 35–47)
HGB BLD-MCNC: 10.8 G/DL (ref 11.7–15.7)
IMM GRANULOCYTES # BLD: 0 10E9/L (ref 0–0.4)
IMM GRANULOCYTES NFR BLD: 0.6 %
LYMPHOCYTES # BLD AUTO: 0.3 10E9/L (ref 0.8–5.3)
LYMPHOCYTES NFR BLD AUTO: 6 %
MCH RBC QN AUTO: 31.8 PG (ref 26.5–33)
MCHC RBC AUTO-ENTMCNC: 36.6 G/DL (ref 31.5–36.5)
MCV RBC AUTO: 87 FL (ref 78–100)
MONOCYTES # BLD AUTO: 0.7 10E9/L (ref 0–1.3)
MONOCYTES NFR BLD AUTO: 13.5 %
NEUTROPHILS # BLD AUTO: 3.8 10E9/L (ref 1.6–8.3)
NEUTROPHILS NFR BLD AUTO: 79.1 %
NRBC # BLD AUTO: 0 10*3/UL
NRBC BLD AUTO-RTO: 0 /100
PLATELET # BLD AUTO: 254 10E9/L (ref 150–450)
POTASSIUM SERPL-SCNC: 2.8 MMOL/L (ref 3.4–5.3)
PROT SERPL-MCNC: 7.5 G/DL (ref 6.8–8.8)
RBC # BLD AUTO: 3.4 10E12/L (ref 3.8–5.2)
SODIUM SERPL-SCNC: 136 MMOL/L (ref 133–144)
WBC # BLD AUTO: 4.8 10E9/L (ref 4–11)

## 2018-10-05 PROCEDURE — 85025 COMPLETE CBC W/AUTO DIFF WBC: CPT | Performed by: PSYCHIATRY & NEUROLOGY

## 2018-10-05 PROCEDURE — G0463 HOSPITAL OUTPT CLINIC VISIT: HCPCS | Mod: 25

## 2018-10-05 PROCEDURE — 96409 CHEMO IV PUSH SNGL DRUG: CPT

## 2018-10-05 PROCEDURE — 25000132 ZZH RX MED GY IP 250 OP 250 PS 637: Mod: ZF | Performed by: PSYCHIATRY & NEUROLOGY

## 2018-10-05 PROCEDURE — 80053 COMPREHEN METABOLIC PANEL: CPT | Performed by: PSYCHIATRY & NEUROLOGY

## 2018-10-05 PROCEDURE — 25000128 H RX IP 250 OP 636: Mod: ZF | Performed by: PSYCHIATRY & NEUROLOGY

## 2018-10-05 RX ORDER — HEPARIN SODIUM (PORCINE) LOCK FLUSH IV SOLN 100 UNIT/ML 100 UNIT/ML
5 SOLUTION INTRAVENOUS ONCE
Status: COMPLETED | OUTPATIENT
Start: 2018-10-05 | End: 2018-10-05

## 2018-10-05 RX ORDER — POTASSIUM CHLORIDE 1500 MG/1
40 TABLET, EXTENDED RELEASE ORAL ONCE
Status: COMPLETED | OUTPATIENT
Start: 2018-10-05 | End: 2018-10-05

## 2018-10-05 RX ADMIN — VINCRISTINE SULFATE 2 MG: 1 INJECTION, SOLUTION INTRAVENOUS at 11:31

## 2018-10-05 RX ADMIN — SODIUM CHLORIDE 250 ML: 9 INJECTION, SOLUTION INTRAVENOUS at 10:59

## 2018-10-05 RX ADMIN — Medication 5 ML: at 10:28

## 2018-10-05 RX ADMIN — POTASSIUM CHLORIDE 40 MEQ: 1500 TABLET, EXTENDED RELEASE ORAL at 11:29

## 2018-10-05 RX ADMIN — Medication 5 ML: at 11:37

## 2018-10-05 ASSESSMENT — PAIN SCALES - GENERAL: PAINLEVEL: NO PAIN (0)

## 2018-10-05 NOTE — MR AVS SNAPSHOT
After Visit Summary   10/5/2018    Merissa Silverman    MRN: [id number]           Patient Information     Date Of Birth          1985        Visit Information        Provider Department      10/5/2018 10:30 AM  31 ATC;  ONCOLOGY INFUSION [facility]        Today's Diagnoses     Medulloblastoma of cerebellum (H)    -  1      Care Instructions    Contact Numbers    [facility] [place]: [phone]  [facility] [place] and [place] Nurse Line:  [phone]    Please call the [facility] nurse triage or the after hours nurse line if you experience a temperature greater than or equal to 100.5, shaking chills, have uncontrolled nausea, vomiting and/or diarrhea, dizziness, lightheadedness, shortness of breath, chest pain, bleeding, unexplained bruising, or if you have any other new/concerning symptoms, questions or concerns.     If you are having any concerning symptoms or wish to speak to a provider before your next infusion visit, please call your care coordinator or triage to notify them so we can adequately serve you.     If you need a refill on a narcotic prescription or other medication, please call triage before your infusion appointment.           October 2018 Sunday Monday Tuesday Wednesday Thursday Friday Saturday        1     2     3     4     5     [facility] LAB DRAW   10:00 AM   (15 min.)   [facility] LAB DRAW   [facility] Lab Draw     [facility] ONC INFUSION 60   10:30 AM   (60 min.)    ONCOLOGY INFUSION   [facility] 6       7     8     9     10     11     12     13       14     15     16     17     18     19     20       21     22     23     24     25     26     27       28     29     30     31 November 2018 Sunday Monday Tuesday Wednesday Thursday Friday Saturday                       1     2     3       4     5     6     7     8     9     MR BRAIN WWO    8:30 AM   (45 min.)   [address] MRI     [facility]  [facility] LAB DRAW    9:15 AM   (15 min.)   [facility] LAB DRAW   [facility] Lab Draw     [facility] ONC INFUSION 360    9:30 AM   (360 min.)    ONCOLOGY INFUSION   [facility]     [facility] RETURN    3:15 PM   (30 min.)   Anupama Morrison MD   [facility] 10       11     12     13     14     15     16     [facility]   10:00 AM   (15 min.)   [facility] LAB DRAW   [facility] Lab Draw     [facility] ONC INFUSION 60   10:30 AM   (60 min.)    ONCOLOGY INFUSION   [facility] 17       18     19     20     21     22     23     24       25     26     27     28     29     30                      Recent Results (from the past 24 hour(s))   CBC with platelets differential    Collection Time: 10/05/18 10:33 AM   Result Value Ref Range    WBC 4.8 4.0 - 11.0 10e9/L    RBC Count 3.40 (L) 3.8 - 5.2 10e12/L    Hemoglobin 10.8 (L) 11.7 - 15.7 g/dL    Hematocrit 29.5 (L) 35.0 - 47.0 %    MCV 87 78 - 100 fl    MCH 31.8 26.5 - 33.0 pg    MCHC 36.6 (H) 31.5 - 36.5 g/dL    RDW 13.2 10.0 - 15.0 %    Platelet Count 254 150 - 450 10e9/L    Diff Method Automated Method     % Neutrophils 79.1 %    % Lymphocytes 6.0 %    % Monocytes 13.5 %    % Eosinophils 0.4 %    % Basophils 0.4 %    % Immature Granulocytes 0.6 %    Nucleated RBCs 0 0 /100    Absolute Neutrophil 3.8 1.6 - 8.3 10e9/L    Absolute Lymphocytes 0.3 (L) 0.8 - 5.3 10e9/L    Absolute Monocytes 0.7 0.0 - 1.3 10e9/L    Absolute Eosinophils 0.0 0.0 - 0.7 10e9/L    Absolute Basophils 0.0 0.0 - 0.2 10e9/L    Abs Immature Granulocytes 0.0 0 - 0.4 10e9/L    Absolute Nucleated RBC 0.0                  Follow-ups after your visit        Your next 10 appointments already scheduled     Nov 09, 2018  8:30 AM CST   MR BRAIN W/O & W CONTRAST with [address] [facility] MRI ([facility])    [address] [facility]  1st Floor  [facility] [address] [phone]           How do I prepare for my  exam? (Food and drink instructions) **If you will be receiving sedation or general anesthesia, please see special notes below.**  How do I prepare for my exam? (Other instructions) Take your medicines as usual, unless your doctor tells you not to. You may or may not receive intravenous (IV) contrast for this exam pending the discretion of the Radiologist.  You do not need to do anything special to prepare.  **If you will be receiving sedation or general anesthesia, please see special notes below.**  What should I wear: The MRI machine uses a strong magnet. Please wear clothes without metal (snaps, zippers). A sweatsuit works well, or we may give you a hospital gown. Please remove any body piercings and hair extensions before you arrive. You will also remove watches, jewelry, hairpins, wallets, dentures, partial dental plates and hearing aids. You may wear contact lenses, and you may be able to wear your rings. We have a safe place to keep your personal items, but it is safer to leave them at home.  How long does the exam take: Most tests take 30 to 60 minutes.  HOWEVER, IF YOUR DOCTOR PRESCRIBES ANESTHESIA please plan on spending four to five hours in the recovery room.  What should I bring:  Bring a list of your current medicines to your exam (including vitamins, minerals and over-the-counter drugs).  Do I need a :  **If you will be receiving sedation or general anesthesia, please see special notes below.**  What should I do after the exam: No Restrictions, You may resume normal activities.  What is this test: MRI (magnetic resonance imaging) uses a strong magnet and radio waves to look inside the body. An MRA (magnetic resonance angiogram) does the same thing, but it lets us look at your blood vessels. A computer turns the radio waves into pictures showing cross sections of the body, much like slices of bread. This helps us see any problems more clearly. You may receive fluid (called  contrast ) before or  during your scan. The fluid helps us see the pictures better. We give the fluid through an IV (small needle in your arm).  Who should I call with questions:  Please call the Imaging Department at your exam site with any questions. Directions, parking instructions, and other information is available on our website, [email or web address].  How do I prepare if I m having sedation or anesthesia? **IMPORTANT** THE INSTRUCTIONS BELOW ARE ONLY FOR THOSE PATIENTS WHO HAVE BEEN TOLD THEY WILL RECEIVE SEDATION OR GENERAL ANESTHESIA DURING THEIR MRI PROCEDURE:  IF YOU WILL RECEIVE SEDATION (take medicine to help you relax during your exam): You must get the medicine from your doctor before you arrive. Bring the medicine to the exam. Do not take it at home. Arrive one hour early. Bring someone who can take you home after the test. Your medicine will make you sleepy. After the exam, you may not drive, take a bus or take a taxi by yourself. No eating 8 hours before your exam. You may have clear liquids up until 4 hours before your exam. (Clear liquids include water, clear tea, black coffee and fruit juice without pulp.)  IF YOU WILL RECEIVE ANESTHESIA (be asleep for your exam): Arrive 1 1/2 hours early. Bring someone who can take you home after the test. You may not drive, take a bus or take a taxi by yourself. No eating 8 hours before your exam. You may have clear liquids up until 4 hours before your exam. (Clear liquids include water, clear tea, black coffee and fruit juice without pulp.)            Nov 09, 2018  9:15 AM CST   [facility] Lab Draw with  [facility] LAB DRAW   [facility] Lab Draw ([facility])    [address] [place] [address]  [address] [place] [address] [phone]            Nov 09, 2018  9:30 AM CST   Infusion 360 with  ONCOLOGY INFUSION, UC 16 ATC   [facility] ([facility])    [address] [place] [address]  [address] [place] [id number]    [phone]            Nov 09, 2018  3:30 PM CST   (Arrive by 3:15 PM)   Return Visit with Anupama Morrison MD   [facility]    [address] [place] [address]  [address]  [facility] [address] [phone]            Nov 16, 2018 10:00 AM CST   [facility] Lab Draw with [facility] LAB DRAW   [facility] ([facility]    [address] [place] [address]  [address]  [facility] [id number] [phone]            Nov 16, 2018 10:30 AM CST   Infusion 60 with UC ONCOLOGY INFUSION, UC 31 ATC   [facility]    [address] [place] [address]  [address]  [facility] [id number] [phone]              Who to contact     If you have questions or need follow up information about today's clinic visit or your schedule please contact [facility] directly at [phone].  Normal or non-critical lab and imaging results will be communicated to you by [employer], letter or phone within 4 business days after the clinic has received the results. If you do not hear from us within 7 days, please contact the clinic through [employer] or phone. If you have a critical or abnormal lab result, we will notify you by phone as soon as possible.  Submit refill requests through [employer] or call your pharmacy and they will forward the refill request to us. Please allow 3 business days for your refill to be completed.          Additional Information About Your Visit        [employer] Information     [employer] gives you secure access to your electronic health record. If you see a primary care provider, you can also send messages to your care team and make appointments. If you have questions, please call your primary care clinic.  If you do not have a primary care provider, please call [phone] and they will assist you.        Care EveryWhere ID     This is your Care EveryWhere ID. This could be  used by other organizations to access your [facility] medical records  [id number]        Your Vitals Were     Pulse Temperature Pulse Oximetry BMI (Body Mass Index)          85 97.7  F (36.5  C) (Oral) 100% 34 kg/m2         Blood Pressure from Last 3 Encounters:   10/05/18 132/87   09/27/18 (!) 137/94   09/27/18 126/86    Weight from Last 3 Encounters:   10/05/18 79 kg (174 lb 1.6 oz)   09/27/18 81.6 kg (179 lb 12.8 oz)   09/24/18 81.2 kg (179 lb 1.6 oz)              We Performed the Following     CBC with platelets differential     Comprehensive metabolic panel        Primary Care Provider Office Phone # Fax #    Anupama Marly Morrison [phone] [address] [facility] [address]        Equal Access to Services     [facility]: Hadii aad ku hadasho Soraul, waaxda luqadaha, qaybta kaalmada adedeannayatu, toño jade . So [facility] [phone].    ATENCIÓN: Si habla español, tiene a mccurdy disposición servicios gratuitos de asistencia lingüística. Llame al [phone].    We comply with applicable federal civil rights laws and [place] laws. We do not discriminate on the basis of race, color, national origin, age, disability, sex, sexual orientation, or gender identity.            Thank you!     Thank you for choosing [facility]  for your care. Our goal is always to provide you with excellent care. Hearing back from our patients is one way we can continue to improve our services. Please take a few minutes to complete the written survey that you may receive in the mail after your visit with us. Thank you!             Your Updated Medication List - Protect others around you: Learn how to safely use, store and throw away your medicines at www.disposemymeds.org.          This list is accurate as of 10/5/18  1:22 PM.  Always use your most recent med list.                   Brand Name Dispense Instructions for use Diagnosis    acetaminophen 325 MG  tablet    TYLENOL     Take 650 mg by mouth        DULCOLAX 5 MG EC tablet   Generic drug:  bisacodyl      Take 5 mg by mouth daily as needed for constipation        lomustine 40 MG capsule CHEMO    CEENU    3 capsule    Take 3 capsules (120 mg) by mouth once for 1 dose    Medulloblastoma of cerebellum (H)       LORazepam 0.5 MG tablet    ATIVAN    60 tablet    Take 1 tablet (0.5 mg) by mouth every 6 hours as needed for anxiety    Medulloblastoma of cerebellum (H)       ondansetron 8 MG ODT tab    ZOFRAN-ODT    60 tablet    Take 1 tablet (8 mg) by mouth every 8 hours as needed for nausea    Medulloblastoma of cerebellum (H)       prochlorperazine 10 MG tablet    COMPAZINE    90 tablet    Take 1 tablet (10 mg) by mouth every 6 hours as needed for nausea or vomiting    Medulloblastoma of cerebellum (H), Nausea and vomiting, intractability of vomiting not specified, unspecified vomiting type

## 2018-10-05 NOTE — PROGRESS NOTES
Infusion Nursing Note:  Merissa Silverman presents today for Cycle 2 Day 8 Vincristine.    Patient seen by provider today: No    Intravenous Access:  Implanted Port.    Treatment Conditions:  Lab Results   Component Value Date    HGB 10.8 10/05/2018     Lab Results   Component Value Date    WBC 4.8 10/05/2018      Lab Results   Component Value Date    ANEU 3.8 10/05/2018     Lab Results   Component Value Date     10/05/2018      Lab Results   Component Value Date     10/05/2018                   Lab Results   Component Value Date    POTASSIUM 2.8 10/05/2018           No results found for: MAG         Lab Results   Component Value Date    CR 1.00 10/05/2018                   Lab Results   Component Value Date    ANAND 8.8 10/05/2018                Lab Results   Component Value Date    BILITOTAL 0.4 10/05/2018           Lab Results   Component Value Date    ALBUMIN 4.0 10/05/2018                    Lab Results   Component Value Date    ALT 43 10/05/2018           Lab Results   Component Value Date    AST 26 10/05/2018       Results reviewed, labs MET treatment parameters, ok to proceed with treatment.  Dr. Morrison notified of patient's potassium level today.    10/5/18 1120 TORB:  Dr. Anupama Morrison MD/Isaiah Burkett RN  - Please give patient 40 mEq PO potassium chloride today  - Educate patient on foods high in potassium  - Will recheck potassium level with labs next week    Relayed above plan of care to patient.  Gave patient a handout on hypokalemia and foods high in potassium.  Patient verbalized understanding of the plan.    Post Infusion Assessment:  Patient tolerated infusion without incident.  Blood return noted pre and post infusion.  Site patent and intact, free from redness, edema or discomfort.  No evidence of extravasations.  Access discontinued per protocol.    Discharge Plan:   Patient declined prescription refills.  Discharge instructions reviewed with: Patient and Family.  Patient and/or  family verbalized understanding of discharge instructions and all questions answered.  AVS to patient via [employer].  Patient will return 11/9/18 for next appointment.  Patient states she is having her weekly labs drawn in [place].   Patient discharged in stable condition accompanied by: Grandma.  Departure Mode: Ambulatory.  Face to Face time: 3 minutes.    ROSA HODGES RN

## 2018-10-05 NOTE — PATIENT INSTRUCTIONS
Contact Numbers    [facility] [place] Line: [phone]  [facility] [place] Triage and After Hours Nurse Line:  [phone]    Please call the [facility] nurse triage or the after hours nurse line if you experience a temperature greater than or equal to 100.5, shaking chills, have uncontrolled nausea, vomiting and/or diarrhea, dizziness, lightheadedness, shortness of breath, chest pain, bleeding, unexplained bruising, or if you have any other new/concerning symptoms, questions or concerns.     If you are having any concerning symptoms or wish to speak to a provider before your next infusion visit, please call your care coordinator or triage to notify them so we can adequately serve you.     If you need a refill on a narcotic prescription or other medication, please call triage before your infusion appointment.           October 2018 Sunday Monday Tuesday Wednesday Thursday Friday Saturday        1     2     3     4     5     [facility] LAB DRAW   10:00 AM   (15 min.)   [facility] D[facility]   [facility] Lab Draw     [facility] ONC INFUSION 60   10:30 AM   (60 min.)    ONCOLOGY INFUSION   [facility] 6       7     8     9     10     11     12     13       14     15     16     17     18     19     20       21     22     23     24     25     26     27       28     29     30     31                               November 2018 Sunday Monday Tuesday Wednesday Thursday Friday Saturday                       1     2     3       4     5     6     7     8     9     MR BRAIN WWO    8:30 AM   (45 min.)   MR1   [facility] MRI     [facility] LAB DRAW    9:15 AM   (15 min.)   [facility] DRAW   [facility] Lab Draw     [facility] ONC INFUSION 360    9:30 AM   (360 min.)    ONCOLOGY INFUSION   [facility]     [facility] RETURN    3:15 PM   (30 min.)   Anupama Morrison MD   [facility] 10       11     12     13     14     15     16     [facility] LAB DRAW   10:00 AM   (15  min.)   [facility] LAB DRAW   [facility] Lab Draw     [facility] ONC INFUSION 60   10:30 AM   (60 min.)    ONCOLOGY INFUSION   [facility] 17       18     19     20     21     22     23     24       25     26     27     28     29     30                      Recent Results (from the past 24 hour(s))   CBC with platelets differential    Collection Time: 10/05/18 10:33 AM   Result Value Ref Range    WBC 4.8 4.0 - 11.0 10e9/L    RBC Count 3.40 (L) 3.8 - 5.2 10e12/L    Hemoglobin 10.8 (L) 11.7 - 15.7 g/dL    Hematocrit 29.5 (L) 35.0 - 47.0 %    MCV 87 78 - 100 fl    MCH 31.8 26.5 - 33.0 pg    MCHC 36.6 (H) 31.5 - 36.5 g/dL    RDW 13.2 10.0 - 15.0 %    Platelet Count 254 150 - 450 10e9/L    Diff Method Automated Method     % Neutrophils 79.1 %    % Lymphocytes 6.0 %    % Monocytes 13.5 %    % Eosinophils 0.4 %    % Basophils 0.4 %    % Immature Granulocytes 0.6 %    Nucleated RBCs 0 0 /100    Absolute Neutrophil 3.8 1.6 - 8.3 10e9/L    Absolute Lymphocytes 0.3 (L) 0.8 - 5.3 10e9/L    Absolute Monocytes 0.7 0.0 - 1.3 10e9/L    Absolute Eosinophils 0.0 0.0 - 0.7 10e9/L    Absolute Basophils 0.0 0.0 - 0.2 10e9/L    Abs Immature Granulocytes 0.0 0 - 0.4 10e9/L    Absolute Nucleated RBC 0.0

## 2018-10-05 NOTE — NURSING NOTE
Chief Complaint   Patient presents with     Port Draw     labs drawn via port by RN     /87 (BP Location: Right arm, Patient Position: Chair, Cuff Size: Adult Regular)  Pulse 85  Temp 97.7  F (36.5  C) (Oral)  Wt 79 kg (174 lb 1.6 oz)  SpO2 100%  BMI 34 kg/m2    Vitals taken. Port accessed by RN. Labs collected and sent. Line flushed with NS & Heparin. Pt tolerated well. Pt checked in for next appointment.    Gale Valentino RN

## 2018-10-09 NOTE — PROGRESS NOTES
"Oral Chemotherapy Monitoring Program    Called Merissa today and she stated that she took her Lomustine dose around 10/2/18.  She states that she is feeling \"really, really,really good\".  She had no further questions.    Gonzalez Cobb, PharmD, BCOP  October 9, 2018    "

## 2018-10-11 DIAGNOSIS — Z51.11 CHEMOTHERAPY MANAGEMENT, ENCOUNTER FOR: ICD-10-CM

## 2018-10-11 DIAGNOSIS — E87.6 HYPOKALEMIA: ICD-10-CM

## 2018-10-11 DIAGNOSIS — C71.6 MEDULLOBLASTOMA OF CEREBELLUM (H): ICD-10-CM

## 2018-10-11 DIAGNOSIS — T45.1X5A CHEMOTHERAPY-INDUCED NAUSEA AND VOMITING: ICD-10-CM

## 2018-10-11 DIAGNOSIS — R11.2 CHEMOTHERAPY-INDUCED NAUSEA AND VOMITING: ICD-10-CM

## 2018-10-11 DIAGNOSIS — D61.810 ANTINEOPLASTIC CHEMOTHERAPY INDUCED PANCYTOPENIA (H): ICD-10-CM

## 2018-10-11 DIAGNOSIS — T45.1X5A ANTINEOPLASTIC CHEMOTHERAPY INDUCED PANCYTOPENIA (H): ICD-10-CM

## 2018-10-11 LAB
ALBUMIN SERPL-MCNC: 4 G/DL (ref 3.4–5)
ALP SERPL-CCNC: 63 U/L (ref 40–150)
ALT SERPL W P-5'-P-CCNC: 28 U/L (ref 0–50)
ANION GAP SERPL CALCULATED.3IONS-SCNC: 11 MMOL/L (ref 3–14)
AST SERPL W P-5'-P-CCNC: 22 U/L (ref 0–45)
BASOPHILS # BLD AUTO: 0 10E9/L (ref 0–0.2)
BASOPHILS NFR BLD AUTO: 0.4 %
BILIRUB SERPL-MCNC: 0.5 MG/DL (ref 0.2–1.3)
BUN SERPL-MCNC: 7 MG/DL (ref 7–30)
CALCIUM SERPL-MCNC: 9.1 MG/DL (ref 8.5–10.1)
CHLORIDE SERPL-SCNC: 104 MMOL/L (ref 94–109)
CO2 SERPL-SCNC: 24 MMOL/L (ref 20–32)
CREAT SERPL-MCNC: 0.82 MG/DL (ref 0.52–1.04)
DIFFERENTIAL METHOD BLD: ABNORMAL
EOSINOPHIL # BLD AUTO: 0 10E9/L (ref 0–0.7)
EOSINOPHIL NFR BLD AUTO: 0.4 %
ERYTHROCYTE [DISTWIDTH] IN BLOOD BY AUTOMATED COUNT: 13.1 % (ref 10–15)
GFR SERPL CREATININE-BSD FRML MDRD: 81 ML/MIN/1.7M2
GLUCOSE SERPL-MCNC: 116 MG/DL (ref 70–99)
HCT VFR BLD AUTO: 26.9 % (ref 35–47)
HGB BLD-MCNC: 9.4 G/DL (ref 11.7–15.7)
LYMPHOCYTES # BLD AUTO: 0.2 10E9/L (ref 0.8–5.3)
LYMPHOCYTES NFR BLD AUTO: 5.8 %
MCH RBC QN AUTO: 31.5 PG (ref 26.5–33)
MCHC RBC AUTO-ENTMCNC: 34.9 G/DL (ref 31.5–36.5)
MCV RBC AUTO: 90 FL (ref 78–100)
MONOCYTES # BLD AUTO: 0.4 10E9/L (ref 0–1.3)
MONOCYTES NFR BLD AUTO: 15.1 %
NEUTROPHILS # BLD AUTO: 2.2 10E9/L (ref 1.6–8.3)
NEUTROPHILS NFR BLD AUTO: 78.3 %
PLATELET # BLD AUTO: 228 10E9/L (ref 150–450)
POTASSIUM SERPL-SCNC: 3.5 MMOL/L (ref 3.4–5.3)
PROT SERPL-MCNC: 7.4 G/DL (ref 6.8–8.8)
RBC # BLD AUTO: 2.98 10E12/L (ref 3.8–5.2)
SODIUM SERPL-SCNC: 139 MMOL/L (ref 133–144)
WBC # BLD AUTO: 2.8 10E9/L (ref 4–11)

## 2018-10-11 PROCEDURE — 36415 COLL VENOUS BLD VENIPUNCTURE: CPT | Performed by: PSYCHIATRY & NEUROLOGY

## 2018-10-11 PROCEDURE — 80053 COMPREHEN METABOLIC PANEL: CPT | Performed by: PSYCHIATRY & NEUROLOGY

## 2018-10-11 PROCEDURE — 85025 COMPLETE CBC W/AUTO DIFF WBC: CPT | Performed by: PSYCHIATRY & NEUROLOGY

## 2018-10-19 DIAGNOSIS — D61.810 ANTINEOPLASTIC CHEMOTHERAPY INDUCED PANCYTOPENIA (H): ICD-10-CM

## 2018-10-19 DIAGNOSIS — T45.1X5A ANTINEOPLASTIC CHEMOTHERAPY INDUCED PANCYTOPENIA (H): ICD-10-CM

## 2018-10-19 DIAGNOSIS — C71.6 MEDULLOBLASTOMA OF CEREBELLUM (H): ICD-10-CM

## 2018-10-19 LAB
BASOPHILS # BLD AUTO: 0 10E9/L (ref 0–0.2)
BASOPHILS NFR BLD AUTO: 0.5 %
DIFFERENTIAL METHOD BLD: ABNORMAL
EOSINOPHIL # BLD AUTO: 0 10E9/L (ref 0–0.7)
EOSINOPHIL NFR BLD AUTO: 0.5 %
ERYTHROCYTE [DISTWIDTH] IN BLOOD BY AUTOMATED COUNT: 14.4 % (ref 10–15)
HCT VFR BLD AUTO: 27.1 % (ref 35–47)
HGB BLD-MCNC: 9.3 G/DL (ref 11.7–15.7)
LYMPHOCYTES # BLD AUTO: 0.2 10E9/L (ref 0.8–5.3)
LYMPHOCYTES NFR BLD AUTO: 8.8 %
MCH RBC QN AUTO: 31.6 PG (ref 26.5–33)
MCHC RBC AUTO-ENTMCNC: 34.3 G/DL (ref 31.5–36.5)
MCV RBC AUTO: 92 FL (ref 78–100)
MONOCYTES # BLD AUTO: 0.5 10E9/L (ref 0–1.3)
MONOCYTES NFR BLD AUTO: 23 %
NEUTROPHILS # BLD AUTO: 1.4 10E9/L (ref 1.6–8.3)
NEUTROPHILS NFR BLD AUTO: 67.2 %
PLATELET # BLD AUTO: 273 10E9/L (ref 150–450)
RBC # BLD AUTO: 2.94 10E12/L (ref 3.8–5.2)
WBC # BLD AUTO: 2 10E9/L (ref 4–11)

## 2018-10-19 PROCEDURE — 85025 COMPLETE CBC W/AUTO DIFF WBC: CPT | Performed by: PSYCHIATRY & NEUROLOGY

## 2018-10-19 PROCEDURE — 36415 COLL VENOUS BLD VENIPUNCTURE: CPT | Performed by: PSYCHIATRY & NEUROLOGY

## 2018-10-26 DIAGNOSIS — T45.1X5A ANTINEOPLASTIC CHEMOTHERAPY INDUCED PANCYTOPENIA (H): ICD-10-CM

## 2018-10-26 DIAGNOSIS — D61.810 ANTINEOPLASTIC CHEMOTHERAPY INDUCED PANCYTOPENIA (H): ICD-10-CM

## 2018-10-26 DIAGNOSIS — C71.6 MEDULLOBLASTOMA OF CEREBELLUM (H): ICD-10-CM

## 2018-10-26 LAB
BASOPHILS # BLD AUTO: 0 10E9/L (ref 0–0.2)
BASOPHILS NFR BLD AUTO: 0.4 %
DIFFERENTIAL METHOD BLD: ABNORMAL
EOSINOPHIL # BLD AUTO: 0 10E9/L (ref 0–0.7)
EOSINOPHIL NFR BLD AUTO: 0.8 %
ERYTHROCYTE [DISTWIDTH] IN BLOOD BY AUTOMATED COUNT: 14.8 % (ref 10–15)
HCT VFR BLD AUTO: 27.9 % (ref 35–47)
HGB BLD-MCNC: 9.6 G/DL (ref 11.7–15.7)
LYMPHOCYTES # BLD AUTO: 0.2 10E9/L (ref 0.8–5.3)
LYMPHOCYTES NFR BLD AUTO: 7.6 %
MCH RBC QN AUTO: 32.1 PG (ref 26.5–33)
MCHC RBC AUTO-ENTMCNC: 34.4 G/DL (ref 31.5–36.5)
MCV RBC AUTO: 93 FL (ref 78–100)
MONOCYTES # BLD AUTO: 0.4 10E9/L (ref 0–1.3)
MONOCYTES NFR BLD AUTO: 17.8 %
NEUTROPHILS # BLD AUTO: 1.7 10E9/L (ref 1.6–8.3)
NEUTROPHILS NFR BLD AUTO: 73.4 %
PLATELET # BLD AUTO: 207 10E9/L (ref 150–450)
RBC # BLD AUTO: 2.99 10E12/L (ref 3.8–5.2)
WBC # BLD AUTO: 2.4 10E9/L (ref 4–11)

## 2018-10-26 PROCEDURE — 36415 COLL VENOUS BLD VENIPUNCTURE: CPT | Performed by: PSYCHIATRY & NEUROLOGY

## 2018-10-26 PROCEDURE — 85025 COMPLETE CBC W/AUTO DIFF WBC: CPT | Performed by: PSYCHIATRY & NEUROLOGY

## 2018-10-30 RX ORDER — METHYLPREDNISOLONE SODIUM SUCCINATE 125 MG/2ML
125 INJECTION, POWDER, LYOPHILIZED, FOR SOLUTION INTRAMUSCULAR; INTRAVENOUS
Status: CANCELLED
Start: 2018-11-09

## 2018-10-30 RX ORDER — HEPARIN SODIUM (PORCINE) LOCK FLUSH IV SOLN 100 UNIT/ML 100 UNIT/ML
5 SOLUTION INTRAVENOUS ONCE
Status: CANCELLED
Start: 2018-11-16 | End: 2018-11-16

## 2018-10-30 RX ORDER — MEPERIDINE HYDROCHLORIDE 25 MG/ML
25 INJECTION INTRAMUSCULAR; INTRAVENOUS; SUBCUTANEOUS EVERY 30 MIN PRN
Status: CANCELLED | OUTPATIENT
Start: 2018-11-09

## 2018-10-30 RX ORDER — EPINEPHRINE 0.3 MG/.3ML
0.3 INJECTION SUBCUTANEOUS EVERY 5 MIN PRN
Status: CANCELLED | OUTPATIENT
Start: 2018-11-09

## 2018-10-30 RX ORDER — LORAZEPAM 2 MG/ML
0.5 INJECTION INTRAMUSCULAR EVERY 4 HOURS PRN
Status: CANCELLED
Start: 2018-11-09

## 2018-10-30 RX ORDER — ALBUTEROL SULFATE 90 UG/1
1-2 AEROSOL, METERED RESPIRATORY (INHALATION)
Status: CANCELLED
Start: 2018-11-16

## 2018-10-30 RX ORDER — EPINEPHRINE 0.3 MG/.3ML
0.3 INJECTION SUBCUTANEOUS EVERY 5 MIN PRN
Status: CANCELLED | OUTPATIENT
Start: 2018-11-16

## 2018-10-30 RX ORDER — METHYLPREDNISOLONE SODIUM SUCCINATE 125 MG/2ML
125 INJECTION, POWDER, LYOPHILIZED, FOR SOLUTION INTRAMUSCULAR; INTRAVENOUS
Status: CANCELLED
Start: 2018-11-16

## 2018-10-30 RX ORDER — EPINEPHRINE 1 MG/ML
0.3 INJECTION, SOLUTION INTRAMUSCULAR; SUBCUTANEOUS EVERY 5 MIN PRN
Status: CANCELLED | OUTPATIENT
Start: 2018-11-09

## 2018-10-30 RX ORDER — SODIUM CHLORIDE 9 MG/ML
1000 INJECTION, SOLUTION INTRAVENOUS CONTINUOUS PRN
Status: CANCELLED
Start: 2018-11-09

## 2018-10-30 RX ORDER — HEPARIN SODIUM (PORCINE) LOCK FLUSH IV SOLN 100 UNIT/ML 100 UNIT/ML
5 SOLUTION INTRAVENOUS ONCE
Status: CANCELLED
Start: 2018-11-09 | End: 2018-11-09

## 2018-10-30 RX ORDER — PALONOSETRON 0.05 MG/ML
0.25 INJECTION, SOLUTION INTRAVENOUS ONCE
Status: CANCELLED
Start: 2018-11-09 | End: 2018-11-09

## 2018-10-30 RX ORDER — ALBUTEROL SULFATE 0.83 MG/ML
2.5 SOLUTION RESPIRATORY (INHALATION)
Status: CANCELLED | OUTPATIENT
Start: 2018-11-16

## 2018-10-30 RX ORDER — DIPHENHYDRAMINE HYDROCHLORIDE 50 MG/ML
50 INJECTION INTRAMUSCULAR; INTRAVENOUS
Status: CANCELLED
Start: 2018-11-16

## 2018-10-30 RX ORDER — ALBUTEROL SULFATE 0.83 MG/ML
2.5 SOLUTION RESPIRATORY (INHALATION)
Status: CANCELLED | OUTPATIENT
Start: 2018-11-09

## 2018-10-30 RX ORDER — SODIUM CHLORIDE 9 MG/ML
1000 INJECTION, SOLUTION INTRAVENOUS CONTINUOUS PRN
Status: CANCELLED
Start: 2018-11-16

## 2018-10-30 RX ORDER — EPINEPHRINE 1 MG/ML
0.3 INJECTION, SOLUTION INTRAMUSCULAR; SUBCUTANEOUS EVERY 5 MIN PRN
Status: CANCELLED | OUTPATIENT
Start: 2018-11-16

## 2018-10-30 RX ORDER — MEPERIDINE HYDROCHLORIDE 25 MG/ML
25 INJECTION INTRAMUSCULAR; INTRAVENOUS; SUBCUTANEOUS EVERY 30 MIN PRN
Status: CANCELLED | OUTPATIENT
Start: 2018-11-16

## 2018-10-30 RX ORDER — DIPHENHYDRAMINE HYDROCHLORIDE 50 MG/ML
50 INJECTION INTRAMUSCULAR; INTRAVENOUS
Status: CANCELLED
Start: 2018-11-09

## 2018-10-30 RX ORDER — LORAZEPAM 2 MG/ML
0.5 INJECTION INTRAMUSCULAR EVERY 4 HOURS PRN
Status: CANCELLED
Start: 2018-11-16

## 2018-10-30 RX ORDER — ALBUTEROL SULFATE 90 UG/1
1-2 AEROSOL, METERED RESPIRATORY (INHALATION)
Status: CANCELLED
Start: 2018-11-09

## 2018-11-02 DIAGNOSIS — D61.810 ANTINEOPLASTIC CHEMOTHERAPY INDUCED PANCYTOPENIA (H): ICD-10-CM

## 2018-11-02 DIAGNOSIS — C71.6 MEDULLOBLASTOMA OF CEREBELLUM (H): ICD-10-CM

## 2018-11-02 DIAGNOSIS — T45.1X5A ANTINEOPLASTIC CHEMOTHERAPY INDUCED PANCYTOPENIA (H): ICD-10-CM

## 2018-11-02 LAB
BASOPHILS # BLD AUTO: 0 10E9/L (ref 0–0.2)
BASOPHILS NFR BLD AUTO: 0.3 %
DIFFERENTIAL METHOD BLD: ABNORMAL
EOSINOPHIL # BLD AUTO: 0 10E9/L (ref 0–0.7)
EOSINOPHIL NFR BLD AUTO: 1.1 %
ERYTHROCYTE [DISTWIDTH] IN BLOOD BY AUTOMATED COUNT: 14.9 % (ref 10–15)
HCT VFR BLD AUTO: 29.5 % (ref 35–47)
HGB BLD-MCNC: 9.8 G/DL (ref 11.7–15.7)
LYMPHOCYTES # BLD AUTO: 0.3 10E9/L (ref 0.8–5.3)
LYMPHOCYTES NFR BLD AUTO: 8.6 %
MCH RBC QN AUTO: 31.6 PG (ref 26.5–33)
MCHC RBC AUTO-ENTMCNC: 33.2 G/DL (ref 31.5–36.5)
MCV RBC AUTO: 95 FL (ref 78–100)
MONOCYTES # BLD AUTO: 0.4 10E9/L (ref 0–1.3)
MONOCYTES NFR BLD AUTO: 12.3 %
NEUTROPHILS # BLD AUTO: 2.7 10E9/L (ref 1.6–8.3)
NEUTROPHILS NFR BLD AUTO: 77.7 %
PLATELET # BLD AUTO: 227 10E9/L (ref 150–450)
RBC # BLD AUTO: 3.1 10E12/L (ref 3.8–5.2)
WBC # BLD AUTO: 3.5 10E9/L (ref 4–11)

## 2018-11-02 PROCEDURE — 36415 COLL VENOUS BLD VENIPUNCTURE: CPT | Performed by: PSYCHIATRY & NEUROLOGY

## 2018-11-02 PROCEDURE — 85025 COMPLETE CBC W/AUTO DIFF WBC: CPT | Performed by: PSYCHIATRY & NEUROLOGY

## 2018-11-07 DIAGNOSIS — C71.6 MEDULLOBLASTOMA OF CEREBELLUM (H): Primary | ICD-10-CM

## 2018-11-07 NOTE — ORAL ONC MGMT
Dr. Morrison gave written approval to release Lomustine on 11/7/18.  Patient will be seeing Dr. Morrison on 11/9.     Marivel Goddard, Pharm.D., [facility]  [phone]  11/07/18

## 2018-11-09 ENCOUNTER — INFUSION THERAPY VISIT (OUTPATIENT)
Dept: ONCOLOGY | Facility: CLINIC | Age: 33
End: 2018-11-09
Attending: PSYCHIATRY & NEUROLOGY
Payer: COMMERCIAL

## 2018-11-09 ENCOUNTER — APPOINTMENT (OUTPATIENT)
Dept: LAB | Facility: CLINIC | Age: 33
End: 2018-11-09
Attending: PSYCHIATRY & NEUROLOGY
Payer: COMMERCIAL

## 2018-11-09 ENCOUNTER — RADIANT APPOINTMENT (OUTPATIENT)
Dept: MRI IMAGING | Facility: CLINIC | Age: 33
End: 2018-11-09
Attending: PSYCHIATRY & NEUROLOGY
Payer: COMMERCIAL

## 2018-11-09 VITALS
HEART RATE: 77 BPM | WEIGHT: 173.4 LBS | HEIGHT: 60 IN | RESPIRATION RATE: 16 BRPM | DIASTOLIC BLOOD PRESSURE: 98 MMHG | BODY MASS INDEX: 34.04 KG/M2 | OXYGEN SATURATION: 99 % | SYSTOLIC BLOOD PRESSURE: 149 MMHG | TEMPERATURE: 98.2 F

## 2018-11-09 DIAGNOSIS — Z51.11 CHEMOTHERAPY MANAGEMENT, ENCOUNTER FOR: ICD-10-CM

## 2018-11-09 DIAGNOSIS — T45.1X5A CHEMOTHERAPY-INDUCED NAUSEA AND VOMITING: ICD-10-CM

## 2018-11-09 DIAGNOSIS — D61.810 ANTINEOPLASTIC CHEMOTHERAPY INDUCED PANCYTOPENIA (H): ICD-10-CM

## 2018-11-09 DIAGNOSIS — H93.8X3 OTOTOXICITY OF BOTH EARS: ICD-10-CM

## 2018-11-09 DIAGNOSIS — C71.6 MEDULLOBLASTOMA OF CEREBELLUM (H): ICD-10-CM

## 2018-11-09 DIAGNOSIS — C71.6 MEDULLOBLASTOMA OF CEREBELLUM (H): Primary | ICD-10-CM

## 2018-11-09 DIAGNOSIS — D70.1 CHEMOTHERAPY INDUCED NEUTROPENIA (H): ICD-10-CM

## 2018-11-09 DIAGNOSIS — T45.1X5A CHEMOTHERAPY INDUCED NEUTROPENIA (H): ICD-10-CM

## 2018-11-09 DIAGNOSIS — Z91.89 HIGH RISK FOR CHEMOTHERAPY-INDUCED INFECTIOUS COMPLICATION: ICD-10-CM

## 2018-11-09 DIAGNOSIS — R11.2 CHEMOTHERAPY-INDUCED NAUSEA AND VOMITING: ICD-10-CM

## 2018-11-09 DIAGNOSIS — T45.1X5A ANTINEOPLASTIC CHEMOTHERAPY INDUCED PANCYTOPENIA (H): ICD-10-CM

## 2018-11-09 LAB
ALBUMIN SERPL-MCNC: 4 G/DL (ref 3.4–5)
ALP SERPL-CCNC: 78 U/L (ref 40–150)
ALT SERPL W P-5'-P-CCNC: 24 U/L (ref 0–50)
ANION GAP SERPL CALCULATED.3IONS-SCNC: 9 MMOL/L (ref 3–14)
AST SERPL W P-5'-P-CCNC: 16 U/L (ref 0–45)
BASOPHILS # BLD AUTO: 0 10E9/L (ref 0–0.2)
BASOPHILS NFR BLD AUTO: 0.6 %
BILIRUB SERPL-MCNC: 0.3 MG/DL (ref 0.2–1.3)
BUN SERPL-MCNC: 15 MG/DL (ref 7–30)
CALCIUM SERPL-MCNC: 8.6 MG/DL (ref 8.5–10.1)
CHLORIDE SERPL-SCNC: 107 MMOL/L (ref 94–109)
CO2 SERPL-SCNC: 23 MMOL/L (ref 20–32)
CREAT SERPL-MCNC: 0.85 MG/DL (ref 0.52–1.04)
DIFFERENTIAL METHOD BLD: ABNORMAL
EOSINOPHIL # BLD AUTO: 0.1 10E9/L (ref 0–0.7)
EOSINOPHIL NFR BLD AUTO: 3.1 %
ERYTHROCYTE [DISTWIDTH] IN BLOOD BY AUTOMATED COUNT: 14.6 % (ref 10–15)
GFR SERPL CREATININE-BSD FRML MDRD: 77 ML/MIN/1.7M2
GLUCOSE SERPL-MCNC: 72 MG/DL (ref 70–99)
HCT VFR BLD AUTO: 28.3 % (ref 35–47)
HGB BLD-MCNC: 9.8 G/DL (ref 11.7–15.7)
IMM GRANULOCYTES # BLD: 0 10E9/L (ref 0–0.4)
IMM GRANULOCYTES NFR BLD: 0.3 %
LYMPHOCYTES # BLD AUTO: 0.3 10E9/L (ref 0.8–5.3)
LYMPHOCYTES NFR BLD AUTO: 8 %
MCH RBC QN AUTO: 32.6 PG (ref 26.5–33)
MCHC RBC AUTO-ENTMCNC: 34.6 G/DL (ref 31.5–36.5)
MCV RBC AUTO: 94 FL (ref 78–100)
MONOCYTES # BLD AUTO: 0.4 10E9/L (ref 0–1.3)
MONOCYTES NFR BLD AUTO: 12.5 %
NEUTROPHILS # BLD AUTO: 2.5 10E9/L (ref 1.6–8.3)
NEUTROPHILS NFR BLD AUTO: 75.5 %
NRBC # BLD AUTO: 0 10*3/UL
NRBC BLD AUTO-RTO: 0 /100
PLATELET # BLD AUTO: 228 10E9/L (ref 150–450)
POTASSIUM SERPL-SCNC: 3.6 MMOL/L (ref 3.4–5.3)
PROT SERPL-MCNC: 7.4 G/DL (ref 6.8–8.8)
RBC # BLD AUTO: 3.01 10E12/L (ref 3.8–5.2)
SODIUM SERPL-SCNC: 139 MMOL/L (ref 133–144)
WBC # BLD AUTO: 3.3 10E9/L (ref 4–11)

## 2018-11-09 PROCEDURE — 25000128 H RX IP 250 OP 636: Mod: ZF | Performed by: PSYCHIATRY & NEUROLOGY

## 2018-11-09 PROCEDURE — 85025 COMPLETE CBC W/AUTO DIFF WBC: CPT | Performed by: PSYCHIATRY & NEUROLOGY

## 2018-11-09 PROCEDURE — 96361 HYDRATE IV INFUSION ADD-ON: CPT

## 2018-11-09 PROCEDURE — 96375 TX/PRO/DX INJ NEW DRUG ADDON: CPT

## 2018-11-09 PROCEDURE — 80053 COMPREHEN METABOLIC PANEL: CPT | Performed by: PSYCHIATRY & NEUROLOGY

## 2018-11-09 PROCEDURE — 96413 CHEMO IV INFUSION 1 HR: CPT

## 2018-11-09 PROCEDURE — 96367 TX/PROPH/DG ADDL SEQ IV INF: CPT

## 2018-11-09 PROCEDURE — 99215 OFFICE O/P EST HI 40 MIN: CPT | Mod: ZP | Performed by: PSYCHIATRY & NEUROLOGY

## 2018-11-09 PROCEDURE — 96415 CHEMO IV INFUSION ADDL HR: CPT

## 2018-11-09 PROCEDURE — G0463 HOSPITAL OUTPT CLINIC VISIT: HCPCS | Mod: ZF

## 2018-11-09 RX ORDER — GADOBUTROL 604.72 MG/ML
7.5 INJECTION INTRAVENOUS ONCE
Status: COMPLETED | OUTPATIENT
Start: 2018-11-09 | End: 2018-11-09

## 2018-11-09 RX ORDER — HEPARIN SODIUM (PORCINE) LOCK FLUSH IV SOLN 100 UNIT/ML 100 UNIT/ML
5 SOLUTION INTRAVENOUS DAILY PRN
Status: DISCONTINUED | OUTPATIENT
Start: 2018-11-09 | End: 2018-11-12 | Stop reason: HOSPADM

## 2018-11-09 RX ORDER — HEPARIN SODIUM (PORCINE) LOCK FLUSH IV SOLN 100 UNIT/ML 100 UNIT/ML
5 SOLUTION INTRAVENOUS ONCE
Status: COMPLETED | OUTPATIENT
Start: 2018-11-09 | End: 2018-11-09

## 2018-11-09 RX ORDER — PALONOSETRON 0.05 MG/ML
0.25 INJECTION, SOLUTION INTRAVENOUS ONCE
Status: COMPLETED | OUTPATIENT
Start: 2018-11-09 | End: 2018-11-09

## 2018-11-09 RX ADMIN — CISPLATIN 130 MG: 1 INJECTION, SOLUTION INTRAVENOUS at 11:57

## 2018-11-09 RX ADMIN — SODIUM CHLORIDE, PRESERVATIVE FREE 5 ML: 5 INJECTION INTRAVENOUS at 14:56

## 2018-11-09 RX ADMIN — DEXAMETHASONE SODIUM PHOSPHATE: 10 INJECTION, SOLUTION INTRAMUSCULAR; INTRAVENOUS at 11:33

## 2018-11-09 RX ADMIN — SODIUM CHLORIDE 1000 ML: 9 INJECTION, SOLUTION INTRAVENOUS at 11:06

## 2018-11-09 RX ADMIN — GADOBUTROL 7.5 ML: 604.72 INJECTION INTRAVENOUS at 08:43

## 2018-11-09 RX ADMIN — PALONOSETRON HYDROCHLORIDE 0.25 MG: 0.25 INJECTION INTRAVENOUS at 11:16

## 2018-11-09 ASSESSMENT — PAIN SCALES - GENERAL: PAINLEVEL: NO PAIN (0)

## 2018-11-09 NOTE — MR AVS SNAPSHOT
After Visit Summary   11/9/2018    Merissa Silverman    MRN: [id number]           Patient Information     Date Of Birth          1985        Visit Information        Provider Department      11/9/2018 11:00 AM  16 ATC;  ONCOLOGY INFUSION [facility]        Today's Diagnoses     Medulloblastoma of cerebellum (H)    -  1      Care Instructions    Contact Numbers  [facility]: [phone]    After Hours:  [phone]  Triage: [phone]    Please call the [facility] Triage line if you experience a temperature greater than or equal to 100.5, shaking chills, have uncontrolled nausea, vomiting and/or diarrhea, dizziness, shortness of breath, chest pain, bleeding, unexplained bruising, or if you have any other new/concerning symptoms, questions or concerns.     If it is after hours, weekends, or holidays, please call the main hospital  at  [phone] and ask to speak to the Oncology doctor on call.     If you are having any concerning symptoms or wish to speak to a provider before your next infusion visit, please call your care coordinator or triage to notify them so we can adequately serve you.     If you need a refill on a narcotic prescription or other medication, please call triage before your infusion appointment.             November 2018 Sunday Monday Tuesday Wednesday Thursday Friday Saturday                       1     2     LAB   10:45 AM   (15 min.)    LAB   [facility] 3       4     5     6     7     8     9     MR BRAIN WWO    9:15 AM   (45 min.)   [id number]   [facility] MRI     [facility] LAB DRAW    9:30 AM   (15 min.)   [facility] LAB DRAW   [facility] Lab Draw     [facility] RETURN    9:45 AM   (30 min.)   Anupama Morrison MD   [facility] ONC INFUSION 360   11:00 AM   (360 min.)    ONCOLOGY INFUSION   [facility] 10       11     12     13     14      CHEMO AUDIOGRAM    8:15 AM   (60 min.)   Jennifer Ventura Jerson WILLIS [facility] Audiology 15     16     17       18     19     20     21     22     23     24       25     26     27     28     29     30                     December 2018 Sunday Monday Tuesday Wednesday Thursday Friday Saturday                                 1       2     3     4     5     6     7     8       9     10     11     12     13     14     15       16     17     18     19     20     21     22       23     24     25     26     27     28     29       30     31                                           Lab Results:  Recent Results (from the past 12 hour(s))   CBC with platelets differential    Collection Time: 11/09/18  9:53 AM   Result Value Ref Range    WBC 3.3 (L) 4.0 - 11.0 10e9/L    RBC Count 3.01 (L) 3.8 - 5.2 10e12/L    Hemoglobin 9.8 (L) 11.7 - 15.7 g/dL    Hematocrit 28.3 (L) 35.0 - 47.0 %    MCV 94 78 - 100 fl    MCH 32.6 26.5 - 33.0 pg    MCHC 34.6 31.5 - 36.5 g/dL    RDW 14.6 10.0 - 15.0 %    Platelet Count 228 150 - 450 10e9/L    Diff Method Automated Method     % Neutrophils 75.5 %    % Lymphocytes 8.0 %    % Monocytes 12.5 %    % Eosinophils 3.1 %    % Basophils 0.6 %    % Immature Granulocytes 0.3 %    Nucleated RBCs 0 0 /100    Absolute Neutrophil 2.5 1.6 - 8.3 10e9/L    Absolute Lymphocytes 0.3 (L) 0.8 - 5.3 10e9/L    Absolute Monocytes 0.4 0.0 - 1.3 10e9/L    Absolute Eosinophils 0.1 0.0 - 0.7 10e9/L    Absolute Basophils 0.0 0.0 - 0.2 10e9/L    Abs Immature Granulocytes 0.0 0 - 0.4 10e9/L    Absolute Nucleated RBC 0.0    Comprehensive metabolic panel    Collection Time: 11/09/18  9:53 AM   Result Value Ref Range    Sodium 139 133 - 144 mmol/L    Potassium 3.6 3.4 - 5.3 mmol/L    Chloride 107 94 - 109 mmol/L    Carbon Dioxide 23 20 - 32 mmol/L    Anion Gap 9 3 - 14 mmol/L    Glucose 72 70 - 99 mg/dL    Urea Nitrogen 15 7 - 30 mg/dL    Creatinine 0.85 0.52 - 1.04 mg/dL    GFR Estimate 77 >60 mL/min/1.7m2    GFR Estimate If Black >90  >60 mL/min/1.7m2    Calcium 8.6 8.5 - 10.1 mg/dL    Bilirubin Total 0.3 0.2 - 1.3 mg/dL    Albumin 4.0 3.4 - 5.0 g/dL    Protein Total 7.4 6.8 - 8.8 g/dL    Alkaline Phosphatase 78 40 - 150 U/L    ALT 24 0 - 50 U/L    AST 16 0 - 45 U/L               Follow-ups after your visit        Your next 10 appointments already scheduled     Nov 14, 2018  8:30 AM CST   (Arrive by 8:15 AM)   Chemo Audiogram with Jerson De La Torre   [facility] Audiology ([facility])    [address] [place] [address]  4th Floor  [facility] [id number] [phone]            Jan 04, 2019  9:00 AM CST   [facility] Draw with [facility] LAB DRAW   [facility] Lab Draw ([facility])    [address] [place] [address]  [address]  [facility] [place] [id number] [phone]            Jan 04, 2019  9:30 AM CST   (Arrive by 9:15 AM)   Return Visit with Melida Kovacs PA-C   [facility] ([facility])    [address] [place] [address]  [address]  [facility] [place] [id number] [phone]            Jan 04, 2019 10:30 AM CST   Infusion 360 with  ONCOLOGY INFUSION, UC 21 ATC   [facility] ([facility])    [address] [place] [address]  [address]  [facility] [place] [id number] [phone]              Future tests that were ordered for you today     Open Future Orders        Priority Expected Expires Ordered    COMPREHENSIVE HEARING TEST Routine  11/9/2019 11/9/2018            Who to contact     If you have questions or need follow up information about today's clinic visit or your schedule please contact [facility] CANCER [facility] [place] [facility] directly at [phone].  Normal or non-critical lab and imaging results will be communicated to you by MyChart, letter or phone within 4 business days after the clinic has received the results. If you do not hear from us within 7 days, please contact the clinic through MyChart or phone. If you have a critical or abnormal lab  result, we will notify you by phone as soon as possible.  Submit refill requests through [employer] or call your pharmacy and they will forward the refill request to us. Please allow 3 business days for your refill to be completed.          Additional Information About Your Visit        [employer] Information     [employer] gives you secure access to your electronic health record. If you see a primary care provider, you can also send messages to your care team and make appointments. If you have questions, please call your primary care clinic.  If you do not have a primary care provider, please call [phone] and they will assist you.        Care EveryWhere ID     This is your Care EveryWhere ID. This could be used by other organizations to access your [place] medical records  [id number]        Your Vitals Were     Last Period                   10/26/2018 (Approximate)            Blood Pressure from Last 3 Encounters:   11/09/18 (!) 149/98   10/05/18 132/87   09/27/18 (!) 137/94    Weight from Last 3 Encounters:   11/09/18 78.7 kg (173 lb 6.4 oz)   10/05/18 79 kg (174 lb 1.6 oz)   09/27/18 81.6 kg (179 lb 12.8 oz)              We Performed the Following     CBC with platelets differential     Comprehensive metabolic panel        Primary Care Provider Office Phone # Fax #    Anupama Marly Morrison [phone] [address] [facility] [address]        Equal Access to Services     [facility]: Hadii aad ku hadasho Soomaali, waaxda luqadaha, qaybta kaalmada adeegyada, toño jade . So [facility] [phone].    ATENCIÓN: Si habla español, tiene a mccurdy disposición servicios gratuitos de asistencia lingüística. Llame al [phone].    We comply with applicable federal civil rights laws and [place] laws. We do not discriminate on the basis of race, color, national origin, age, disability, sex, sexual orientation, or gender identity.            Thank you!     Thank you  for [facility]  for your care. Our goal is always to provide you with excellent care. Hearing back from our patients is one way we can continue to improve our services. Please take a few minutes to complete the written survey that you may receive in the mail after your visit with us. Thank you!             Your Updated Medication List - Protect others around you: Learn how to safely use, store and throw away your medicines at www.disposemymeds.org.          This list is accurate as of 11/9/18  3:19 PM.  Always use your most recent med list.                   Brand Name Dispense Instructions for use Diagnosis    acetaminophen 325 MG tablet    TYLENOL     Take 650 mg by mouth        DULCOLAX 5 MG EC tablet   Generic drug:  bisacodyl      Take 5 mg by mouth daily as needed for constipation        lomustine 40 MG capsule CHEMO    CEENU    3 capsule    Take 3 capsules (120 mg) by mouth once for 1 dose    Medulloblastoma of cerebellum (H)       LORazepam 0.5 MG tablet    ATIVAN    60 tablet    Take 1 tablet (0.5 mg) by mouth every 6 hours as needed for anxiety    Medulloblastoma of cerebellum (H)       ondansetron 8 MG ODT tab    ZOFRAN-ODT    60 tablet    Take 1 tablet (8 mg) by mouth every 8 hours as needed for nausea    Medulloblastoma of cerebellum (H)       prochlorperazine 10 MG tablet    COMPAZINE    90 tablet    Take 1 tablet (10 mg) by mouth every 6 hours as needed for nausea or vomiting    Medulloblastoma of cerebellum (H), Nausea and vomiting, intractability of vomiting not specified, unspecified vomiting type

## 2018-11-09 NOTE — PATIENT INSTRUCTIONS
Continue with the compazine, zofran, and ativan for nausea.   Cycle 3 today, holding vincristine for ODD cycles.   For cycle 4, continue with vincristine. Delayed start until 1/4.     Hearing recheck in the next few weeks (11/16).     Imaging reviewed, excellent response to therapy.   Repeat in 4 months (prior to cycle 5).     Continue with weekly blood draws; starting the week of 11/19.   Return to clinic on 1/4 with Melida Kovacs.     Anupama Morrison MD  Neuro-oncology  11/9/2018

## 2018-11-09 NOTE — PATIENT INSTRUCTIONS
Contact Numbers  [facility]: [phone]    After Hours:  [phone]  Triage: [phone]    Please call the [facility] Triage line if you experience a temperature greater than or equal to 100.5, shaking chills, have uncontrolled nausea, vomiting and/or diarrhea, dizziness, shortness of breath, chest pain, bleeding, unexplained bruising, or if you have any other new/concerning symptoms, questions or concerns.     If it is after hours, weekends, or holidays, please call the main hospital  at  [phone] and ask to speak to the Oncology doctor on call.     If you are having any concerning symptoms or wish to speak to a provider before your next infusion visit, please call your care coordinator or triage to notify them so we can adequately serve you.     If you need a refill on a narcotic prescription or other medication, please call triage before your infusion appointment.             November 2018 Sunday Monday Tuesday Wednesday Thursday Friday Saturday                       1     2     LAB   10:45 AM   (15 min.)   EC LAB   [facility] 3       4     5     6     7     8     9     MR BRAIN WWO    9:15 AM   (45 min.)   [id number]   [facility] MRI     [facility] LAB DRAW    9:30 AM   (15 min.)   [facility] LAB DRAW   [facility] Lab Draw     [facility] RETURN    9:45 AM   (30 min.)   Anupama Morrison MD   [facility] ONC INFUSION 360   11:00 AM   (360 min.)   UC ONCOLOGY INFUSION   [facility] 10       11     12     13     14     CHEMO AUDIOGRAM    8:15 AM   (60 min.)   Jennifer Ventura AuD   [facility] Audiology 15     16     17       18     19     20     21     22     23     24       25     26     27     28     29     30 December 2018 Sunday Monday Tuesday Wednesday Thursday Friday Saturday                                 1       2     3     4     5     6     7     8       9     10     11      12     13     14     15       16     17     18     19     20     21     22       23     24     25     26     27     28     29       30     31                                           Lab Results:  Recent Results (from the past 12 hour(s))   CBC with platelets differential    Collection Time: 11/09/18  9:53 AM   Result Value Ref Range    WBC 3.3 (L) 4.0 - 11.0 10e9/L    RBC Count 3.01 (L) 3.8 - 5.2 10e12/L    Hemoglobin 9.8 (L) 11.7 - 15.7 g/dL    Hematocrit 28.3 (L) 35.0 - 47.0 %    MCV 94 78 - 100 fl    MCH 32.6 26.5 - 33.0 pg    MCHC 34.6 31.5 - 36.5 g/dL    RDW 14.6 10.0 - 15.0 %    Platelet Count 228 150 - 450 10e9/L    Diff Method Automated Method     % Neutrophils 75.5 %    % Lymphocytes 8.0 %    % Monocytes 12.5 %    % Eosinophils 3.1 %    % Basophils 0.6 %    % Immature Granulocytes 0.3 %    Nucleated RBCs 0 0 /100    Absolute Neutrophil 2.5 1.6 - 8.3 10e9/L    Absolute Lymphocytes 0.3 (L) 0.8 - 5.3 10e9/L    Absolute Monocytes 0.4 0.0 - 1.3 10e9/L    Absolute Eosinophils 0.1 0.0 - 0.7 10e9/L    Absolute Basophils 0.0 0.0 - 0.2 10e9/L    Abs Immature Granulocytes 0.0 0 - 0.4 10e9/L    Absolute Nucleated RBC 0.0    Comprehensive metabolic panel    Collection Time: 11/09/18  9:53 AM   Result Value Ref Range    Sodium 139 133 - 144 mmol/L    Potassium 3.6 3.4 - 5.3 mmol/L    Chloride 107 94 - 109 mmol/L    Carbon Dioxide 23 20 - 32 mmol/L    Anion Gap 9 3 - 14 mmol/L    Glucose 72 70 - 99 mg/dL    Urea Nitrogen 15 7 - 30 mg/dL    Creatinine 0.85 0.52 - 1.04 mg/dL    GFR Estimate 77 >60 mL/min/1.7m2    GFR Estimate If Black >90 >60 mL/min/1.7m2    Calcium 8.6 8.5 - 10.1 mg/dL    Bilirubin Total 0.3 0.2 - 1.3 mg/dL    Albumin 4.0 3.4 - 5.0 g/dL    Protein Total 7.4 6.8 - 8.8 g/dL    Alkaline Phosphatase 78 40 - 150 U/L    ALT 24 0 - 50 U/L    AST 16 0 - 45 U/L

## 2018-11-09 NOTE — NURSING NOTE
Chief Complaint   Patient presents with     Port Draw     labs drawn via port by RN. VS taken.      Labs drawn via Port. . Line flushed and Heparin locked. Vital signs taken. Checked into next appointment.     Sharon Long RN

## 2018-11-09 NOTE — PROGRESS NOTES
NEURO-ONCOLOGY VISIT  11/12/2018    CHIEF COMPLAINT: Ms. 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 Completed craniospinal radiation with concurrent vincristine x 3 doses, but concurrent chemotherapy was stopped in the setting of pancytopenia. She is now managed on adjuvant chemotherapy with vincristine, CCNU and cisplatin.     She is presenting in follow-up accompanied by grandmother, Shaji.     HISTORY OF PRESENT ILLNESS  -She tolerated her second cycle of chemo slightly better. Significant nausea/vomiting, increased fatigue, anorexia and neuropathy.  -No constipation.  -No hearing loss/ no changes in hearing. No ringing in the ears.   -No neuropathy or weakness. No numbness and tingling in hands and feet or nose.Strength and gait unchanged.   -Sleeping well.   -Appetite is now good.    REVIEW OF SYSTEMS  A comprehensive ROS negative except as in HPI.      MEDICATIONS   Current Outpatient Prescriptions   Medication     LORazepam (ATIVAN) 0.5 MG tablet     acetaminophen (TYLENOL) 325 MG tablet     bisacodyl (DULCOLAX) 5 MG EC tablet     lomustine (CEENU) 40 MG capsule CHEMO     ondansetron (ZOFRAN-ODT) 8 MG ODT tab     prochlorperazine (COMPAZINE) 10 MG tablet     Current Facility-Administered Medications   Medication     heparin 100 UNIT/ML injection 5 mL     DRUG ALLERGIES   Allergies   Allergen Reactions     Sulfa Drugs Unknown       ONCOLOGIC HISTORY  -PRESENTATION: Progressively worsening headaches. Additionally was with abrupt position changes resulted in dizziness/ syncope. CTH at an outside hospital showed a mass in the left cerebellum. Transferred to [facility].  -4/11/2018 MRB showed a 3.5cm mass in the left cerebellar hemisphere that was associated with mild cerebellar tonsillar  herniation and hydrocephalus.  -MRI spine showed no evidence of disease.   -4/13/2018 SURGERY: Suboccipital craniotomy with resection of the left cerebellar mass. Immediate post-operative MRI demonstrated a gross total resection.   PATHOLOGY: Medulloblastoma; Molecular markers pending.   -5/4/2018 NEURO-ONC: LP performed. Evaluated by radiation oncology. Recommending craniospinal radiation + concurrent vincristine followed by eight 6-week cycles of cisplatin, lomustine, and vincristine.  -5/4/2018 LP with CSF analysis: WBC 1/ RBC 1, protein 25, glucose 62, negative cytology.   -5/14 - 6/22/2018 CHEMORADS with vincristine 2 mg/m2 (stopped after 3 infusions due to pancytopenia).  -5/17/2018 Audiology- Baseline hearing testing with no hearing loss.  -6/4/2018 NEURO-ONC: Doing well, no new neurological symptoms. Tolerating treatment well. Ophtho referral. Labs improved.  -6/4/2018 NGS via STRATA: SMO mutation, TERT promotor mutation. Negative for microsatellite instability.   -6/25/2018 NEURO-ONC: Clinically stable, painful radiation-induced burn. Monitoring labs. Cancer Risk Assessment referral.  -8/13/2018 NEURO-ONC/MRB/ CHEMO: Clinically stable. Imaging shows expected post operative changes with no evidence of new disease. C1D1 of vincristine, CCNU and cisplatin.   -9/24/2018 NEURO-ONC: Neurologically stable, though did not tolerate first cycle of chemo well 2/2 nausea. Hold on starting C2D1 today due to leukopenia (WBC 2.7).  -9/27/2018 CHEMO: C2D1 of vincristine, CCNU and cisplatin.   -11/9/2018 NEURO-ONC/ MRB/ CHEMO: Clinically well. Imaging with no evidence of tumor recurrence. Cycle 3 CCNU and cisplatin; plan to hold vincristine on ODD cycles due to peripheral neuropathy. Repeat hearing testing ordered.     SOCIAL HISTORY   Tobacco use: Former smoker, E-cigs stopped on 5/3/2018  Alcohol use: None.   Drug use: Denies marijuana use.  Supplement, complimentary/ alternative medicine: None.   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      PHYSICAL EXAMINATION  BP (!) 149/98 (BP Location: Right arm, Patient Position: Sitting, Cuff Size: Adult Regular)  Pulse 77  Temp 98.2  F (36.8  C) (Tympanic)  Resp 16  Ht 1.524 m (5')  Wt 78.7 kg (173 lb 6.4 oz)  LMP 10/26/2018 (Approximate)  SpO2 99%  Breastfeeding? No  BMI 33.86 kg/m2   Vitals:    11/09/18 0949   Weight: 78.7 kg (173 lb 6.4 oz)     Ht Readings from Last 2 Encounters:   11/09/18 1.524 m (5')   08/13/18 1.524 m (5')     KPS: 100    -Generally well appearing.  -Throat: No oral thrush. No redness of throat. No lymphadenopathy.   -Respiratory: Normal breath sounds, no audible wheezing.   -Skin: No rashes. Healed head incision. Hair is regrowing.  -Hematologic/ lymphatic: No abnormal bruising. No leg swelling.  -Psychiatric: Normal mood and affect. Pleasant, talkative.  -Neurologic:   MENTAL STATUS:     Alert, oriented to date.    Recall: Immediate 3/3, delayed 3/3.    Speech fluent. Comprehension intact to multi-step commands.   Normal naming, repetition. Able to read.   Good right-left orientation.     CRANIAL NERVES:     Discs flat on fundoscopy.    Pupils are equal, round, reactive to light.     Extraocular movements full, patient denies diplopia. Previous mild eye jerks noted on pursuit.    Visual fields full.     Facial sensation intact to light touch.   Symmetric facial movements.   Hearing intact.   Palate moves symmetrically.     Sternocleidomastoid and trapezius strength intact.    Tongue midline.  MOTOR:    Normal and symmetric tone.   Grossly 5/5 throughout.    No pronation or drift. No orbiting.   Able to rise from a chair without use of arms.   On toe/ heel walk, equal distance from floor to heels/ toes.   SENSATION:    Intact to light touch throughout.   Vibratory sense decreased in toes (10 seconds) and in thumb (5 seconds).   COORDINATION:   Intact finger-nose with eyes open and closed.   REFLEXES:    Muted in legs, trace  at biceps; symmetric.    Toes not tested. No clonus. No Hoffmans.   No grasp.    GAIT:   Walks without assistance.   Good speed. Normal stride length and heel strike. Normal turns. Normal arm swing.   Able to toe, heel walk. Some difficulty with tandem walk.       MEDICAL RECORDS  Obtained and personally reviewed all available outside medical records in addition to reviewing any records available in our electronic system.     LABS  Personally reviewed all available lab results.     IMAGING  Personally reviewed MR brain imaging from today and compared to post-radiation imaging. To my eye, there is no concern for disease recurrence.     Imaging was shown to and results were reviewed with Merissa.       IMPRESSION  For the 30 minute appointment, more than 50% of the encounter was spent discussing in detail the treatment, its side effects for adjuvant therapy, and imaging from today. This was in addition to providing emotional support, answering questions pertaining to my recommendations, and devising the treatment plan as outlined below.    Imaging with continued positive treatment response with no evidence of residual disease or disease recurrence. Labs without concerns. Clinically stable, though examination with continued muting of reflexes and length dependent vibratory loss. No weakness noted. 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 Plan is to hold vincristine on odd cycles; no vincristine today or on D8. Plan for CCNU and cisplatin today. For nausea, will use Zofran and compazine + ativan. Declined dexamethasone trial due to poor tolerance in the past. Ordered repeat hearing testing.     Will continue to monitor CBC with weekly labs throughout treatment. The plan is for up to eight cycles of cisplatin, lomustine, and vincristine (with 6 weeks cycles). Of note, this regimen is highly toxic and in clinical studies, treatment was terminated or dose reduced due to side effects in nearly 60  percent of patients by cycle four.     Due to the holidays, will delay start date of C4 until after the New Year on 1/4/2019. Repeat imaging prior to C5.    Clinical trial options remain a strong consideration for recurrent disease, as does off label use of SMO inhibitors.     PROBLEM LIST  Medulloblastoma  Steroid intolerance  Chemotherapy-induced pancytopenia  Chemotherapy-induced nausea/ vomiting   Radiation burn  Fatigue, cancer and treatment related, improving    PLAN  -CANCER-DIRECTED THERAPY-  -As above.  -Continue CCNU and cisplatin, holding vincristine on ODD cycles. (Delay in start date of C4 due to the holidays, per patient request.)  -Supportive medications for nausea: Aloxi, Emend and Dex 12 mg during infusion. Can use Ativan and Compazine at home (gave prescription today). Discussed using medical marijuana, though she was not comfortable with this at this time. Can also consider Olanzapine.  -Continue to use Miralax, Colace and/or Senna as needed for constipation.  -Continue weekly labs to monitor counts.  -Repeat MR brain imaging prior to C5.  -Repeat hearing testing given continued use of cisplatin.     -SEIZURE MANAGEMENT-  -While this patient is at increased risk of having seizures, given the lack of seizure history, there is no indication to prescribe an antiepileptic at this time. Will continue to monitor for seizure activity.    -Quality of life/ MOOD/ FATIGUE-  -Denies any mood issues.  -Continue to monitor mood as untreated/ undertreated depression can worsen fatigue, dysorexia, and quality of life.  -If anxiety worsens and has an increased need for Ativan, will start her on a medication like Celexa or Lexapro.     Return to clinic on 1/4 with Melida Kovacs for a visit prior to the start of cycle 4.     In the meantime, Merissa knows to call with questions or concerns or to report new complaints and can be seen sooner if needed. Urgent evaluation is needed in the setting of acute onset of severe  headache, abrupt change in mental status, on-going seizures, new focal deficits, or new leg swelling/ pain.     Anupama Morrison MD  Neuro-oncology

## 2018-11-09 NOTE — NURSING NOTE
Oncology Rooming Note    November 9, 2018 10:04 AM   Merissa Silverman is a 33 year old female who presents for:    Chief Complaint   Patient presents with     Port Draw     labs drawn via port by RN. VS taken.      Oncology Clinic Visit     Return Medulloblastoma     Initial Vitals: BP (!) 149/98 (BP Location: Right arm, Patient Position: Sitting, Cuff Size: Adult Regular)  Pulse 77  Temp 98.2  F (36.8  C) (Tympanic)  Resp 16  Ht 1.524 m (5')  Wt 78.7 kg (173 lb 6.4 oz)  LMP 10/26/2018 (Approximate)  SpO2 99%  Breastfeeding? No  BMI 33.86 kg/m2 Estimated body mass index is 33.86 kg/(m^2) as calculated from the following:    Height as of this encounter: 1.524 m (5').    Weight as of this encounter: 78.7 kg (173 lb 6.4 oz). Body surface area is 1.83 meters squared.  No Pain (0) Comment: Data Unavailable   Patient's last menstrual period was 10/26/2018 (approximate).  Allergies reviewed: Yes  Medications reviewed: Yes    Medications: MEDICATION REFILLS NEEDED TODAY. Provider was notified.  Pharmacy name entered into [employer]:    [facility] - [place], [place] - [address] [facility] [place] - [facility] [place] [address]    Clinical concerns: Refill on Chemo meds, pharmacy needs confirmation for med. Further treatment??    6 minutes for nursing intake (face to face time)     Naomi Nino [facility]

## 2018-11-09 NOTE — LETTER
11/9/2018       RE: Merissa Silverman  [address]  [address]  [facility] [address]     Dear Colleague,    Thank you for referring your patient, Merissa Silverman, to the [facility]. Please see a copy of my visit note below.    NEURO-ONCOLOGY VISIT  11/12/2018    CHIEF COMPLAINT: Ms. 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 Completed craniospinal radiation with concurrent vincristine x 3 doses, but concurrent chemotherapy was stopped in the setting of pancytopenia. She is now managed on adjuvant chemotherapy with vincristine, CCNU and cisplatin.     She is presenting in follow-up accompanied by grandmother, Shaji.     HISTORY OF PRESENT ILLNESS  -She tolerated her second cycle of chemo slightly better. Significant nausea/vomiting, increased fatigue, anorexia and neuropathy.  -No constipation.  -No hearing loss/ no changes in hearing. No ringing in the ears.   -No neuropathy or weakness. No numbness and tingling in hands and feet or nose.Strength and gait unchanged.   -Sleeping well.   -Appetite is now good.    REVIEW OF SYSTEMS  A comprehensive ROS negative except as in HPI.      MEDICATIONS   Current Outpatient Prescriptions   Medication     LORazepam (ATIVAN) 0.5 MG tablet     acetaminophen (TYLENOL) 325 MG tablet     bisacodyl (DULCOLAX) 5 MG EC tablet     lomustine (CEENU) 40 MG capsule CHEMO     ondansetron (ZOFRAN-ODT) 8 MG ODT tab     prochlorperazine (COMPAZINE) 10 MG tablet     Current Facility-Administered Medications   Medication     heparin 100 UNIT/ML injection 5 mL     DRUG ALLERGIES   Allergies   Allergen Reactions     Sulfa Drugs Unknown       ONCOLOGIC HISTORY  -PRESENTATION: Progressively worsening headaches. Additionally was with abrupt position changes resulted in  dizziness/ syncope. CTH at an outside hospital showed a mass in the left cerebellum. Transferred to [facility].  -4/11/2018 MRB showed a 3.5cm mass in the left cerebellar hemisphere that was associated with mild cerebellar tonsillar herniation and hydrocephalus.  -MRI spine showed no evidence of disease.   -4/13/2018 SURGERY: Suboccipital craniotomy with resection of the left cerebellar mass. Immediate post-operative MRI demonstrated a gross total resection.   PATHOLOGY: Medulloblastoma; Molecular markers pending.   -5/4/2018 NEURO-ONC: LP performed. Evaluated by radiation oncology. Recommending craniospinal radiation + concurrent vincristine followed by eight 6-week cycles of cisplatin, lomustine, and vincristine.  -5/4/2018 LP with CSF analysis: WBC 1/ RBC 1, protein 25, glucose 62, negative cytology.   -5/14 - 6/22/2018 CHEMORADS with vincristine 2 mg/m2 (stopped after 3 infusions due to pancytopenia).  -5/17/2018 Audiology- Baseline hearing testing with no hearing loss.  -6/4/2018 NEURO-ONC: Doing well, no new neurological symptoms. Tolerating treatment well. Ophtho referral. Labs improved.  -6/4/2018 NGS via STRATA: SMO mutation, TERT promotor mutation. Negative for microsatellite instability.   -6/25/2018 NEURO-ONC: Clinically stable, painful radiation-induced burn. Monitoring labs. Cancer Risk Assessment referral.  -8/13/2018 NEURO-ONC/MRB/ CHEMO: Clinically stable. Imaging shows expected post operative changes with no evidence of new disease. C1D1 of vincristine, CCNU and cisplatin.   -9/24/2018 NEURO-ONC: Neurologically stable, though did not tolerate first cycle of chemo well 2/2 nausea. Hold on starting C2D1 today due to leukopenia (WBC 2.7).  -9/27/2018 CHEMO: C2D1 of vincristine, CCNU and cisplatin.   -11/9/2018 NEURO-ONC/ MRB/ CHEMO: Clinically well. Imaging with no evidence of tumor recurrence. Cycle 3 CCNU and cisplatin; plan to hold vincristine on ODD cycles due to peripheral  neuropathy. Repeat hearing testing ordered.     SOCIAL HISTORY   Tobacco use: Former smoker, E-cigs stopped on 5/3/2018  Alcohol use: None.   Drug use: Denies marijuana use.  Supplement, complimentary/ alternative medicine: None.   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      PHYSICAL EXAMINATION  BP (!) 149/98 (BP Location: Right arm, Patient Position: Sitting, Cuff Size: Adult Regular)  Pulse 77  Temp 98.2  F (36.8  C) (Tympanic)  Resp 16  Ht 1.524 m (5')  Wt 78.7 kg (173 lb 6.4 oz)  LMP 10/26/2018 (Approximate)  SpO2 99%  Breastfeeding? No  BMI 33.86 kg/m2   Vitals:    11/09/18 0949   Weight: 78.7 kg (173 lb 6.4 oz)     Ht Readings from Last 2 Encounters:   11/09/18 1.524 m (5')   08/13/18 1.524 m (5')     KPS: 100    -Generally well appearing.  -Throat: No oral thrush. No redness of throat. No lymphadenopathy.   -Respiratory: Normal breath sounds, no audible wheezing.   -Skin: No rashes. Healed head incision. Hair is regrowing.  -Hematologic/ lymphatic: No abnormal bruising. No leg swelling.  -Psychiatric: Normal mood and affect. Pleasant, talkative.  -Neurologic:   MENTAL STATUS:     Alert, oriented to date.    Recall: Immediate 3/3, delayed 3/3.    Speech fluent. Comprehension intact to multi-step commands.   Normal naming, repetition. Able to read.   Good right-left orientation.     CRANIAL NERVES:     Discs flat on fundoscopy.    Pupils are equal, round, reactive to light.     Extraocular movements full, patient denies diplopia. Previous mild eye jerks noted on pursuit.    Visual fields full.     Facial sensation intact to light touch.   Symmetric facial movements.   Hearing intact.   Palate moves symmetrically.     Sternocleidomastoid and trapezius strength intact.    Tongue midline.  MOTOR:    Normal and symmetric tone.   Grossly 5/5 throughout.    No pronation or drift. No orbiting.   Able to rise from a chair without use of arms.   On toe/ heel walk, equal  distance from floor to heels/ toes.   SENSATION:    Intact to light touch throughout.   Vibratory sense decreased in toes (10 seconds) and in thumb (5 seconds).   COORDINATION:   Intact finger-nose with eyes open and closed.   REFLEXES:    Muted in legs, trace at biceps; symmetric.    Toes not tested. No clonus. No Hoffmans.   No grasp.    GAIT:   Walks without assistance.   Good speed. Normal stride length and heel strike. Normal turns. Normal arm swing.   Able to toe, heel walk. Some difficulty with tandem walk.       MEDICAL RECORDS  Obtained and personally reviewed all available outside medical records in addition to reviewing any records available in our electronic system.     LABS  Personally reviewed all available lab results.     IMAGING  Personally reviewed MR brain imaging from today and compared to post-radiation imaging. To my eye, there is no concern for disease recurrence.     Imaging was shown to and results were reviewed with Merissa.       IMPRESSION  For the 30 minute appointment, more than 50% of the encounter was spent discussing in detail the treatment, its side effects for adjuvant therapy, and imaging from today. This was in addition to providing emotional support, answering questions pertaining to my recommendations, and devising the treatment plan as outlined below.    Imaging with continued positive treatment response with no evidence of residual disease or disease recurrence. Labs without concerns. Clinically stable, though examination with continued muting of reflexes and length dependent vibratory loss. No weakness noted. 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 Plan is to hold vincristine on odd cycles; no vincristine today or on D8. Plan for CCNU and cisplatin today. For nausea, will use Zofran and compazine + ativan. Declined dexamethasone trial due to poor tolerance in the past. Ordered repeat hearing testing.     Will continue to monitor CBC with weekly  labs throughout treatment. The plan is for up to eight cycles of cisplatin, lomustine, and vincristine (with 6 weeks cycles). Of note, this regimen is highly toxic and in clinical studies, treatment was terminated or dose reduced due to side effects in nearly 60 percent of patients by cycle four.     Due to the holidays, will delay start date of C4 until after the New Year on 1/4/2019. Repeat imaging prior to C5.    Clinical trial options remain a strong consideration for recurrent disease, as does off label use of SMO inhibitors.     PROBLEM LIST  Medulloblastoma  Steroid intolerance  Chemotherapy-induced pancytopenia  Chemotherapy-induced nausea/ vomiting   Radiation burn  Fatigue, cancer and treatment related, improving    PLAN  -CANCER-DIRECTED THERAPY-  -As above.  -Continue CCNU and cisplatin, holding vincristine on ODD cycles. (Delay in start date of C4 due to the holidays, per patient request.)  -Supportive medications for nausea: Aloxi, Emend and Dex 12 mg during infusion. Can use Ativan and Compazine at home (gave prescription today). Discussed using medical marijuana, though she was not comfortable with this at this time. Can also consider Olanzapine.  -Continue to use Miralax, Colace and/or Senna as needed for constipation.  -Continue weekly labs to monitor counts.  -Repeat MR brain imaging prior to C5.  -Repeat hearing testing given continued use of cisplatin.     -SEIZURE MANAGEMENT-  -While this patient is at increased risk of having seizures, given the lack of seizure history, there is no indication to prescribe an antiepileptic at this time. Will continue to monitor for seizure activity.    -Quality of life/ MOOD/ FATIGUE-  -Denies any mood issues.  -Continue to monitor mood as untreated/ undertreated depression can worsen fatigue, dysorexia, and quality of life.  -If anxiety worsens and has an increased need for Ativan, will start her on a medication like Celexa or Lexapro.     Return to clinic on  1/4 with Melida Kovacs for a visit prior to the start of cycle 4.     In the meantime, Merissa knows to call with questions or concerns or to report new complaints and can be seen sooner if needed. Urgent evaluation is needed in the setting of acute onset of severe headache, abrupt change in mental status, on-going seizures, new focal deficits, or new leg swelling/ pain.     Anupama Morrison MD  Neuro-oncology

## 2018-11-09 NOTE — PROGRESS NOTES
Infusion Nursing Note:  Merissa Silverman presents today for Cycle 3 Day 1 of Cisplatin.    Patient seen by provider today: Yes: Dr. Morrison   present during visit today: Not Applicable.    Note: Patient presents to infusion with no issues or complaints. Patient only receiving Cisplatin today, no Vincristine.    Intravenous Access:  Implanted Port.    Treatment Conditions:  Lab Results   Component Value Date    HGB 9.8 11/09/2018     Lab Results   Component Value Date    WBC 3.3 11/09/2018      Lab Results   Component Value Date    ANEU 2.5 11/09/2018     Lab Results   Component Value Date     11/09/2018      Lab Results   Component Value Date     11/09/2018                   Lab Results   Component Value Date    POTASSIUM 3.6 11/09/2018           No results found for: MAG         Lab Results   Component Value Date    CR 0.85 11/09/2018                   Lab Results   Component Value Date    ANAND 8.6 11/09/2018                Lab Results   Component Value Date    BILITOTAL 0.3 11/09/2018           Lab Results   Component Value Date    ALBUMIN 4.0 11/09/2018                    Lab Results   Component Value Date    ALT 24 11/09/2018           Lab Results   Component Value Date    AST 16 11/09/2018       Results reviewed, labs MET treatment parameters, ok to proceed with treatment.      Post Infusion Assessment:  Patient peed pre, during, and post Cisplatin.  Patient tolerated infusion without incident.  Blood return noted pre and post infusion.  Site patent and intact, free from redness, edema or discomfort.  No evidence of extravasations.  Access discontinued per protocol.    Discharge Plan:   Patient declined prescription refills.  Discharge instructions reviewed with: Patient.  Patient and/or family verbalized understanding of discharge instructions and all questions answered.  AVS to patient via [employer].  Patient will return 11/16/18 for next appointment.   Patient discharged in stable  condition accompanied by: grandmother.  Departure Mode: Ambulatory.    Marivel House RN

## 2018-11-09 NOTE — MR AVS SNAPSHOT
After Visit Summary   11/9/2018    Merissa Silverman    MRN: [id number]           Patient Information     Date Of Birth          1985        Visit Information        Provider Department      11/9/2018 10:00 AM Anupama Morrison MD [facility]        Today's Diagnoses     Medulloblastoma of cerebellum (H)    -  1    Chemotherapy management, encounter for        Antineoplastic chemotherapy induced pancytopenia (H)        Chemotherapy-induced nausea and vomiting        High risk for chemotherapy-induced infectious complication        Chemotherapy induced neutropenia (H)        Ototoxicity of both ears          Care Instructions    Continue with the compazine, zofran, and ativan for nausea.   Cycle 3 today, holding vincristine for ODD cycles.   For cycle 4, continue with vincristine. Delayed start until 1/4.     Hearing recheck in the next few weeks (11/16).     Imaging reviewed, excellent response to therapy.   Repeat in 4 months (prior to cycle 5).     Continue with weekly blood draws; starting the week of 11/19.   Return to clinic on 1/4 with Melida Kovacs.     Anupama Morrison MD  Neuro-oncology  11/9/2018               Follow-ups after your visit        Additional Services     AUDIOLOGY ADULT REFERRAL       Your provider has referred you to: ealth: [facility] [place] [phone]   [email or web address]    Specialty Testing:  Audiogram w/Tymps and Reflexes (Comprehensive Audiology Evaluation), Ototoxic Monitoring and on vincristine, concerned for chemotherapy-associated hearing loss                  Your next 10 appointments already scheduled     Jan 04, 2019  9:00 AM [facility]   [employer] Lab Draw with  [employer] LAB DRAW   [facility] Lab Draw ([facility])    [address] [place] [address]  [address]  [facility] [address] [phone]            Jan 04,  2019  9:30 AM CST   (Arrive by 9:15 AM)   Return Visit with Melida Kovacs PA-C   [facility] ([facility])    [address]  [address]  [facility] [address] [phone]            Jan 04, 2019 10:30 AM CST   Infusion 360 with UC ONCOLOGY INFUSION, UC 21 ATC   [facility] ([facility])    [address]  [address]  [facility] [address] [phone]              Who to contact     If you have questions or need follow up information about today's clinic visit or your schedule please contact [employer] [facility] directly at [phone].  Normal or non-critical lab and imaging results will be communicated to you by Susohart, letter or phone within 4 business days after the clinic has received the results. If you do not hear from us within 7 days, please contact the clinic through Susohart or phone. If you have a critical or abnormal lab result, we will notify you by phone as soon as possible.  Submit refill requests through [employer] or call your pharmacy and they will forward the refill request to us. Please allow 3 business days for your refill to be completed.          Additional Information About Your Visit        SusoharZenoss Information     [employer] gives you secure access to your electronic health record. If you see a primary care provider, you can also send messages to your care team and make appointments. If you have questions, please call your primary care clinic.  If you do not have a primary care provider, please call [phone] and they will assist you.        Care EveryWhere ID     This is your Care EveryWhere ID. This could be used by other organizations to access your [place] medical records  [id number]        Your Vitals Were     Pulse Temperature Respirations Height Last Period Pulse Oximetry    77 98.2  F (36.8  C) (Tympanic) 16 1.524 m (5') 10/26/2018 (Approximate) 99%     Breastfeeding? BMI (Body Mass Index)                No 33.86 kg/m2           Blood Pressure from Last 3 Encounters:   11/09/18 (!) 149/98   10/05/18 132/87   09/27/18 (!) 137/94    Weight from Last 3 Encounters:   11/09/18 78.7 kg (173 lb 6.4 oz)   10/05/18 79 kg (174 lb 1.6 oz)   09/27/18 81.6 kg (179 lb 12.8 oz)              We Performed the Following     AUDIOLOGY ADULT REFERRAL        Primary Care Provider Office Phone # Fax #    Anupama Marly Morrison [phone] [address] [facility] [address]        Equal Access to Services     WANDER HAMMER : Hadii handy nielsono Ebony, waaxda luqadaha, qaybta kaalmada yumiko, toño bowman. So [facility] [place] [phone].    ATENCIÓN: Si habla español, tiene a mccurdy disposición servicios gratuitos de asistencia lingüística. [facility] [phone].    We comply with applicable federal civil rights laws and [place] laws. We do not discriminate on the basis of race, color, national origin, age, disability, sex, sexual orientation, or gender identity.            Thank you!     Thank you for choosing [facility]  for your care. Our goal is always to provide you with excellent care. Hearing back from our patients is one way we can continue to improve our services. Please take a few minutes to complete the written survey that you may receive in the mail after your visit with us. Thank you!             Your Updated Medication List - Protect others around you: Learn how to safely use, store and throw away your medicines at www.disposemymeds.org.          This list is accurate as of 11/9/18 11:59 PM.  Always use your most recent med list.                   Brand Name Dispense Instructions for use Diagnosis    acetaminophen 325 MG tablet    TYLENOL     Take 650 mg by mouth        DULCOLAX 5 MG EC tablet   Generic drug:  bisacodyl      Take 5 mg by mouth daily as needed for constipation        lomustine 40 MG  capsule CHEMO    CEENU    3 capsule    Take 3 capsules (120 mg) by mouth once for 1 dose    Medulloblastoma of cerebellum (H)       LORazepam 0.5 MG tablet    ATIVAN    60 tablet    Take 1 tablet (0.5 mg) by mouth every 6 hours as needed for anxiety    Medulloblastoma of cerebellum (H)       ondansetron 8 MG ODT tab    ZOFRAN-ODT    60 tablet    Take 1 tablet (8 mg) by mouth every 8 hours as needed for nausea    Medulloblastoma of cerebellum (H)       prochlorperazine 10 MG tablet    COMPAZINE    90 tablet    Take 1 tablet (10 mg) by mouth every 6 hours as needed for nausea or vomiting    Medulloblastoma of cerebellum (H), Nausea and vomiting, intractability of vomiting not specified, unspecified vomiting type

## 2018-11-16 DIAGNOSIS — C71.6 MEDULLOBLASTOMA OF CEREBELLUM (H): ICD-10-CM

## 2018-11-16 DIAGNOSIS — R11.2 CHEMOTHERAPY-INDUCED NAUSEA AND VOMITING: ICD-10-CM

## 2018-11-16 DIAGNOSIS — Z51.11 CHEMOTHERAPY MANAGEMENT, ENCOUNTER FOR: ICD-10-CM

## 2018-11-16 DIAGNOSIS — D61.810 ANTINEOPLASTIC CHEMOTHERAPY INDUCED PANCYTOPENIA (H): ICD-10-CM

## 2018-11-16 DIAGNOSIS — T45.1X5A ANTINEOPLASTIC CHEMOTHERAPY INDUCED PANCYTOPENIA (H): ICD-10-CM

## 2018-11-16 DIAGNOSIS — T45.1X5A CHEMOTHERAPY-INDUCED NAUSEA AND VOMITING: ICD-10-CM

## 2018-11-16 LAB
ALBUMIN SERPL-MCNC: 4.6 G/DL (ref 3.4–5)
ALP SERPL-CCNC: 76 U/L (ref 40–150)
ALT SERPL W P-5'-P-CCNC: 32 U/L (ref 0–50)
ANION GAP SERPL CALCULATED.3IONS-SCNC: 8 MMOL/L (ref 3–14)
AST SERPL W P-5'-P-CCNC: 28 U/L (ref 0–45)
BASOPHILS # BLD AUTO: 0 10E9/L (ref 0–0.2)
BASOPHILS NFR BLD AUTO: 0.2 %
BILIRUB SERPL-MCNC: 0.5 MG/DL (ref 0.2–1.3)
BUN SERPL-MCNC: 14 MG/DL (ref 7–30)
CALCIUM SERPL-MCNC: 9.6 MG/DL (ref 8.5–10.1)
CHLORIDE SERPL-SCNC: 100 MMOL/L (ref 94–109)
CO2 SERPL-SCNC: 26 MMOL/L (ref 20–32)
CREAT SERPL-MCNC: 0.92 MG/DL (ref 0.52–1.04)
DIFFERENTIAL METHOD BLD: ABNORMAL
EOSINOPHIL # BLD AUTO: 0 10E9/L (ref 0–0.7)
EOSINOPHIL NFR BLD AUTO: 1 %
ERYTHROCYTE [DISTWIDTH] IN BLOOD BY AUTOMATED COUNT: 13.3 % (ref 10–15)
GFR SERPL CREATININE-BSD FRML MDRD: 70 ML/MIN/1.7M2
GLUCOSE SERPL-MCNC: 87 MG/DL (ref 70–99)
HCT VFR BLD AUTO: 29.7 % (ref 35–47)
HGB BLD-MCNC: 10.6 G/DL (ref 11.7–15.7)
LYMPHOCYTES # BLD AUTO: 0.3 10E9/L (ref 0.8–5.3)
LYMPHOCYTES NFR BLD AUTO: 6.9 %
MCH RBC QN AUTO: 32.5 PG (ref 26.5–33)
MCHC RBC AUTO-ENTMCNC: 35.7 G/DL (ref 31.5–36.5)
MCV RBC AUTO: 91 FL (ref 78–100)
MONOCYTES # BLD AUTO: 0.5 10E9/L (ref 0–1.3)
MONOCYTES NFR BLD AUTO: 11.2 %
NEUTROPHILS # BLD AUTO: 3.3 10E9/L (ref 1.6–8.3)
NEUTROPHILS NFR BLD AUTO: 80.7 %
PLATELET # BLD AUTO: 263 10E9/L (ref 150–450)
POTASSIUM SERPL-SCNC: 3.3 MMOL/L (ref 3.4–5.3)
PROT SERPL-MCNC: 8.1 G/DL (ref 6.8–8.8)
RBC # BLD AUTO: 3.26 10E12/L (ref 3.8–5.2)
SODIUM SERPL-SCNC: 134 MMOL/L (ref 133–144)
WBC # BLD AUTO: 4 10E9/L (ref 4–11)

## 2018-11-16 PROCEDURE — 85025 COMPLETE CBC W/AUTO DIFF WBC: CPT | Performed by: PSYCHIATRY & NEUROLOGY

## 2018-11-16 PROCEDURE — 36415 COLL VENOUS BLD VENIPUNCTURE: CPT | Performed by: PSYCHIATRY & NEUROLOGY

## 2018-11-16 PROCEDURE — 80053 COMPREHEN METABOLIC PANEL: CPT | Performed by: PSYCHIATRY & NEUROLOGY

## 2018-11-23 ENCOUNTER — TELEPHONE (OUTPATIENT)
Dept: ONCOLOGY | Facility: CLINIC | Age: 33
End: 2018-11-23

## 2018-11-23 NOTE — ORAL ONC MGMT
Oral Chemotherapy Monitoring Program    Primary Oncologist: Dr. Morrison  Primary Oncology Clinic: [facility]  Cancer Diagnosis: Medulloblastoma     Drug therapy history:   C1D1=8/13/18 lomustine 160 mg (4 x 40 mg) by mouth for one dose  C2D1=10/2/18 lomustine 160 mg (4 x 40 mg) by mouth for one dose  G5H3=6211/13/18 lomustine 140 mg (3 x 40 mg) by mouth for one dose     Drug Interaction Assessment: No clinically relevant drug interactions.     Lab Monitoring Plan  CBC weekly for 6 weeks and CMP every 28 days  She goes in on Fridays at the [facility]. Missed 11/23 due to holiday, plans to go in on 11/26.     Subjective/Objective:  Merissa Silverman is a 33 year old female contacted by phone for a follow-up visit for oral chemotherapy. Today is C3D15. She took lomustine on 11/13 the day is was delivered by [facility] specialty pharmacy. She denies all side effects including nausea, constipation, and fatigue today. She has good appetite and encouraged 64 oz daily water intake. She has no medication changes, health changes, questions/concerns. She will continue weekly CBC. She was not able to get labs today due to holiday and will schedule for next week. She appreciated the call. She stated cycle 4 will be delayed after the holiday.     ORAL CHEMOTHERAPY 8/13/2018 8/20/2018 11/23/2018   Drug Name (No Data) (No Data) (No Data)   Current Dosage 160mg 160mg (No Data)   Current Schedule Daily Daily Daily   Cycle Details Other Other Other   Start Date of Last Cycle 8/13/2018 8/13/2018 11/13/2018   Planned next cycle start date - - 1/8/2019   Doses missed in last 2 weeks - 0 0   Adherence Assessment - Adherent Adherent   Adverse Effects - Nausea;Constipation;Fatigue Nausea;Constipation;Fatigue   Nausea - Grade 1 Resolved due to intervention   Pharmacist Intervention(nausea) - Yes -   Intervention(s) - Patient education -   Constipation - Grade 1 Resolved due to intervention   Pharmacist  Intervention(constipation) - Yes -   Intervention(s) - Patient education -   Fatigue - Grade 1 Resolved due to intervention   Pharmacist Intervention(fatigue) - Yes -   Intervention(s) - Patient education -   Home BPs - - Not applicable   Any new drug interactions? - - No   Is the dose as ordered appropriate for the patient? - - Yes   Has the patient been assessed within the past 6 months for depression? - - Yes   Has the patient missed any days of school, work, or other routine activity? - - No       Last PHQ-2 Score on record:   PHQ-2 ( 1999 [employer]) 6/4/2018 5/2/2018   Q1: Little interest or pleasure in doing things 0 0   Q2: Feeling down, depressed or hopeless 0 0   PHQ-2 Score 0 0       Patient does not report depression symptoms.      Vitals:  BP:   BP Readings from Last 1 Encounters:   11/09/18 (!) 149/98     Wt Readings from Last 1 Encounters:   11/09/18 78.7 kg (173 lb 6.4 oz)     Estimated body surface area is 1.83 meters squared as calculated from the following:    Height as of 11/9/18: 1.524 m (5').    Weight as of 11/9/18: 78.7 kg (173 lb 6.4 oz).    Labs:  _  Result Component Current Result Ref Range   Sodium 134 (11/16/2018) 133 - 144 mmol/L     _  Result Component Current Result Ref Range   Potassium 3.3 (L) (11/16/2018) 3.4 - 5.3 mmol/L     _  Result Component Current Result Ref Range   Calcium 9.6 (11/16/2018) 8.5 - 10.1 mg/dL     No results found for Mag within last 30 days.     No results found for Phos within last 30 days.     _  Result Component Current Result Ref Range   Albumin 4.6 (11/16/2018) 3.4 - 5.0 g/dL     _  Result Component Current Result Ref Range   Urea Nitrogen 14 (11/16/2018) 7 - 30 mg/dL     _  Result Component Current Result Ref Range   Creatinine 0.92 (11/16/2018) 0.52 - 1.04 mg/dL       _  Result Component Current Result Ref Range   AST 28 (11/16/2018) 0 - 45 U/L     _  Result Component Current Result Ref Range   ALT 32 (11/16/2018) 0 - 50 U/L     _  Result Component Current  Result Ref Range   Bilirubin Total 0.5 (11/16/2018) 0.2 - 1.3 mg/dL       _  Result Component Current Result Ref Range   WBC 4.0 (11/16/2018) 4.0 - 11.0 10e9/L     _  Result Component Current Result Ref Range   Hemoglobin 10.6 (L) (11/16/2018) 11.7 - 15.7 g/dL     _  Result Component Current Result Ref Range   Platelet Count 263 (11/16/2018) 150 - 450 10e9/L     _  Result Component Current Result Ref Range   Absolute Neutrophil 3.3 (11/16/2018) 1.6 - 8.3 10e9/L       Assessment:  Merissa is tolerating lomustine with no perceivable side effects today.     Plan:  Continue plan.   Continue weekly CBC at [facility].     Follow-Up:  Appointment and labs on 1/4/19.    Refill Due:  Cycle 4 ~ 1/8/19 after the holiday.      Thank you for the opportunity to participate in the care of the above patient,  Kennedy Tamayo, PharmD  Hematology/Oncology Clinical Pharmacist  [facility] and [facility]   [phone]

## 2018-11-27 ENCOUNTER — DOCUMENTATION ONLY (OUTPATIENT)
Dept: ONCOLOGY | Facility: CLINIC | Age: 33
End: 2018-11-27

## 2018-11-27 NOTE — PROGRESS NOTES
Form Request Documentation    Date Received in Clinic:  11/21/18  Name/Type of Form: FMLA for spouse  Questions that need to be addressed:   Current Employment Status: Spouse currently working on a full time basis   Amount of Leave Requested: Intermittent Leave    Other: None  Date Completed: 11/27/2018  Copy Mailed to patient: Yes on 11/27/2018  Disposition of Form: Fax to [facility] at [phone]

## 2018-11-28 ENCOUNTER — TELEPHONE (OUTPATIENT)
Dept: ONCOLOGY | Facility: CLINIC | Age: 33
End: 2018-11-28

## 2018-11-29 ENCOUNTER — TELEPHONE (OUTPATIENT)
Dept: ONCOLOGY | Facility: CLINIC | Age: 33
End: 2018-11-29

## 2018-11-29 ENCOUNTER — CARE COORDINATION (OUTPATIENT)
Dept: ONCOLOGY | Facility: CLINIC | Age: 33
End: 2018-11-29

## 2018-11-29 DIAGNOSIS — C71.6 MEDULLOBLASTOMA OF CEREBELLUM (H): ICD-10-CM

## 2018-11-29 DIAGNOSIS — D61.810 ANTINEOPLASTIC CHEMOTHERAPY INDUCED PANCYTOPENIA (H): ICD-10-CM

## 2018-11-29 DIAGNOSIS — T45.1X5A ANTINEOPLASTIC CHEMOTHERAPY INDUCED PANCYTOPENIA (H): ICD-10-CM

## 2018-11-29 LAB
BASOPHILS # BLD AUTO: 0 10E9/L (ref 0–0.2)
BASOPHILS NFR BLD AUTO: 0 %
DIFFERENTIAL METHOD BLD: ABNORMAL
EOSINOPHIL # BLD AUTO: 0 10E9/L (ref 0–0.7)
EOSINOPHIL NFR BLD AUTO: 1.2 %
ERYTHROCYTE [DISTWIDTH] IN BLOOD BY AUTOMATED COUNT: 13.9 % (ref 10–15)
HCT VFR BLD AUTO: 28.3 % (ref 35–47)
HGB BLD-MCNC: 9.6 G/DL (ref 11.7–15.7)
LYMPHOCYTES # BLD AUTO: 0.2 10E9/L (ref 0.8–5.3)
LYMPHOCYTES NFR BLD AUTO: 12 %
MCH RBC QN AUTO: 33.2 PG (ref 26.5–33)
MCHC RBC AUTO-ENTMCNC: 33.9 G/DL (ref 31.5–36.5)
MCV RBC AUTO: 98 FL (ref 78–100)
MONOCYTES # BLD AUTO: 0.2 10E9/L (ref 0–1.3)
MONOCYTES NFR BLD AUTO: 14.4 %
NEUTROPHILS # BLD AUTO: 1.2 10E9/L (ref 1.6–8.3)
NEUTROPHILS NFR BLD AUTO: 72.4 %
PLATELET # BLD AUTO: 214 10E9/L (ref 150–450)
RBC # BLD AUTO: 2.89 10E12/L (ref 3.8–5.2)
WBC # BLD AUTO: 1.7 10E9/L (ref 4–11)

## 2018-11-29 PROCEDURE — 36415 COLL VENOUS BLD VENIPUNCTURE: CPT | Performed by: PSYCHIATRY & NEUROLOGY

## 2018-11-29 PROCEDURE — 85025 COMPLETE CBC W/AUTO DIFF WBC: CPT | Performed by: PSYCHIATRY & NEUROLOGY

## 2018-11-29 NOTE — PROGRESS NOTES
Placed call to patient to follow up on lab draw. No answer. LM for patient asking her to have labs drawn as soon as possible. Expressed importance of lab monitoring for neutropenia. Asked patient to return call with any questions or concerns.

## 2018-11-29 NOTE — ORAL ONC MGMT
Oral Chemotherapy Monitoring Program     Placed call to patient in follow up of Lomustine therapy. Merissa is overdue for weekly labs.     Left message to please call back in follow-up required weekly labs. No patient or drug names were mentioned.     Cornell DupreeD  [facility]  [phone]  November 29, 2018

## 2018-12-04 ENCOUNTER — APPOINTMENT (OUTPATIENT)
Dept: LAB | Facility: CLINIC | Age: 33
End: 2018-12-04
Attending: PSYCHIATRY & NEUROLOGY
Payer: COMMERCIAL

## 2018-12-04 ENCOUNTER — TELEPHONE (OUTPATIENT)
Dept: ONCOLOGY | Facility: CLINIC | Age: 33
End: 2018-12-04

## 2018-12-04 ENCOUNTER — ONCOLOGY VISIT (OUTPATIENT)
Dept: ONCOLOGY | Facility: CLINIC | Age: 33
End: 2018-12-04
Attending: PHYSICIAN ASSISTANT
Payer: COMMERCIAL

## 2018-12-04 VITALS
DIASTOLIC BLOOD PRESSURE: 90 MMHG | HEART RATE: 64 BPM | BODY MASS INDEX: 32.67 KG/M2 | TEMPERATURE: 97.6 F | RESPIRATION RATE: 18 BRPM | HEIGHT: 60 IN | OXYGEN SATURATION: 100 % | WEIGHT: 166.4 LBS | SYSTOLIC BLOOD PRESSURE: 140 MMHG

## 2018-12-04 DIAGNOSIS — D61.810 ANTINEOPLASTIC CHEMOTHERAPY INDUCED PANCYTOPENIA (H): ICD-10-CM

## 2018-12-04 DIAGNOSIS — T45.1X5A ANTINEOPLASTIC CHEMOTHERAPY INDUCED PANCYTOPENIA (H): ICD-10-CM

## 2018-12-04 DIAGNOSIS — C71.6 MEDULLOBLASTOMA OF CEREBELLUM (H): Primary | ICD-10-CM

## 2018-12-04 LAB
BASOPHILS # BLD AUTO: 0 10E9/L (ref 0–0.2)
BASOPHILS NFR BLD AUTO: 0.5 %
DIFFERENTIAL METHOD BLD: ABNORMAL
EOSINOPHIL # BLD AUTO: 0 10E9/L (ref 0–0.7)
EOSINOPHIL NFR BLD AUTO: 1 %
ERYTHROCYTE [DISTWIDTH] IN BLOOD BY AUTOMATED COUNT: 14 % (ref 10–15)
HCT VFR BLD AUTO: 27.7 % (ref 35–47)
HGB BLD-MCNC: 9.7 G/DL (ref 11.7–15.7)
IMM GRANULOCYTES # BLD: 0 10E9/L (ref 0–0.4)
IMM GRANULOCYTES NFR BLD: 0 %
LYMPHOCYTES # BLD AUTO: 0.2 10E9/L (ref 0.8–5.3)
LYMPHOCYTES NFR BLD AUTO: 8.4 %
MCH RBC QN AUTO: 32.7 PG (ref 26.5–33)
MCHC RBC AUTO-ENTMCNC: 35 G/DL (ref 31.5–36.5)
MCV RBC AUTO: 93 FL (ref 78–100)
MONOCYTES # BLD AUTO: 0.5 10E9/L (ref 0–1.3)
MONOCYTES NFR BLD AUTO: 22.8 %
NEUTROPHILS # BLD AUTO: 1.4 10E9/L (ref 1.6–8.3)
NEUTROPHILS NFR BLD AUTO: 67.3 %
NRBC # BLD AUTO: 0 10*3/UL
NRBC BLD AUTO-RTO: 0 /100
PLATELET # BLD AUTO: 206 10E9/L (ref 150–450)
PLATELET # BLD EST: ABNORMAL 10*3/UL
RBC # BLD AUTO: 2.97 10E12/L (ref 3.8–5.2)
WBC # BLD AUTO: 2 10E9/L (ref 4–11)

## 2018-12-04 PROCEDURE — 85025 COMPLETE CBC W/AUTO DIFF WBC: CPT | Performed by: PSYCHIATRY & NEUROLOGY

## 2018-12-04 PROCEDURE — G0463 HOSPITAL OUTPT CLINIC VISIT: HCPCS | Mod: ZF

## 2018-12-04 PROCEDURE — 36591 DRAW BLOOD OFF VENOUS DEVICE: CPT

## 2018-12-04 PROCEDURE — 99214 OFFICE O/P EST MOD 30 MIN: CPT | Mod: ZP | Performed by: PHYSICIAN ASSISTANT

## 2018-12-04 PROCEDURE — 25000128 H RX IP 250 OP 636: Mod: ZF | Performed by: PHYSICIAN ASSISTANT

## 2018-12-04 RX ORDER — LORAZEPAM 0.5 MG/1
0.5 TABLET ORAL EVERY 6 HOURS PRN
Qty: 60 TABLET | Refills: 2 | Status: SHIPPED | OUTPATIENT
Start: 2018-12-04 | End: 2019-01-04

## 2018-12-04 RX ORDER — HEPARIN SODIUM (PORCINE) LOCK FLUSH IV SOLN 100 UNIT/ML 100 UNIT/ML
5 SOLUTION INTRAVENOUS EVERY 8 HOURS
Status: DISCONTINUED | OUTPATIENT
Start: 2018-12-04 | End: 2018-12-05 | Stop reason: HOSPADM

## 2018-12-04 RX ADMIN — SODIUM CHLORIDE, PRESERVATIVE FREE 5 ML: 5 INJECTION INTRAVENOUS at 11:04

## 2018-12-04 ASSESSMENT — PAIN SCALES - GENERAL: PAINLEVEL: EXTREME PAIN (9)

## 2018-12-04 NOTE — NURSING NOTE
Oncology Rooming Note    December 4, 2018 11:32 AM   Merissa Silverman is a 33 year old female who presents for:    Chief Complaint   Patient presents with     Port Draw     Labs drawn via PORT by RN. Line flushed with saline and heparin. VS taken.      Oncology Clinic Visit     Return visit related to Medulloblastoma     Initial Vitals: /90 (BP Location: Left arm, Patient Position: Sitting, Cuff Size: Adult Regular)  Pulse 64  Temp 97.6  F (36.4  C) (Oral)  Resp 18  Ht 1.524 m (5')  Wt 75.5 kg (166 lb 6.4 oz)  SpO2 100%  BMI 32.5 kg/m2 Estimated body mass index is 32.5 kg/(m^2) as calculated from the following:    Height as of this encounter: 1.524 m (5').    Weight as of this encounter: 75.5 kg (166 lb 6.4 oz). Body surface area is 1.79 meters squared.  Extreme Pain (9) Comment: Data Unavailable   No LMP recorded.  Allergies reviewed: Yes  Medications reviewed: Yes    Medications: MEDICATION REFILLS NEEDED TODAY. Provider was notified.  Pharmacy name entered into [facility]:    [facility] - [place], [place] - [address] [place] - [facility] [address]    Clinical concerns: Refill needed today. Provider was notified.    10 minutes for nursing intake (face to face time)     Ana Beaulieu LPN

## 2018-12-04 NOTE — NURSING NOTE
Chief Complaint   Patient presents with     Port Draw     Labs drawn via PORT by RN. Line flushed with saline and heparin. VS taken.      Fabricio Ayala RN

## 2018-12-04 NOTE — MR AVS SNAPSHOT
After Visit Summary   12/4/2018    Merissa Silverman    MRN: [id number]           Patient Information     Date Of Birth          1985        Visit Information        Provider Department      12/4/2018 11:00 AM Melida Kovacs PA-C [facility]        Today's Diagnoses     Medulloblastoma of cerebellum (H)    -  1    Antineoplastic chemotherapy induced pancytopenia (H)           Follow-ups after your visit        Your next 10 appointments already scheduled     Jan 04, 2019  9:00 AM CST   [facility] Lab Draw with [facility] LAB DRAW   [facility] ([facility])    [address] [place] [address]  [address]  [facility] [address] [phone]            Jan 04, 2019  9:30 AM CST   (Arrive by 9:15 AM)   Return Visit with Melida Kovacs PA-C   [facility] ([facility])    [address] [place] [address]  [address]  [facility] [id number] [phone]            Jan 04, 2019 10:30 AM CST   Infusion 360 with  ONCOLOGY INFUSION, UC 21 ATC   [facility] ([facility])    [address] [place] [address]  [address]  [facility] [id number] [phone]              Who to contact     If you have questions or need follow up information about today's clinic visit or your schedule please contact [facility] directly at [phone].  Normal or non-critical lab and imaging results will be communicated to you by MyChart, letter or phone within 4 business days after the clinic has received the results. If you do not hear from us within 7 days, please contact the clinic through MyChart or phone. If you have a critical or abnormal lab result, we will notify you by phone as soon as possible.  Submit refill requests through [employer] or call your pharmacy and they will forward the refill request to us. Please allow 3 business days for your refill to be completed.           Additional Information About Your Visit        Pendo Systemshart Information     [employer] gives you secure access to your electronic health record. If you see a primary care provider, you can also send messages to your care team and make appointments. If you have questions, please call your primary care clinic.  If you do not have a primary care provider, please call [phone] and they will assist you.        Care EveryWhere ID     This is your Care EveryWhere ID. This could be used by other organizations to access your [place] medical records  [id number]        Your Vitals Were     Pulse Temperature Respirations Height Pulse Oximetry BMI (Body Mass Index)    64 97.6  F (36.4  C) (Oral) 18 1.524 m (5') 100% 32.5 kg/m2       Blood Pressure from Last 3 Encounters:   12/04/18 140/90   11/09/18 (!) 149/98   10/05/18 132/87    Weight from Last 3 Encounters:   12/04/18 75.5 kg (166 lb 6.4 oz)   11/09/18 78.7 kg (173 lb 6.4 oz)   10/05/18 79 kg (174 lb 1.6 oz)              We Performed the Following     CBC with platelets differential          Today's Medication Changes          These changes are accurate as of 12/4/18 10:38 PM.  If you have any questions, ask your nurse or doctor.               These medicines have changed or have updated prescriptions.        Dose/Directions    LORazepam 0.5 MG tablet   Commonly known as:  ATIVAN   This may have changed:  reasons to take this   Used for:  Medulloblastoma of cerebellum (H)   Changed by:  Melida Kovacs PA-C        Dose:  0.5 mg   Take 1 tablet (0.5 mg) by mouth every 6 hours as needed for anxiety, nausea or sleep   Quantity:  60 tablet   Refills:  2            Where to get your medicines      Some of these will need a paper prescription and others can be bought over the counter.  Ask your nurse if you have questions.     Bring a paper prescription for each of these medications     LORazepam 0.5 MG tablet                Primary Care Provider Office Phone # Fax #    Anupama  Marly Morrison [phone]       [address] [facility] [address]        Equal Access to Services     WANDER HAMMER : Hadlindy aad ku hadhamilton Mathurraul, danaytu suazodewayneha, babschris soriaangel luis calderon, toño vines ayushdeanna nj yasmany bowman. So [facility] [phone].    ATENCIÓN: Si habla español, tiene a mccurdy disposición servicios gratuitos de asistencia lingüística. Llame al [phone].    We comply with applicable federal civil rights laws and [place] laws. We do not discriminate on the basis of race, color, national origin, age, disability, sex, sexual orientation, or gender identity.            Thank you!     Thank you for choosing [facility]  for your care. Our goal is always to provide you with excellent care. Hearing back from our patients is one way we can continue to improve our services. Please take a few minutes to complete the written survey that you may receive in the mail after your visit with us. Thank you!             Your Updated Medication List - Protect others around you: Learn how to safely use, store and throw away your medicines at www.disposemymeds.org.          This list is accurate as of 12/4/18 10:38 PM.  Always use your most recent med list.                   Brand Name Dispense Instructions for use Diagnosis    acetaminophen 325 MG tablet    TYLENOL     Take 650 mg by mouth        DULCOLAX 5 MG EC tablet   Generic drug:  bisacodyl      Take 5 mg by mouth daily as needed for constipation        lomustine 40 MG capsule    CEENU    3 capsule    Take 3 capsules (120 mg) by mouth once for 1 dose    Medulloblastoma of cerebellum (H)       LORazepam 0.5 MG tablet    ATIVAN    60 tablet    Take 1 tablet (0.5 mg) by mouth every 6 hours as needed for anxiety, nausea or sleep    Medulloblastoma of cerebellum (H)       ondansetron 8 MG ODT tab    ZOFRAN-ODT    60 tablet    Take 1 tablet (8 mg) by mouth every 8 hours as needed for nausea    Medulloblastoma of  cerebellum (H)       prochlorperazine 10 MG tablet    COMPAZINE    90 tablet    Take 1 tablet (10 mg) by mouth every 6 hours as needed for nausea or vomiting    Medulloblastoma of cerebellum (H), Nausea and vomiting, intractability of vomiting not specified, unspecified vomiting type

## 2018-12-04 NOTE — PROGRESS NOTES
NEURO-ONCOLOGY VISIT  12/04/2018    CHIEF COMPLAINT: Ms. 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 Completed craniospinal radiation with concurrent vincristine x 3 doses, but concurrent chemotherapy was stopped in the setting of pancytopenia. She is now managed on adjuvant chemotherapy with vincristine, CCNU and cisplatin.     She is presenting today as an add on for new HA accompanied by grandmother, Shaji.     HISTORY OF PRESENT ILLNESS  Merissa presents today with new onset of HA this morning. She has not had any HA prior to today since she had surgery. Her HAs prior to surgery started behind her eyes. This morning she woke up with severe pain in the back of her neck/occipital lobe. She rates the pain as a 9/10 and is a throbbing pain. The pain has now wrapped around both sides of her head. She denies light or sound sensitivity. No pain behind her eyes. She denies visual changes, speech changes, confusion, new numbness or tingling or activities concerning for seizures. She has had some nausea though no vomiting. She has not taken any medications yet today. She admits that she does carry her stress in her shoulders. She is otherwise doing well. She has been feeling good and continues to work without a problem.     REVIEW OF SYSTEMS  A comprehensive ROS negative except as in HPI.      MEDICATIONS   Current Outpatient Prescriptions   Medication     LORazepam (ATIVAN) 0.5 MG tablet     acetaminophen (TYLENOL) 325 MG tablet     bisacodyl (DULCOLAX) 5 MG EC tablet     lomustine (CEENU) 40 MG capsule CHEMO     ondansetron (ZOFRAN-ODT) 8 MG ODT tab     prochlorperazine (COMPAZINE) 10 MG tablet     Current Facility-Administered Medications   Medication     heparin 100 UNIT/ML injection 5 mL     sodium chloride (PF) 0.9% PF  flush 20 mL     DRUG ALLERGIES   Allergies   Allergen Reactions     Sulfa Drugs Unknown and Hives       ONCOLOGIC HISTORY  -PRESENTATION: Progressively worsening headaches. Additionally was with abrupt position changes resulted in dizziness/ syncope. CTH at an outside hospital showed a mass in the left cerebellum. Transferred to [facility].  -4/11/2018 MRB showed a 3.5cm mass in the left cerebellar hemisphere that was associated with mild cerebellar tonsillar herniation and hydrocephalus.  -MRI spine showed no evidence of disease.   -4/13/2018 SURGERY: Suboccipital craniotomy with resection of the left cerebellar mass. Immediate post-operative MRI demonstrated a gross total resection.   PATHOLOGY: Medulloblastoma; Molecular markers pending.   -5/4/2018 NEURO-ONC: LP performed. Evaluated by radiation oncology. Recommending craniospinal radiation + concurrent vincristine followed by eight 6-week cycles of cisplatin, lomustine, and vincristine.  -5/4/2018 LP with CSF analysis: WBC 1/ RBC 1, protein 25, glucose 62, negative cytology.   -5/14 - 6/22/2018 CHEMORADS with vincristine 2 mg/m2 (stopped after 3 infusions due to pancytopenia).  -5/17/2018 Audiology- Baseline hearing testing with no hearing loss.  -6/4/2018 NEURO-ONC: Doing well, no new neurological symptoms. Tolerating treatment well. Ophtho referral. Labs improved.  -6/4/2018 NGS via STRATA: SMO mutation, TERT promotor mutation. Negative for microsatellite instability.   -6/25/2018 NEURO-ONC: Clinically stable, painful radiation-induced burn. Monitoring labs. Cancer Risk Assessment referral.  -8/13/2018 NEURO-ONC/MRB/ CHEMO: Clinically stable. Imaging shows expected post operative changes with no evidence of new disease. C1D1 of vincristine, CCNU and cisplatin.   -9/24/2018 NEURO-ONC: Neurologically stable, though did not tolerate first cycle of chemo well 2/2 nausea. Hold on starting C2D1 today due to leukopenia (WBC 2.7).  -9/27/2018  CHEMO: C2D1 of vincristine, CCNU and cisplatin.   -11/9/2018 NEURO-ONC/ MRB/ CHEMO: Clinically well. Imaging with no evidence of tumor recurrence. Cycle 3 CCNU and cisplatin; plan to hold vincristine on ODD cycles due to peripheral neuropathy. Repeat hearing testing ordered.   -12/4/2018 NEURO-ONC: New severe HA, likely tension. No new neurological symptoms or signs    SOCIAL HISTORY   Tobacco use: Former smoker, E-cigs stopped on 5/3/2018  Alcohol use: None.   Drug use: Denies marijuana use.  Supplement, complimentary/ alternative medicine: None.   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      PHYSICAL EXAMINATION  /90 (BP Location: Left arm, Patient Position: Sitting, Cuff Size: Adult Regular)  Pulse 64  Temp 97.6  F (36.4  C) (Oral)  Resp 18  Ht 1.524 m (5')  Wt 75.5 kg (166 lb 6.4 oz)  SpO2 100%  BMI 32.5 kg/m2   Vitals:    12/04/18 1100   Weight: 75.5 kg (166 lb 6.4 oz)     Ht Readings from Last 2 Encounters:   12/04/18 1.524 m (5')   11/09/18 1.524 m (5')     KPS: 100    -Generally well appearing.  -Throat: No oral thrush. No redness of throat. No lymphadenopathy.   -Respiratory: Normal breath sounds, no audible wheezing.   -Skin: No rashes. Healed head incision. Hair is regrowing.  -Hematologic/ lymphatic: No abnormal bruising. No leg swelling.  -Psychiatric: Normal mood and affect. Pleasant, talkative.  -Neurologic:   MENTAL STATUS:     Alert, oriented to date.    Recall: Immediate 3/3, delayed 3/3.    Speech fluent. Comprehension intact to multi-step commands.   Normal naming, repetition. Able to read.   Good right-left orientation.     CRANIAL NERVES:     Discs flat on fundoscopy.    Pupils are equal, round, reactive to light.     Extraocular movements full, patient denies diplopia. Previous mild eye jerks noted on pursuit.    Visual fields full.     Facial sensation intact to light touch.   Symmetric facial movements.   Hearing intact.   Palate moves  symmetrically.     Sternocleidomastoid and trapezius strength intact.    Tongue midline.  MOTOR:    Normal and symmetric tone.   Grossly 5/5 throughout.    No pronation or drift. No orbiting.   Able to rise from a chair without use of arms.   On toe/ heel walk, equal distance from floor to heels/ toes.   SENSATION:    Intact to light touch throughout.   COORDINATION:   Intact finger-nose with eyes open and closed.   REFLEXES:    Muted in legs, trace at biceps; symmetric.    Toes not tested. No clonus. No Hoffmans.   No grasp.    GAIT:   Walks without assistance.   Good speed. Normal stride length and heel strike. Normal turns. Normal arm swing.   Able to toe, heel walk. Some difficulty with tandem walk.       MEDICAL RECORDS  Obtained and personally reviewed all available outside medical records in addition to reviewing any records available in our electronic system.     LABS  Personally reviewed all available lab results.    12/4/2018 11:08   WBC 2.0 (L)   Hemoglobin 9.7 (L)   Hematocrit 27.7 (L)   Platelet Count 206   RBC Count 2.97 (L)   MCV 93   MCH 32.7   MCHC 35.0   RDW 14.0   Diff Method Automated Method   % Neutrophils 67.3   % Lymphocytes 8.4   % Monocytes 22.8   % Eosinophils 1.0   % Basophils 0.5   % Immature Granulocytes 0.0   Nucleated RBCs 0   Absolute Neutrophil 1.4 (L)   Absolute Lymphocytes 0.2 (L)   Absolute Monocytes 0.5   Absolute Eosinophils 0.0   Absolute Basophils 0.0   Abs Immature Granulocytes 0.0   Absolute Nucleated RBC 0.0   Platelet Estimate Confirming automa...     IMAGING  No new imaging needed    IMPRESSION/PLAN    -GBM-  -Continue CCNU and cisplatin, holding vincristine on ODD cycles. (Delay in start date of C4 due to the holidays, per patient request.)  -Supportive medications for nausea: Aloxi, Emend and Dex 12 mg during infusion. Can use Ativan and Compazine at home (gave prescription today). Discussed using medical marijuana, though she was not comfortable with this at this time.  Can also consider Olanzapine.  -Continue to use Miralax, Colace and/or Senna as needed for constipation.  -Continue weekly labs to monitor counts.  -Repeat MR brain imaging prior to C5.  -Repeat hearing testing given continued use of cisplatin.     -HA-  -neurologic exam is stable with no changes. Has mild tenderness to spine on palpation. Clinically sounds like a tension HA/migraine. Instructed her to use APAP and moist heat to stretch her neck. Plts are ok today and so she could take a couple dose of IBU today if needed, though should not be taking IBU chronically. Patient understands.   -if her symptoms were to worsen or not improve with medical management, she should call and will discuss rescanning her early, though past scans have showed improvement and so unlikely to be progression of disease     -SEIZURE MANAGEMENT-  -While this patient is at increased risk of having seizures, given the lack of seizure history, there is no indication to prescribe an antiepileptic at this time. Will continue to monitor for seizure activity.    -Quality of life/ MOOD/ FATIGUE-  -Denies any mood issues.  -Continue to monitor mood as untreated/ undertreated depression can worsen fatigue, dysorexia, and quality of life.  -If anxiety worsens and has an increased need for Ativan, will start her on a medication like Celexa or Lexapro. Did refill Ativan for her today, though she states she very rarely takes it    Return to clinic on 1/4 with me for a visit prior to the start of cycle 4.     Melida Kovacs PA-C  [facility]  [address], [place] [id number] [phone]

## 2018-12-04 NOTE — TELEPHONE ENCOUNTER
[facility] Telephone Triage Note    Assessment: Patient called in to triage reporting the following symptoms: patient reporting headache in the back of her head at he site or surgery, describes it as a pounding migraine with some slight nausea, denies all other symptoms.    Recommendations: Discussed with Melida Kovacs PA-C.  Clinic visit  today with the following procedures/labs:  no procedures,  CBC and CMP,  no infusion.    Follow-Up: Message sent to schedulers to add pt on to schedule, Informed patient of appointment times, Instructed patient to seek care immediately for worsening symptoms, including: fever, chest pain, shortness of breath, dizziness. Patient voiced understanding of advice and/or instructions given.

## 2018-12-11 DIAGNOSIS — D61.810 ANTINEOPLASTIC CHEMOTHERAPY INDUCED PANCYTOPENIA (H): ICD-10-CM

## 2018-12-11 DIAGNOSIS — T45.1X5A ANTINEOPLASTIC CHEMOTHERAPY INDUCED PANCYTOPENIA (H): ICD-10-CM

## 2018-12-11 DIAGNOSIS — C71.6 MEDULLOBLASTOMA OF CEREBELLUM (H): ICD-10-CM

## 2018-12-11 LAB
BASOPHILS # BLD AUTO: 0 10E9/L (ref 0–0.2)
BASOPHILS NFR BLD AUTO: 0.3 %
DIFFERENTIAL METHOD BLD: ABNORMAL
EOSINOPHIL # BLD AUTO: 0.1 10E9/L (ref 0–0.7)
EOSINOPHIL NFR BLD AUTO: 1.6 %
ERYTHROCYTE [DISTWIDTH] IN BLOOD BY AUTOMATED COUNT: 13.8 % (ref 10–15)
HCT VFR BLD AUTO: 29.8 % (ref 35–47)
HGB BLD-MCNC: 10.3 G/DL (ref 11.7–15.7)
LYMPHOCYTES # BLD AUTO: 0.3 10E9/L (ref 0.8–5.3)
LYMPHOCYTES NFR BLD AUTO: 8.4 %
MCH RBC QN AUTO: 33.6 PG (ref 26.5–33)
MCHC RBC AUTO-ENTMCNC: 34.6 G/DL (ref 31.5–36.5)
MCV RBC AUTO: 97 FL (ref 78–100)
MONOCYTES # BLD AUTO: 0.3 10E9/L (ref 0–1.3)
MONOCYTES NFR BLD AUTO: 10.3 %
NEUTROPHILS # BLD AUTO: 2.5 10E9/L (ref 1.6–8.3)
NEUTROPHILS NFR BLD AUTO: 79.4 %
PLATELET # BLD AUTO: 206 10E9/L (ref 150–450)
RBC # BLD AUTO: 3.07 10E12/L (ref 3.8–5.2)
WBC # BLD AUTO: 3.2 10E9/L (ref 4–11)

## 2018-12-11 PROCEDURE — 85025 COMPLETE CBC W/AUTO DIFF WBC: CPT | Performed by: PSYCHIATRY & NEUROLOGY

## 2018-12-11 PROCEDURE — 36415 COLL VENOUS BLD VENIPUNCTURE: CPT | Performed by: PSYCHIATRY & NEUROLOGY

## 2018-12-12 ENCOUNTER — TELEPHONE (OUTPATIENT)
Dept: ONCOLOGY | Facility: CLINIC | Age: 33
End: 2018-12-12

## 2018-12-12 NOTE — ORAL ONC MGMT
Oral Chemotherapy Monitoring Program    Placed call to patient in follow up of Lomustine therapy (12/11/18 labs). ANC back within normal range (2.5), Hbg/WBC/ALC still low but trending up. Plan to continue with weekly labs (CBC), next ~12/18/18.     All questions answered to Merissa's stated satisfaction.     Devante Desouza  Pharmacy Intern  Oral Chemotherapy Monitoring Program  [facility]  [phone]

## 2018-12-18 DIAGNOSIS — D61.810 ANTINEOPLASTIC CHEMOTHERAPY INDUCED PANCYTOPENIA (H): ICD-10-CM

## 2018-12-18 DIAGNOSIS — C71.6 MEDULLOBLASTOMA OF CEREBELLUM (H): ICD-10-CM

## 2018-12-18 DIAGNOSIS — T45.1X5A ANTINEOPLASTIC CHEMOTHERAPY INDUCED PANCYTOPENIA (H): ICD-10-CM

## 2018-12-18 LAB
BASOPHILS # BLD AUTO: 0 10E9/L (ref 0–0.2)
BASOPHILS NFR BLD AUTO: 0.3 %
DIFFERENTIAL METHOD BLD: ABNORMAL
EOSINOPHIL # BLD AUTO: 0.1 10E9/L (ref 0–0.7)
EOSINOPHIL NFR BLD AUTO: 2.4 %
ERYTHROCYTE [DISTWIDTH] IN BLOOD BY AUTOMATED COUNT: 13.2 % (ref 10–15)
HCT VFR BLD AUTO: 29.9 % (ref 35–47)
HGB BLD-MCNC: 10.1 G/DL (ref 11.7–15.7)
LYMPHOCYTES # BLD AUTO: 0.2 10E9/L (ref 0.8–5.3)
LYMPHOCYTES NFR BLD AUTO: 7.9 %
MCH RBC QN AUTO: 32.8 PG (ref 26.5–33)
MCHC RBC AUTO-ENTMCNC: 33.8 G/DL (ref 31.5–36.5)
MCV RBC AUTO: 97 FL (ref 78–100)
MONOCYTES # BLD AUTO: 0.4 10E9/L (ref 0–1.3)
MONOCYTES NFR BLD AUTO: 12 %
NEUTROPHILS # BLD AUTO: 2.3 10E9/L (ref 1.6–8.3)
NEUTROPHILS NFR BLD AUTO: 77.4 %
PLATELET # BLD AUTO: 219 10E9/L (ref 150–450)
RBC # BLD AUTO: 3.08 10E12/L (ref 3.8–5.2)
WBC # BLD AUTO: 2.9 10E9/L (ref 4–11)

## 2018-12-18 PROCEDURE — 85025 COMPLETE CBC W/AUTO DIFF WBC: CPT | Performed by: PSYCHIATRY & NEUROLOGY

## 2018-12-18 PROCEDURE — 36415 COLL VENOUS BLD VENIPUNCTURE: CPT | Performed by: PSYCHIATRY & NEUROLOGY

## 2018-12-27 DIAGNOSIS — D61.810 ANTINEOPLASTIC CHEMOTHERAPY INDUCED PANCYTOPENIA (H): ICD-10-CM

## 2018-12-27 DIAGNOSIS — C71.6 MEDULLOBLASTOMA OF CEREBELLUM (H): ICD-10-CM

## 2018-12-27 DIAGNOSIS — T45.1X5A ANTINEOPLASTIC CHEMOTHERAPY INDUCED PANCYTOPENIA (H): ICD-10-CM

## 2018-12-27 LAB
BASOPHILS # BLD AUTO: 0 10E9/L (ref 0–0.2)
BASOPHILS NFR BLD AUTO: 0.4 %
DIFFERENTIAL METHOD BLD: ABNORMAL
EOSINOPHIL # BLD AUTO: 0.1 10E9/L (ref 0–0.7)
EOSINOPHIL NFR BLD AUTO: 4 %
ERYTHROCYTE [DISTWIDTH] IN BLOOD BY AUTOMATED COUNT: 13.2 % (ref 10–15)
HCT VFR BLD AUTO: 29.5 % (ref 35–47)
HGB BLD-MCNC: 10.2 G/DL (ref 11.7–15.7)
LYMPHOCYTES # BLD AUTO: 0.2 10E9/L (ref 0.8–5.3)
LYMPHOCYTES NFR BLD AUTO: 8.4 %
MCH RBC QN AUTO: 33.4 PG (ref 26.5–33)
MCHC RBC AUTO-ENTMCNC: 34.6 G/DL (ref 31.5–36.5)
MCV RBC AUTO: 97 FL (ref 78–100)
MONOCYTES # BLD AUTO: 0.3 10E9/L (ref 0–1.3)
MONOCYTES NFR BLD AUTO: 11.3 %
NEUTROPHILS # BLD AUTO: 2.1 10E9/L (ref 1.6–8.3)
NEUTROPHILS NFR BLD AUTO: 75.9 %
PLATELET # BLD AUTO: 234 10E9/L (ref 150–450)
RBC # BLD AUTO: 3.05 10E12/L (ref 3.8–5.2)
WBC # BLD AUTO: 2.8 10E9/L (ref 4–11)

## 2018-12-27 PROCEDURE — 85025 COMPLETE CBC W/AUTO DIFF WBC: CPT | Performed by: PSYCHIATRY & NEUROLOGY

## 2018-12-27 PROCEDURE — 36415 COLL VENOUS BLD VENIPUNCTURE: CPT | Performed by: PSYCHIATRY & NEUROLOGY

## 2018-12-28 RX ORDER — HEPARIN SODIUM (PORCINE) LOCK FLUSH IV SOLN 100 UNIT/ML 100 UNIT/ML
5 SOLUTION INTRAVENOUS ONCE
Status: CANCELLED
Start: 2019-01-04 | End: 2019-01-04

## 2018-12-28 RX ORDER — MEPERIDINE HYDROCHLORIDE 25 MG/ML
25 INJECTION INTRAMUSCULAR; INTRAVENOUS; SUBCUTANEOUS EVERY 30 MIN PRN
Status: CANCELLED | OUTPATIENT
Start: 2019-01-04

## 2018-12-28 RX ORDER — METHYLPREDNISOLONE SODIUM SUCCINATE 125 MG/2ML
125 INJECTION, POWDER, LYOPHILIZED, FOR SOLUTION INTRAMUSCULAR; INTRAVENOUS
Status: CANCELLED
Start: 2019-01-04

## 2018-12-28 RX ORDER — EPINEPHRINE 1 MG/ML
0.3 INJECTION, SOLUTION INTRAMUSCULAR; SUBCUTANEOUS EVERY 5 MIN PRN
Status: CANCELLED | OUTPATIENT
Start: 2019-01-04

## 2018-12-28 RX ORDER — ALBUTEROL SULFATE 0.83 MG/ML
2.5 SOLUTION RESPIRATORY (INHALATION)
Status: CANCELLED | OUTPATIENT
Start: 2019-01-04

## 2018-12-28 RX ORDER — PALONOSETRON 0.05 MG/ML
0.25 INJECTION, SOLUTION INTRAVENOUS ONCE
Status: CANCELLED
Start: 2019-01-04 | End: 2019-01-04

## 2018-12-28 RX ORDER — SODIUM CHLORIDE 9 MG/ML
1000 INJECTION, SOLUTION INTRAVENOUS CONTINUOUS PRN
Status: CANCELLED
Start: 2019-01-04

## 2018-12-28 RX ORDER — DIPHENHYDRAMINE HYDROCHLORIDE 50 MG/ML
50 INJECTION INTRAMUSCULAR; INTRAVENOUS
Status: CANCELLED
Start: 2019-01-04

## 2018-12-28 RX ORDER — LORAZEPAM 2 MG/ML
0.5 INJECTION INTRAMUSCULAR EVERY 4 HOURS PRN
Status: CANCELLED
Start: 2019-01-04

## 2018-12-28 RX ORDER — ALBUTEROL SULFATE 90 UG/1
1-2 AEROSOL, METERED RESPIRATORY (INHALATION)
Status: CANCELLED
Start: 2019-01-04

## 2018-12-28 RX ORDER — EPINEPHRINE 0.3 MG/.3ML
0.3 INJECTION SUBCUTANEOUS EVERY 5 MIN PRN
Status: CANCELLED | OUTPATIENT
Start: 2019-01-04

## 2018-12-30 DIAGNOSIS — C71.6 MEDULLOBLASTOMA OF CEREBELLUM (H): Primary | ICD-10-CM

## 2019-01-04 ENCOUNTER — ONCOLOGY VISIT (OUTPATIENT)
Dept: ONCOLOGY | Facility: CLINIC | Age: 34
End: 2019-01-04
Attending: PHYSICIAN ASSISTANT
Payer: COMMERCIAL

## 2019-01-04 ENCOUNTER — INFUSION THERAPY VISIT (OUTPATIENT)
Dept: ONCOLOGY | Facility: CLINIC | Age: 34
End: 2019-01-04
Attending: PSYCHIATRY & NEUROLOGY
Payer: COMMERCIAL

## 2019-01-04 ENCOUNTER — APPOINTMENT (OUTPATIENT)
Dept: LAB | Facility: CLINIC | Age: 34
End: 2019-01-04
Attending: PSYCHIATRY & NEUROLOGY
Payer: COMMERCIAL

## 2019-01-04 VITALS
WEIGHT: 167.2 LBS | DIASTOLIC BLOOD PRESSURE: 91 MMHG | HEART RATE: 67 BPM | TEMPERATURE: 98.4 F | RESPIRATION RATE: 16 BRPM | BODY MASS INDEX: 32.65 KG/M2 | SYSTOLIC BLOOD PRESSURE: 131 MMHG | OXYGEN SATURATION: 100 %

## 2019-01-04 DIAGNOSIS — C71.6 MEDULLOBLASTOMA OF CEREBELLUM (H): Primary | ICD-10-CM

## 2019-01-04 LAB
ALBUMIN SERPL-MCNC: 4 G/DL (ref 3.4–5)
ALP SERPL-CCNC: 76 U/L (ref 40–150)
ALT SERPL W P-5'-P-CCNC: 35 U/L (ref 0–50)
ANION GAP SERPL CALCULATED.3IONS-SCNC: 8 MMOL/L (ref 3–14)
AST SERPL W P-5'-P-CCNC: 22 U/L (ref 0–45)
BASOPHILS # BLD AUTO: 0 10E9/L (ref 0–0.2)
BASOPHILS NFR BLD AUTO: 0.4 %
BILIRUB SERPL-MCNC: 0.3 MG/DL (ref 0.2–1.3)
BUN SERPL-MCNC: 12 MG/DL (ref 7–30)
CALCIUM SERPL-MCNC: 8.7 MG/DL (ref 8.5–10.1)
CHLORIDE SERPL-SCNC: 106 MMOL/L (ref 94–109)
CO2 SERPL-SCNC: 25 MMOL/L (ref 20–32)
CREAT SERPL-MCNC: 0.78 MG/DL (ref 0.52–1.04)
DIFFERENTIAL METHOD BLD: ABNORMAL
EOSINOPHIL # BLD AUTO: 0.1 10E9/L (ref 0–0.7)
EOSINOPHIL NFR BLD AUTO: 3.3 %
ERYTHROCYTE [DISTWIDTH] IN BLOOD BY AUTOMATED COUNT: 13.2 % (ref 10–15)
GFR SERPL CREATININE-BSD FRML MDRD: >90 ML/MIN/{1.73_M2}
GLUCOSE SERPL-MCNC: 79 MG/DL (ref 70–99)
HCT VFR BLD AUTO: 30.2 % (ref 35–47)
HGB BLD-MCNC: 10.1 G/DL (ref 11.7–15.7)
IMM GRANULOCYTES # BLD: 0 10E9/L (ref 0–0.4)
IMM GRANULOCYTES NFR BLD: 0.4 %
LYMPHOCYTES # BLD AUTO: 0.2 10E9/L (ref 0.8–5.3)
LYMPHOCYTES NFR BLD AUTO: 6.9 %
MCH RBC QN AUTO: 32.5 PG (ref 26.5–33)
MCHC RBC AUTO-ENTMCNC: 33.4 G/DL (ref 31.5–36.5)
MCV RBC AUTO: 97 FL (ref 78–100)
MONOCYTES # BLD AUTO: 0.4 10E9/L (ref 0–1.3)
MONOCYTES NFR BLD AUTO: 14.6 %
NEUTROPHILS # BLD AUTO: 1.8 10E9/L (ref 1.6–8.3)
NEUTROPHILS NFR BLD AUTO: 74.4 %
NRBC # BLD AUTO: 0 10*3/UL
NRBC BLD AUTO-RTO: 0 /100
PLATELET # BLD AUTO: 211 10E9/L (ref 150–450)
POTASSIUM SERPL-SCNC: 3.5 MMOL/L (ref 3.4–5.3)
PROT SERPL-MCNC: 7.2 G/DL (ref 6.8–8.8)
RBC # BLD AUTO: 3.11 10E12/L (ref 3.8–5.2)
SODIUM SERPL-SCNC: 139 MMOL/L (ref 133–144)
WBC # BLD AUTO: 2.5 10E9/L (ref 4–11)

## 2019-01-04 PROCEDURE — 99214 OFFICE O/P EST MOD 30 MIN: CPT | Mod: ZP | Performed by: PHYSICIAN ASSISTANT

## 2019-01-04 PROCEDURE — 96367 TX/PROPH/DG ADDL SEQ IV INF: CPT

## 2019-01-04 PROCEDURE — 96375 TX/PRO/DX INJ NEW DRUG ADDON: CPT

## 2019-01-04 PROCEDURE — 80053 COMPREHEN METABOLIC PANEL: CPT | Performed by: PSYCHIATRY & NEUROLOGY

## 2019-01-04 PROCEDURE — 85025 COMPLETE CBC W/AUTO DIFF WBC: CPT | Performed by: PSYCHIATRY & NEUROLOGY

## 2019-01-04 PROCEDURE — 25000128 H RX IP 250 OP 636: Mod: ZF | Performed by: PHYSICIAN ASSISTANT

## 2019-01-04 PROCEDURE — 96415 CHEMO IV INFUSION ADDL HR: CPT

## 2019-01-04 PROCEDURE — G0463 HOSPITAL OUTPT CLINIC VISIT: HCPCS | Mod: ZF

## 2019-01-04 PROCEDURE — 25000128 H RX IP 250 OP 636: Mod: ZF | Performed by: PSYCHIATRY & NEUROLOGY

## 2019-01-04 PROCEDURE — 96413 CHEMO IV INFUSION 1 HR: CPT

## 2019-01-04 RX ORDER — LORAZEPAM 1 MG/1
1 TABLET ORAL EVERY 6 HOURS PRN
Qty: 60 TABLET | Refills: 1 | Status: SHIPPED | OUTPATIENT
Start: 2019-01-04 | End: 2019-02-15

## 2019-01-04 RX ORDER — PALONOSETRON 0.05 MG/ML
0.25 INJECTION, SOLUTION INTRAVENOUS ONCE
Status: COMPLETED | OUTPATIENT
Start: 2019-01-04 | End: 2019-01-04

## 2019-01-04 RX ORDER — HEPARIN SODIUM (PORCINE) LOCK FLUSH IV SOLN 100 UNIT/ML 100 UNIT/ML
5 SOLUTION INTRAVENOUS ONCE
Status: COMPLETED | OUTPATIENT
Start: 2019-01-04 | End: 2019-01-04

## 2019-01-04 RX ORDER — LORAZEPAM 2 MG/ML
0.5 INJECTION INTRAMUSCULAR EVERY 4 HOURS PRN
Status: DISCONTINUED | OUTPATIENT
Start: 2019-01-04 | End: 2019-01-04 | Stop reason: HOSPADM

## 2019-01-04 RX ORDER — HEPARIN SODIUM (PORCINE) LOCK FLUSH IV SOLN 100 UNIT/ML 100 UNIT/ML
5 SOLUTION INTRAVENOUS EVERY 8 HOURS
Status: DISCONTINUED | OUTPATIENT
Start: 2019-01-04 | End: 2019-01-07 | Stop reason: HOSPADM

## 2019-01-04 RX ADMIN — CISPLATIN 130 MG: 1 INJECTION, SOLUTION INTRAVENOUS at 11:59

## 2019-01-04 RX ADMIN — DEXAMETHASONE SODIUM PHOSPHATE: 10 INJECTION, SOLUTION INTRAMUSCULAR; INTRAVENOUS at 11:36

## 2019-01-04 RX ADMIN — SODIUM CHLORIDE 1000 ML: 9 INJECTION, SOLUTION INTRAVENOUS at 11:16

## 2019-01-04 RX ADMIN — PALONOSETRON HYDROCHLORIDE 0.25 MG: 0.25 INJECTION INTRAVENOUS at 11:33

## 2019-01-04 RX ADMIN — Medication 5 ML: at 09:09

## 2019-01-04 RX ADMIN — Medication 5 ML: at 14:12

## 2019-01-04 ASSESSMENT — PAIN SCALES - GENERAL: PAINLEVEL: NO PAIN (0)

## 2019-01-04 NOTE — PROGRESS NOTES
Infusion Nursing Note:  Merissa Silverman presents today for cycle 4 day 1 cisplatin.    Patient seen by provider today: Yes: JOSTIN Shultz   present during visit today: Not Applicable.    Note: Per Melida Kovacs patient to not get vincristine today - only cisplatin. Day 8 cancelled, this is patient's last chemotherapy infusion appointment.    Intravenous Access:  Implanted Port.    Treatment Conditions:  Lab Results   Component Value Date    HGB 10.1 01/04/2019     Lab Results   Component Value Date    WBC 2.5 01/04/2019      Lab Results   Component Value Date    ANEU 1.8 01/04/2019     Lab Results   Component Value Date     01/04/2019      Lab Results   Component Value Date     01/04/2019                   Lab Results   Component Value Date    POTASSIUM 3.5 01/04/2019           No results found for: MAG         Lab Results   Component Value Date    CR 0.78 01/04/2019                   Lab Results   Component Value Date    ANAND 8.7 01/04/2019                Lab Results   Component Value Date    BILITOTAL 0.3 01/04/2019           Lab Results   Component Value Date    ALBUMIN 4.0 01/04/2019                    Lab Results   Component Value Date    ALT 35 01/04/2019           Lab Results   Component Value Date    AST 22 01/04/2019       Results reviewed, labs MET treatment parameters, ok to proceed with treatment.      Post Infusion Assessment:  Patient tolerated infusion without incident.  Blood return noted pre and post infusion.  Site patent and intact, free from redness, edema or discomfort.  No evidence of extravasations.  Access discontinued per protocol.    Discharge Plan:   Prescription refills given for ativan.  Discharge instructions reviewed with: Patient.  Patient and/or family verbalized understanding of discharge instructions and all questions answered.  Copy of AVS reviewed with patient and/or family.    Patient discharged in stable condition accompanied by:  father.  Departure Mode: Ambulatory.    Jayna Mejia RN

## 2019-01-04 NOTE — PROGRESS NOTES
NEURO-ONCOLOGY VISIT  01/04/2019    CHIEF COMPLAINT: Ms. 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 Completed craniospinal radiation with concurrent vincristine x 3 doses, but concurrent chemotherapy was stopped in the setting of pancytopenia. She is now managed on adjuvant chemotherapy with vincristine, CCNU and cisplatin.     She is presenting today prior to Cycle 4.     HISTORY OF PRESENT ILLNESS  -She is doing well since I saw her last.  She states that the headache that she came in for was relieved shortly after our visit after she took a single Tylenol.  She has had a couple mild headaches since but nothing like the first headache.  Using heat and a neck massage or which has greatly helped the neck tension and headaches.  -She generally has nausea after taking the lomustine.  She will take Ativan and Compazine prior to this though still will have vomiting.  -She denies any seizure-like activity, confusion, speech changes, vision changes, weakness, numbness and tingling.    -Denies any nausea, vomiting, diarrhea, constipation, shortness of breath, chest pain or cough.  She has been eating and drinking normally.  She usually has decreased appetite for the first 2 weeks.  She continues to work.  -She is very excited today that this will be her last cycle.    REVIEW OF SYSTEMS  A comprehensive ROS negative except as in HPI.      MEDICATIONS   Current Outpatient Medications   Medication     acetaminophen (TYLENOL) 325 MG tablet     bisacodyl (DULCOLAX) 5 MG EC tablet     LORazepam (ATIVAN) 1 MG tablet     lomustine (CEENU) 100 MG capsule CHEMO     lomustine (CEENU) 40 MG capsule CHEMO     lomustine (CEENU) 40 MG capsule CHEMO     lomustine (CEENU) 40 MG capsule CHEMO     lomustine (CEENU) 40 MG capsule      ondansetron (ZOFRAN-ODT) 8 MG ODT tab     prochlorperazine (COMPAZINE) 10 MG tablet     Current Facility-Administered Medications   Medication     heparin 100 UNIT/ML injection 5 mL     DRUG ALLERGIES   Allergies   Allergen Reactions     Sulfa Drugs Unknown and Hives       ONCOLOGIC HISTORY  -PRESENTATION: Progressively worsening headaches. Additionally was with abrupt position changes resulted in dizziness/ syncope. CTH at an outside hospital showed a mass in the left cerebellum. Transferred to [facility].  -4/11/2018 MRB showed a 3.5cm mass in the left cerebellar hemisphere that was associated with mild cerebellar tonsillar herniation and hydrocephalus.  -MRI spine showed no evidence of disease.   -4/13/2018 SURGERY: Suboccipital craniotomy with resection of the left cerebellar mass. Immediate post-operative MRI demonstrated a gross total resection.   PATHOLOGY: Medulloblastoma; Molecular markers pending.   -5/4/2018 NEURO-ONC: LP performed. Evaluated by radiation oncology. Recommending craniospinal radiation + concurrent vincristine followed by eight 6-week cycles of cisplatin, lomustine, and vincristine.  -5/4/2018 LP with CSF analysis: WBC 1/ RBC 1, protein 25, glucose 62, negative cytology.   -5/14 - 6/22/2018 CHEMORADS with vincristine 2 mg/m2 (stopped after 3 infusions due to pancytopenia).  -5/17/2018 Audiology- Baseline hearing testing with no hearing loss.  -6/4/2018 NEURO-ONC: Doing well, no new neurological symptoms. Tolerating treatment well. Ophtho referral. Labs improved.  -6/4/2018 NGS via STRATA: SMO mutation, TERT promotor mutation. Negative for microsatellite instability.   -6/25/2018 NEURO-ONC: Clinically stable, painful radiation-induced burn. Monitoring labs. Cancer Risk Assessment referral.  -8/13/2018 NEURO-ONC/MRB/ CHEMO: Clinically stable. Imaging shows expected post operative changes with no evidence of new disease. C1D1 of vincristine, CCNU and cisplatin.   -9/24/2018  NEURO-ONC: Neurologically stable, though did not tolerate first cycle of chemo well 2/2 nausea. Hold on starting C2D1 today due to leukopenia (WBC 2.7).  -9/27/2018 CHEMO: C2D1 of vincristine, CCNU and cisplatin.   -11/9/2018 NEURO-ONC/ MRB/ CHEMO: Clinically well. Imaging with no evidence of tumor recurrence. Cycle 3 CCNU and cisplatin; plan to hold vincristine on ODD cycles due to peripheral neuropathy. Repeat hearing testing ordered.   -12/4/2018 NEURO-ONC: New severe HA, likely tension. No new neurological symptoms or signs  -1/4/2019 NEURO-ONC/CHEMO: Clinically doing well. No changes. Cycle 4 CCNU and cisplatin. Holding vincristine. Last cycle     SOCIAL HISTORY   Tobacco use: Former smoker, E-cigs stopped on 5/3/2018  Alcohol use: None.   Drug use: Denies marijuana use.  Supplement, complimentary/ alternative medicine: None.   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      PHYSICAL EXAMINATION  BP (!) 131/91 (BP Location: Right arm, Patient Position: Sitting)   Pulse 67   Temp 98.4  F (36.9  C) (Oral)   Resp 16   Wt 75.8 kg (167 lb 3.2 oz)   SpO2 100%   BMI 32.65 kg/m     Vitals:    01/04/19 0859   Weight: 75.8 kg (167 lb 3.2 oz)     Ht Readings from Last 2 Encounters:   12/04/18 1.524 m (5')   11/09/18 1.524 m (5')     KPS: 100    -Generally well appearing.  -Throat: No oral thrush. No redness of throat. No lymphadenopathy.   -Respiratory: Normal breath sounds, no audible wheezing.   -Skin: No rashes. Healed head incision. Hair is regrowing.  -Hematologic/ lymphatic: No abnormal bruising. No leg swelling.  -Psychiatric: Normal mood and affect. Pleasant, talkative.  -Neurologic:   MENTAL STATUS:     Alert, oriented to date.    Recall: Immediate 3/3, delayed 3/3.    Speech fluent. Comprehension intact to multi-step commands.   Normal naming, repetition. Able to read.   Good right-left orientation.     CRANIAL NERVES:     Discs flat on fundoscopy.    Pupils are equal, round,  reactive to light.     Extraocular movements full, patient denies diplopia. Previous mild eye jerks noted on pursuit.    Visual fields full.     Facial sensation intact to light touch.   Symmetric facial movements.   Hearing intact.   Palate moves symmetrically.     Sternocleidomastoid and trapezius strength intact.    Tongue midline.  MOTOR:    Normal and symmetric tone.   Grossly 5/5 throughout.    No pronation or drift. No orbiting.   Able to rise from a chair without use of arms.   On toe/ heel walk, equal distance from floor to heels/ toes.   SENSATION:    Intact to light touch throughout.   COORDINATION:   Intact finger-nose with eyes open and closed.   REFLEXES:    Muted in legs, trace at biceps; symmetric.    Toes not tested. No clonus. No Hoffmans.   No grasp.    GAIT:   Walks without assistance.   Good speed. Normal stride length and heel strike. Normal turns. Normal arm swing.   Able to toe, heel walk. Some difficulty with tandem walk.       MEDICAL RECORDS  Obtained and personally reviewed all available outside medical records in addition to reviewing any records available in our electronic system.     LABS   1/4/2019 09:09   Sodium 139   Potassium 3.5   Chloride 106   Carbon Dioxide 25   Urea Nitrogen 12   Creatinine 0.78   GFR Estimate >90   GFR Estimate If Black >90   Calcium 8.7   Anion Gap 8   Albumin 4.0   Protein Total 7.2   Bilirubin Total 0.3   Alkaline Phosphatase 76   ALT 35   AST 22   Glucose 79   WBC 2.5 (L)   Hemoglobin 10.1 (L)   Hematocrit 30.2 (L)   Platelet Count 211   RBC Count 3.11 (L)   MCV 97   MCH 32.5   MCHC 33.4   RDW 13.2   Diff Method Automated Method   % Neutrophils 74.4   % Lymphocytes 6.9   % Monocytes 14.6   % Eosinophils 3.3   % Basophils 0.4   % Immature Granulocytes 0.4   Nucleated RBCs 0   Absolute Neutrophil 1.8   Absolute Lymphocytes 0.2 (L)   Absolute Monocytes 0.4   Absolute Eosinophils 0.1   Absolute Basophils 0.0   Abs Immature Granulocytes 0.0   Absolute  Nucleated RBC 0.0       IMAGING  No new imaging needed    IMPRESSION/PLAN    -GBM-  -Continue CCNU and cisplatin Cycle 4, holding vincristine today due to poor tolerance   -Supportive medications for nausea: Aloxi, Emend and Dex 12 mg during infusion. Can use Ativan and Compazine at home   -Continue to use Miralax, Colace and/or Senna as needed for constipation.  -Continue weekly labs to monitor counts.  -Repeat MR brain imaging in 6-8 weeks  -Should repeat hearing testing after Cycle 4 due to use of cisplatin    -SEIZURE MANAGEMENT-  -While this patient is at increased risk of having seizures, given the lack of seizure history, there is no indication to prescribe an antiepileptic at this time. Will continue to monitor for seizure activity.    -Quality of life/ MOOD/ FATIGUE-  -Denies any mood issues.  -Continue to monitor mood as untreated/ undertreated depression can worsen fatigue, dysorexia, and quality of life.  -Mood has been good lately. Can use Ativan PRN  -can take APAP PRN for HA    Return to clinic in 6-8 week with new MRI and f/u with Dr. Prince Kovacs PA-C  [facility]  [address] [place] [address] [phone]

## 2019-01-04 NOTE — NURSING NOTE
Oncology Rooming Note    January 4, 2019 9:14 AM   Merissa Silverman is a 33 year old female who presents for:    Chief Complaint   Patient presents with     Oncology Clinic Visit     [facility] [place] RETURN- MEDULLOBLASTOMA     Initial Vitals: BP (!) 131/91 (BP Location: Right arm, Patient Position: Sitting)   Pulse 67   Temp 98.4  F (36.9  C) (Oral)   Resp 16   Wt 75.8 kg (167 lb 3.2 oz)   SpO2 100%   BMI 32.65 kg/m   Estimated body mass index is 32.65 kg/m  as calculated from the following:    Height as of 12/4/18: 1.524 m (5').    Weight as of this encounter: 75.8 kg (167 lb 3.2 oz). Body surface area is 1.79 meters squared.  No Pain (0) Comment: Data Unavailable   No LMP recorded.  Allergies reviewed: Yes  Medications reviewed: Yes    Medications: Medication refills not needed today.  Pharmacy name entered into [employer]:    [facility] - [place], [place] - [address] [facility] [place] - [facility] [address]    Clinical concerns: No new concerns. Fermín was notified.    10 minutes for nursing intake (face to face time)     Hector Bhatti LPN

## 2019-01-04 NOTE — PATIENT INSTRUCTIONS
Contact Numbers:     [facility] Main Line: [phone]  [facility] Triage: [phone]    Call triage to speak with triage if you are experiencing chills and/or temperature greater than or equal to 100.5, uncontrolled nausea/vomiting, diarrhea, constipation, dizziness, shortness of breath, chest pain, bleeding, unexplained bruising, or any new/concerning symptoms, questions/concerns.     If you are having any concerning symptoms or wish to speak to a provider before your next infusion visit, please call your care coordinator or triage to notify them so we can adequately serve you.     If you need a refill on a narcotic prescription, please call triage or your care coordinator before your infusion appointment.         January 2019 Sunday Monday Tuesday Wednesday Thursday Friday Saturday             1     2     3     4    [facility]   9:00 AM   (15 min.)    [facility] LAB DRAW   [facility] Lab Draw    [facility] RETURN   9:15 AM   (50 min.)   Melida Kovacs PA-C   [facility] ONC INFUSION 360  10:30 AM   (360 min.)    ONCOLOGY INFUSION   [facility] 5       6     7     8     9     10     11     12       13     14     15     16     17     18     19       20     21     22     23     24     25     26       27     28     29     30     31 February 2019 Sunday Monday Tuesday Wednesday Thursday Friday Saturday                            1     2       3     4     5     6     7     8     9       10     11     12     13     14     15     16       17     18     19     20     21     22     23       24     25     26     27     28                               Lab Results:  Recent Results (from the past 12 hour(s))   Comprehensive metabolic panel    Collection Time: 01/04/19  9:09 AM   Result Value Ref Range    Sodium 139 133 - 144 mmol/L    Potassium 3.5 3.4 - 5.3 mmol/L    Chloride 106 94 - 109 mmol/L    Carbon Dioxide 25 20 - 32 mmol/L    Anion Gap  8 3 - 14 mmol/L    Glucose 79 70 - 99 mg/dL    Urea Nitrogen 12 7 - 30 mg/dL    Creatinine 0.78 0.52 - 1.04 mg/dL    GFR Estimate >90 >60 mL/min/[1.73_m2]    GFR Estimate If Black >90 >60 mL/min/[1.73_m2]    Calcium 8.7 8.5 - 10.1 mg/dL    Bilirubin Total 0.3 0.2 - 1.3 mg/dL    Albumin 4.0 3.4 - 5.0 g/dL    Protein Total 7.2 6.8 - 8.8 g/dL    Alkaline Phosphatase 76 40 - 150 U/L    ALT 35 0 - 50 U/L    AST 22 0 - 45 U/L   CBC with platelets differential    Collection Time: 01/04/19  9:09 AM   Result Value Ref Range    WBC 2.5 (L) 4.0 - 11.0 10e9/L    RBC Count 3.11 (L) 3.8 - 5.2 10e12/L    Hemoglobin 10.1 (L) 11.7 - 15.7 g/dL    Hematocrit 30.2 (L) 35.0 - 47.0 %    MCV 97 78 - 100 fl    MCH 32.5 26.5 - 33.0 pg    MCHC 33.4 31.5 - 36.5 g/dL    RDW 13.2 10.0 - 15.0 %    Platelet Count 211 150 - 450 10e9/L    Diff Method Automated Method     % Neutrophils 74.4 %    % Lymphocytes 6.9 %    % Monocytes 14.6 %    % Eosinophils 3.3 %    % Basophils 0.4 %    % Immature Granulocytes 0.4 %    Nucleated RBCs 0 0 /100    Absolute Neutrophil 1.8 1.6 - 8.3 10e9/L    Absolute Lymphocytes 0.2 (L) 0.8 - 5.3 10e9/L    Absolute Monocytes 0.4 0.0 - 1.3 10e9/L    Absolute Eosinophils 0.1 0.0 - 0.7 10e9/L    Absolute Basophils 0.0 0.0 - 0.2 10e9/L    Abs Immature Granulocytes 0.0 0 - 0.4 10e9/L    Absolute Nucleated RBC 0.0

## 2019-01-04 NOTE — NURSING NOTE
Chief Complaint   Patient presents with     Oncology Clinic Visit     [facility] RETURN- MEDULLOBLASTOMA     Port Draw     Port labs collected by RN.

## 2019-01-17 DIAGNOSIS — C71.6 MEDULLOBLASTOMA OF CEREBELLUM (H): ICD-10-CM

## 2019-01-17 DIAGNOSIS — T45.1X5A ANTINEOPLASTIC CHEMOTHERAPY INDUCED PANCYTOPENIA (H): ICD-10-CM

## 2019-01-17 DIAGNOSIS — D61.810 ANTINEOPLASTIC CHEMOTHERAPY INDUCED PANCYTOPENIA (H): ICD-10-CM

## 2019-01-17 LAB
BASOPHILS # BLD AUTO: 0 10E9/L (ref 0–0.2)
BASOPHILS NFR BLD AUTO: 0 %
DIFFERENTIAL METHOD BLD: ABNORMAL
EOSINOPHIL # BLD AUTO: 0 10E9/L (ref 0–0.7)
EOSINOPHIL NFR BLD AUTO: 0 %
ERYTHROCYTE [DISTWIDTH] IN BLOOD BY AUTOMATED COUNT: 12.5 % (ref 10–15)
HCT VFR BLD AUTO: 28.2 % (ref 35–47)
HGB BLD-MCNC: 9.5 G/DL (ref 11.7–15.7)
LYMPHOCYTES # BLD AUTO: 0.2 10E9/L (ref 0.8–5.3)
LYMPHOCYTES NFR BLD AUTO: 5.6 %
MCH RBC QN AUTO: 32.1 PG (ref 26.5–33)
MCHC RBC AUTO-ENTMCNC: 33.7 G/DL (ref 31.5–36.5)
MCV RBC AUTO: 95 FL (ref 78–100)
MONOCYTES # BLD AUTO: 0.3 10E9/L (ref 0–1.3)
MONOCYTES NFR BLD AUTO: 9.7 %
NEUTROPHILS # BLD AUTO: 2.9 10E9/L (ref 1.6–8.3)
NEUTROPHILS NFR BLD AUTO: 84.7 %
PLATELET # BLD AUTO: 177 10E9/L (ref 150–450)
RBC # BLD AUTO: 2.96 10E12/L (ref 3.8–5.2)
WBC # BLD AUTO: 3.4 10E9/L (ref 4–11)

## 2019-01-17 PROCEDURE — 85025 COMPLETE CBC W/AUTO DIFF WBC: CPT | Performed by: PSYCHIATRY & NEUROLOGY

## 2019-01-17 PROCEDURE — 36415 COLL VENOUS BLD VENIPUNCTURE: CPT | Performed by: PSYCHIATRY & NEUROLOGY

## 2019-02-15 ENCOUNTER — APPOINTMENT (OUTPATIENT)
Dept: LAB | Facility: CLINIC | Age: 34
End: 2019-02-15
Attending: PSYCHIATRY & NEUROLOGY
Payer: COMMERCIAL

## 2019-02-15 ENCOUNTER — ONCOLOGY VISIT (OUTPATIENT)
Dept: ONCOLOGY | Facility: CLINIC | Age: 34
End: 2019-02-15
Attending: PSYCHIATRY & NEUROLOGY
Payer: COMMERCIAL

## 2019-02-15 ENCOUNTER — HOSPITAL ENCOUNTER (OUTPATIENT)
Dept: MRI IMAGING | Facility: CLINIC | Age: 34
Discharge: HOME | End: 2019-02-15
Attending: PSYCHIATRY & NEUROLOGY | Admitting: PSYCHIATRY & NEUROLOGY
Payer: COMMERCIAL

## 2019-02-15 VITALS
HEIGHT: 62 IN | SYSTOLIC BLOOD PRESSURE: 135 MMHG | BODY MASS INDEX: 28.56 KG/M2 | OXYGEN SATURATION: 98 % | WEIGHT: 155.2 LBS | RESPIRATION RATE: 16 BRPM | TEMPERATURE: 98.4 F | HEART RATE: 88 BPM | DIASTOLIC BLOOD PRESSURE: 82 MMHG

## 2019-02-15 DIAGNOSIS — Z51.11 CHEMOTHERAPY MANAGEMENT, ENCOUNTER FOR: ICD-10-CM

## 2019-02-15 DIAGNOSIS — R21 SKIN RASH: ICD-10-CM

## 2019-02-15 DIAGNOSIS — Z95.828 PORT-A-CATH IN PLACE: ICD-10-CM

## 2019-02-15 DIAGNOSIS — D61.810 ANTINEOPLASTIC CHEMOTHERAPY INDUCED PANCYTOPENIA (H): ICD-10-CM

## 2019-02-15 DIAGNOSIS — R53.0 NEOPLASTIC MALIGNANT RELATED FATIGUE: ICD-10-CM

## 2019-02-15 DIAGNOSIS — R11.2 CHEMOTHERAPY-INDUCED NAUSEA AND VOMITING: ICD-10-CM

## 2019-02-15 DIAGNOSIS — T45.1X5A ANTINEOPLASTIC CHEMOTHERAPY INDUCED PANCYTOPENIA (H): ICD-10-CM

## 2019-02-15 DIAGNOSIS — C71.6 MEDULLOBLASTOMA OF CEREBELLUM (H): ICD-10-CM

## 2019-02-15 DIAGNOSIS — T45.1X5A CHEMOTHERAPY-INDUCED NAUSEA AND VOMITING: ICD-10-CM

## 2019-02-15 DIAGNOSIS — Z76.89 ESTABLISHING CARE WITH NEW DOCTOR, ENCOUNTER FOR: ICD-10-CM

## 2019-02-15 DIAGNOSIS — C71.6 MEDULLOBLASTOMA OF CEREBELLUM (H): Primary | ICD-10-CM

## 2019-02-15 LAB
ALBUMIN SERPL-MCNC: 4.3 G/DL (ref 3.4–5)
ALP SERPL-CCNC: 75 U/L (ref 40–150)
ALT SERPL W P-5'-P-CCNC: 38 U/L (ref 0–50)
ANION GAP SERPL CALCULATED.3IONS-SCNC: 8 MMOL/L (ref 3–14)
AST SERPL W P-5'-P-CCNC: 37 U/L (ref 0–45)
BASOPHILS # BLD AUTO: 0 10E9/L (ref 0–0.2)
BASOPHILS NFR BLD AUTO: 0.4 %
BILIRUB SERPL-MCNC: 0.6 MG/DL (ref 0.2–1.3)
BUN SERPL-MCNC: 11 MG/DL (ref 7–30)
CALCIUM SERPL-MCNC: 8.9 MG/DL (ref 8.5–10.1)
CHLORIDE SERPL-SCNC: 102 MMOL/L (ref 94–109)
CO2 SERPL-SCNC: 28 MMOL/L (ref 20–32)
CREAT SERPL-MCNC: 0.78 MG/DL (ref 0.52–1.04)
DIFFERENTIAL METHOD BLD: ABNORMAL
EOSINOPHIL # BLD AUTO: 0 10E9/L (ref 0–0.7)
EOSINOPHIL NFR BLD AUTO: 0.9 %
ERYTHROCYTE [DISTWIDTH] IN BLOOD BY AUTOMATED COUNT: 14.5 % (ref 10–15)
GFR SERPL CREATININE-BSD FRML MDRD: >90 ML/MIN/{1.73_M2}
GLUCOSE SERPL-MCNC: 87 MG/DL (ref 70–99)
HCT VFR BLD AUTO: 26.6 % (ref 35–47)
HGB BLD-MCNC: 9.3 G/DL (ref 11.7–15.7)
IMM GRANULOCYTES # BLD: 0 10E9/L (ref 0–0.4)
IMM GRANULOCYTES NFR BLD: 0.4 %
LYMPHOCYTES # BLD AUTO: 0.2 10E9/L (ref 0.8–5.3)
LYMPHOCYTES NFR BLD AUTO: 7.5 %
MCH RBC QN AUTO: 33.3 PG (ref 26.5–33)
MCHC RBC AUTO-ENTMCNC: 35 G/DL (ref 31.5–36.5)
MCV RBC AUTO: 95 FL (ref 78–100)
MONOCYTES # BLD AUTO: 0.5 10E9/L (ref 0–1.3)
MONOCYTES NFR BLD AUTO: 22 %
NEUTROPHILS # BLD AUTO: 1.6 10E9/L (ref 1.6–8.3)
NEUTROPHILS NFR BLD AUTO: 68.8 %
NRBC # BLD AUTO: 0 10*3/UL
NRBC BLD AUTO-RTO: 0 /100
PLATELET # BLD AUTO: 240 10E9/L (ref 150–450)
PLATELET # BLD EST: ABNORMAL 10*3/UL
POTASSIUM SERPL-SCNC: 3.1 MMOL/L (ref 3.4–5.3)
PROT SERPL-MCNC: 7.6 G/DL (ref 6.8–8.8)
RBC # BLD AUTO: 2.79 10E12/L (ref 3.8–5.2)
SODIUM SERPL-SCNC: 138 MMOL/L (ref 133–144)
WBC # BLD AUTO: 2.3 10E9/L (ref 4–11)

## 2019-02-15 PROCEDURE — A9585 GADOBUTROL INJECTION: HCPCS | Performed by: PSYCHIATRY & NEUROLOGY

## 2019-02-15 PROCEDURE — 85025 COMPLETE CBC W/AUTO DIFF WBC: CPT | Performed by: PSYCHIATRY & NEUROLOGY

## 2019-02-15 PROCEDURE — 99214 OFFICE O/P EST MOD 30 MIN: CPT | Mod: ZP | Performed by: PSYCHIATRY & NEUROLOGY

## 2019-02-15 PROCEDURE — 25000128 H RX IP 250 OP 636: Performed by: PSYCHIATRY & NEUROLOGY

## 2019-02-15 PROCEDURE — 80053 COMPREHEN METABOLIC PANEL: CPT | Performed by: PSYCHIATRY & NEUROLOGY

## 2019-02-15 PROCEDURE — 36591 DRAW BLOOD OFF VENOUS DEVICE: CPT

## 2019-02-15 PROCEDURE — 25500064 ZZH RX 255 OP 636: Performed by: PSYCHIATRY & NEUROLOGY

## 2019-02-15 PROCEDURE — 25000128 H RX IP 250 OP 636: Mod: ZF | Performed by: PSYCHIATRY & NEUROLOGY

## 2019-02-15 PROCEDURE — G0463 HOSPITAL OUTPT CLINIC VISIT: HCPCS | Mod: 25

## 2019-02-15 PROCEDURE — 70553 MRI BRAIN STEM W/O & W/DYE: CPT

## 2019-02-15 RX ORDER — HEPARIN SODIUM,PORCINE 10 UNIT/ML
5 VIAL (ML) INTRAVENOUS
Status: DISCONTINUED | OUTPATIENT
Start: 2019-02-15 | End: 2019-02-16 | Stop reason: HOSPADM

## 2019-02-15 RX ORDER — GADOBUTROL 604.72 MG/ML
7.5 INJECTION INTRAVENOUS ONCE
Status: COMPLETED | OUTPATIENT
Start: 2019-02-15 | End: 2019-02-15

## 2019-02-15 RX ORDER — HEPARIN SODIUM (PORCINE) LOCK FLUSH IV SOLN 100 UNIT/ML 100 UNIT/ML
5 SOLUTION INTRAVENOUS DAILY PRN
Status: DISCONTINUED | OUTPATIENT
Start: 2019-02-15 | End: 2019-02-16 | Stop reason: HOSPADM

## 2019-02-15 RX ADMIN — HEPARIN 5 ML: 100 SYRINGE at 14:25

## 2019-02-15 RX ADMIN — Medication 5 ML: at 14:18

## 2019-02-15 RX ADMIN — GADOBUTROL 7.5 ML: 604.72 INJECTION INTRAVENOUS at 13:23

## 2019-02-15 ASSESSMENT — PAIN SCALES - GENERAL: PAINLEVEL: NO PAIN (0)

## 2019-02-15 ASSESSMENT — MIFFLIN-ST. JEOR: SCORE: 1362.25

## 2019-02-15 NOTE — NURSING NOTE
Chief Complaint   Patient presents with     Port Draw     Labs drawn via port by RN in lab. VS taken.      Labs drawn via Port.  Line flushed and Heparin locked. Vital signs taken. Checked into next appointment.       Sharon Long RN

## 2019-02-15 NOTE — NURSING NOTE
Oncology Rooming Note    February 15, 2019 2:37 PM   Merissa Silverman is a 33 year old female who presents for:    Chief Complaint   Patient presents with     Port Draw     Labs drawn via port by RN in lab. VS taken.      Oncology Clinic Visit     Return visit related to Medullblastoma     Initial Vitals: /82 (BP Location: Right arm, Patient Position: Sitting, Cuff Size: Adult Regular)   Pulse 88   Temp 98.4  F (36.9  C) (Oral)   Resp 16   Ht 1.524 m (5')   Wt 70.4 kg (155 lb 3.2 oz)   SpO2 98%   BMI 30.31 kg/m   Estimated body mass index is 30.31 kg/m  as calculated from the following:    Height as of this encounter: 1.524 m (5').    Weight as of this encounter: 70.4 kg (155 lb 3.2 oz). Body surface area is 1.73 meters squared.  No Pain (0) Comment: Data Unavailable   No LMP recorded.  Allergies reviewed: Yes  Medications reviewed: Yes    Medications: Medication refills not needed today.  Pharmacy name entered into [employer]:    [facility] - [place], [place] - [address] [place] - [address]    Clinical concerns: No new concerns. Provider was notified.    10 minutes for nursing intake (face to face time)     Ana Beaulieu LPN

## 2019-02-15 NOTE — LETTER
2/15/2019     RE: Merissa Silverman  [address]  [address]  [place] [address]     Dear Colleague,    Thank you for referring your patient, Merissa Silverman, to the [facility]. Please see a copy of my visit note below.    NEURO-ONCOLOGY VISIT  02/15/2019    CHIEF COMPLAINT: Ms. 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 Completed craniospinal radiation with concurrent vincristine x 3 doses, but concurrent chemotherapy was stopped in the setting of pancytopenia. She completed 4 cycles of adjuvant chemotherapy with vincristine, CCNU and cisplatin as of 1/2019. Currently managed on imaging surveillance.     She is presenting in follow-up accompanied by Grzegorz (father).      HISTORY OF PRESENT ILLNESS  -She is doing well   -Mild headaches in the setting of stress, associated with neck tension. Resolves with muscle massage and Tylenol.   -Intermittent nausea + vomiting. Resolves with Ativan or Zofran or Compazine.  -Fatigues toward the end of the day; working full time. Eating and drinking well.   -She denies any seizure-like activity.  -Denies vision changes, weakness, or numbness and tingling.      REVIEW OF SYSTEMS  A comprehensive ROS negative except as in HPI.      MEDICATIONS   Current Outpatient Medications   Medication     acetaminophen (TYLENOL) 325 MG tablet     bisacodyl (DULCOLAX) 5 MG EC tablet     ondansetron (ZOFRAN-ODT) 8 MG ODT tab     Current Facility-Administered Medications   Medication     heparin 100 UNIT/ML injection 5 mL     Facility-Administered Medications Ordered in Other Visits   Medication     heparin flush SOLN 5 mL     heparin lock flush 10 UNIT/ML injection 5 mL     DRUG ALLERGIES   Allergies   Allergen Reactions     Sulfa Drugs Unknown and Hives       ONCOLOGIC  HISTORY  -PRESENTATION: Progressively worsening headaches. Additionally was with abrupt position changes resulted in dizziness/ syncope. CTH at an outside hospital showed a mass in the left cerebellum. Transferred to [facility].  -4/11/2018 MRB showed a 3.5cm mass in the left cerebellar hemisphere that was associated with mild cerebellar tonsillar herniation and hydrocephalus.  -MRI spine showed no evidence of disease.   -4/13/2018 SURGERY: Suboccipital craniotomy with resection of the left cerebellar mass. Immediate post-operative MRI demonstrated a gross total resection.   PATHOLOGY: Medulloblastoma; Molecular markers pending.   -5/4/2018 NEURO-ONC: LP performed. Evaluated by radiation oncology. Recommending craniospinal radiation + concurrent vincristine followed by eight 6-week cycles of cisplatin, lomustine, and vincristine.  -5/4/2018 LP with CSF analysis: WBC 1/ RBC 1, protein 25, glucose 62, negative cytology.   -5/14 - 6/22/2018 CHEMORADS with vincristine 2 mg/m2 (stopped after 3 infusions due to pancytopenia).  -5/17/2018 Audiology- Baseline hearing testing with no hearing loss.  -6/4/2018 NEURO-ONC: Doing well, no new neurological symptoms. Tolerating treatment well. Ophtho referral. Labs improved.  -6/4/2018 NGS via [facility]: SMO mutation, TERT promotor mutation. Negative for microsatellite instability.   -6/25/2018 NEURO-ONC: Clinically stable, painful radiation-induced burn. Monitoring labs. Cancer Risk Assessment referral.  -8/13/2018 NEURO-ONC/MRB/ CHEMO: Clinically stable. Imaging shows expected post operative changes with no evidence of new disease. C1D1 of vincristine, CCNU and cisplatin.   -9/24/2018 NEURO-ONC: Neurologically stable, though did not tolerate first cycle of chemo well 2/2 nausea. Hold on starting C2D1 today due to leukopenia (WBC 2.7).  -9/27/2018 CHEMO: C2D1 of vincristine, CCNU and cisplatin.   -11/9/2018 NEURO-ONC/ MRB/ CHEMO: Clinically well. Imaging with no  "evidence of tumor recurrence. Cycle 3 CCNU and cisplatin; plan to hold vincristine on ODD cycles due to peripheral neuropathy. Repeat hearing testing ordered.   -12/4/2018 NEURO-ONC: New severe HA, likely tension. No new neurological symptoms or signs  -1/4/2019 NEURO-ONC/CHEMO: Clinically doing well. No changes. Cycle 4 CCNU and cisplatin. Holding vincristine. Last cycle.  -2/15/2019 NEURO-ONC/ MRB: Clinically stable. Labs not completely rebounded. Imaging with no concern for disease recurrence. Continue with imaging surveillance.     SOCIAL HISTORY   Tobacco use: Former smoker, E-cigs stopped on 5/3/2018  Alcohol use: None.   Drug use: Denies marijuana use.  Supplement, complimentary/ alternative medicine: None.   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      PHYSICAL EXAMINATION  /82 (BP Location: Right arm, Patient Position: Sitting, Cuff Size: Adult Regular)   Pulse 88   Temp 98.4  F (36.9  C) (Oral)   Resp 16   Ht 1.575 m (5' 2\")   Wt 70.4 kg (155 lb 3.3 oz)   SpO2 98%   BMI 28.39 kg/m      Vitals:    02/15/19 1424   Weight: 70.4 kg (155 lb 3.3 oz)     Ht Readings from Last 2 Encounters:   02/15/19 1.575 m (5' 2\")   12/04/18 1.524 m (5')     KPS: 100    -Generally well appearing.  -Throat: No oral thrush. No redness of throat. No lymphadenopathy.   -Respiratory: Normal breath sounds, no audible wheezing.   -Skin: No rashes. Healed head incision. Hair is regrowing.  -Hematologic/ lymphatic: No abnormal bruising. No leg swelling.  -Psychiatric: Normal mood and affect. Pleasant, talkative.  -Neurologic:   MENTAL STATUS:     Alert, oriented to date.    Recall: Immediate 3/3, delayed 3/3.    Speech fluent. Comprehension intact to multi-step commands.   Normal naming, repetition. Able to read.   Good right-left orientation.     CRANIAL NERVES:     Discs flat on fundoscopy.    Pupils are equal, round, reactive to light.     Extraocular movements full, patient denies " diplopia. Previous mild eye jerks noted on pursuit.    Visual fields full.     Facial sensation intact to light touch.   Symmetric facial movements.   Hearing intact.   Palate moves symmetrically.     Sternocleidomastoid and trapezius strength intact.    Tongue midline.  MOTOR:    Normal and symmetric tone.   Grossly 5/5 throughout.    No pronation or drift. No orbiting.   Able to rise from a chair without use of arms.   On toe/ heel walk, equal distance from floor to heels/ toes.   SENSATION:    Intact to light touch throughout.   COORDINATION:   Intact finger-nose with eyes open and closed.   REFLEXES:    Muted in legs, trace at biceps; symmetric.    Toes not tested. No clonus. No Hoffmans.   No grasp.    GAIT:   Walks without assistance.   Good speed. Normal stride length and heel strike. Normal turns. Normal arm swing.   Able to toe, heel walk. Some difficulty with tandem walk.       MEDICAL RECORDS  Obtained and personally reviewed all available outside medical records in addition to reviewing any records available in our electronic system.     LABS  Hb 9.3  WBC 2.3    IMAGING  Personally reviewed MR brain imaging from today and compared to post-radiation imaging. To my eye, there is no sign of disease recurrence.     Imaging was shown to and results were reviewed with Merissa and Grzegorz.     Imaging and case reviewed and discussed at Brain Tumor Conference.        IMPRESSION/PLAN  For the 30 minute appointment, more than 50% of the encounter was spent discussing in detail the nature of her cancer in light of her imaging from today. This was in addition to providing emotional support, answering questions pertaining to my recommendations, and devising the treatment plan as outlined below.     Imaging with no evidence of disease recurrence. Labs without concerns, but have not recovered to baseline; requesting recheck in 2 weeks. Clinically stable, though examination with continued muted reflexes, consistent with  vincristine associated neuropathy. Need to have repeat hearing testing scheduled.      Merissa has completed 4 cycles of adjuvant therapy and given complete response on imaging, the plan is for a planned treatment stop with continued imaging surveillance.  General NCCN Guidelines for imaging surveillance is q 3 months of 2 years, then q 6 months for 3 years. At recurrence, clinical trial options remain a strong consideration for recurrent disease, as does off label use of SMO inhibitors.     PROBLEM LIST  Medulloblastoma  Steroid intolerance  Chemotherapy-induced pancytopenia  Chemotherapy-induced nausea/ vomiting   Radiation burn  Fatigue, cancer and treatment related, improving     PLAN  -CANCER-DIRECTED THERAPY-  -Planned treatment stop, continued imaging surveillance.  -Repeat in 4/2019, then q 3 months.   -Repeat labs to monitor counts in 2 weeks.  -Repeat hearing testing needed.   -Port to be removed.     -SEIZURE MANAGEMENT-  -While this patient is at increased risk of having seizures, given the lack of seizure history, there is no indication to prescribe an antiepileptic at this time. Will continue to monitor for seizure activity.    -Quality of life/ MOOD/ FATIGUE-  -Denies any mood issues.  -Continue to monitor mood as untreated/ undertreated depression can worsen fatigue, dysorexia, and quali  -Fatigue confounded by anemia, continue to monitor.    Referral to Dr. Jakob Ryder MD - Family Medicine Physician at [facility].     Return to clinic in 4/2019 with new MRI.    Anupama Morrison MD  Neuro-oncology      ORAL CHEMOTHERAPY DISCONTINUATION       Primary Oncologist:  Dr. Morrison  Primary Oncology Clinic: [facility]   Cancer Diagnosis:  Medulloblastoma   Therapy History:  Lomustine in PCV started in August 2018   C1D1=8/13/18 lomustine 160 mg (4 x 40 mg) by mouth for one dose  C2D1=10/2/18 lomustine 160 mg (4 x 40 mg) by mouth for one dose  D4D3=9811/13/18 lomustine 140 mg (3 x 40 mg) by mouth  for one dose      Reason For Discontinuation: therapy completed    Additional Notes:  Thank you for the opportunity to be a part in the care of this patient's oral chemotherapy. The oncology pharmacy will no longer be following this patient for oral chemotherapy. If there are any questions or the plan changes, feel free to contact us.    Rama Amaro   Pharmacy Intern  [facility]   [phone]

## 2019-02-15 NOTE — PROGRESS NOTES
NEURO-ONCOLOGY VISIT  02/15/2019    CHIEF COMPLAINT: Ms. 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 Completed craniospinal radiation with concurrent vincristine x 3 doses, but concurrent chemotherapy was stopped in the setting of pancytopenia. She completed 4 cycles of adjuvant chemotherapy with vincristine, CCNU and cisplatin as of 1/2019. Currently managed on imaging surveillance.     She is presenting in follow-up accompanied by Grzegorz (father).      HISTORY OF PRESENT ILLNESS  -She is doing well   -Mild headaches in the setting of stress, associated with neck tension. Resolves with muscle massage and Tylenol.   -Intermittent nausea + vomiting. Resolves with Ativan or Zofran or Compazine.  -Fatigues toward the end of the day; working full time. Eating and drinking well.   -She denies any seizure-like activity.  -Denies vision changes, weakness, or numbness and tingling.      REVIEW OF SYSTEMS  A comprehensive ROS negative except as in HPI.      MEDICATIONS   Current Outpatient Medications   Medication     acetaminophen (TYLENOL) 325 MG tablet     bisacodyl (DULCOLAX) 5 MG EC tablet     ondansetron (ZOFRAN-ODT) 8 MG ODT tab     Current Facility-Administered Medications   Medication     heparin 100 UNIT/ML injection 5 mL     Facility-Administered Medications Ordered in Other Visits   Medication     heparin flush SOLN 5 mL     heparin lock flush 10 UNIT/ML injection 5 mL     DRUG ALLERGIES   Allergies   Allergen Reactions     Sulfa Drugs Unknown and Hives       ONCOLOGIC HISTORY  -PRESENTATION: Progressively worsening headaches. Additionally was with abrupt position changes resulted in dizziness/ syncope. CTH at an outside hospital showed a mass in the left cerebellum. Transferred to [facility]  [place].  -4/11/2018 MRB showed a 3.5cm mass in the left cerebellar hemisphere that was associated with mild cerebellar tonsillar herniation and hydrocephalus.  -MRI spine showed no evidence of disease.   -4/13/2018 SURGERY: Suboccipital craniotomy with resection of the left cerebellar mass. Immediate post-operative MRI demonstrated a gross total resection.   PATHOLOGY: Medulloblastoma; Molecular markers pending.   -5/4/2018 NEURO-ONC: LP performed. Evaluated by radiation oncology. Recommending craniospinal radiation + concurrent vincristine followed by eight 6-week cycles of cisplatin, lomustine, and vincristine.  -5/4/2018 LP with CSF analysis: WBC 1/ RBC 1, protein 25, glucose 62, negative cytology.   -5/14 - 6/22/2018 CHEMORADS with vincristine 2 mg/m2 (stopped after 3 infusions due to pancytopenia).  -5/17/2018 Audiology- Baseline hearing testing with no hearing loss.  -6/4/2018 NEURO-ONC: Doing well, no new neurological symptoms. Tolerating treatment well. Ophtho referral. Labs improved.  -6/4/2018 NGS via [facility]: SMO mutation, TERT promotor mutation. Negative for microsatellite instability.   -6/25/2018 NEURO-ONC: Clinically stable, painful radiation-induced burn. Monitoring labs. Cancer Risk Assessment referral.  -8/13/2018 NEURO-ONC/MRB/ CHEMO: Clinically stable. Imaging shows expected post operative changes with no evidence of new disease. C1D1 of vincristine, CCNU and cisplatin.   -9/24/2018 NEURO-ONC: Neurologically stable, though did not tolerate first cycle of chemo well 2/2 nausea. Hold on starting C2D1 today due to leukopenia (WBC 2.7).  -9/27/2018 CHEMO: C2D1 of vincristine, CCNU and cisplatin.   -11/9/2018 NEURO-ONC/ MRB/ CHEMO: Clinically well. Imaging with no evidence of tumor recurrence. Cycle 3 CCNU and cisplatin; plan to hold vincristine on ODD cycles due to peripheral neuropathy. Repeat hearing testing ordered.   -12/4/2018 NEURO-ONC: New severe HA, likely tension. No new neurological symptoms  "or signs  -1/4/2019 NEURO-ONC/CHEMO: Clinically doing well. No changes. Cycle 4 CCNU and cisplatin. Holding vincristine. Last cycle.  -2/15/2019 NEURO-ONC/ MRB: Clinically stable. Labs not completely rebounded. Imaging with no concern for disease recurrence. Continue with imaging surveillance.     SOCIAL HISTORY   Tobacco use: Former smoker, E-cigs stopped on 5/3/2018  Alcohol use: None.   Drug use: Denies marijuana use.  Supplement, complimentary/ alternative medicine: None.   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      PHYSICAL EXAMINATION  /82 (BP Location: Right arm, Patient Position: Sitting, Cuff Size: Adult Regular)   Pulse 88   Temp 98.4  F (36.9  C) (Oral)   Resp 16   Ht 1.575 m (5' 2\")   Wt 70.4 kg (155 lb 3.3 oz)   SpO2 98%   BMI 28.39 kg/m     Vitals:    02/15/19 1424   Weight: 70.4 kg (155 lb 3.3 oz)     Ht Readings from Last 2 Encounters:   02/15/19 1.575 m (5' 2\")   12/04/18 1.524 m (5')     KPS: 100    -Generally well appearing.  -Throat: No oral thrush. No redness of throat. No lymphadenopathy.   -Respiratory: Normal breath sounds, no audible wheezing.   -Skin: No rashes. Healed head incision. Hair is regrowing.  -Hematologic/ lymphatic: No abnormal bruising. No leg swelling.  -Psychiatric: Normal mood and affect. Pleasant, talkative.  -Neurologic:   MENTAL STATUS:     Alert, oriented to date.    Recall: Immediate 3/3, delayed 3/3.    Speech fluent. Comprehension intact to multi-step commands.   Normal naming, repetition. Able to read.   Good right-left orientation.     CRANIAL NERVES:     Discs flat on fundoscopy.    Pupils are equal, round, reactive to light.     Extraocular movements full, patient denies diplopia. Previous mild eye jerks noted on pursuit.    Visual fields full.     Facial sensation intact to light touch.   Symmetric facial movements.   Hearing intact.   Palate moves symmetrically.     Sternocleidomastoid and trapezius strength intact. "    Tongue midline.  MOTOR:    Normal and symmetric tone.   Grossly 5/5 throughout.    No pronation or drift. No orbiting.   Able to rise from a chair without use of arms.   On toe/ heel walk, equal distance from floor to heels/ toes.   SENSATION:    Intact to light touch throughout.   COORDINATION:   Intact finger-nose with eyes open and closed.   REFLEXES:    Muted in legs, trace at biceps; symmetric.    Toes not tested. No clonus. No Hoffmans.   No grasp.    GAIT:   Walks without assistance.   Good speed. Normal stride length and heel strike. Normal turns. Normal arm swing.   Able to toe, heel walk. Some difficulty with tandem walk.       MEDICAL RECORDS  Obtained and personally reviewed all available outside medical records in addition to reviewing any records available in our electronic system.     LABS  Hb 9.3  WBC 2.3    IMAGING  Personally reviewed MR brain imaging from today and compared to post-radiation imaging. To my eye, there is no sign of disease recurrence.     Imaging was shown to and results were reviewed with Merissa and Grzegorz.     Imaging and case reviewed and discussed at Brain Tumor Conference.        IMPRESSION/PLAN  For the 30 minute appointment, more than 50% of the encounter was spent discussing in detail the nature of her cancer in light of her imaging from today. This was in addition to providing emotional support, answering questions pertaining to my recommendations, and devising the treatment plan as outlined below.     Imaging with no evidence of disease recurrence. Labs without concerns, but have not recovered to baseline; requesting recheck in 2 weeks. Clinically stable, though examination with continued muted reflexes, consistent with vincristine associated neuropathy. Need to have repeat hearing testing scheduled.      Merissa has completed 4 cycles of adjuvant therapy and given complete response on imaging, the plan is for a planned treatment stop with continued imaging surveillance.  General NCCN Guidelines for imaging surveillance is q 3 months of 2 years, then q 6 months for 3 years. At recurrence, clinical trial options remain a strong consideration for recurrent disease, as does off label use of SMO inhibitors.     PROBLEM LIST  Medulloblastoma  Steroid intolerance  Chemotherapy-induced pancytopenia  Chemotherapy-induced nausea/ vomiting   Radiation burn  Fatigue, cancer and treatment related, improving     PLAN  -CANCER-DIRECTED THERAPY-  -Planned treatment stop, continued imaging surveillance.  -Repeat in 4/2019, then q 3 months.   -Repeat labs to monitor counts in 2 weeks.  -Repeat hearing testing needed.   -Port to be removed.     -SEIZURE MANAGEMENT-  -While this patient is at increased risk of having seizures, given the lack of seizure history, there is no indication to prescribe an antiepileptic at this time. Will continue to monitor for seizure activity.    -Quality of life/ MOOD/ FATIGUE-  -Denies any mood issues.  -Continue to monitor mood as untreated/ undertreated depression can worsen fatigue, dysorexia, and quali  -Fatigue confounded by anemia, continue to monitor.    Referral to Dr. Jakob Ryder MD - Family Medicine Physician at [facility].     Return to clinic in 4/2019 with new MRI.    Anupama Morrison MD  Neuro-oncology

## 2019-02-15 NOTE — PATIENT INSTRUCTIONS
Imaging reviewed, no signs of cancer.   Repeat in 2 months.     Referral to Dr. Jakob Ryder MD - Family Medicine Physician at [facility].   Agree with dermatology referral.     I will arrange for the port to be removed.     Repeat labs in 2 weeks at [facility].     For fatigue, will likely continue to improve as blood counts recover.   Call clinic if this symptom continues to be distressing.     Return to clinic in 4/2019.     Anupama Morrison MD  Neuro-oncology  2/15/2019

## 2019-02-18 NOTE — PROGRESS NOTES
ORAL CHEMOTHERAPY DISCONTINUATION       Primary Oncologist:  Dr. Morrison  Primary Oncology Clinic: [facility]   Cancer Diagnosis:  Medulloblastoma   Therapy History:  Lomustine in PCV started in August 2018   C1D1=8/13/18 lomustine 160 mg (4 x 40 mg) by mouth for one dose  C2D1=10/2/18 lomustine 160 mg (4 x 40 mg) by mouth for one dose  H7D4=6211/13/18 lomustine 140 mg (3 x 40 mg) by mouth for one dose      Therapy Ended On:  2/15/2019  Reason For Discontinuation: therapy completed    Additional Notes:  Thank you for the opportunity to be a part in the care of this patient's oral chemotherapy. The oncology pharmacy will no longer be following this patient for oral chemotherapy. If there are any questions or the plan changes, feel free to contact us.    Rama Amaro   Pharmacy Intern  [facility]   [phone]

## 2019-02-25 ENCOUNTER — OFFICE VISIT (OUTPATIENT)
Dept: AUDIOLOGY | Facility: CLINIC | Age: 34
End: 2019-02-25
Attending: PSYCHIATRY & NEUROLOGY
Payer: COMMERCIAL

## 2019-02-25 DIAGNOSIS — H90.3 SENSORINEURAL HEARING LOSS, BILATERAL: Primary | ICD-10-CM

## 2019-02-25 NOTE — PROGRESS NOTES
SUBJECTIVE:   Merissa Silverman is a 33 year old female who presents to clinic today for the following health issues:      Merissa is here to establish care.  She is a very pleasant 33-year-old female who diagnosed with brain tumor in early part of last year.  She underwent chemotherapy, surgery and last chemo was earlier this year.    Her journey through cancer was up and down.  Otherwise she feels pretty normal.  She would like to see a skin for her skin check.  She does not have any underlying issue with skin.    Problem list and histories reviewed & adjusted, as indicated.      Patient Active Problem List   Diagnosis     Medulloblastoma of cerebellum (H)     Antineoplastic chemotherapy induced pancytopenia (H)     Chemotherapy induced neutropenia (H)     High risk for chemotherapy-induced infectious complication     Chemotherapy management, encounter for     Lymphocytes decreased     Past Surgical History:   Procedure Laterality Date     CRANIOTOMY  04/13/2018     dermoid cyst removed right ovary       INSERT PORT VASCULAR ACCESS Right 5/9/2018    Procedure: INSERT PORT VASCULAR ACCESS;  Right Central Venous Chest Port Placement;  Surgeon: Sung Selby PA-C;  Location:  OR       Social History     Tobacco Use     Smoking status: Former Smoker     Types: Other     Smokeless tobacco: Never Used     Tobacco comment: ECigs stopped 5/3/2018   Substance Use Topics     Alcohol use: No     Comment: none     Family History   Problem Relation Age of Onset     Colon Cancer Paternal Grandmother      Colon Cancer Paternal Grandfather      Glaucoma No family hx of      Macular Degeneration No family hx of          Current Outpatient Medications   Medication Sig Dispense Refill     acetaminophen (TYLENOL) 325 MG tablet Take 650 mg by mouth       ondansetron (ZOFRAN-ODT) 8 MG ODT tab Take 1 tablet (8 mg) by mouth every 8 hours as needed for nausea (Patient not taking: Reported on 2/26/2019) 60 tablet 3  "      Reviewed and updated as needed this visit by clinical staff       Reviewed and updated as needed this visit by Provider         ROS:  CONSTITUTIONAL: NEGATIVE for fever, chills, change in weight  ENT/MOUTH: NEGATIVE for ear, mouth and throat problems  RESP: NEGATIVE for significant cough or SOB  CV: NEGATIVE for chest pain, palpitations or peripheral edema    OBJECTIVE:                                                    /87   Pulse 68   Temp 98  F (36.7  C)   Ht 1.575 m (5' 2\")   Wt 69.6 kg (153 lb 6.4 oz)   LMP  (LMP Unknown)   BMI 28.06 kg/m    Body mass index is 28.06 kg/m .   GENERAL: healthy, alert, well nourished, well hydrated, no distress  NECK: no tenderness, no adenopathy, no asymmetry, no masses, no stiffness; thyroid- normal to palpation  RESP: lungs clear to auscultation - no rales, no rhonchi, no wheezes  CV: regular rates and rhythm, normal S1 S2, no S3 or S4 and no murmur, no click or rub -  NEURO: strength and tone- normal, sensory exam- grossly normal, mentation- intact, speech- normal, reflexes- symmetric    }     ASSESSMENT/PLAN:                                                        ICD-10-CM    1. Medulloblastoma of cerebellum (H) C71.6    2. Family history of skin conditions Z84.0 SKIN CARE REFERRAL       Overall patient is stable.  She is status post chemo and radiation along with surgery for her brain tumor.  She has regular follow-up with neurology and neurosurgery.  She would like to see a skin specialist for her skin care.  Referral provided.  Follow-up in 6 months for physical.    Jakob Ryder MD  [facility]  "

## 2019-02-25 NOTE — PROGRESS NOTES
AUDIOLOGY REPORT    SUBJECTIVE:  Merissa Silverman is a 33 year old female who was seen in Audiology at the [facility] [place] for audiologic evaluation, referred by Anupama Morrison.  The patient has been seen previously in this clinic on 5/17/2018 for hearing assessment and results indicated normal hearing and middle ear status bilaterally. The patient reports that her hearing does seem symmetric but that she notes fluctuations in hearing bilaterally. The patient also reports excessive cerumen bilaterally for which she cleans with over the counter drops. The patient denies other ear related issues or vertigo.    OBJECTIVE:  Fall Risk Screen:  1. Have you fallen two or more times in the past year? No  2. Have you fallen and had an injury in the past year? No    Otoscopic exam indicates excessive cerumen bilaterally removed with lighted curette without incident prior to testing.    Pure Tone Thresholds assessed using conventional audiometry with fair to good  reliability from 250-8000 Hz bilaterally using circumaural headphones and reassessed using insert earphones    RIGHT:  Normal hearing through 2000 Hz sloping to moderate sensorineural hearing loss 1751-6448 Hz; 10 dB improvement 500 and 1000 Hz with 25-35 dB decline 0552-9406 Hz    LEFT:    Normal through 3000 Hz sloping to moderate sensorineural hearing loss 2441-6168 Hz; 15 dB improvement 1545-0357 Hz with 20-30 dB decline 7672-5325 Hz    Tympanogram:    RIGHT: normal eardrum mobility    LEFT:   normal eardrum mobility    Reflexes (reported by stimulus ear):  RIGHT: Ipsilateral is present at normal levels  RIGHT: Contralateral is present at normal levels  LEFT:   Ipsilateral is present at normal levels  LEFT:   Contralateral is present at normal levels    Speech Reception Threshold:    RIGHT: 5 dB HL    LEFT:   5 dB HL  Word Recognition Score:     RIGHT: 100% at 45 dB HL using NU-6 recorded word list.    LEFT:    100% at 45 dB HL using NU-6 recorded word list.    ASSESSMENT:  Mild (symmetric) high-frequency sensorineural hearing loss bilaterally with 20-35 dB decline bilaterally high-frequency compared to testing completed 5/17/2018 while the patient was undergoing chemotherapy.    PLAN:    Patient was counseled regarding hearing loss and impact on communication.  Patient is not an ideal candidate for amplification at this time. It is recommended that the patient return bi-annually for monitoring of hearing status, sooner if changes in hearing or ear symptoms are noted.  Please call this clinic with questions regarding these results or recommendations.      Meenakshi Payne.  Licensed Audiologist  [place] #[id number]

## 2019-02-26 ENCOUNTER — OFFICE VISIT (OUTPATIENT)
Dept: FAMILY MEDICINE | Facility: CLINIC | Age: 34
End: 2019-02-26
Payer: COMMERCIAL

## 2019-02-26 VITALS
WEIGHT: 153.4 LBS | HEIGHT: 62 IN | SYSTOLIC BLOOD PRESSURE: 117 MMHG | TEMPERATURE: 98 F | BODY MASS INDEX: 28.23 KG/M2 | HEART RATE: 68 BPM | DIASTOLIC BLOOD PRESSURE: 87 MMHG

## 2019-02-26 DIAGNOSIS — C71.6 MEDULLOBLASTOMA OF CEREBELLUM (H): ICD-10-CM

## 2019-02-26 DIAGNOSIS — Z84.0 FAMILY HISTORY OF SKIN CONDITIONS: ICD-10-CM

## 2019-02-26 DIAGNOSIS — T45.1X5A ANTINEOPLASTIC CHEMOTHERAPY INDUCED PANCYTOPENIA (H): ICD-10-CM

## 2019-02-26 DIAGNOSIS — D61.810 ANTINEOPLASTIC CHEMOTHERAPY INDUCED PANCYTOPENIA (H): ICD-10-CM

## 2019-02-26 DIAGNOSIS — C71.6 MEDULLOBLASTOMA OF CEREBELLUM (H): Primary | ICD-10-CM

## 2019-02-26 LAB
BASOPHILS # BLD AUTO: 0 10E9/L (ref 0–0.2)
BASOPHILS NFR BLD AUTO: 0.4 %
DIFFERENTIAL METHOD BLD: ABNORMAL
EOSINOPHIL # BLD AUTO: 0 10E9/L (ref 0–0.7)
EOSINOPHIL NFR BLD AUTO: 0.4 %
ERYTHROCYTE [DISTWIDTH] IN BLOOD BY AUTOMATED COUNT: 14.4 % (ref 10–15)
HCT VFR BLD AUTO: 27.8 % (ref 35–47)
HGB BLD-MCNC: 9.5 G/DL (ref 11.7–15.7)
LYMPHOCYTES # BLD AUTO: 0.3 10E9/L (ref 0.8–5.3)
LYMPHOCYTES NFR BLD AUTO: 11.2 %
MCH RBC QN AUTO: 33.7 PG (ref 26.5–33)
MCHC RBC AUTO-ENTMCNC: 34.2 G/DL (ref 31.5–36.5)
MCV RBC AUTO: 99 FL (ref 78–100)
MONOCYTES # BLD AUTO: 0.4 10E9/L (ref 0–1.3)
MONOCYTES NFR BLD AUTO: 14.1 %
NEUTROPHILS # BLD AUTO: 2.1 10E9/L (ref 1.6–8.3)
NEUTROPHILS NFR BLD AUTO: 73.9 %
PLATELET # BLD AUTO: 270 10E9/L (ref 150–450)
RBC # BLD AUTO: 2.82 10E12/L (ref 3.8–5.2)
WBC # BLD AUTO: 2.8 10E9/L (ref 4–11)

## 2019-02-26 PROCEDURE — 85025 COMPLETE CBC W/AUTO DIFF WBC: CPT | Performed by: PSYCHIATRY & NEUROLOGY

## 2019-02-26 PROCEDURE — 99214 OFFICE O/P EST MOD 30 MIN: CPT | Performed by: FAMILY MEDICINE

## 2019-02-26 PROCEDURE — 36415 COLL VENOUS BLD VENIPUNCTURE: CPT | Performed by: PSYCHIATRY & NEUROLOGY

## 2019-02-26 ASSESSMENT — MIFFLIN-ST. JEOR: SCORE: 1354.07

## 2019-02-28 ENCOUNTER — OFFICE VISIT (OUTPATIENT)
Dept: FAMILY MEDICINE | Facility: CLINIC | Age: 34
End: 2019-02-28
Payer: COMMERCIAL

## 2019-02-28 VITALS — SYSTOLIC BLOOD PRESSURE: 110 MMHG | DIASTOLIC BLOOD PRESSURE: 80 MMHG

## 2019-02-28 DIAGNOSIS — D48.5 NEOPLASM OF UNCERTAIN BEHAVIOR OF SKIN: ICD-10-CM

## 2019-02-28 DIAGNOSIS — D22.9 MULTIPLE BENIGN MELANOCYTIC NEVI: ICD-10-CM

## 2019-02-28 DIAGNOSIS — Z80.8 FAMILY HISTORY OF SKIN CANCER: ICD-10-CM

## 2019-02-28 DIAGNOSIS — Z12.83 SCREENING, MALIGNANT NEOPLASM, SKIN: Primary | ICD-10-CM

## 2019-02-28 PROCEDURE — 11301 SHAVE SKIN LESION 0.6-1.0 CM: CPT | Performed by: FAMILY MEDICINE

## 2019-02-28 PROCEDURE — 88305 TISSUE EXAM BY PATHOLOGIST: CPT | Mod: TC | Performed by: FAMILY MEDICINE

## 2019-02-28 PROCEDURE — 11301 SHAVE SKIN LESION 0.6-1.0 CM: CPT | Mod: 59 | Performed by: FAMILY MEDICINE

## 2019-02-28 PROCEDURE — 88342 IMHCHEM/IMCYTCHM 1ST ANTB: CPT | Mod: TC | Performed by: FAMILY MEDICINE

## 2019-02-28 PROCEDURE — 99213 OFFICE O/P EST LOW 20 MIN: CPT | Mod: 25 | Performed by: FAMILY MEDICINE

## 2019-02-28 NOTE — LETTER
"    2/28/2019         RE: Merissa Silverman  [address]  [address]  [place] [phone]        Dear Colleague,    Thank you for referring your patient, Merissa Silverman, to the [facility] [place]. Please see a copy of my visit note below.    [facility] - PRIMARY CARE SKIN    CC: skin cancer screening (full-body)  SUBJECTIVE:   Merissa Silverman is a(n) 33 year old female who presents to clinic today for a full-body skin exam.    Bothersome lesions noticed by the patient or other skin concerns :  Issue One: Lesion(s) are developing on the back.  Enlarging: YES.  Bleeding: NO.  Itchy or irritating: NO.  Pain or tenderness: NO.  Changing color: NO.    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    Family Medical History  Skin cancer: YES - melanoma in paternal grandmother, \"skin chemo\" in an aunt  Psoriasis   NO     Sun Exposure History  Previous history of significant sun exposure:  Blistering sunburns: NO.  Tanning beds: YES - when younger.  Sunscreen usage: YES, SPF: 50+.  UV-protective clothing usage: YES.  Wide-brimmed hat usage: NO.  UV-protective sunglasses usage: YES.  Mid-day sun avoidance: YES.    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    Refer to electronic medical record (EMR) for past medical history and medications.    INTEGUMENTARY/SKIN: POSITIVE for changing lesions  ROS: 14 point review of systems was negative except the symptoms listed above in the HPI.    This document serves as a record of the services and decisions personally performed and made by Padmini Trinidad MD and was created by Gilmer Flynn, a trained medical scribe, based on personal observations and provider statements to the medical scribe.  February 28, 2019 10:56 AM   Gilmer " Flynn    OBJECTIVE:   GENERAL: healthy, alert and no distress.  SKIN: Plata Skin Type - I.  This patient was examined from the top of the head to the bottom of the feet  including scalp, face, neck, trunk, buttocks, both arms, both legs, both hands, both feet, and all fingers and toes. The dermatoscope was used to help evaluate pigmented lesions.  Skin Pertinent Findings:  Upper extremities: Scattered brown macules of various sizes and shapes most consistent with (benign) melanocytic nevi.    Anterior lower extremities: Multiple brown macules of various sizes and shapes most consistent with (benign) melanocytic nevi.    Back: multiple brown macules of various sizes and shapes most consistent with (benign) melanocytic nevi .    Posterior lower extremities, Buttocks: Multiple brown macules of various sizes and shapes most consistent with (benign) melanocytic nevi .    Significant Findings:  Right anterior thigh, 9 cm proximal to patella: 6 x 4 mm in size brown macule with irregular pigmentation. ? Atypical nevus ? Other      Back, 4 cm left of T2: 7 x 5 mm in size brown macule with irregular pigmentation. ? Atypical nevus ? Other      ASSESSMENT:     Encounter Diagnoses   Name Primary?     Screening, malignant neoplasm, skin Yes     Neoplasm of uncertain behavior of skin      Family history of skin cancer      Multiple benign melanocytic nevi          PLAN:   Patient Instructions   FUTURE APPOINTMENTS  Follow up in 6 months for a full-body skin cancer screening because of number of nevi.    SUN PROTECTION INSTRUCTIONS  Sun damage can lead to skin cancer and premature aging of the skin.      The best way to protect from sun damage to your skin is to avoid the sun during peak hours (10 am - 2 pm) even on overcast days.    Never use tanning beds. Tanning beds are associated with much higher risks of skin cancer.    All tanning damages the skin. Aim for ivory skin year round and you will have less trouble with your  "skin in years to come. There is no merit in getting \"a base tan\" before a warm weather vacation, as any tanning indicates your body's response to sun damage.    Stop smoking. Smokers have higher rates of skin cancer and also have premature skin wrinkling.    Use UPF sun-protective clothing, which while more expensive initially provides longer lasting coverage without having to worry about remembering to re-apply.  1. Wear a wide-brimmed hat and sunglasses.   2. Wear sun-protective clothing.  [employer] other [employer] make sun protective clothing that are stylish, comfortable and cool.   [email or web address] and other [employer] make UV arm sleeves suitable for golfing, gardening and other activities.    Sunscreen instructions:  1. Use sunscreens with Zinc Oxide, Titanium Dioxide or Avobenzone to protect from UVA rays.  2. Use SPF 30-50+ to protect from UVB rays.  3. Re-apply every 2 hours even if water resistant.  4. Apply on your face every day even when cloudy and even in the winter. UVA \"aging rays\" penetrate window glass and are just as strong in the winter as in the summer.    FYI  You should use about 3 tablespoons of sunscreen to protect your whole body. Thus a typical eight ounce bottle of sunscreen should last 4 applications. Remember, that the SPF rating is compromised if you don t apply enough. Most people only apply 1/2 - 1/3 of the amount they need. Also don t forget areas such as your ears, feet, upper back and harder to reach places. Keep in mind that these amounts should be increased for larger body sizes.    Sunscreens with titanium dioxide and/or zinc oxide in the active ingredients are physical blockers as opposed to chemical blockers. Chemical-free sunscreens should not irritate the skin.    Spray-on sunscreens may be used for touch-up application only, not as a base layer. Also, use with caution around small children due to inhalation risk.    SPF means sun protection factor, which is just " the degree to which the sunscreen can protect against UVB rays. There is no rating system for UVA rays. SPF is calculated as the time skin will burn when sunscreen is applied vs. skin without sunscreen.    Water resistant sunscreens should be re-applied every 1-2 hours.    Product Recommendations:    Consider use of sunscreen sticks with Zinc Oxide and Titanium Dioxide active ingredients such as Neutrogena Pure&Free Baby Sunscreen Stick.    Good examples include: Blue Lizard, EltaMD, Solbar    Good daily moisturizers with SPF: Vanicream, CeraVe.    For sensitive skin, consider : SkinMedica Essential Defense Mineral Shield Broad Spectrum SPF 35    Men: consider use of Neutrogena Triple Protect Facial Lotion    Avoid retinyl palmitate products.  Avoid combination products that include both sunscreen and insect repellant, as sunscreen should be applied every 2 hours, but insect repellant should not be applied as frequently.    For more information:  https://www.skincancer.org/prevention/sun-protection/sunscreen/sunscreens-safe-and-effective    SKIN CANCER SELF-EXAM INSTRUCTIONS  Check every month in the mirror or with a household member. Be aware of any changes, especially bleeding or tenderness. Also, make sure to check your nails for color changes after removal of nail polish.    For melanoma, check for:  A - Asymmetry. One half unlike the other half.  B - Border. Irregular, scalloped, ragged, notched, blurred or poorly defined borders.  C - Color. Color variations from one area to another, with shades of tan, brown and/or black present. Sometimes white, red or blue.  D - Diameter. Greater than 6 mm (about the size of a pencil eraser). Any new growth of a mole should be concerning and be evaluated.  E - Evolving. A mole or skin lesion that looks different from the rest or is changing in size, shape or color.    For basal cell carcinoma and squamous cell carcinoma, check for:    Sores, shiny bumps, nodules, scaly  lesions, or wart-like growths that are itchy, tender, crusting, scabbing, eroding, oozing or bleeding.    Open sores/wounds or reddish/irritated areas that do not heal within 2-3 weeks.    Scar-like areas that are white, yellow or waxy in color.      The patient was counseled about sunscreens and sun avoidance. The patient was counseled to check the skin regularly and report any lesion that is new, changing, itching, scabbing, bleeding or otherwise bothersome. The patient was discharged ambulatory and in stable condition.    Educational brochures given to patient: skin cancer.    TT: 25 minutes.  CT: 15 minutes.    The information in this document, created by the medical scribe for me, accurately reflects the services I personally performed and the decisions made by me. I have reviewed and approved this document for accuracy prior to leaving the patient care area.  February 28, 2019 10:56 AM  Padmini Trinidad MD  [facility] [place]    Again, thank you for allowing me to participate in the care of your patient.        Sincerely,        Padmini Trinidad MD

## 2019-02-28 NOTE — PROCEDURES
Name : Shave Excision  Indication : Excision of tissue for pathology evaluation.  Location(s) : Right anterior thigh, 9 cm proximal to patella: 6 x 4 mm in size brown macule with irregular pigmentation. ? Atypical nevus ? Other.  Completed by : Adilene Trinidad MD  Photo Taken : . yes    Anesthesia : Patient was anesthetized by infiltrating the area surrounding the lesion with 1% lidocaine.   epinephrine [id number] : Yes.  Note : Discussed the risk of pain, infection, scarring, hypo- or hyperpigmentation and recurrence or need for re-treatment. The benefits of treatment and alternative treatments were also discussed.    During this procedure, the universal protocol was utilized. The patient's identity was confirmed by no less than two patient identifiers, correct procedure was verified, correct site was verified and marked as applicable and a final pause was completed.    Sterile technique was used throughout the procedure. The skin was cleaned and prepped with surgical cleanser. Once adequate anesthesia was obtained, the lesion was removed with a deep scallop shave procedure. The specimen was sent to pathology.    Direct pressure and aluminum chloride and monopolar cautery was applied for hemostasis. No bleeding was present upon the completion of the procedure. The wound was coated with antibacterial ointment. A dry sterile dressing was applied. Patient tolerated the procedure well and left in satisfactory condition.    Primary provider and referring provider will be informed regarding the tissue report when it returns.    Name : Shave Excision  Indication : Excision of tissue for pathology evaluation.  Location(s) : Back, 4 cm left of T2: 7 x 5 mm in size brown macule with irregular pigmentation. ? Atypical nevus ? Other.  Completed by : Adilene Trinidad MD  Photo Taken : yes.  Anesthesia : Patient was anesthetized by infiltrating the area surrounding the lesion with 1% lidocaine.   epinephrine [id number] : Yes.  Note :  Discussed the risk of pain, infection, scarring, hypo- or hyperpigmentation and recurrence or need for re-treatment. The benefits of treatment and alternative treatments were also discussed.    During this procedure, the universal protocol was utilized. The patient's identity was confirmed by no less than two patient identifiers, correct procedure was verified, correct site was verified and marked as applicable and a final pause was completed.    Sterile technique was used throughout the procedure. The skin was cleaned and prepped with surgical cleanser. Once adequate anesthesia was obtained, the lesion was removed with a deep scallop shave procedure. The specimen was sent to pathology.    Direct pressure and aluminum chloride and monopolar cautery was applied for hemostasis. No bleeding was present upon the completion of the procedure. The wound was coated with antibacterial ointment. A dry sterile dressing was applied. Patient tolerated the procedure well and left in satisfactory condition.    Primary provider and referring provider will be informed regarding the tissue report when it returns.

## 2019-02-28 NOTE — PATIENT INSTRUCTIONS
"FUTURE APPOINTMENTS  Follow up in 6 months for a full-body skin cancer screening, then every 12 months after that.  Follow up per pathology report.    WOUND CARE INSTRUCTIONS  1. Wash hands before every dressing change.  2. After 24 hours, change dressing daily.  3. Wash the wound area with a mild soap, then rinse.  4. Gently pat dry with a sterile gauze or Q-tip.  5. Using a Q-tip, apply Vaseline or Aquaphor only over entire wound. Do NOT use Neosporin - as many people react to neomycin.  6. Finally, cover with a bandage or sterile non-stick gauze with micropore paper tape.  7. Repeat once daily until wound has healed.      Soap, water and shampoo will not hurt this area.    Do not go swimming or take baths, but showering is encouraged.    Limit use of the area where the procedure was done for a few days to allow for optimal healing.    If you experience bleeding:  Wash hands and hold firm pressure on the area for 10 minutes without checking to see if the bleeding has stopped. \"Checking\" pulls off the protective wound clot and restarts the bleeding all over again. Re-apply pressure for 10 minutes if necessary to stop bleeding.  Use additional sterile gauze and tape to maintain pressure once bleeding has stopped.  If bleeding continues, then call back to clinic at [phone].    Signs of Infection:  Infection can occur in any area where skin has been disrupted.  If you notice persistent redness, swelling, colored drainage, increasing pain, fever or other signs of infection, please call us at: [phone] and ask to have me or my colleague paged. We will call you back to discuss.    Pathology Results:  You will be notified, generally via letter or MyChart, in approximately 10 days. If there is anything we need to discuss or further treatment needed, I will call you to discuss it.    PATIENT INFORMATION : WOUNDS  During the healing process you will notice a number of changes. All wounds develop a small halo of " "redness surrounding the wound.  This means healing is occurring. Severe itching with extensive redness usually indicates sensitivity to the ointment or bandage tape used to dress the wound.  You should call our office if this develops.      Swelling  and/or discoloration around your surgical site is common, particularly when performed around the eye.    All wounds normally drain.  The larger the wound the more drainage there will be.  After 7-10 days, you will notice the wound beginning to shrink and new skin will begin to grow.  The wound is healed when you can see skin has formed over the entire area.  A healed wound has a healthy, shiny look to the surface and is red to dark pink in color to normalize.  Wounds may take approximately 4-6 weeks to heal.  Larger wounds may take 6-8 weeks. After the wound is healed you may discontinue dressing changes.    You may experience a sensation of tightness as your wound heals. This is normal and will gradually subside.    Your healed wound may be sensitive to temperature changes. This sensitivity improves with time, but if you re having a lot of discomfort, try to avoid temperature extremes.    Patients frequently experience itching after their wound appears to have healed because of the continue healing under the skin.  Plain Vaseline will help relieve the itching.    SUN PROTECTION INSTRUCTIONS  Sun damage can lead to skin cancer and premature aging of the skin.      The best way to protect from sun damage to your skin is to avoid the sun during peak hours (10 am - 2 pm) even on overcast days.    Never use tanning beds. Tanning beds are associated with much higher risks of skin cancer.    All tanning damages the skin. Aim for ivory skin year round and you will have less trouble with your skin in years to come. There is no merit in getting \"a base tan\" before a warm weather vacation, as any tanning indicates your body's response to sun damage.    Stop smoking. Smokers have " "higher rates of skin cancer and also have premature skin wrinkling.    Use UPF sun-protective clothing, which while more expensive initially provides longer lasting coverage without having to worry about remembering to re-apply.  1. Wear a wide-brimmed hat and sunglasses.   2. Wear sun-protective clothing.  [employer] and other [employer] make sun protective clothing that are stylish, comfortable and cool.   [employer] and other [employer] make UV arm sleeves suitable for golfing, gardening and other activities.    Sunscreen instructions:  1. Use sunscreens with Zinc Oxide, Titanium Dioxide or Avobenzone to protect from UVA rays.  2. Use SPF 30-50+ to protect from UVB rays.  3. Re-apply every 2 hours even if water resistant.  4. Apply on your face every day even when cloudy and even in the winter. UVA \"aging rays\" penetrate window glass and are just as strong in the winter as in the summer.    FYI  You should use about 3 tablespoons of sunscreen to protect your whole body. Thus a typical eight ounce bottle of sunscreen should last 4 applications. Remember, that the SPF rating is compromised if you don t apply enough. Most people only apply 1/2 - 1/3 of the amount they need. Also don t forget areas such as your ears, feet, upper back and harder to reach places. Keep in mind that these amounts should be increased for larger body sizes.    Sunscreens with titanium dioxide and/or zinc oxide in the active ingredients are physical blockers as opposed to chemical blockers. Chemical-free sunscreens should not irritate the skin.    Spray-on sunscreens may be used for touch-up application only, not as a base layer. Also, use with caution around small children due to inhalation risk.    SPF means sun protection factor, which is just the degree to which the sunscreen can protect against UVB rays. There is no rating system for UVA rays. SPF is calculated as the time skin will burn when sunscreen is applied vs. skin " without sunscreen.    Water resistant sunscreens should be re-applied every 1-2 hours.    Product Recommendations:    Consider use of sunscreen sticks with Zinc Oxide and Titanium Dioxide active ingredients such as Neutrogena Pure&Free Baby Sunscreen Stick.    Good examples include: Blue Lizard, EltaMD, Solbar    Good daily moisturizers with SPF: Vanicream, CeraVe.    For sensitive skin, consider : SkinMedica Essential Defense Mineral Shield Broad Spectrum SPF 35    Men: consider use of Neutrogena Triple Protect Facial Lotion    Avoid retinyl palmitate products.  Avoid combination products that include both sunscreen and insect repellant, as sunscreen should be applied every 2 hours, but insect repellant should not be applied as frequently.    For more information:  https://www.skincancer.org/prevention/sun-protection/sunscreen/sunscreens-safe-and-effective    SKIN CANCER SELF-EXAM INSTRUCTIONS  Check every month in the mirror or with a household member. Be aware of any changes, especially bleeding or tenderness. Also, make sure to check your nails for color changes after removal of nail polish.    For melanoma, check for:  A - Asymmetry. One half unlike the other half.  B - Border. Irregular, scalloped, ragged, notched, blurred or poorly defined borders.  C - Color. Color variations from one area to another, with shades of tan, brown and/or black present. Sometimes white, red or blue.  D - Diameter. Greater than 6 mm (about the size of a pencil eraser). Any new growth of a mole should be concerning and be evaluated.  E - Evolving. A mole or skin lesion that looks different from the rest or is changing in size, shape or color.    For basal cell carcinoma and squamous cell carcinoma, check for:    Sores, shiny bumps, nodules, scaly lesions, or wart-like growths that are itchy, tender, crusting, scabbing, eroding, oozing or bleeding.    Open sores/wounds or reddish/irritated areas that do not heal within 2-3  weeks.    Scar-like areas that are white, yellow or waxy in color.

## 2019-02-28 NOTE — PROGRESS NOTES
"[facility] - PRIMARY CARE SKIN    CC: skin cancer screening (full-body)  SUBJECTIVE:   Merissa Silverman is a(n) 33 year old female who presents to clinic today for a full-body skin exam.    Bothersome lesions noticed by the patient or other skin concerns :  Issue One: Lesion(s) are developing on the back.  Enlarging: YES.  Bleeding: NO.  Itchy or irritating: NO.  Pain or tenderness: NO.  Changing color: NO.    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    Family Medical History  Skin cancer: YES - melanoma in paternal grandmother, \"skin chemo\" in an aunt  Psoriasis   NO     Sun Exposure History  Previous history of significant sun exposure:  Blistering sunburns: NO.  Tanning beds: YES - when younger.  Sunscreen usage: YES, SPF: 50+.  UV-protective clothing usage: YES.  Wide-brimmed hat usage: NO.  UV-protective sunglasses usage: YES.  Mid-day sun avoidance: YES.    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    Refer to electronic medical record (EMR) for past medical history and medications.    INTEGUMENTARY/SKIN: POSITIVE for changing lesions  ROS: 14 point review of systems was negative except the symptoms listed above in the HPI.    This document serves as a record of the services and decisions personally performed and made by Padmini Trinidad MD and was created by Gilmer Flynn, a trained medical scribe, based on personal observations and provider statements to the medical scribe.  February 28, 2019 10:56 AM   Gilmer Flynn    OBJECTIVE:   GENERAL: healthy, alert and no distress.  SKIN: Plata Skin Type - I.  This patient was examined from the top of the head to the bottom of the feet  including scalp, face, neck, trunk, buttocks, both arms, both legs, both hands, both " "feet, and all fingers and toes. The dermatoscope was used to help evaluate pigmented lesions.  Skin Pertinent Findings:  Upper extremities: Scattered brown macules of various sizes and shapes most consistent with (benign) melanocytic nevi.    Anterior lower extremities: Multiple brown macules of various sizes and shapes most consistent with (benign) melanocytic nevi.    Back: multiple brown macules of various sizes and shapes most consistent with (benign) melanocytic nevi .    Posterior lower extremities, Buttocks: Multiple brown macules of various sizes and shapes most consistent with (benign) melanocytic nevi .    Significant Findings:  Right anterior thigh, 9 cm proximal to patella: 6 x 4 mm in size brown macule with irregular pigmentation. ? Atypical nevus ? Other      Back, 4 cm left of T2: 7 x 5 mm in size brown macule with irregular pigmentation. ? Atypical nevus ? Other      ASSESSMENT:     Encounter Diagnoses   Name Primary?     Screening, malignant neoplasm, skin Yes     Neoplasm of uncertain behavior of skin      Family history of skin cancer      Multiple benign melanocytic nevi          PLAN:   Patient Instructions   FUTURE APPOINTMENTS  Follow up in 6 months for a full-body skin cancer screening because of number of nevi.    SUN PROTECTION INSTRUCTIONS  Sun damage can lead to skin cancer and premature aging of the skin.      The best way to protect from sun damage to your skin is to avoid the sun during peak hours (10 am - 2 pm) even on overcast days.    Never use tanning beds. Tanning beds are associated with much higher risks of skin cancer.    All tanning damages the skin. Aim for ivory skin year round and you will have less trouble with your skin in years to come. There is no merit in getting \"a base tan\" before a warm weather vacation, as any tanning indicates your body's response to sun damage.    Stop smoking. Smokers have higher rates of skin cancer and also have premature skin " "wrinkling.    Use UPF sun-protective clothing, which while more expensive initially provides longer lasting coverage without having to worry about remembering to re-apply.  1. Wear a wide-brimmed hat and sunglasses.   2. Wear sun-protective clothing.  [employer] and other [employer] make sun protective clothing that are stylish, comfortable and cool.   [employer] and other [employer] make UV arm sleeves suitable for golfing, gardening and other activities.    Sunscreen instructions:  1. Use sunscreens with Zinc Oxide, Titanium Dioxide or Avobenzone to protect from UVA rays.  2. Use SPF 30-50+ to protect from UVB rays.  3. Re-apply every 2 hours even if water resistant.  4. Apply on your face every day even when cloudy and even in the winter. UVA \"aging rays\" penetrate window glass and are just as strong in the winter as in the summer.    FYI  You should use about 3 tablespoons of sunscreen to protect your whole body. Thus a typical eight ounce bottle of sunscreen should last 4 applications. Remember, that the SPF rating is compromised if you don t apply enough. Most people only apply 1/2 - 1/3 of the amount they need. Also don t forget areas such as your ears, feet, upper back and harder to reach places. Keep in mind that these amounts should be increased for larger body sizes.    Sunscreens with titanium dioxide and/or zinc oxide in the active ingredients are physical blockers as opposed to chemical blockers. Chemical-free sunscreens should not irritate the skin.    Spray-on sunscreens may be used for touch-up application only, not as a base layer. Also, use with caution around small children due to inhalation risk.    SPF means sun protection factor, which is just the degree to which the sunscreen can protect against UVB rays. There is no rating system for UVA rays. SPF is calculated as the time skin will burn when sunscreen is applied vs. skin without sunscreen.    Water resistant sunscreens should be " re-applied every 1-2 hours.    Product Recommendations:    Consider use of sunscreen sticks with Zinc Oxide and Titanium Dioxide active ingredients such as Neutrogena Pure&Free Baby Sunscreen Stick.    Good examples include: Blue Lizard, EltaMD, Solbar    Good daily moisturizers with SPF: Vanicream, CeraVe.    For sensitive skin, consider : SkinMedica Essential Defense Mineral Shield Broad Spectrum SPF 35    Men: consider use of Neutrogena Triple Protect Facial Lotion    Avoid retinyl palmitate products.  Avoid combination products that include both sunscreen and insect repellant, as sunscreen should be applied every 2 hours, but insect repellant should not be applied as frequently.    For more information:  https://www.skincancer.org/prevention/sun-protection/sunscreen/sunscreens-safe-and-effective    SKIN CANCER SELF-EXAM INSTRUCTIONS  Check every month in the mirror or with a household member. Be aware of any changes, especially bleeding or tenderness. Also, make sure to check your nails for color changes after removal of nail polish.    For melanoma, check for:  A - Asymmetry. One half unlike the other half.  B - Border. Irregular, scalloped, ragged, notched, blurred or poorly defined borders.  C - Color. Color variations from one area to another, with shades of tan, brown and/or black present. Sometimes white, red or blue.  D - Diameter. Greater than 6 mm (about the size of a pencil eraser). Any new growth of a mole should be concerning and be evaluated.  E - Evolving. A mole or skin lesion that looks different from the rest or is changing in size, shape or color.    For basal cell carcinoma and squamous cell carcinoma, check for:    Sores, shiny bumps, nodules, scaly lesions, or wart-like growths that are itchy, tender, crusting, scabbing, eroding, oozing or bleeding.    Open sores/wounds or reddish/irritated areas that do not heal within 2-3 weeks.    Scar-like areas that are white, yellow or waxy in  color.      The patient was counseled about sunscreens and sun avoidance. The patient was counseled to check the skin regularly and report any lesion that is new, changing, itching, scabbing, bleeding or otherwise bothersome. The patient was discharged ambulatory and in stable condition.    Educational brochures given to patient: skin cancer.    TT: 25 minutes.  CT: 15 minutes.    The information in this document, created by the medical scribe for me, accurately reflects the services I personally performed and the decisions made by me. I have reviewed and approved this document for accuracy prior to leaving the patient care area.  February 28, 2019 10:56 AM  Padmini Trinidad MD  [facility]

## 2019-03-06 ENCOUNTER — TELEPHONE (OUTPATIENT)
Dept: FAMILY MEDICINE | Facility: CLINIC | Age: 34
End: 2019-03-06

## 2019-03-06 LAB — COPATH REPORT: NORMAL

## 2019-03-06 NOTE — TELEPHONE ENCOUNTER
Notes recorded by Padmini Trinidad MD on 3/6/2019 at 2:01 PM CST  Please call and explain atypical nevus. Moderate changes in the nevus on the back , therefore recheck this site at 6 months for recurring pigmentation. Mild changes in the nevus on anterior thigh.     Thank you,   Padmini Trinidad M.D.    The lesion that was removed from  was benign (not cancer) and is called an atypical nevus. A nevus with atypical changes has a small potential to evolve into a melanoma; therefore, we usually recommend completely removing the atypical nevus. Your atypical nevus was completely removed. If any pigmentation should recur then it should be evaluated.   There are degrees of atypia called mild, moderate and severe . A mild atypical nevus does not need a re-excision but if pigmentation should recur then it should be evaluated. The more severe atypical changes that are present then  more concern there is regarding its potential to evolve into a melanoma. Usually I may recommend re excision for a moderate- severe atypical nevus.

## 2019-03-06 NOTE — TELEPHONE ENCOUNTER
Left message on answering machine for patient to call back.  Halima Moe,RN BSN  [facility]  [phone]

## 2019-03-06 NOTE — TELEPHONE ENCOUNTER
Patient notified of test results and providers message, patient has no further questions.    Halima WRIGHTRN BSN  [facility]  [phone]

## 2019-03-07 ENCOUNTER — ANESTHESIA EVENT (OUTPATIENT)
Dept: SURGERY | Facility: AMBULATORY SURGERY CENTER | Age: 34
End: 2019-03-07

## 2019-03-08 ENCOUNTER — HOSPITAL ENCOUNTER (OUTPATIENT)
Facility: AMBULATORY SURGERY CENTER | Age: 34
End: 2019-03-08
Attending: PHYSICIAN ASSISTANT
Payer: COMMERCIAL

## 2019-03-08 ENCOUNTER — ANESTHESIA (OUTPATIENT)
Dept: SURGERY | Facility: AMBULATORY SURGERY CENTER | Age: 34
End: 2019-03-08

## 2019-03-08 ENCOUNTER — ANCILLARY PROCEDURE (OUTPATIENT)
Dept: RADIOLOGY | Facility: AMBULATORY SURGERY CENTER | Age: 34
End: 2019-03-08
Attending: PSYCHIATRY & NEUROLOGY
Payer: COMMERCIAL

## 2019-03-08 VITALS
TEMPERATURE: 98 F | OXYGEN SATURATION: 100 % | DIASTOLIC BLOOD PRESSURE: 78 MMHG | RESPIRATION RATE: 16 BRPM | HEIGHT: 62 IN | BODY MASS INDEX: 28.26 KG/M2 | WEIGHT: 153.6 LBS | SYSTOLIC BLOOD PRESSURE: 102 MMHG | HEART RATE: 68 BPM

## 2019-03-08 DIAGNOSIS — Z95.828 PORT-A-CATH IN PLACE: ICD-10-CM

## 2019-03-08 DIAGNOSIS — C71.6 MEDULLOBLASTOMA OF CEREBELLUM (H): ICD-10-CM

## 2019-03-08 LAB — INR PPP: 1.08 (ref 0.86–1.14)

## 2019-03-08 RX ORDER — NALOXONE HYDROCHLORIDE 0.4 MG/ML
.1-.4 INJECTION, SOLUTION INTRAMUSCULAR; INTRAVENOUS; SUBCUTANEOUS
Status: DISCONTINUED | OUTPATIENT
Start: 2019-03-08 | End: 2019-03-09 | Stop reason: HOSPADM

## 2019-03-08 RX ORDER — ONDANSETRON 2 MG/ML
4 INJECTION INTRAMUSCULAR; INTRAVENOUS EVERY 30 MIN PRN
Status: DISCONTINUED | OUTPATIENT
Start: 2019-03-08 | End: 2019-03-09 | Stop reason: HOSPADM

## 2019-03-08 RX ORDER — MEPERIDINE HYDROCHLORIDE 25 MG/ML
12.5 INJECTION INTRAMUSCULAR; INTRAVENOUS; SUBCUTANEOUS
Status: DISCONTINUED | OUTPATIENT
Start: 2019-03-08 | End: 2019-03-09 | Stop reason: HOSPADM

## 2019-03-08 RX ORDER — GABAPENTIN 300 MG/1
300 CAPSULE ORAL ONCE
Status: DISCONTINUED | OUTPATIENT
Start: 2019-03-08 | End: 2019-03-09 | Stop reason: HOSPADM

## 2019-03-08 RX ORDER — ONDANSETRON 4 MG/1
4 TABLET, ORALLY DISINTEGRATING ORAL EVERY 30 MIN PRN
Status: DISCONTINUED | OUTPATIENT
Start: 2019-03-08 | End: 2019-03-09 | Stop reason: HOSPADM

## 2019-03-08 RX ORDER — PROPOFOL 10 MG/ML
INJECTION, EMULSION INTRAVENOUS CONTINUOUS PRN
Status: DISCONTINUED | OUTPATIENT
Start: 2019-03-08 | End: 2019-03-08

## 2019-03-08 RX ORDER — ACETAMINOPHEN 325 MG/1
975 TABLET ORAL ONCE
Status: DISCONTINUED | OUTPATIENT
Start: 2019-03-08 | End: 2019-03-09 | Stop reason: HOSPADM

## 2019-03-08 RX ORDER — FENTANYL CITRATE 50 UG/ML
25-50 INJECTION, SOLUTION INTRAMUSCULAR; INTRAVENOUS
Status: DISCONTINUED | OUTPATIENT
Start: 2019-03-08 | End: 2019-03-09 | Stop reason: HOSPADM

## 2019-03-08 RX ORDER — PROPOFOL 10 MG/ML
INJECTION, EMULSION INTRAVENOUS PRN
Status: DISCONTINUED | OUTPATIENT
Start: 2019-03-08 | End: 2019-03-08

## 2019-03-08 RX ORDER — ONDANSETRON 2 MG/ML
INJECTION INTRAMUSCULAR; INTRAVENOUS PRN
Status: DISCONTINUED | OUTPATIENT
Start: 2019-03-08 | End: 2019-03-08

## 2019-03-08 RX ORDER — SODIUM CHLORIDE, SODIUM LACTATE, POTASSIUM CHLORIDE, CALCIUM CHLORIDE 600; 310; 30; 20 MG/100ML; MG/100ML; MG/100ML; MG/100ML
INJECTION, SOLUTION INTRAVENOUS CONTINUOUS PRN
Status: DISCONTINUED | OUTPATIENT
Start: 2019-03-08 | End: 2019-03-08

## 2019-03-08 RX ORDER — SODIUM CHLORIDE, SODIUM LACTATE, POTASSIUM CHLORIDE, CALCIUM CHLORIDE 600; 310; 30; 20 MG/100ML; MG/100ML; MG/100ML; MG/100ML
INJECTION, SOLUTION INTRAVENOUS CONTINUOUS
Status: DISCONTINUED | OUTPATIENT
Start: 2019-03-08 | End: 2019-03-09 | Stop reason: HOSPADM

## 2019-03-08 RX ORDER — OXYCODONE HYDROCHLORIDE 5 MG/1
5 TABLET ORAL EVERY 4 HOURS PRN
Status: DISCONTINUED | OUTPATIENT
Start: 2019-03-08 | End: 2019-03-09 | Stop reason: HOSPADM

## 2019-03-08 RX ORDER — SODIUM CHLORIDE 9 MG/ML
INJECTION, SOLUTION INTRAVENOUS CONTINUOUS
Status: DISCONTINUED | OUTPATIENT
Start: 2019-03-08 | End: 2019-03-09 | Stop reason: HOSPADM

## 2019-03-08 RX ADMIN — PROPOFOL 50 MG: 10 INJECTION, EMULSION INTRAVENOUS at 07:21

## 2019-03-08 RX ADMIN — PROPOFOL 50 MG: 10 INJECTION, EMULSION INTRAVENOUS at 07:20

## 2019-03-08 RX ADMIN — PROPOFOL 100 MG: 10 INJECTION, EMULSION INTRAVENOUS at 07:18

## 2019-03-08 RX ADMIN — SODIUM CHLORIDE, SODIUM LACTATE, POTASSIUM CHLORIDE, CALCIUM CHLORIDE: 600; 310; 30; 20 INJECTION, SOLUTION INTRAVENOUS at 07:14

## 2019-03-08 RX ADMIN — ONDANSETRON 4 MG: 2 INJECTION INTRAMUSCULAR; INTRAVENOUS at 07:14

## 2019-03-08 ASSESSMENT — MIFFLIN-ST. JEOR: SCORE: 1354.98

## 2019-03-08 NOTE — PROCEDURES
Interventional Radiology Brief Post Procedure Note    Procedure: IR PORT REMOVAL RIGHT [[id number]]    Proceduralist: Tito Briceño PA-C    Assistant: None    Time Out: Prior to the start of the procedure and with procedural staff participation, I verbally confirmed the patient s identity using two indicators, relevant allergies, that the procedure was appropriate and matched the consent or emergent situation, and that the correct equipment/implants were available. Immediately prior to starting the procedure I conducted the Time Out with the procedural staff and re-confirmed the patient s name, procedure, and site/side. (The Joint Commission universal protocol was followed.)  Yes    Sedation: Monitored Anesthesia Care (MAC) administered and documented by Anesthesia Care Provider    Findings: Completed removal of right chest port in its entirety.    Estimated Blood Loss: Minimal    Fluoroscopy Time: None    Specimens: None    Complications: 1. None     Condition: Stable    Plan: Follow-up per primary team.    Comments: See dictated procedure note for full details.    Tito Briceño PA-C  Interventional Radiology  [phone]

## 2019-03-08 NOTE — DISCHARGE INSTRUCTIONS
A collaboration between [facility] and [facility]  Experts in minimally invasive, targeted treatments performed using imaging guidance    Venous Access Device, Port Catheter or Tunneled Central Line Removal    Today you had your existing venous access device removed, either because it was no longer needed or because there was malfunction or infection issues.    One of our Radiology PAs performed this procedure for you today:  ? Tito Briceño PA-C    After you go home:  - Drink plenty of fluids.  Generally 6-8 (8 ounce) glasses a day is recommended.  - Resume your regular diet unless otherwise ordered by a medical provider.  - Keep any applied tape/gauze dressings clean and dry.  Change tape/gauze dressings if they get wet or soiled.  - You may shower the following day after procedure, however cover and protect from moisture any tape/gauze dressings.  You may let water hit and run over dried skin glue, but do not scrub.  Pat the area dry after showering.  - Port removal incisions are closed with absorbable suture, meaning they do not need to be removed at a later date, and a topical skin adhesive (skin glue).  This glue will wear off in 7-14 days.  Do not remove before this time.  If 14 days have passed and residual glue is present, you may gently remove it.  - You may remove tape/gauze dressings after 5 days if the site looks closed and in the process of healing.  - Do not apply gels, lotions, or ointments to the glue site for the first 10 days as this may cause the glue to prematurely soften and fail.  - Do not perform strenuous activities or lift greater than 10 pounds for the next three days.  - If there is bleeding or oozing from the procedure site, apply firm pressure to the area for 5-10 minutes.  If the bleeding continues seek medical advice at the numbers below.  - Mild procedure site discomfort can be treated with an ice pack and over-the-counter pain  relievers.              For 24 hours after any sedation used:  - Relax and take it easy.  No strenuous activities.  - Do not drive or operate machines at home or at work.  - No alcohol consumption.  - Do not make any important or legal decisions.    Call our Interventional Radiology (IR) service if:  - If you start bleeding from the procedure site.  If you do start to bleed from the site, lie down and hold some pressure on the site.  Our radiology provider can help you decide if you need to return to the hospital.  - If you have new or worsening pain related to the procedure.  - If you have concerning swelling at the procedure site.  - If you develop persistent nausea or vomiting.  - If you develop hives or a rash or any unexplained itching.  - If you have a fever of greater than 100.5  F and chills in the first 5 days after procedure.  - Any other concerns related to your procedure.      [facility]  Interventional Radiology (IR)  [address] [place] [address]    Contact Number:  [phone]  (IR control desk)  - Monday - Friday 8:00 am - 4:30 pm    After hours for urgent concerns:  [phone]  - After 4:30 pm Monday - Friday, Weekends and Holidays.   - Ask for Interventional Radiology on-call.  Someone is available 24 hours a day.  - [facility] toll free number:  [phone]

## 2019-03-08 NOTE — ANESTHESIA POSTPROCEDURE EVALUATION
Anesthesia POST Procedure Evaluation    Patient: Merissa Silverman   MRN:     [id number] Gender:   female   Age:    33 year old :      1985        Preoperative Diagnosis: Medulloblastoma of Cerebellum   Procedure(s):  Right Port Removal   Postop Comments: No value filed.       Anesthesia Type:  Not documented    Reportable Event: NO     PAIN: Uncomplicated   Sign Out status: Comfortable, Well controlled pain     PONV: No PONV   Sign Out status:  No Nausea or Vomiting     Neuro/Psych: Uneventful perioperative course   Sign Out Status: Preoperative baseline; Age appropriate mentation     Airway/Resp.: Uneventful perioperative course   Sign Out Status: Non labored breathing, age appropriate RR; Resp. Status within EXPECTED Parameters     CV: Uneventful perioperative course   Sign Out status: Appropriate BP and perfusion indices; Appropriate HR/Rhythm     Disposition:   Sign Out in:  PACU  Disposition:  Phase II; Home  Recovery Course: Uneventful  Follow-Up: Not required           Last Anesthesia Record Vitals:  CRNA VITALS  3/8/2019 0721 - 3/8/2019 0821      3/8/2019             EKG:  NSR          Last PACU/Preop Vitals:  Vitals:    19 0618 19 0753 19 0800   BP: 111/85 104/74 102/78   Pulse:  60 68   Resp: 16 16 16   Temp: 36.5  C (97.7  F) 36.7  C (98  F) 36.7  C (98  F)   SpO2: 99% 100% 100%         Electronically Signed By: Aldo Schrader MD, 2019, 1:41 PM

## 2019-03-08 NOTE — ANESTHESIA PREPROCEDURE EVALUATION
Anesthesia Pre-Procedure Evaluation    Patient: Merissa Silverman   MRN:     [id number] Gender:   female   Age:    33 year old :      1985        Preoperative Diagnosis: Medulloblastoma of Cerebellum   Procedure(s):  Right Port Removal     Past Medical History:   Diagnosis Date     Gastroesophageal reflux disease      Medulloblastoma (H)       Past Surgical History:   Procedure Laterality Date     CRANIOTOMY  2018     dermoid cyst removed right ovary       INSERT PORT VASCULAR ACCESS Right 2018    Procedure: INSERT PORT VASCULAR ACCESS;  Right Central Venous Chest Port Placement;  Surgeon: Sung Selby PA-C;  Location: [facility] OR          Anesthesia Evaluation     .             ROS/MED HX    ENT/Pulmonary:  - neg pulmonary ROS     Neurologic:  - neg neurologic ROS     Cardiovascular:  - neg cardiovascular ROS       METS/Exercise Tolerance:     Hematologic:  - neg hematologic  ROS       Musculoskeletal:  - neg musculoskeletal ROS       GI/Hepatic:  - neg GI/hepatic ROS   (+) GERD       Renal/Genitourinary:  - ROS Renal section negative       Endo:  - neg endo ROS       Psychiatric:  - neg psychiatric ROS       Infectious Disease:  - neg infectious disease ROS       Malignancy:   (+) Malignancy History of Neuro  Neuro CA status post Chemo and Surgery.       - no malignancy   Other:    - neg other ROS                     PHYSICAL EXAM:   Mental Status/Neuro: A/A/O   Airway: Facies: Feasible  Mallampati: I  Mouth/Opening: Full  TM distance: > 6 cm  Neck ROM: Full   Respiratory: Auscultation: CTAB     Resp. Rate: Normal     Resp. Effort: Normal      CV: Rhythm: Regular  Rate: Age appropriate  Heart: Normal Sounds   Comments:      Dental: Normal                  Lab Results   Component Value Date    WBC 2.8 (L) 2019    HGB 9.5 (L) 2019    HCT 27.8 (L) 2019     2019     02/15/2019    POTASSIUM 3.1 (L) 02/15/2019    CHLORIDE 102 02/15/2019    CO2 28 02/15/2019     "BUN 11 02/15/2019    CR 0.78 02/15/2019    GLC 87 02/15/2019    ANAND 8.9 02/15/2019    ALBUMIN 4.3 02/15/2019    PROTTOTAL 7.6 02/15/2019    ALT 38 02/15/2019    AST 37 02/15/2019    ALKPHOS 75 02/15/2019    BILITOTAL 0.6 02/15/2019    INR 1.08 03/08/2019    HCGS Negative 05/02/2018       Preop Vitals  BP Readings from Last 3 Encounters:   03/08/19 111/85   02/28/19 110/80   02/26/19 117/87    Pulse Readings from Last 3 Encounters:   02/26/19 68   02/15/19 88   01/04/19 67      Resp Readings from Last 3 Encounters:   03/08/19 16   02/15/19 16   01/04/19 16    SpO2 Readings from Last 3 Encounters:   03/08/19 99%   02/15/19 98%   01/04/19 100%      Temp Readings from Last 1 Encounters:   03/08/19 36.5  C (97.7  F) (Oral)    Ht Readings from Last 1 Encounters:   03/08/19 1.575 m (5' 2\")      Wt Readings from Last 1 Encounters:   03/08/19 69.7 kg (153 lb 9.6 oz)    Estimated body mass index is 28.09 kg/m  as calculated from the following:    Height as of this encounter: 1.575 m (5' 2\").    Weight as of this encounter: 69.7 kg (153 lb 9.6 oz).     LDA:  Peripheral IV 03/08/19 Left Hand (Active)   Site Assessment WDL 3/8/2019  6:50 AM   Line Status Infusing 3/8/2019  6:50 AM   Phlebitis Scale 0-->no symptoms 3/8/2019  6:50 AM   Infiltration Scale 0 3/8/2019  6:50 AM   Extravasation? No 3/8/2019  6:50 AM   Dressing Intervention New dressing  3/8/2019  6:50 AM   Reason Not Rotated Anticipated discharge 3/8/2019  6:50 AM   Number of days: 0       Port A Cath Single 05/09/18 Right Chest wall (Active)   Number of days: 303            Assessment:   ASA SCORE: 3    NPO Status: > 6 hours since completed Solid Foods   Documentation: H&P complete; Preop Testing complete; Consents complete   Proceeding: Proceed without further delay  Tobacco Use:  NO Active use of Tobacco/UNKNOWN Tobacco use status     Plan:   Anes. Type:  MAC   Pre-Induction: Midazolam IV   Induction:  Not applicable   Airway: Native Airway   Access/Monitoring: PIV "   Maintenance: N/a   Emergence: N/a   Logistics: Same Day Surgery     Postop Pain/Sedation Strategy:  Standard-Options: Opioids PRN     PONV Management: NO PONV Prevention Needed  Adult Risk Factors: Female, Non-Smoker, Postop Opioids     CONSENT: Direct conversation   Plan and risks discussed with: Patient   Blood Products: Consent Deferred (Minimal Blood Loss)                         Aldo Schrader MD

## 2019-03-08 NOTE — ANESTHESIA CARE TRANSFER NOTE
Patient: Merissa Silverman    Procedure(s):  Right Port Removal    Diagnosis: Medulloblastoma of Cerebellum  Diagnosis Additional Information: No value filed.    Anesthesia Type:   No value filed.     Note:  Airway :Room Air  Patient transferred to:Phase II  Handoff Report: Identifed the Patient, Identified the Reponsible Provider, Reviewed the pertinent medical history, Discussed the surgical course, Reviewed Intra-OP anesthesia mangement and issues during anesthesia, Set expectations for post-procedure period and Allowed opportunity for questions and acknowledgement of understanding      Vitals: (Last set prior to Anesthesia Care Transfer)    CRNA VITALS  3/8/2019 0721 - 3/8/2019 0756      3/8/2019             EKG:  NSR                Electronically Signed By: PHIL Salguero CRNA  March 8, 2019  7:56 AM

## 2019-04-22 ENCOUNTER — ANCILLARY PROCEDURE (OUTPATIENT)
Dept: MRI IMAGING | Facility: CLINIC | Age: 34
End: 2019-04-22
Attending: PSYCHIATRY & NEUROLOGY
Payer: COMMERCIAL

## 2019-04-22 ENCOUNTER — APPOINTMENT (OUTPATIENT)
Dept: LAB | Facility: CLINIC | Age: 34
End: 2019-04-22
Attending: PSYCHIATRY & NEUROLOGY
Payer: COMMERCIAL

## 2019-04-22 ENCOUNTER — ONCOLOGY VISIT (OUTPATIENT)
Dept: ONCOLOGY | Facility: CLINIC | Age: 34
End: 2019-04-22
Attending: PSYCHIATRY & NEUROLOGY
Payer: COMMERCIAL

## 2019-04-22 ENCOUNTER — TUMOR CONFERENCE (OUTPATIENT)
Dept: ONCOLOGY | Facility: CLINIC | Age: 34
End: 2019-04-22

## 2019-04-22 VITALS
BODY MASS INDEX: 27.86 KG/M2 | SYSTOLIC BLOOD PRESSURE: 124 MMHG | RESPIRATION RATE: 16 BRPM | TEMPERATURE: 98.4 F | HEART RATE: 70 BPM | DIASTOLIC BLOOD PRESSURE: 81 MMHG | OXYGEN SATURATION: 98 % | WEIGHT: 152.3 LBS

## 2019-04-22 DIAGNOSIS — T45.1X5A CHEMOTHERAPY-INDUCED NAUSEA AND VOMITING: ICD-10-CM

## 2019-04-22 DIAGNOSIS — R11.2 CHEMOTHERAPY-INDUCED NAUSEA AND VOMITING: ICD-10-CM

## 2019-04-22 DIAGNOSIS — F40.240 CLAUSTROPHOBIA: ICD-10-CM

## 2019-04-22 DIAGNOSIS — R53.0 NEOPLASTIC MALIGNANT RELATED FATIGUE: ICD-10-CM

## 2019-04-22 DIAGNOSIS — F41.9 ANXIETY: ICD-10-CM

## 2019-04-22 DIAGNOSIS — D61.810 ANTINEOPLASTIC CHEMOTHERAPY INDUCED PANCYTOPENIA (H): ICD-10-CM

## 2019-04-22 DIAGNOSIS — C71.6 MEDULLOBLASTOMA OF CEREBELLUM (H): ICD-10-CM

## 2019-04-22 DIAGNOSIS — Z51.11 CHEMOTHERAPY MANAGEMENT, ENCOUNTER FOR: ICD-10-CM

## 2019-04-22 DIAGNOSIS — T45.1X5A ANTINEOPLASTIC CHEMOTHERAPY INDUCED PANCYTOPENIA (H): ICD-10-CM

## 2019-04-22 DIAGNOSIS — C71.6 MEDULLOBLASTOMA OF CEREBELLUM (H): Primary | ICD-10-CM

## 2019-04-22 LAB
ALBUMIN SERPL-MCNC: 4.2 G/DL (ref 3.4–5)
ALP SERPL-CCNC: 88 U/L (ref 40–150)
ALT SERPL W P-5'-P-CCNC: 53 U/L (ref 0–50)
ANION GAP SERPL CALCULATED.3IONS-SCNC: 8 MMOL/L (ref 3–14)
AST SERPL W P-5'-P-CCNC: 48 U/L (ref 0–45)
BASOPHILS # BLD AUTO: 0 10E9/L (ref 0–0.2)
BASOPHILS NFR BLD AUTO: 0.6 %
BILIRUB SERPL-MCNC: 0.4 MG/DL (ref 0.2–1.3)
BUN SERPL-MCNC: 16 MG/DL (ref 7–30)
CALCIUM SERPL-MCNC: 9.2 MG/DL (ref 8.5–10.1)
CHLORIDE SERPL-SCNC: 103 MMOL/L (ref 94–109)
CO2 SERPL-SCNC: 26 MMOL/L (ref 20–32)
CREAT SERPL-MCNC: 0.86 MG/DL (ref 0.52–1.04)
DIFFERENTIAL METHOD BLD: ABNORMAL
EOSINOPHIL # BLD AUTO: 0.1 10E9/L (ref 0–0.7)
EOSINOPHIL NFR BLD AUTO: 2.2 %
ERYTHROCYTE [DISTWIDTH] IN BLOOD BY AUTOMATED COUNT: 12.6 % (ref 10–15)
GFR SERPL CREATININE-BSD FRML MDRD: 89 ML/MIN/{1.73_M2}
GLUCOSE SERPL-MCNC: 102 MG/DL (ref 70–99)
HCT VFR BLD AUTO: 31.1 % (ref 35–47)
HGB BLD-MCNC: 10.9 G/DL (ref 11.7–15.7)
IMM GRANULOCYTES # BLD: 0 10E9/L (ref 0–0.4)
IMM GRANULOCYTES NFR BLD: 0 %
LYMPHOCYTES # BLD AUTO: 0.2 10E9/L (ref 0.8–5.3)
LYMPHOCYTES NFR BLD AUTO: 6.2 %
MCH RBC QN AUTO: 33.9 PG (ref 26.5–33)
MCHC RBC AUTO-ENTMCNC: 35 G/DL (ref 31.5–36.5)
MCV RBC AUTO: 97 FL (ref 78–100)
MONOCYTES # BLD AUTO: 0.4 10E9/L (ref 0–1.3)
MONOCYTES NFR BLD AUTO: 12.1 %
NEUTROPHILS # BLD AUTO: 2.8 10E9/L (ref 1.6–8.3)
NEUTROPHILS NFR BLD AUTO: 78.9 %
NRBC # BLD AUTO: 0 10*3/UL
NRBC BLD AUTO-RTO: 0 /100
PLATELET # BLD AUTO: 212 10E9/L (ref 150–450)
POTASSIUM SERPL-SCNC: 3.8 MMOL/L (ref 3.4–5.3)
PROT SERPL-MCNC: 7.6 G/DL (ref 6.8–8.8)
RBC # BLD AUTO: 3.22 10E12/L (ref 3.8–5.2)
SODIUM SERPL-SCNC: 136 MMOL/L (ref 133–144)
WBC # BLD AUTO: 3.6 10E9/L (ref 4–11)

## 2019-04-22 PROCEDURE — 85025 COMPLETE CBC W/AUTO DIFF WBC: CPT | Performed by: PSYCHIATRY & NEUROLOGY

## 2019-04-22 PROCEDURE — 36592 COLLECT BLOOD FROM PICC: CPT

## 2019-04-22 PROCEDURE — G0463 HOSPITAL OUTPT CLINIC VISIT: HCPCS | Mod: ZF

## 2019-04-22 PROCEDURE — 99214 OFFICE O/P EST MOD 30 MIN: CPT | Mod: ZP | Performed by: PSYCHIATRY & NEUROLOGY

## 2019-04-22 PROCEDURE — 80053 COMPREHEN METABOLIC PANEL: CPT | Performed by: PSYCHIATRY & NEUROLOGY

## 2019-04-22 RX ORDER — GADOBUTROL 604.72 MG/ML
7.5 INJECTION INTRAVENOUS ONCE
Status: COMPLETED | OUTPATIENT
Start: 2019-04-22 | End: 2019-04-22

## 2019-04-22 RX ORDER — LORAZEPAM 1 MG/1
TABLET ORAL
Qty: 2 TABLET | Refills: 0 | Status: SHIPPED | OUTPATIENT
Start: 2019-04-22 | End: 2019-06-24

## 2019-04-22 RX ORDER — LORAZEPAM 1 MG/1
1 TABLET ORAL EVERY 6 HOURS PRN
COMMUNITY
End: 2019-04-22

## 2019-04-22 RX ADMIN — GADOBUTROL 7.5 ML: 604.72 INJECTION INTRAVENOUS at 12:50

## 2019-04-22 ASSESSMENT — PAIN SCALES - GENERAL: PAINLEVEL: NO PAIN (0)

## 2019-04-22 NOTE — TUMOR CONFERENCE
Tumor Conference Information  Tumor Conference:    Specialties Present:  Surgery, Medical Oncology, Radiation Oncology, Radiology, Pathology  Patient Status:  Current patient  Pathology:  Not Discussed  Treatment to Date:  Surgical intervention(s), Chemoradiation  Clinical Trial Eligibility:  Not discussed  Recommended Plan:  Observation (see comment) (Comment: review of imaging shows no new evidence of disease recurrence, continue regular follow up)  Did the review exceed 30 minutes?:  did not           Documentation / Disclaimer Cancer Tumor Board Note  Cancer tumor board recommendations do not override what is determined to be reasonable care and treatment, which is dependent on the circumstances of a patient's case; the patient's medical, social, and personal concerns; and the clinical judgment of the oncologist [physician].

## 2019-04-22 NOTE — PATIENT INSTRUCTIONS
Imaging reviewed, no signs of cancer.   Repeat in 2 months.   Refilled Ativan as needed prior to scan.   No lab testing needed.     Return to clinic in 6/2019.      Anupama Morrison MD  Neuro-oncology  4/22/2019

## 2019-04-22 NOTE — PROGRESS NOTES
NEURO-ONCOLOGY VISIT  04/22/2019    CHIEF COMPLAINT: Ms. Merissa Silverman is a 33 year old right-handed woman with medulloblastoma (SHH-pathway activated and [id number], T2, M0 (spinal imaging and CSF negative) arising from the left cerebellum, diagnosed following gross total resection on 4/13/2018. Completed craniospinal radiation with concurrent vincristine x 3 doses, but concurrent chemotherapy was stopped in the setting of pancytopenia. She completed 4 cycles of adjuvant chemotherapy with vincristine, CCNU and cisplatin as of 1/2019. Currently managed on imaging surveillance.     She is presenting in follow-up accompanied by Grzegorz (father).      HISTORY OF PRESENT ILLNESS  -She is doing well   -She has very occasional headaches after exertion or dehydration.  -She has no nausea  -No difficulty with walking or disequilibrium  -Denies fatigue; working full time. Eating and drinking well.   -She denies any seizure-like activity.  -Denies vision changes, weakness, or numbness and tingling.   -Persistent tinnitus, had repeat hearing testing.   -Had port removed.   -Likes her new primary care MD.   -Evaluated by dermatology.     REVIEW OF SYSTEMS  A comprehensive ROS negative except as in HPI.      MEDICATIONS   Current Outpatient Medications   Medication     acetaminophen (TYLENOL) 325 MG tablet     LORazepam (ATIVAN) 1 MG tablet     ondansetron (ZOFRAN-ODT) 8 MG ODT tab     No current facility-administered medications for this visit.      DRUG ALLERGIES   Allergies   Allergen Reactions     Sulfa Drugs Unknown and Hives       ONCOLOGIC HISTORY  -PRESENTATION: Progressively worsening headaches. Additionally was with abrupt position changes resulted in dizziness/ syncope. CTH at an outside hospital showed a mass in the left cerebellum. Transferred to [facility].  -4/11/2018 MRB showed a 3.5cm mass in the left cerebellar hemisphere that was associated with mild cerebellar tonsillar herniation and  hydrocephalus.  -MRI spine showed no evidence of disease.   -4/13/2018 SURGERY: Suboccipital craniotomy with resection of the left cerebellar mass. Immediate post-operative MRI demonstrated a gross total resection.   PATHOLOGY: Medulloblastoma; Molecular markers pending.   -5/4/2018 NEURO-ONC: LP performed. Evaluated by radiation oncology. Recommending craniospinal radiation + concurrent vincristine followed by eight 6-week cycles of cisplatin, lomustine, and vincristine.  -5/4/2018 LP with CSF analysis: WBC 1/ RBC 1, protein 25, glucose 62, negative cytology.   -5/14 - 6/22/2018 CHEMORADS with vincristine 2 mg/m2 (stopped after 3 infusions due to pancytopenia).  -5/17/2018 Audiology- Baseline hearing testing with no hearing loss.  -6/4/2018 NEURO-ONC: Doing well, no new neurological symptoms. Tolerating treatment well. Ophtho referral. Labs improved.  -6/4/2018 NGS via STRATA: SMO mutation, TERT promotor mutation. Negative for microsatellite instability.   -6/25/2018 NEURO-ONC: Clinically stable, painful radiation-induced burn. Monitoring labs. Cancer Risk Assessment referral.  -8/13/2018 NEURO-ONC/MRB/ CHEMO: Clinically stable. Imaging shows expected post operative changes with no evidence of new disease. C1D1 of vincristine, CCNU and cisplatin.   -9/24/2018 NEURO-ONC: Neurologically stable, though did not tolerate first cycle of chemo well 2/2 nausea. Hold on starting C2D1 today due to leukopenia (WBC 2.7).  -9/27/2018 CHEMO: C2D1 of vincristine, CCNU and cisplatin.   -11/9/2018 NEURO-ONC/ MRB/ CHEMO: Clinically well. Imaging with no evidence of tumor recurrence. Cycle 3 CCNU and cisplatin; plan to hold vincristine on ODD cycles due to peripheral neuropathy. Repeat hearing testing ordered.   -12/4/2018 NEURO-ONC: New severe HA, likely tension. No new neurological symptoms or signs  -1/4/2019 NEURO-ONC/CHEMO: Clinically doing well. No changes. Cycle 4 CCNU and cisplatin. Holding vincristine. Last  "cycle.  -2/15/2019 NEURO-ONC/ MRB: Clinically stable. Labs not completely rebounded. Imaging with no concern for disease recurrence. Continue with imaging surveillance.  -2/25/2019 AUDIOGRAM: moderate SNHL 3-8 kHz.  -3/2019 Port removed.   -4/22/2019 NEURO-ONC/ MRB: Clinically stable. Labs stable. Imaging with no concern for disease recurrence. Continue with imaging surveillance. Evaluated by dermatology.     SOCIAL HISTORY   Tobacco use: Former smoker, E-cigs stopped on 5/3/2018  Alcohol use: None.   Drug use: Denies marijuana use.  Supplement, complimentary/ alternative medicine: None.   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      PHYSICAL EXAMINATION  /81 (BP Location: Right arm, Patient Position: Sitting, Cuff Size: Adult Regular)   Pulse 70   Temp 98.4  F (36.9  C) (Oral)   Resp 16   Wt 69.1 kg (152 lb 4.8 oz)   SpO2 98%   BMI 27.86 kg/m     Vitals:    04/22/19 1407   Weight: 69.1 kg (152 lb 4.8 oz)     Ht Readings from Last 2 Encounters:   03/08/19 1.575 m (5' 2\")   02/26/19 1.575 m (5' 2\")     KPS: 100    -Generally well appearing.  -Throat: No oral thrush. No redness of throat. No lymphadenopathy.   -Respiratory: Normal breath sounds, no audible wheezing.   -Skin: No rashes. Healed head incision. Hair has regrown  -Hematologic/ lymphatic: No abnormal bruising. No leg swelling.  -Psychiatric: Normal mood and affect. Pleasant, talkative.  -Neurologic:   MENTAL STATUS:     Alert, oriented to date.               Endorses feeling slightly altered due to pre-scan benzodiazepine use.   Recall: Intact.    Speech fluent. Comprehension intact to multi-step commands.   Normal naming, repetition. Able to read.   Good right-left orientation.     CRANIAL NERVES:     Discs flat on fundoscopy.    Pupils are equal, round, reactive to light.     Extraocular movements full, patient denies diplopia. Previous mild eye jerks noted on pursuit.    Visual fields full.     Facial sensation " intact to light touch.   Symmetric facial movements.   Hearing intact.   Palate moves symmetrically.     Sternocleidomastoid and trapezius strength intact.    Tongue midline.  MOTOR:    Normal and symmetric tone.   Grossly 5/5 throughout.    No pronation or drift. No orbiting.   Able to rise from a chair without use of arms.   On toe/ heel walk, equal distance from floor to heels/ toes.   SENSATION:    Intact to light touch throughout.   COORDINATION:   Intact finger-nose with eyes open and closed.   REFLEXES:    Muted in legs, trace at biceps; symmetric.    Toes not tested. No clonus. No Hoffmans.   No grasp.    GAIT:   Walks without assistance.   Good speed. Normal stride length and heel strike. Normal turns. Normal arm swing.   Able to toe, heel walk. No difficulty with tandem walk.       MEDICAL RECORDS  Obtained and personally reviewed all available outside medical records in addition to reviewing any records available in our electronic system.     LABS  Results for orders placed or performed in visit on 04/22/19   Comprehensive metabolic panel   Result Value Ref Range    Sodium 136 133 - 144 mmol/L    Potassium 3.8 3.4 - 5.3 mmol/L    Chloride 103 94 - 109 mmol/L    Carbon Dioxide 26 20 - 32 mmol/L    Anion Gap 8 3 - 14 mmol/L    Glucose 102 (H) 70 - 99 mg/dL    Urea Nitrogen 16 7 - 30 mg/dL    Creatinine 0.86 0.52 - 1.04 mg/dL    GFR Estimate 89 >60 mL/min/[1.73_m2]    GFR Estimate If Black >90 >60 mL/min/[1.73_m2]    Calcium 9.2 8.5 - 10.1 mg/dL    Bilirubin Total 0.4 0.2 - 1.3 mg/dL    Albumin 4.2 3.4 - 5.0 g/dL    Protein Total 7.6 6.8 - 8.8 g/dL    Alkaline Phosphatase 88 40 - 150 U/L    ALT 53 (H) 0 - 50 U/L    AST 48 (H) 0 - 45 U/L   CBC with platelets differential   Result Value Ref Range    WBC 3.6 (L) 4.0 - 11.0 10e9/L    RBC Count 3.22 (L) 3.8 - 5.2 10e12/L    Hemoglobin 10.9 (L) 11.7 - 15.7 g/dL    Hematocrit 31.1 (L) 35.0 - 47.0 %    MCV 97 78 - 100 fl    MCH 33.9 (H) 26.5 - 33.0 pg    MCHC 35.0  31.5 - 36.5 g/dL    RDW 12.6 10.0 - 15.0 %    Platelet Count 212 150 - 450 10e9/L    Diff Method Automated Method     % Neutrophils 78.9 %    % Lymphocytes 6.2 %    % Monocytes 12.1 %    % Eosinophils 2.2 %    % Basophils 0.6 %    % Immature Granulocytes 0.0 %    Nucleated RBCs 0 0 /100    Absolute Neutrophil 2.8 1.6 - 8.3 10e9/L    Absolute Lymphocytes 0.2 (L) 0.8 - 5.3 10e9/L    Absolute Monocytes 0.4 0.0 - 1.3 10e9/L    Absolute Eosinophils 0.1 0.0 - 0.7 10e9/L    Absolute Basophils 0.0 0.0 - 0.2 10e9/L    Abs Immature Granulocytes 0.0 0 - 0.4 10e9/L    Absolute Nucleated RBC 0.0          IMAGING  Personally reviewed MR brain imaging from today and compared to post-radiation imaging. To my eye, there is no sign of disease recurrence.     Imaging was shown to and results were reviewed with Merissa and Grzegorz.     Imaging and case reviewed and discussed at Brain Tumor Conference.        IMPRESSION/PLAN  For the 30 minute appointment, more than 50% of the encounter was spent discussing in detail the nature of her cancer in light of her imaging from today. This was in addition to providing emotional support, answering questions pertaining to my recommendations, and devising the treatment plan as outlined below.     Imaging with no evidence of disease recurrence. Labs without concern, recovered to baseline; no need for continued monitoring. Clinically stable. Underwent repeat hearing testing with mild loss in hearing noted.      Merissa completed 4 cycles of adjuvant therapy and imaging continues to demonstrate complete response. No cancer-directed therapy indicated. General NCCN Guidelines for imaging surveillance is q 3 months of 2 years, then q 6 months for 3 years. At recurrence, clinical trial options remain a strong consideration for recurrent disease, as does off label use of SMO inhibitors.     PROBLEM LIST  Medulloblastoma  Steroid intolerance  Chemotherapy-induced pancytopenia  Chemotherapy-induced nausea/ vomiting    Radiation burn  Fatigue, cancer and treatment related, improving  Port removed     PLAN  -CANCER-DIRECTED THERAPY-  -Planned treatment stop, continued imaging surveillance.   -Refilled Ativan needed for scan.   -Repeat in 6/2019, then q 3 months.   -No repeat labs need, counts have removed.   -Repeat hearing testing needed again in 11/2019.     -SEIZURE MANAGEMENT-  -While this patient is at increased risk of having seizures, given the lack of seizure history, there is no indication to prescribe an antiepileptic at this time. Will continue to monitor for seizure activity.    -Quality of life/ MOOD/ FATIGUE-  -Denies any mood issues.  -Continue to monitor mood as untreated/ undertreated depression can worsen fatigue, dysorexia, and quali  -Fatigue confounded by anemia, continue to monitor.    Follows with Dr. Jakob Ryder MD - Family Medicine Physician at [facility].     Return to clinic in 6/2019 with new MRI.    Anupama Morrison MD  Neuro-oncology

## 2019-04-22 NOTE — LETTER
4/22/2019       RE: Merissa Silverman  [address]  [address]  [place] [facility] [place] [place] [facility] [address]     Dear Colleague,    Thank you for referring your patient, Merissa Silverman, to the [facility]. Please see a copy of my visit note below.    NEURO-ONCOLOGY VISIT  04/22/2019    CHIEF COMPLAINT: Ms. 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 Completed craniospinal radiation with concurrent vincristine x 3 doses, but concurrent chemotherapy was stopped in the setting of pancytopenia. She completed 4 cycles of adjuvant chemotherapy with vincristine, CCNU and cisplatin as of 1/2019. Currently managed on imaging surveillance.     She is presenting in follow-up accompanied by Grzegorz (father).      HISTORY OF PRESENT ILLNESS  -She is doing well   -She has very occasional headaches after exertion or dehydration.  -She has no nausea  -No difficulty with walking or disequilibrium  -Denies fatigue; working full time. Eating and drinking well.   -She denies any seizure-like activity.  -Denies vision changes, weakness, or numbness and tingling.   -Persistent tinnitus, had repeat hearing testing.   -Had port removed.   -Likes her new primary care MD.   -Evaluated by dermatology.     REVIEW OF SYSTEMS  A comprehensive ROS negative except as in HPI.      MEDICATIONS   Current Outpatient Medications   Medication     acetaminophen (TYLENOL) 325 MG tablet     LORazepam (ATIVAN) 1 MG tablet     ondansetron (ZOFRAN-ODT) 8 MG ODT tab     No current facility-administered medications for this visit.      DRUG ALLERGIES   Allergies   Allergen Reactions     Sulfa Drugs Unknown and Hives       ONCOLOGIC HISTORY  -PRESENTATION: Progressively worsening headaches. Additionally was with abrupt position changes resulted in dizziness/  syncope. CTH at an outside hospital showed a mass in the left cerebellum. Transferred to [facility].  -4/11/2018 MRB showed a 3.5cm mass in the left cerebellar hemisphere that was associated with mild cerebellar tonsillar herniation and hydrocephalus.  -MRI spine showed no evidence of disease.   -4/13/2018 SURGERY: Suboccipital craniotomy with resection of the left cerebellar mass. Immediate post-operative MRI demonstrated a gross total resection.   PATHOLOGY: Medulloblastoma; Molecular markers pending.   -5/4/2018 NEURO-ONC: LP performed. Evaluated by radiation oncology. Recommending craniospinal radiation + concurrent vincristine followed by eight 6-week cycles of cisplatin, lomustine, and vincristine.  -5/4/2018 LP with CSF analysis: WBC 1/ RBC 1, protein 25, glucose 62, negative cytology.   -5/14 - 6/22/2018 CHEMORADS with vincristine 2 mg/m2 (stopped after 3 infusions due to pancytopenia).  -5/17/2018 Audiology- Baseline hearing testing with no hearing loss.  -6/4/2018 NEURO-ONC: Doing well, no new neurological symptoms. Tolerating treatment well. Ophtho referral. Labs improved.  -6/4/2018 NGS via STRATA: SMO mutation, TERT promotor mutation. Negative for microsatellite instability.   -6/25/2018 NEURO-ONC: Clinically stable, painful radiation-induced burn. Monitoring labs. Cancer Risk Assessment referral.  -8/13/2018 NEURO-ONC/MRB/ CHEMO: Clinically stable. Imaging shows expected post operative changes with no evidence of new disease. C1D1 of vincristine, CCNU and cisplatin.   -9/24/2018 NEURO-ONC: Neurologically stable, though did not tolerate first cycle of chemo well 2/2 nausea. Hold on starting C2D1 today due to leukopenia (WBC 2.7).  -9/27/2018 CHEMO: C2D1 of vincristine, CCNU and cisplatin.   -11/9/2018 NEURO-ONC/ MRB/ CHEMO: Clinically well. Imaging with no evidence of tumor recurrence. Cycle 3 CCNU and cisplatin; plan to hold vincristine on ODD cycles due to peripheral neuropathy.  "Repeat hearing testing ordered.   -12/4/2018 NEURO-ONC: New severe HA, likely tension. No new neurological symptoms or signs  -1/4/2019 NEURO-ONC/CHEMO: Clinically doing well. No changes. Cycle 4 CCNU and cisplatin. Holding vincristine. Last cycle.  -2/15/2019 NEURO-ONC/ MRB: Clinically stable. Labs not completely rebounded. Imaging with no concern for disease recurrence. Continue with imaging surveillance.  -2/25/2019 AUDIOGRAM: moderate SNHL 3-8 kHz.  -3/2019 Port removed.   -4/22/2019 NEURO-ONC/ MRB: Clinically stable. Labs stable. Imaging with no concern for disease recurrence. Continue with imaging surveillance. Evaluated by dermatology.     SOCIAL HISTORY   Tobacco use: Former smoker, E-cigs stopped on 5/3/2018  Alcohol use: None.   Drug use: Denies marijuana use.  Supplement, complimentary/ alternative medicine: None.   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      PHYSICAL EXAMINATION  /81 (BP Location: Right arm, Patient Position: Sitting, Cuff Size: Adult Regular)   Pulse 70   Temp 98.4  F (36.9  C) (Oral)   Resp 16   Wt 69.1 kg (152 lb 4.8 oz)   SpO2 98%   BMI 27.86 kg/m      Vitals:    04/22/19 1407   Weight: 69.1 kg (152 lb 4.8 oz)     Ht Readings from Last 2 Encounters:   03/08/19 1.575 m (5' 2\")   02/26/19 1.575 m (5' 2\")     KPS: 100    -Generally well appearing.  -Throat: No oral thrush. No redness of throat. No lymphadenopathy.   -Respiratory: Normal breath sounds, no audible wheezing.   -Skin: No rashes. Healed head incision. Hair has regrown  -Hematologic/ lymphatic: No abnormal bruising. No leg swelling.  -Psychiatric: Normal mood and affect. Pleasant, talkative.  -Neurologic:   MENTAL STATUS:     Alert, oriented to date.               Endorses feeling slightly altered due to pre-scan benzodiazepine use.   Recall: Intact.    Speech fluent. Comprehension intact to multi-step commands.   Normal naming, repetition. Able to read.   Good right-left " orientation.     CRANIAL NERVES:     Discs flat on fundoscopy.    Pupils are equal, round, reactive to light.     Extraocular movements full, patient denies diplopia. Previous mild eye jerks noted on pursuit.    Visual fields full.     Facial sensation intact to light touch.   Symmetric facial movements.   Hearing intact.   Palate moves symmetrically.     Sternocleidomastoid and trapezius strength intact.    Tongue midline.  MOTOR:    Normal and symmetric tone.   Grossly 5/5 throughout.    No pronation or drift. No orbiting.   Able to rise from a chair without use of arms.   On toe/ heel walk, equal distance from floor to heels/ toes.   SENSATION:    Intact to light touch throughout.   COORDINATION:   Intact finger-nose with eyes open and closed.   REFLEXES:    Muted in legs, trace at biceps; symmetric.    Toes not tested. No clonus. No Hoffmans.   No grasp.    GAIT:   Walks without assistance.   Good speed. Normal stride length and heel strike. Normal turns. Normal arm swing.   Able to toe, heel walk. No difficulty with tandem walk.       MEDICAL RECORDS  Obtained and personally reviewed all available outside medical records in addition to reviewing any records available in our electronic system.     LABS  Results for orders placed or performed in visit on 04/22/19   Comprehensive metabolic panel   Result Value Ref Range    Sodium 136 133 - 144 mmol/L    Potassium 3.8 3.4 - 5.3 mmol/L    Chloride 103 94 - 109 mmol/L    Carbon Dioxide 26 20 - 32 mmol/L    Anion Gap 8 3 - 14 mmol/L    Glucose 102 (H) 70 - 99 mg/dL    Urea Nitrogen 16 7 - 30 mg/dL    Creatinine 0.86 0.52 - 1.04 mg/dL    GFR Estimate 89 >60 mL/min/[1.73_m2]    GFR Estimate If Black >90 >60 mL/min/[1.73_m2]    Calcium 9.2 8.5 - 10.1 mg/dL    Bilirubin Total 0.4 0.2 - 1.3 mg/dL    Albumin 4.2 3.4 - 5.0 g/dL    Protein Total 7.6 6.8 - 8.8 g/dL    Alkaline Phosphatase 88 40 - 150 U/L    ALT 53 (H) 0 - 50 U/L    AST 48 (H) 0 - 45 U/L   CBC with platelets  differential   Result Value Ref Range    WBC 3.6 (L) 4.0 - 11.0 10e9/L    RBC Count 3.22 (L) 3.8 - 5.2 10e12/L    Hemoglobin 10.9 (L) 11.7 - 15.7 g/dL    Hematocrit 31.1 (L) 35.0 - 47.0 %    MCV 97 78 - 100 fl    MCH 33.9 (H) 26.5 - 33.0 pg    MCHC 35.0 31.5 - 36.5 g/dL    RDW 12.6 10.0 - 15.0 %    Platelet Count 212 150 - 450 10e9/L    Diff Method Automated Method     % Neutrophils 78.9 %    % Lymphocytes 6.2 %    % Monocytes 12.1 %    % Eosinophils 2.2 %    % Basophils 0.6 %    % Immature Granulocytes 0.0 %    Nucleated RBCs 0 0 /100    Absolute Neutrophil 2.8 1.6 - 8.3 10e9/L    Absolute Lymphocytes 0.2 (L) 0.8 - 5.3 10e9/L    Absolute Monocytes 0.4 0.0 - 1.3 10e9/L    Absolute Eosinophils 0.1 0.0 - 0.7 10e9/L    Absolute Basophils 0.0 0.0 - 0.2 10e9/L    Abs Immature Granulocytes 0.0 0 - 0.4 10e9/L    Absolute Nucleated RBC 0.0          IMAGING  Personally reviewed MR brain imaging from today and compared to post-radiation imaging. To my eye, there is no sign of disease recurrence.     Imaging was shown to and results were reviewed with Merissa and Grzegorz.     Imaging and case reviewed and discussed at Brain Tumor Conference.        IMPRESSION/PLAN  For the 30 minute appointment, more than 50% of the encounter was spent discussing in detail the nature of her cancer in light of her imaging from today. This was in addition to providing emotional support, answering questions pertaining to my recommendations, and devising the treatment plan as outlined below.     Imaging with no evidence of disease recurrence. Labs without concern, recovered to baseline; no need for continued monitoring. Clinically stable. Underwent repeat hearing testing with mild loss in hearing noted.      Merissa completed 4 cycles of adjuvant therapy and imaging continues to demonstrate complete response. No cancer-directed therapy indicated. General NCCN Guidelines for imaging surveillance is q 3 months of 2 years, then q 6 months for 3 years. At  recurrence, clinical trial options remain a strong consideration for recurrent disease, as does off label use of SMO inhibitors.     PROBLEM LIST  Medulloblastoma  Steroid intolerance  Chemotherapy-induced pancytopenia  Chemotherapy-induced nausea/ vomiting   Radiation burn  Fatigue, cancer and treatment related, improving  Port removed     PLAN  -CANCER-DIRECTED THERAPY-  -Planned treatment stop, continued imaging surveillance.   -Refilled Ativan needed for scan.   -Repeat in 6/2019, then q 3 months.   -No repeat labs need, counts have removed.   -Repeat hearing testing needed again in 11/2019.     -SEIZURE MANAGEMENT-  -While this patient is at increased risk of having seizures, given the lack of seizure history, there is no indication to prescribe an antiepileptic at this time. Will continue to monitor for seizure activity.    -Quality of life/ MOOD/ FATIGUE-  -Denies any mood issues.  -Continue to monitor mood as untreated/ undertreated depression can worsen fatigue, dysorexia, and quali  -Fatigue confounded by anemia, continue to monitor.    Follows with Dr. Jakob Ryder MD - Family Medicine Physician at [facility].     Return to clinic in 6/2019 with new MRI.    Anupama Morrison MD  Neuro-oncology

## 2019-04-22 NOTE — NURSING NOTE
Chief Complaint   Patient presents with     Blood Draw     Labs drawn via PIV by RN in lab. VS taken.      Labs drawn via peripheral IV. Vital signs taken. Checked into next appointment.   Sharon Long RN

## 2019-04-22 NOTE — NURSING NOTE
"Oncology Rooming Note    April 22, 2019 2:33 PM   Merissa Silverman is a 33 year old female who presents for:    Chief Complaint   Patient presents with     Blood Draw     Labs drawn via PIV by RN in lab. VS taken.      Oncology Clinic Visit     Return; Medulloblastoma of cerebellum      Initial Vitals: /81 (BP Location: Right arm, Patient Position: Sitting, Cuff Size: Adult Regular)   Pulse 70   Temp 98.4  F (36.9  C) (Oral)   Resp 16   Wt 69.1 kg (152 lb 4.8 oz)   SpO2 98%   BMI 27.86 kg/m   Estimated body mass index is 27.86 kg/m  as calculated from the following:    Height as of 3/8/19: 1.575 m (5' 2\").    Weight as of this encounter: 69.1 kg (152 lb 4.8 oz). Body surface area is 1.74 meters squared.  No Pain (0) Comment: Data Unavailable   No LMP recorded.  Allergies reviewed: Yes  Medications reviewed: Yes    Medications: Medication refills not needed today.  Pharmacy name entered into [employer]: [facility] [place] - [address]    Clinical concerns: Patient states there are no new concerns to discuss with provider.  Dr Morrison was not notified.         Chelle Ruiz CMA              "

## 2019-06-24 ENCOUNTER — ONCOLOGY VISIT (OUTPATIENT)
Dept: ONCOLOGY | Facility: CLINIC | Age: 34
End: 2019-06-24
Attending: PSYCHIATRY & NEUROLOGY
Payer: COMMERCIAL

## 2019-06-24 ENCOUNTER — ANCILLARY PROCEDURE (OUTPATIENT)
Dept: MRI IMAGING | Facility: CLINIC | Age: 34
End: 2019-06-24
Attending: PSYCHIATRY & NEUROLOGY
Payer: COMMERCIAL

## 2019-06-24 ENCOUNTER — PATIENT OUTREACH (OUTPATIENT)
Dept: ONCOLOGY | Facility: CLINIC | Age: 34
End: 2019-06-24

## 2019-06-24 ENCOUNTER — TUMOR CONFERENCE (OUTPATIENT)
Dept: ONCOLOGY | Facility: CLINIC | Age: 34
End: 2019-06-24

## 2019-06-24 VITALS
DIASTOLIC BLOOD PRESSURE: 99 MMHG | OXYGEN SATURATION: 100 % | SYSTOLIC BLOOD PRESSURE: 141 MMHG | WEIGHT: 150.9 LBS | RESPIRATION RATE: 16 BRPM | BODY MASS INDEX: 27.6 KG/M2 | TEMPERATURE: 97.8 F | HEART RATE: 68 BPM

## 2019-06-24 VITALS
DIASTOLIC BLOOD PRESSURE: 89 MMHG | BODY MASS INDEX: 27.58 KG/M2 | TEMPERATURE: 97.8 F | WEIGHT: 150.79 LBS | OXYGEN SATURATION: 100 % | SYSTOLIC BLOOD PRESSURE: 127 MMHG | HEART RATE: 68 BPM

## 2019-06-24 DIAGNOSIS — F40.240 CLAUSTROPHOBIA: ICD-10-CM

## 2019-06-24 DIAGNOSIS — R11.2 CHEMOTHERAPY-INDUCED NAUSEA AND VOMITING: ICD-10-CM

## 2019-06-24 DIAGNOSIS — T45.1X5A ANTINEOPLASTIC CHEMOTHERAPY INDUCED PANCYTOPENIA (H): ICD-10-CM

## 2019-06-24 DIAGNOSIS — R53.0 NEOPLASTIC MALIGNANT RELATED FATIGUE: ICD-10-CM

## 2019-06-24 DIAGNOSIS — T45.1X5A CHEMOTHERAPY-INDUCED NAUSEA AND VOMITING: ICD-10-CM

## 2019-06-24 DIAGNOSIS — D61.810 ANTINEOPLASTIC CHEMOTHERAPY INDUCED PANCYTOPENIA (H): ICD-10-CM

## 2019-06-24 DIAGNOSIS — C71.6 MEDULLOBLASTOMA OF CEREBELLUM (H): ICD-10-CM

## 2019-06-24 DIAGNOSIS — F41.9 ANXIETY: ICD-10-CM

## 2019-06-24 DIAGNOSIS — C71.6 MEDULLOBLASTOMA (H): Primary | ICD-10-CM

## 2019-06-24 DIAGNOSIS — Z51.11 CHEMOTHERAPY MANAGEMENT, ENCOUNTER FOR: ICD-10-CM

## 2019-06-24 LAB
ALBUMIN SERPL-MCNC: 4.3 G/DL (ref 3.4–5)
ALP SERPL-CCNC: 75 U/L (ref 40–150)
ALT SERPL W P-5'-P-CCNC: 28 U/L (ref 0–50)
ANION GAP SERPL CALCULATED.3IONS-SCNC: 4 MMOL/L (ref 3–14)
AST SERPL W P-5'-P-CCNC: 27 U/L (ref 0–45)
BASOPHILS # BLD AUTO: 0 10E9/L (ref 0–0.2)
BASOPHILS NFR BLD AUTO: 0.3 %
BILIRUB SERPL-MCNC: 0.4 MG/DL (ref 0.2–1.3)
BUN SERPL-MCNC: 13 MG/DL (ref 7–30)
CALCIUM SERPL-MCNC: 8.8 MG/DL (ref 8.5–10.1)
CHLORIDE SERPL-SCNC: 105 MMOL/L (ref 94–109)
CO2 SERPL-SCNC: 28 MMOL/L (ref 20–32)
CREAT SERPL-MCNC: 0.86 MG/DL (ref 0.52–1.04)
DIFFERENTIAL METHOD BLD: ABNORMAL
EOSINOPHIL # BLD AUTO: 0.1 10E9/L (ref 0–0.7)
EOSINOPHIL NFR BLD AUTO: 2.2 %
ERYTHROCYTE [DISTWIDTH] IN BLOOD BY AUTOMATED COUNT: 12.6 % (ref 10–15)
GFR SERPL CREATININE-BSD FRML MDRD: 88 ML/MIN/{1.73_M2}
GLUCOSE SERPL-MCNC: 96 MG/DL (ref 70–99)
HCT VFR BLD AUTO: 32 % (ref 35–47)
HGB BLD-MCNC: 11.1 G/DL (ref 11.7–15.7)
IMM GRANULOCYTES # BLD: 0 10E9/L (ref 0–0.4)
IMM GRANULOCYTES NFR BLD: 0.3 %
LYMPHOCYTES # BLD AUTO: 0.2 10E9/L (ref 0.8–5.3)
LYMPHOCYTES NFR BLD AUTO: 7.2 %
MCH RBC QN AUTO: 32.3 PG (ref 26.5–33)
MCHC RBC AUTO-ENTMCNC: 34.7 G/DL (ref 31.5–36.5)
MCV RBC AUTO: 93 FL (ref 78–100)
MONOCYTES # BLD AUTO: 0.4 10E9/L (ref 0–1.3)
MONOCYTES NFR BLD AUTO: 13.5 %
NEUTROPHILS # BLD AUTO: 2.4 10E9/L (ref 1.6–8.3)
NEUTROPHILS NFR BLD AUTO: 76.5 %
NRBC # BLD AUTO: 0 10*3/UL
NRBC BLD AUTO-RTO: 0 /100
PLATELET # BLD AUTO: 184 10E9/L (ref 150–450)
POTASSIUM SERPL-SCNC: 4 MMOL/L (ref 3.4–5.3)
PROT SERPL-MCNC: 7.3 G/DL (ref 6.8–8.8)
RBC # BLD AUTO: 3.44 10E12/L (ref 3.8–5.2)
SODIUM SERPL-SCNC: 137 MMOL/L (ref 133–144)
WBC # BLD AUTO: 3.2 10E9/L (ref 4–11)

## 2019-06-24 PROCEDURE — G0463 HOSPITAL OUTPT CLINIC VISIT: HCPCS | Mod: ZF

## 2019-06-24 PROCEDURE — 99214 OFFICE O/P EST MOD 30 MIN: CPT | Mod: ZP | Performed by: PSYCHIATRY & NEUROLOGY

## 2019-06-24 PROCEDURE — 36415 COLL VENOUS BLD VENIPUNCTURE: CPT

## 2019-06-24 PROCEDURE — 85025 COMPLETE CBC W/AUTO DIFF WBC: CPT | Performed by: PSYCHIATRY & NEUROLOGY

## 2019-06-24 PROCEDURE — 80053 COMPREHEN METABOLIC PANEL: CPT | Performed by: PSYCHIATRY & NEUROLOGY

## 2019-06-24 RX ORDER — LORAZEPAM 1 MG/1
TABLET ORAL
Qty: 2 TABLET | Refills: 0 | Status: SHIPPED | OUTPATIENT
Start: 2019-06-24 | End: 2019-09-18

## 2019-06-24 RX ORDER — GADOBUTROL 604.72 MG/ML
7.5 INJECTION INTRAVENOUS ONCE
Status: COMPLETED | OUTPATIENT
Start: 2019-06-24 | End: 2019-06-24

## 2019-06-24 RX ADMIN — GADOBUTROL 7.5 ML: 604.72 INJECTION INTRAVENOUS at 07:26

## 2019-06-24 ASSESSMENT — PAIN SCALES - GENERAL
PAINLEVEL: NO PAIN (0)
PAINLEVEL: NO PAIN (0)

## 2019-06-24 NOTE — DISCHARGE INSTRUCTIONS
MRI Contrast Discharge Instructions    The IV contrast you received today will pass out of your body in your  urine. This will happen in the next 24 hours. You will not feel this process.  Your urine will not change color.    Drink at least 4 extra glasses of water or juice today (unless your doctor  has restricted your fluids). This reduces the stress on your kidneys.  You may take your regular medicines.    If you are on dialysis: It is best to have dialysis today.    If you have a reaction: Most reactions happen right away. If you have  any new symptoms after leaving the hospital (such as hives or swelling),  call your hospital at the correct number below. Or call your family doctor.  If you have breathing distress or wheezing, call 911.    Special instructions: ***    I have read and understand the above information.    Signature:______________________________________ Date:___________    Staff:__________________________________________ Date:___________     Time:__________    [facility] Departments:    ___[place]: [phone]  ___[facility]: [phone]  ___[place]: [phone] ___[facility]: [phone]  ___[facility]: [phone]  ___[facility]: [phone]  ___[phone]  ___[facility]: [phone]  ___[phone]

## 2019-06-24 NOTE — PROGRESS NOTES
NEURO-ONCOLOGY VISIT  06/24/2019    CHIEF COMPLAINT: Ms. Merissa Silverman is a 33 year old right-handed woman with medulloblastoma (SHH-pathway activated and [id number], T2, M0 (spinal imaging and CSF negative) arising from the left cerebellum, diagnosed following gross total resection on 4/13/2018. Completed craniospinal radiation with concurrent vincristine x 3 doses, but concurrent chemotherapy was stopped in the setting of pancytopenia. She completed 4 cycles of adjuvant chemotherapy with vincristine, CCNU and cisplatin as of 1/2019. Currently managed on imaging surveillance.     She is presenting in follow-up accompanied by her mother.      HISTORY OF PRESENT ILLNESS  -She is doing well.  -She has very occasional headaches after exertion or dehydration.  -She has no nausea. Eating and drinking well.   -No difficulty with walking or disequilibrium.  -Denies fatigue; working full time.   -She denies any seizure-like activity.  -Denies vision changes, weakness, or numbness and tingling.   -Persistent tinnitus has improved, less noticable.     REVIEW OF SYSTEMS  A comprehensive ROS negative except as in HPI.      MEDICATIONS   Current Outpatient Medications   Medication     LORazepam (ATIVAN) 1 MG tablet     acetaminophen (TYLENOL) 325 MG tablet     ondansetron (ZOFRAN-ODT) 8 MG ODT tab     No current facility-administered medications for this visit.      DRUG ALLERGIES   Allergies   Allergen Reactions     Sulfa Drugs Unknown and Hives       ONCOLOGIC HISTORY  -PRESENTATION: Progressively worsening headaches. Additionally was with abrupt position changes resulted in dizziness/ syncope. CTH at an outside hospital showed a mass in the left cerebellum. Transferred to [facility].  -4/11/2018 MRB showed a 3.5cm mass in the left cerebellar hemisphere that was associated with mild cerebellar tonsillar herniation and hydrocephalus.  -MRI spine showed no evidence of disease.   -4/13/2018 SURGERY:  Suboccipital craniotomy with resection of the left cerebellar mass. Immediate post-operative MRI demonstrated a gross total resection.   PATHOLOGY: Medulloblastoma; Molecular markers pending.   -5/4/2018 NEURO-ONC: LP performed. Evaluated by radiation oncology. Recommending craniospinal radiation + concurrent vincristine followed by eight 6-week cycles of cisplatin, lomustine, and vincristine.  -5/4/2018 LP with CSF analysis: WBC 1/ RBC 1, protein 25, glucose 62, negative cytology.   -5/14 - 6/22/2018 CHEMORADS with vincristine 2 mg/m2 (stopped after 3 infusions due to pancytopenia).  -5/17/2018 Audiology- Baseline hearing testing with no hearing loss.  -6/4/2018 NEURO-ONC: Doing well, no new neurological symptoms. Tolerating treatment well. Ophtho referral. Labs improved.  -6/4/2018 NGS via STRATA: SMO mutation, TERT promotor mutation. Negative for microsatellite instability.   -6/25/2018 NEURO-ONC: Clinically stable, painful radiation-induced burn. Monitoring labs. Cancer Risk Assessment referral.  -8/13/2018 NEURO-ONC/MRB/ CHEMO: Clinically stable. Imaging shows expected post operative changes with no evidence of new disease. C1D1 of vincristine, CCNU and cisplatin.   -9/24/2018 NEURO-ONC: Neurologically stable, though did not tolerate first cycle of chemo well 2/2 nausea. Hold on starting C2D1 today due to leukopenia (WBC 2.7).  -9/27/2018 CHEMO: C2D1 of vincristine, CCNU and cisplatin.   -11/9/2018 NEURO-ONC/ MRB/ CHEMO: Clinically well. Imaging with no evidence of tumor recurrence. Cycle 3 CCNU and cisplatin; plan to hold vincristine on ODD cycles due to peripheral neuropathy. Repeat hearing testing ordered.   -12/4/2018 NEURO-ONC: New severe HA, likely tension. No new neurological symptoms or signs  -1/4/2019 NEURO-ONC/CHEMO: Clinically doing well. No changes. Cycle 4 CCNU and cisplatin. Holding vincristine. Last cycle.  -2/15/2019 NEURO-ONC/ MRB: Clinically stable. Labs not completely rebounded. Imaging with  "no concern for disease recurrence. Continue with imaging surveillance.  -2/25/2019 AUDIOGRAM: moderate SNHL 3-8 kHz.  -3/2019 Port removed.   -4/22/2019 NEURO-ONC/ MRB: Clinically stable. Labs stable. Imaging with no concern for disease recurrence. Continue with imaging surveillance. Evaluated by dermatology.   -6/24/2019 NEURO-ONC/ MRB: Clinically stable. Imaging with no concern for disease recurrence. Continue with imaging surveillance.    SOCIAL HISTORY   Tobacco use: Former smoker, E-cigs stopped on 5/3/2018  Alcohol use: None.   Drug use: Denies marijuana use.  Supplement, complimentary/ alternative medicine: None.   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      PHYSICAL EXAMINATION  /89   Pulse 68   Temp 97.8  F (36.6  C) (Oral)   Wt 68.4 kg (150 lb 12.7 oz)   SpO2 100%   BMI 27.58 kg/m     Vitals:    06/24/19 1059   Weight: 68.4 kg (150 lb 12.7 oz)     Ht Readings from Last 2 Encounters:   03/08/19 1.575 m (5' 2\")   02/26/19 1.575 m (5' 2\")     KPS: 100    -Generally well appearing.  -Throat: No oral thrush. No redness of throat. No lymphadenopathy.   -Respiratory: Normal breath sounds, no audible wheezing.   -Skin: No rashes. Healed head incision.  -Hematologic/ lymphatic: No abnormal bruising. No leg swelling.  -Psychiatric: Normal mood and affect. Pleasant, talkative.  -Neurologic:   MENTAL STATUS:     Alert, oriented to date.               Endorses feeling slightly altered due to pre-scan benzodiazepine use.   Recall: Intact.    Speech fluent. Comprehension intact to multi-step commands.   Normal naming, repetition. Able to read.   Good right-left orientation.     CRANIAL NERVES:     Discs flat on fundoscopy.    Pupils are equal, round, reactive to light.     Extraocular movements full, patient denies diplopia. Previous mild eye jerks noted on pursuit.    Visual fields full.     Facial sensation intact to light touch.   Symmetric facial movements.   Hearing " intact.   Palate moves symmetrically.     Sternocleidomastoid and trapezius strength intact.    Tongue midline.  MOTOR:    Normal and symmetric tone.   Grossly 5/5 throughout.    No pronation or drift. No orbiting.   Able to rise from a chair without use of arms.   On toe/ heel walk, equal distance from floor to heels/ toes.   SENSATION:    Intact to light touch throughout.   COORDINATION:   Intact finger-nose with eyes open and closed.   REFLEXES:    Muted in legs, trace at biceps; symmetric.    Toes not tested. No clonus. No Hoffmans.   No grasp.    GAIT:   Walks without assistance.   Good speed. Normal stride length and heel strike. Normal turns. Normal arm swing.   Able to toe, heel walk. No difficulty with tandem walk.       MEDICAL RECORDS  Obtained and personally reviewed all available outside medical records in addition to reviewing any records available in our electronic system.     LABS  Reviewed.     IMAGING  Personally reviewed MR brain imaging from today and compared to post-radiation imaging. To my eye, there is no sign of disease recurrence.     Imaging was shown to and results were reviewed with Merissa and her mother.     Imaging and case reviewed and discussed at Brain Tumor Conference.        IMPRESSION/PLAN  For the 30 minute appointment, more than 50% of the encounter was spent discussing in detail the nature of her cancer in light of her imaging from today. This was in addition to providing emotional support, answering questions pertaining to my recommendations, and devising the treatment plan as outlined below.     Imaging with no evidence of disease recurrence. Will repeat in 3 months.     Labs without concern, recovered to baseline; no need for continued monitoring.     Clinically stable. Will need repeat hearing testing in 11/2019.      Merissa completed 4 cycles of adjuvant therapy and imaging continues to demonstrate complete response. No cancer-directed therapy indicated. General NCCN  Guidelines for imaging surveillance is q 3 months of 2 years, then q 6 months for 3 years. At recurrence, clinical trial options remain a strong consideration for recurrent disease, as does off label use of SMO inhibitors.     PROBLEM LIST  Medulloblastoma  Steroid intolerance  Chemotherapy-induced pancytopenia  Chemotherapy-induced nausea/ vomiting   Radiation burn  Fatigue, cancer and treatment related, improving  Port removed     PLAN  -CANCER-DIRECTED THERAPY-  -Planned treatment stop, continued imaging surveillance.   -Refilled Ativan needed for scan.   -Repeat in 9/2019.  -No repeat labs need, counts have removed.     -Repeat hearing testing needed in 11/2019.     -SEIZURE MANAGEMENT-  -While this patient is at increased risk of having seizures, given the lack of seizure history, there is no indication to prescribe an antiepileptic at this time. Will continue to monitor for seizure activity.    -Quality of life/ MOOD/ FATIGUE-  -Denies any mood issues. Denies fatigue.   -Continue to monitor mood as untreated/ undertreated depression can worsen fatigue, dysorexia, and quali    Follows with Dr. Jakob Ryder MD - Family Medicine Physician at [employer].     Return to clinic in 9/2019 + repeat MRI.    Anupama Morrison MD  Neuro-oncology

## 2019-06-24 NOTE — PATIENT INSTRUCTIONS
[facility]     [facility]   (Part of [facility])   All-inclusive center with an onsite clinic, surgery center, IVF laboratory and clinical laboratory, located in [place].   [facility]   [phone]     [facility]   Infertility center focusing on in vitro fertilization, specifically for infertile couples and individuals. Clinics in [place] and [place].   [facility]   [phone]

## 2019-06-24 NOTE — NURSING NOTE
Chief Complaint   Patient presents with     Blood Draw     Labs drawn via PIVT via MA. VS taken. Pt. checked in for appt.      Rayna Garcia MA

## 2019-06-24 NOTE — LETTER
6/24/2019       RE: Merissa Silverman  [address]  [address]  [facility] [address]     Dear Colleague,    Thank you for referring your patient, Merissa Silverman, to the [facility]. Please see a copy of my visit note below.    NEURO-ONCOLOGY VISIT  06/24/2019    CHIEF COMPLAINT: Ms. 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 Completed craniospinal radiation with concurrent vincristine x 3 doses, but concurrent chemotherapy was stopped in the setting of pancytopenia. She completed 4 cycles of adjuvant chemotherapy with vincristine, CCNU and cisplatin as of 1/2019. Currently managed on imaging surveillance.     She is presenting in follow-up accompanied by her mother.      HISTORY OF PRESENT ILLNESS  -She is doing well.  -She has very occasional headaches after exertion or dehydration.  -She has no nausea. Eating and drinking well.   -No difficulty with walking or disequilibrium.  -Denies fatigue; working full time.   -She denies any seizure-like activity.  -Denies vision changes, weakness, or numbness and tingling.   -Persistent tinnitus has improved, less noticable.     REVIEW OF SYSTEMS  A comprehensive ROS negative except as in HPI.      MEDICATIONS   Current Outpatient Medications   Medication     LORazepam (ATIVAN) 1 MG tablet     acetaminophen (TYLENOL) 325 MG tablet     ondansetron (ZOFRAN-ODT) 8 MG ODT tab     No current facility-administered medications for this visit.      DRUG ALLERGIES   Allergies   Allergen Reactions     Sulfa Drugs Unknown and Hives       ONCOLOGIC HISTORY  -PRESENTATION: Progressively worsening headaches. Additionally was with abrupt position changes resulted in dizziness/ syncope. CTH at an outside hospital showed a mass in the left cerebellum. Transferred to  [facility].  -4/11/2018 MRB showed a 3.5cm mass in the left cerebellar hemisphere that was associated with mild cerebellar tonsillar herniation and hydrocephalus.  -MRI spine showed no evidence of disease.   -4/13/2018 SURGERY: Suboccipital craniotomy with resection of the left cerebellar mass. Immediate post-operative MRI demonstrated a gross total resection.   PATHOLOGY: Medulloblastoma; Molecular markers pending.   -5/4/2018 NEURO-ONC: LP performed. Evaluated by radiation oncology. Recommending craniospinal radiation + concurrent vincristine followed by eight 6-week cycles of cisplatin, lomustine, and vincristine.  -5/4/2018 LP with CSF analysis: WBC 1/ RBC 1, protein 25, glucose 62, negative cytology.   -5/14 - 6/22/2018 CHEMORADS with vincristine 2 mg/m2 (stopped after 3 infusions due to pancytopenia).  -5/17/2018 Audiology- Baseline hearing testing with no hearing loss.  -6/4/2018 NEURO-ONC: Doing well, no new neurological symptoms. Tolerating treatment well. Ophtho referral. Labs improved.  -6/4/2018 NGS via STRATA: SMO mutation, TERT promotor mutation. Negative for microsatellite instability.   -6/25/2018 NEURO-ONC: Clinically stable, painful radiation-induced burn. Monitoring labs. Cancer Risk Assessment referral.  -8/13/2018 NEURO-ONC/MRB/ CHEMO: Clinically stable. Imaging shows expected post operative changes with no evidence of new disease. C1D1 of vincristine, CCNU and cisplatin.   -9/24/2018 NEURO-ONC: Neurologically stable, though did not tolerate first cycle of chemo well 2/2 nausea. Hold on starting C2D1 today due to leukopenia (WBC 2.7).  -9/27/2018 CHEMO: C2D1 of vincristine, CCNU and cisplatin.   -11/9/2018 NEURO-ONC/ MRB/ CHEMO: Clinically well. Imaging with no evidence of tumor recurrence. Cycle 3 CCNU and cisplatin; plan to hold vincristine on ODD cycles due to peripheral neuropathy. Repeat hearing testing ordered.   -12/4/2018 NEURO-ONC: New severe HA, likely tension. No  "new neurological symptoms or signs  -1/4/2019 NEURO-ONC/CHEMO: Clinically doing well. No changes. Cycle 4 CCNU and cisplatin. Holding vincristine. Last cycle.  -2/15/2019 NEURO-ONC/ MRB: Clinically stable. Labs not completely rebounded. Imaging with no concern for disease recurrence. Continue with imaging surveillance.  -2/25/2019 AUDIOGRAM: moderate SNHL 3-8 kHz.  -3/2019 Port removed.   -4/22/2019 NEURO-ONC/ MRB: Clinically stable. Labs stable. Imaging with no concern for disease recurrence. Continue with imaging surveillance. Evaluated by dermatology.   -6/24/2019 NEURO-ONC/ MRB: Clinically stable. Imaging with no concern for disease recurrence. Continue with imaging surveillance.    SOCIAL HISTORY   Tobacco use: Former smoker, E-cigs stopped on 5/3/2018  Alcohol use: None.   Drug use: Denies marijuana use.  Supplement, complimentary/ alternative medicine: None.   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      PHYSICAL EXAMINATION  /89   Pulse 68   Temp 97.8  F (36.6  C) (Oral)   Wt 68.4 kg (150 lb 12.7 oz)   SpO2 100%   BMI 27.58 kg/m      Vitals:    06/24/19 1059   Weight: 68.4 kg (150 lb 12.7 oz)     Ht Readings from Last 2 Encounters:   03/08/19 1.575 m (5' 2\")   02/26/19 1.575 m (5' 2\")     KPS: 100    -Generally well appearing.  -Throat: No oral thrush. No redness of throat. No lymphadenopathy.   -Respiratory: Normal breath sounds, no audible wheezing.   -Skin: No rashes. Healed head incision.  -Hematologic/ lymphatic: No abnormal bruising. No leg swelling.  -Psychiatric: Normal mood and affect. Pleasant, talkative.  -Neurologic:   MENTAL STATUS:     Alert, oriented to date.               Endorses feeling slightly altered due to pre-scan benzodiazepine use.   Recall: Intact.    Speech fluent. Comprehension intact to multi-step commands.   Normal naming, repetition. Able to read.   Good right-left orientation.     CRANIAL NERVES:     Discs flat on fundoscopy. "    Pupils are equal, round, reactive to light.     Extraocular movements full, patient denies diplopia. Previous mild eye jerks noted on pursuit.    Visual fields full.     Facial sensation intact to light touch.   Symmetric facial movements.   Hearing intact.   Palate moves symmetrically.     Sternocleidomastoid and trapezius strength intact.    Tongue midline.  MOTOR:    Normal and symmetric tone.   Grossly 5/5 throughout.    No pronation or drift. No orbiting.   Able to rise from a chair without use of arms.   On toe/ heel walk, equal distance from floor to heels/ toes.   SENSATION:    Intact to light touch throughout.   COORDINATION:   Intact finger-nose with eyes open and closed.   REFLEXES:    Muted in legs, trace at biceps; symmetric.    Toes not tested. No clonus. No Hoffmans.   No grasp.    GAIT:   Walks without assistance.   Good speed. Normal stride length and heel strike. Normal turns. Normal arm swing.   Able to toe, heel walk. No difficulty with tandem walk.       MEDICAL RECORDS  Obtained and personally reviewed all available outside medical records in addition to reviewing any records available in our electronic system.     LABS  Reviewed.     IMAGING  Personally reviewed MR brain imaging from today and compared to post-radiation imaging. To my eye, there is no sign of disease recurrence.     Imaging was shown to and results were reviewed with Merissa and her mother.     Imaging and case reviewed and discussed at Brain Tumor Conference.        IMPRESSION/PLAN  For the 30 minute appointment, more than 50% of the encounter was spent discussing in detail the nature of her cancer in light of her imaging from today. This was in addition to providing emotional support, answering questions pertaining to my recommendations, and devising the treatment plan as outlined below.     Imaging with no evidence of disease recurrence. Will repeat in 3 months.     Labs without concern, recovered to baseline; no need for  continued monitoring.     Clinically stable. Will need repeat hearing testing in 11/2019.      Merissa completed 4 cycles of adjuvant therapy and imaging continues to demonstrate complete response. No cancer-directed therapy indicated. General NCCN Guidelines for imaging surveillance is q 3 months of 2 years, then q 6 months for 3 years. At recurrence, clinical trial options remain a strong consideration for recurrent disease, as does off label use of SMO inhibitors.     PROBLEM LIST  Medulloblastoma  Steroid intolerance  Chemotherapy-induced pancytopenia  Chemotherapy-induced nausea/ vomiting   Radiation burn  Fatigue, cancer and treatment related, improving  Port removed     PLAN  -CANCER-DIRECTED THERAPY-  -Planned treatment stop, continued imaging surveillance.   -Refilled Ativan needed for scan.   -Repeat in 9/2019.  -No repeat labs need, counts have removed.     -Repeat hearing testing needed in 11/2019.     -SEIZURE MANAGEMENT-  -While this patient is at increased risk of having seizures, given the lack of seizure history, there is no indication to prescribe an antiepileptic at this time. Will continue to monitor for seizure activity.    -Quality of life/ MOOD/ FATIGUE-  -Denies any mood issues. Denies fatigue.   -Continue to monitor mood as untreated/ undertreated depression can worsen fatigue, dysorexia, and quali    Follows with Dr. Jakob Ryder MD - Family Medicine Physician at [facility].     Return to clinic in 9/2019 + repeat MRI.    Anupama Morrison MD  Neuro-oncology

## 2019-06-24 NOTE — PATIENT INSTRUCTIONS
Imaging reviewed, no signs of cancer.   Repeat in 3 months.   Refilled Ativan as needed prior to scan.   CBC today and if normal, no lab testing needed.     Return to clinic in 9/2019.     Anupama Morrison MD  Neuro-oncology  6/24/2019

## 2019-06-24 NOTE — NURSING NOTE
"Oncology Rooming Note    June 24, 2019 11:00 AM   Merissa Silverman is a 33 year old female who presents for:    Chief Complaint   Patient presents with     Oncology Clinic Visit     Medullablastoma     Initial Vitals: /89   Pulse 68   Temp 97.8  F (36.6  C) (Oral)   Wt 68.4 kg (150 lb 12.7 oz)   SpO2 100%   BMI 27.58 kg/m   Estimated body mass index is 27.58 kg/m  as calculated from the following:    Height as of 3/8/19: 1.575 m (5' 2\").    Weight as of this encounter: 68.4 kg (150 lb 12.7 oz). Body surface area is 1.73 meters squared.  No Pain (0) Comment: Data Unavailable   No LMP recorded. (Menstrual status: Chemotherapy).  Allergies reviewed: Yes  Medications reviewed: Yes    Medications: Medication refills not needed today.  Pharmacy name entered into [facility]: [facility] [place] [address]    Clinical concerns: No specific concerns.       LORE HERNANDEZ CMA              "

## 2019-06-28 NOTE — TUMOR CONFERENCE
Tumor Conference Information  Tumor Conference:    Specialties Present:  Surgery, Medical Oncology, Radiation Oncology, Radiology, Pathology  Patient Status:  Current patient  Pathology:  Not Discussed  Treatment to Date:  Surgical intervention(s), Chemoradiation, Adjuvant chemotherapy  Clinical Trial Eligibility:  Not discussed  Recommended Plan:  Observation (see comment) (Comment: stable on recent imaging, continue regular follow up)  Did the review exceed 30 minutes?:  did not           Documentation / Disclaimer Cancer Tumor Board Note  Cancer tumor board recommendations do not override what is determined to be reasonable care and treatment, which is dependent on the circumstances of a patient's case; the patient's medical, social, and personal concerns; and the clinical judgment of the oncologist [physician].

## 2019-08-27 NOTE — PROGRESS NOTES
"[facility] - PRIMARY CARE SKIN    CC: skin cancer screening (full-body)  SUBJECTIVE:   Merissa Silverman is a(n) 33 year old female who presents to clinic today for a full-body skin exam , history of mild and moderate atypical nevi.    Dry spots on the upper chest, itchy. Started few weeks ago. Sometimes occur on the arms or legs.      Personal Medical History  Skin cancer: NO - atypical nevi  Psoriasis   YES - previously involved the postauricular areas but has resolved since chemotherapy   Other: History of radiation therapy and chemotherapy for medulloblastoma of cerebellum.    Family Medical History  Skin cancer: YES - melanoma in paternal grandmother, \"skin chemo\" in an aunt  Psoriasis   NO     Sun Exposure History  Previous history of significant sun exposure:  Blistering sunburns: NO.  Tanning beds: YES - when younger.  Sunscreen usage: YES, SPF: 50+.  UV-protective clothing usage: YES.  Wide-brimmed hat usage: NO.  UV-protective sunglasses usage: YES.  Mid-day sun avoidance: YES.    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    Refer to electronic medical record (EMR) for past medical history and medications.    INTEGUMENTARY/SKIN: POSITIVE for changing lesions  ROS: 14 point review of systems was negative except the symptoms listed above in the HPI.        OBJECTIVE:   GENERAL: healthy, alert and no distress.  SKIN: Plata Skin Type - I.  This patient was examined from the top of the head to the bottom of the feet  including scalp, face, neck, trunk, buttocks, both arms, both legs, both hands, both feet, and all fingers and toes. The dermatoscope was used to help evaluate pigmented lesions.  Skin Pertinent Findings:  Upper extremities: Scattered brown macules of various sizes and shapes most consistent with (benign) melanocytic nevi.    Anterior lower extremities: Multiple brown macules of various sizes and shapes most consistent with (benign) melanocytic nevi.    Back: multiple " brown macules of various sizes and shapes most consistent with (benign) melanocytic nevi .    Posterior lower extremities, Buttocks: Multiple brown macules of various sizes and shapes most consistent with (benign) melanocytic nevi .    Significant Findings:  On the upper chest- three patches of erythema and light scaling      ASSESSMENT:     Encounter Diagnoses   Name Primary?     Multiple melanocytic nevi Yes     History of atypical nevus      Eczema, unspecified type          PLAN:   Skin exam in 6 months  Triamcinolone 0.1% cream apply bid to AA on the trunk, arms or legs.    TT: 25 minutes.  CT: 15 minutes.

## 2019-08-28 ENCOUNTER — OFFICE VISIT (OUTPATIENT)
Dept: FAMILY MEDICINE | Facility: CLINIC | Age: 34
End: 2019-08-28
Payer: COMMERCIAL

## 2019-08-28 VITALS — DIASTOLIC BLOOD PRESSURE: 76 MMHG | SYSTOLIC BLOOD PRESSURE: 122 MMHG

## 2019-08-28 DIAGNOSIS — L30.9 ECZEMA, UNSPECIFIED TYPE: ICD-10-CM

## 2019-08-28 DIAGNOSIS — D22.9 MULTIPLE MELANOCYTIC NEVI: Primary | ICD-10-CM

## 2019-08-28 DIAGNOSIS — Z87.898 HISTORY OF ATYPICAL NEVUS: ICD-10-CM

## 2019-08-28 PROCEDURE — 99213 OFFICE O/P EST LOW 20 MIN: CPT | Performed by: FAMILY MEDICINE

## 2019-08-28 RX ORDER — TRIAMCINOLONE ACETONIDE 1 MG/G
CREAM TOPICAL
Qty: 80 G | Refills: 0 | Status: SHIPPED | OUTPATIENT
Start: 2019-08-28 | End: 2020-01-24

## 2019-08-28 NOTE — LETTER
"    8/28/2019         RE: Merissa Silverman  [address]  [address]  [place] [id number]        Dear Colleague,    Thank you for [facility] your [facility]man[facility]. Please see a copy of my visit note below.    [facility] - PRIMARY CARE SKIN    CC: skin cancer screening (full-body)  SUBJECTIVE:   Merissa Silverman is a(n) 33 year old female who presents to clinic today for a full-body skin exam , history of mild and moderate atypical nevi.    Dry spots on the upper chest, itchy. Started few weeks ago. Sometimes occur on the arms or legs.      Personal Medical History  Skin cancer: NO - atypical nevi  Psoriasis   YES - previously involved the postauricular areas but has resolved since chemotherapy   Other: History of radiation therapy and chemotherapy for medulloblastoma of cerebellum.    Family Medical History  Skin cancer: YES - melanoma in paternal grandmother, \"skin chemo\" in an aunt  Psoriasis   NO     Sun Exposure History  Previous history of significant sun exposure:  Blistering sunburns: NO.  Tanning beds: YES - when younger.  Sunscreen usage: YES, SPF: 50+.  UV-protective clothing usage: YES.  Wide-brimmed hat usage: NO.  UV-protective sunglasses usage: YES.  Mid-day sun avoidance: YES.    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    Refer to electronic medical record (EMR) for past medical history and medications.    INTEGUMENTARY/SKIN: POSITIVE for changing lesions  ROS: 14 point review of systems was negative except the symptoms listed above in the HPI.        OBJECTIVE:   GENERAL: healthy, alert and no distress.  SKIN: Plata Skin Type - I.  This patient was examined from the top of the head to the bottom of the feet  including scalp, face, neck, trunk, buttocks, both arms, both legs, both hands, both feet, and all fingers and toes. The dermatoscope was used to help evaluate pigmented lesions.  Skin Pertinent Findings:  Upper " extremities: Scattered brown macules of various sizes and shapes most consistent with (benign) melanocytic nevi.    Anterior lower extremities: Multiple brown macules of various sizes and shapes most consistent with (benign) melanocytic nevi.    Back: multiple brown macules of various sizes and shapes most consistent with (benign) melanocytic nevi .    Posterior lower extremities, Buttocks: Multiple brown macules of various sizes and shapes most consistent with (benign) melanocytic nevi .    Significant Findings:  On the upper chest- three patches of erythema and light scaling      ASSESSMENT:     Encounter Diagnoses   Name Primary?     Multiple melanocytic nevi Yes     History of atypical nevus      Eczema, unspecified type          PLAN:   Skin exam in 6 months  Triamcinolone 0.1% cream apply bid to AA on the trunk, arms or legs.    TT: 25 minutes.  CT: 15 minutes.        Again, thank you for allowing me to participate in the care of your patient.        Sincerely,        Padmini Trinidad MD

## 2019-09-18 DIAGNOSIS — F40.240 CLAUSTROPHOBIA: ICD-10-CM

## 2019-09-18 DIAGNOSIS — F41.9 ANXIETY: ICD-10-CM

## 2019-09-18 DIAGNOSIS — C71.6 MEDULLOBLASTOMA OF CEREBELLUM (H): ICD-10-CM

## 2019-09-18 RX ORDER — LORAZEPAM 1 MG/1
TABLET ORAL
Qty: 2 TABLET | Refills: 0 | Status: SHIPPED | OUTPATIENT
Start: 2019-09-18 | End: 2019-12-17

## 2019-09-18 NOTE — TELEPHONE ENCOUNTER
Patient requested pre med for MRI due to claustrophobia. Script called to [facility] pharmacy.    Halima Ferugson RN, BSN  Care Coordinator  [facility]

## 2019-09-20 ENCOUNTER — ANCILLARY PROCEDURE (OUTPATIENT)
Dept: MRI IMAGING | Facility: CLINIC | Age: 34
End: 2019-09-20
Attending: PSYCHIATRY & NEUROLOGY
Payer: COMMERCIAL

## 2019-09-20 DIAGNOSIS — C71.6 MEDULLOBLASTOMA (H): ICD-10-CM

## 2019-09-20 RX ORDER — GADOBUTROL 604.72 MG/ML
7.5 INJECTION INTRAVENOUS ONCE
Status: COMPLETED | OUTPATIENT
Start: 2019-09-20 | End: 2019-09-20

## 2019-09-20 RX ADMIN — GADOBUTROL 6.5 ML: 604.72 INJECTION INTRAVENOUS at 12:59

## 2019-09-20 NOTE — DISCHARGE INSTRUCTIONS
MRI Contrast Discharge Instructions    The IV contrast you received today will pass out of your body in your  urine. This will happen in the next 24 hours. You will not feel this process.  Your urine will not change color.    Drink at least 4 extra glasses of water or juice today (unless your doctor  has restricted your fluids). This reduces the stress on your kidneys.  You may take your regular medicines.    If you are on dialysis: It is best to have dialysis today.    If you have a reaction: Most reactions happen right away. If you have  any new symptoms after leaving the hospital (such as hives or swelling),  call your hospital at the correct number below. Or call your family doctor.  If you have breathing distress or wheezing, call 911.    Special instructions: ***    I have read and understand the above information.    Signature:______________________________________ Date:___________    Staff:__________________________________________ Date:___________     Time:__________    [facility] [place] [facility] Departments:    ___[place]: [phone]  ___[facility]: [phone]  ___[place]: [phone] ___[facility]: [phone]  ___[facility]: [phone]  ___[facility]: [phone]  ___[phone]  ___[facility]: [phone]  ___[phone]

## 2019-09-23 ENCOUNTER — TUMOR CONFERENCE (OUTPATIENT)
Dept: ONCOLOGY | Facility: CLINIC | Age: 34
End: 2019-09-23

## 2019-09-23 ENCOUNTER — ONCOLOGY VISIT (OUTPATIENT)
Dept: ONCOLOGY | Facility: CLINIC | Age: 34
End: 2019-09-23
Attending: PSYCHIATRY & NEUROLOGY
Payer: COMMERCIAL

## 2019-09-23 VITALS
OXYGEN SATURATION: 99 % | BODY MASS INDEX: 29.01 KG/M2 | DIASTOLIC BLOOD PRESSURE: 85 MMHG | WEIGHT: 158.6 LBS | SYSTOLIC BLOOD PRESSURE: 132 MMHG | HEART RATE: 61 BPM

## 2019-09-23 DIAGNOSIS — F41.9 ANXIETY: ICD-10-CM

## 2019-09-23 DIAGNOSIS — F40.240 CLAUSTROPHOBIA: ICD-10-CM

## 2019-09-23 DIAGNOSIS — C71.6 MEDULLOBLASTOMA OF CEREBELLUM (H): Primary | ICD-10-CM

## 2019-09-23 DIAGNOSIS — H93.8X3 OTOTOXICITY OF BOTH EARS: ICD-10-CM

## 2019-09-23 PROCEDURE — 40000114 ZZH STATISTIC NO CHARGE CLINIC VISIT

## 2019-09-23 PROCEDURE — 99214 OFFICE O/P EST MOD 30 MIN: CPT | Mod: ZP | Performed by: PSYCHIATRY & NEUROLOGY

## 2019-09-23 RX ORDER — LORAZEPAM 1 MG/1
TABLET ORAL
Qty: 2 TABLET | Refills: 0 | Status: CANCELLED | OUTPATIENT
Start: 2019-09-23

## 2019-09-23 ASSESSMENT — PAIN SCALES - GENERAL: PAINLEVEL: NO PAIN (0)

## 2019-09-23 NOTE — PATIENT INSTRUCTIONS
Imaging reviewed, no signs of cancer.   Repeat in 3 months.   Will need to refill Ativan as needed prior to scan.   No lab testing needed.     Repeat hearing testing needed in 11/2019.     Return to clinic in 12/2019.     Anupama Morrison MD  Neuro-oncology  9/23/2019

## 2019-09-23 NOTE — LETTER
RE: Merissa Silverman  [address]  [address]  [place] [address]     Dear Colleague,    Thank you for referring your patient, Merissa Silverman, to the [facility]. Please see a copy of my visit note below.    NEURO-ONCOLOGY VISIT  09/23/2019    CHIEF COMPLAINT: Ms. 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 Completed craniospinal radiation with concurrent vincristine x 3 doses, but concurrent chemotherapy was stopped in the setting of pancytopenia. She completed 4 cycles of adjuvant chemotherapy with vincristine, CCNU and cisplatin as of 1/2019. Currently managed on imaging surveillance.     She is presenting in follow-up and is not accompanied.    HISTORY OF PRESENT ILLNESS  -She is doing well. Lost a tooth filling this AM and has a dentist appt for the afternoon to address this.   -She has very occasional headaches after exertion or dehydration. Same with experiencing back pain after a really long day at work. No changes in bowel/ bladder function or radicular pain.   -She has no nausea. Eating and drinking well.   -No difficulty with walking or disequilibrium.  -Denies fatigue; working full time.   -She denies any seizure-like activity.  -Denies vision changes, weakness, or numbness and tingling.   -Persistent tinnitus stable.     REVIEW OF SYSTEMS  A comprehensive ROS negative except as in HPI.      MEDICATIONS   Current Outpatient Medications   Medication     triamcinolone (KENALOG) 0.1 % external cream     acetaminophen (TYLENOL) 325 MG tablet     LORazepam (ATIVAN) 1 MG tablet     No current facility-administered medications for this visit.      DRUG ALLERGIES   Allergies   Allergen Reactions     Sulfa Drugs Unknown and Hives       ONCOLOGIC HISTORY  -PRESENTATION:  Progressively worsening headaches. Additionally was with abrupt position changes resulted in dizziness/ syncope. CTH at an outside hospital showed a mass in the left cerebellum. Transferred to [facility].  -4/11/2018 MRB showed a 3.5cm mass in the left cerebellar hemisphere that was associated with mild cerebellar tonsillar herniation and hydrocephalus.  -MRI spine showed no evidence of disease.   -4/13/2018 SURGERY: Suboccipital craniotomy with resection of the left cerebellar mass. Immediate post-operative MRI demonstrated a gross total resection.   PATHOLOGY: Medulloblastoma; Molecular markers pending.   -5/4/2018 NEURO-ONC: LP performed. Evaluated by radiation oncology. Recommending craniospinal radiation + concurrent vincristine followed by eight 6-week cycles of cisplatin, lomustine, and vincristine.  -5/4/2018 LP with CSF analysis: WBC 1/ RBC 1, protein 25, glucose 62, negative cytology.   -5/14 - 6/22/2018 CHEMORADS with vincristine 2 mg/m2 (stopped after 3 infusions due to pancytopenia).  -5/17/2018 Audiology- Baseline hearing testing with no hearing loss.  -6/4/2018 NEURO-ONC: Doing well, no new neurological symptoms. Tolerating treatment well. Ophtho referral. Labs improved.  -6/4/2018 NGS via [facility]: SMO mutation, TERT promotor mutation. Negative for microsatellite instability.   -6/25/2018 NEURO-ONC: Clinically stable, painful radiation-induced burn. Monitoring labs. Cancer Risk Assessment referral.  -8/13/2018 NEURO-ONC/MRB/ CHEMO: Clinically stable. Imaging shows expected post operative changes with no evidence of new disease. C1D1 of vincristine, CCNU and cisplatin.   -9/24/2018 NEURO-ONC: Neurologically stable, though did not tolerate first cycle of chemo well 2/2 nausea. Hold on starting C2D1 today due to leukopenia (WBC 2.7).  -9/27/2018 CHEMO: C2D1 of vincristine, CCNU and cisplatin.   -11/9/2018 NEURO-ONC/ MRB/ CHEMO: Clinically well. Imaging with no evidence of tumor  "recurrence. Cycle 3 CCNU and cisplatin; plan to hold vincristine on ODD cycles due to peripheral neuropathy. Repeat hearing testing ordered.   -12/4/2018 NEURO-ONC: New severe HA, likely tension. No new neurological symptoms or signs  -1/4/2019 NEURO-ONC/CHEMO: Clinically doing well. No changes. Cycle 4 CCNU and cisplatin. Holding vincristine. Last cycle.  -2/15/2019 NEURO-ONC/ MRB: Clinically stable. Labs not completely rebounded. Imaging with no concern for disease recurrence. Continue with imaging surveillance.  -2/25/2019 AUDIOGRAM: moderate SNHL 3-8 kHz.  -3/2019 Port removed.   -4/22/2019 NEURO-ONC/ MRB: Clinically stable. Labs stable. Imaging with no concern for disease recurrence. Continue with imaging surveillance. Evaluated by dermatology.   -6/24/2019 NEURO-ONC/ MRB: Clinically stable. Imaging with no concern for disease recurrence. Continue with imaging surveillance.  -9/23/2019 NEURO-ONC/ MRB: Clinically stable. Imaging with no concern for disease recurrence.    SOCIAL HISTORY   Tobacco use: Former smoker, E-cigs stopped on 5/3/2018  Alcohol use: None.   Drug use: Denies marijuana use.  Supplement, complimentary/ alternative medicine: None.   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    PHYSICAL EXAMINATION  /85   Pulse 61   Wt 71.9 kg (158 lb 9.6 oz)   SpO2 99%   BMI 29.01 kg/m      Vitals:    09/23/19 0912   Weight: 71.9 kg (158 lb 9.6 oz)     Ht Readings from Last 2 Encounters:   03/08/19 1.575 m (5' 2\")   02/26/19 1.575 m (5' 2\")     KPS: 100    -Generally well appearing.  -Respiratory: Normal breath sounds, no audible wheezing.   -Skin: No rashes. Healed head incision.  -Hematologic/ lymphatic: No abnormal bruising. No leg swelling.  -Psychiatric: Normal mood and affect. Pleasant, talkative.  -Neurologic:   MENTAL STATUS:     Alert, oriented.    Recall: Intact.    Speech fluent. Comprehension intact to multi-step commands.   Good right-left orientation.     " CRANIAL NERVES:     Discs flat on fundoscopy.    Pupils are equal, round, reactive to light.     Extraocular movements full, patient denies diplopia. Previous mild eye jerks noted on pursuit.    Visual fields full.     Facial sensation intact to light touch.   Symmetric facial movements.   Hearing intact.   Palate moves symmetrically.     Tongue midline.  MOTOR:    Normal and symmetric tone.   Grossly 5/5 throughout.    No pronation or drift. No orbiting.   Able to rise from a chair without use of arms.   On toe/ heel walk, equal distance from floor to heels/ toes.   SENSATION:    Intact to light touch throughout.   COORDINATION:   Intact finger-nose with eyes open and closed.   REFLEXES:    Muted in legs, trace at biceps; symmetric.    No Hoffmans.  GAIT:   Walks without assistance.   Good speed. Normal stride length and heel strike. Normal turns. Normal arm swing.   Able to toe, heel walk. Able to tandem walk.     MEDICAL RECORDS  Obtained and personally reviewed all available outside medical records in addition to reviewing any records available in our electronic system.     LABS  Reviewed.     IMAGING  Personally reviewed MR brain imaging from last week and compared to post-radiation imaging. To my eye, there is no sign of disease recurrence.     Imaging was shown to and results were reviewed with Merissa.      IMPRESSION/PLAN  Clinic was spent discussing in detail the nature of her cancer in light of her recent imaging. This was in addition to providing emotional support, answering questions pertaining to my recommendations, and devising the treatment plan as outlined below.     Imaging with no evidence of disease recurrence. Will repeat in 3 months. No cancer-directed therapy indicated at this time. Merissa completed 4 cycles of adjuvant therapy and imaging continues to demonstrate complete response. General NCCN Guidelines for imaging surveillance is q 3 months of 2 years (until 1/2021), then q 6 months for 3  years. At recurrence, clinical trial options remain a strong consideration for recurrent disease, as does off label use of SMO inhibitors.     Clinically stable. Will need repeat hearing testing in 11/2019.     PROBLEM LIST  Medulloblastoma  Steroid intolerance  Chemotherapy-induced pancytopenia  Chemotherapy-induced nausea/ vomiting   Radiation burn  Fatigue, cancer and treatment related, improving  Port removed     PLAN  -CANCER-DIRECTED THERAPY-  -Planned treatment stop, continued imaging surveillance. Repeat MRI in 12/2019.  -Can refill pre-scan Ativan as needed.   -No repeat labs need, counts have removed.   -Repeat hearing testing needed in 11/2019; order placed.     -SEIZURE MANAGEMENT-  -While this patient is at increased risk of having seizures, given the lack of seizure history, there is no indication to prescribe an antiepileptic at this time. Will continue to monitor for seizure activity.    -Quality of life/ MOOD/ FATIGUE-  -Denies any mood issues. Denies fatigue.   -Continue to monitor mood as untreated/ undertreated depression can worsen fatigue, dysorexia, and quality of life.     Follows with Dr. Jakob Ryder MD - Family Medicine Physician at [facility].     Return to clinic in 12/2019 + repeat MRI.    Anupama Morrison MD  Neuro-oncology

## 2019-09-23 NOTE — PROGRESS NOTES
NEURO-ONCOLOGY VISIT  09/23/2019    CHIEF COMPLAINT: Ms. 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 Completed craniospinal radiation with concurrent vincristine x 3 doses, but concurrent chemotherapy was stopped in the setting of pancytopenia. She completed 4 cycles of adjuvant chemotherapy with vincristine, CCNU and cisplatin as of 1/2019. Currently managed on imaging surveillance.     She is presenting in follow-up and is not accompanied.        HISTORY OF PRESENT ILLNESS  -She is doing well. Lost a tooth filling this AM and has a dentist appt for the afternoon to address this.   -She has very occasional headaches after exertion or dehydration. Same with experiencing back pain after a really long day at work. No changes in bowel/ bladder function or radicular pain.   -She has no nausea. Eating and drinking well.   -No difficulty with walking or disequilibrium.  -Denies fatigue; working full time.   -She denies any seizure-like activity.  -Denies vision changes, weakness, or numbness and tingling.   -Persistent tinnitus stable.     REVIEW OF SYSTEMS  A comprehensive ROS negative except as in HPI.      MEDICATIONS   Current Outpatient Medications   Medication     triamcinolone (KENALOG) 0.1 % external cream     acetaminophen (TYLENOL) 325 MG tablet     LORazepam (ATIVAN) 1 MG tablet     No current facility-administered medications for this visit.      DRUG ALLERGIES   Allergies   Allergen Reactions     Sulfa Drugs Unknown and Hives       ONCOLOGIC HISTORY  -PRESENTATION: Progressively worsening headaches. Additionally was with abrupt position changes resulted in dizziness/ syncope. CTH at an outside hospital showed a mass in the left cerebellum. Transferred to [facility].  -4/11/2018 MRB showed  a 3.5cm mass in the left cerebellar hemisphere that was associated with mild cerebellar tonsillar herniation and hydrocephalus.  -MRI spine showed no evidence of disease.   -4/13/2018 SURGERY: Suboccipital craniotomy with resection of the left cerebellar mass. Immediate post-operative MRI demonstrated a gross total resection.   PATHOLOGY: Medulloblastoma; Molecular markers pending.   -5/4/2018 NEURO-ONC: LP performed. Evaluated by radiation oncology. Recommending craniospinal radiation + concurrent vincristine followed by eight 6-week cycles of cisplatin, lomustine, and vincristine.  -5/4/2018 LP with CSF analysis: WBC 1/ RBC 1, protein 25, glucose 62, negative cytology.   -5/14 - 6/22/2018 CHEMORADS with vincristine 2 mg/m2 (stopped after 3 infusions due to pancytopenia).  -5/17/2018 Audiology- Baseline hearing testing with no hearing loss.  -6/4/2018 NEURO-ONC: Doing well, no new neurological symptoms. Tolerating treatment well. Ophtho referral. Labs improved.  -6/4/2018 NGS via STRATA: SMO mutation, TERT promotor mutation. Negative for microsatellite instability.   -6/25/2018 NEURO-ONC: Clinically stable, painful radiation-induced burn. Monitoring labs. Cancer Risk Assessment referral.  -8/13/2018 NEURO-ONC/MRB/ CHEMO: Clinically stable. Imaging shows expected post operative changes with no evidence of new disease. C1D1 of vincristine, CCNU and cisplatin.   -9/24/2018 NEURO-ONC: Neurologically stable, though did not tolerate first cycle of chemo well 2/2 nausea. Hold on starting C2D1 today due to leukopenia (WBC 2.7).  -9/27/2018 CHEMO: C2D1 of vincristine, CCNU and cisplatin.   -11/9/2018 NEURO-ONC/ MRB/ CHEMO: Clinically well. Imaging with no evidence of tumor recurrence. Cycle 3 CCNU and cisplatin; plan to hold vincristine on ODD cycles due to peripheral neuropathy. Repeat hearing testing ordered.   -12/4/2018 NEURO-ONC: New severe HA, likely tension. No new neurological symptoms or signs  -1/4/2019  "NEURO-ONC/CHEMO: Clinically doing well. No changes. Cycle 4 CCNU and cisplatin. Holding vincristine. Last cycle.  -2/15/2019 NEURO-ONC/ MRB: Clinically stable. Labs not completely rebounded. Imaging with no concern for disease recurrence. Continue with imaging surveillance.  -2/25/2019 AUDIOGRAM: moderate SNHL 3-8 kHz.  -3/2019 Port removed.   -4/22/2019 NEURO-ONC/ MRB: Clinically stable. Labs stable. Imaging with no concern for disease recurrence. Continue with imaging surveillance. Evaluated by dermatology.   -6/24/2019 NEURO-ONC/ MRB: Clinically stable. Imaging with no concern for disease recurrence. Continue with imaging surveillance.  -9/23/2019 NEURO-ONC/ MRB: Clinically stable. Imaging with no concern for disease recurrence.    SOCIAL HISTORY   Tobacco use: Former smoker, E-cigs stopped on 5/3/2018  Alcohol use: None.   Drug use: Denies marijuana use.  Supplement, complimentary/ alternative medicine: None.   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      PHYSICAL EXAMINATION  /85   Pulse 61   Wt 71.9 kg (158 lb 9.6 oz)   SpO2 99%   BMI 29.01 kg/m     Vitals:    09/23/19 0912   Weight: 71.9 kg (158 lb 9.6 oz)     Ht Readings from Last 2 Encounters:   03/08/19 1.575 m (5' 2\")   02/26/19 1.575 m (5' 2\")     KPS: 100    -Generally well appearing.  -Respiratory: Normal breath sounds, no audible wheezing.   -Skin: No rashes. Healed head incision.  -Hematologic/ lymphatic: No abnormal bruising. No leg swelling.  -Psychiatric: Normal mood and affect. Pleasant, talkative.  -Neurologic:   MENTAL STATUS:     Alert, oriented.    Recall: Intact.    Speech fluent. Comprehension intact to multi-step commands.   Good right-left orientation.     CRANIAL NERVES:     Discs flat on fundoscopy.    Pupils are equal, round, reactive to light.     Extraocular movements full, patient denies diplopia. Previous mild eye jerks noted on pursuit.    Visual fields full.     Facial sensation intact to " light touch.   Symmetric facial movements.   Hearing intact.   Palate moves symmetrically.     Tongue midline.  MOTOR:    Normal and symmetric tone.   Grossly 5/5 throughout.    No pronation or drift. No orbiting.   Able to rise from a chair without use of arms.   On toe/ heel walk, equal distance from floor to heels/ toes.   SENSATION:    Intact to light touch throughout.   COORDINATION:   Intact finger-nose with eyes open and closed.   REFLEXES:    Muted in legs, trace at biceps; symmetric.    No Hoffmans.  GAIT:   Walks without assistance.   Good speed. Normal stride length and heel strike. Normal turns. Normal arm swing.   Able to toe, heel walk. Able to tandem walk.       MEDICAL RECORDS  Obtained and personally reviewed all available outside medical records in addition to reviewing any records available in our electronic system.     LABS  Reviewed.     IMAGING  Personally reviewed MR brain imaging from last week and compared to post-radiation imaging. To my eye, there is no sign of disease recurrence.     Imaging was shown to and results were reviewed with Merissa.        IMPRESSION/PLAN  Clinic was spent discussing in detail the nature of her cancer in light of her recent imaging. This was in addition to providing emotional support, answering questions pertaining to my recommendations, and devising the treatment plan as outlined below.     Imaging with no evidence of disease recurrence. Will repeat in 3 months. No cancer-directed therapy indicated at this time. Merissa completed 4 cycles of adjuvant therapy and imaging continues to demonstrate complete response. General NCCN Guidelines for imaging surveillance is q 3 months of 2 years (until 1/2021), then q 6 months for 3 years. At recurrence, clinical trial options remain a strong consideration for recurrent disease, as does off label use of SMO inhibitors.     Clinically stable. Will need repeat hearing testing in 11/2019.     PROBLEM  LIST  Medulloblastoma  Steroid intolerance  Chemotherapy-induced pancytopenia  Chemotherapy-induced nausea/ vomiting   Radiation burn  Fatigue, cancer and treatment related, improving  Port removed     PLAN  -CANCER-DIRECTED THERAPY-  -Planned treatment stop, continued imaging surveillance. Repeat MRI in 12/2019.  -Can refill pre-scan Ativan as needed.   -No repeat labs need, counts have removed.   -Repeat hearing testing needed in 11/2019; order placed.     -SEIZURE MANAGEMENT-  -While this patient is at increased risk of having seizures, given the lack of seizure history, there is no indication to prescribe an antiepileptic at this time. Will continue to monitor for seizure activity.    -Quality of life/ MOOD/ FATIGUE-  -Denies any mood issues. Denies fatigue.   -Continue to monitor mood as untreated/ undertreated depression can worsen fatigue, dysorexia, and quality of life.     Follows with Dr. Jakob Ryder MD - Family Medicine Physician at [facility].     Return to clinic in 12/2019 + repeat MRI.    Anupama Morrison MD  Neuro-oncology

## 2019-09-24 NOTE — TUMOR CONFERENCE
Tumor Conference Information  Tumor Conference:    Specialties Present:  Medical oncology, Surgery, Radiology, Pathology  Patient Status:  A current patient  Pathology:  Not Discussed  Treatment to Date:  Surgical intervention(s), Chemoradiation, Adjuvant chemotherapy  Clinical Trial Eligibility:  Not discussed  Recommended Plan:  Observation (see comment) (Comment: stable on recent scans, continue with regular follow up and imaging)  Did the review exceed 30 minutes?:  did not           Documentation / Disclaimer Cancer Tumor Board Note  Cancer tumor board recommendations do not override what is determined to be reasonable care and treatment, which is dependent on the circumstances of a patient's case; the patient's medical, social, and personal concerns; and the clinical judgment of the oncologist [physician].

## 2019-12-17 DIAGNOSIS — C71.6 MEDULLOBLASTOMA OF CEREBELLUM (H): ICD-10-CM

## 2019-12-17 DIAGNOSIS — F40.240 CLAUSTROPHOBIA: ICD-10-CM

## 2019-12-17 DIAGNOSIS — F41.9 ANXIETY: ICD-10-CM

## 2019-12-17 RX ORDER — LORAZEPAM 1 MG/1
TABLET ORAL
Qty: 2 TABLET | Refills: 0
Start: 2019-12-17 | End: 2020-10-27

## 2019-12-19 PROBLEM — T45.1X5A CHEMOTHERAPY INDUCED NEUTROPENIA (H): Status: RESOLVED | Noted: 2018-06-01 | Resolved: 2019-12-19

## 2019-12-19 PROBLEM — D70.1 CHEMOTHERAPY INDUCED NEUTROPENIA (H): Status: RESOLVED | Noted: 2018-06-01 | Resolved: 2019-12-19

## 2019-12-20 ENCOUNTER — ANCILLARY PROCEDURE (OUTPATIENT)
Dept: MRI IMAGING | Facility: CLINIC | Age: 34
End: 2019-12-20
Attending: PSYCHIATRY & NEUROLOGY
Payer: COMMERCIAL

## 2019-12-20 ENCOUNTER — ONCOLOGY VISIT (OUTPATIENT)
Dept: ONCOLOGY | Facility: CLINIC | Age: 34
End: 2019-12-20
Attending: PSYCHIATRY & NEUROLOGY
Payer: COMMERCIAL

## 2019-12-20 VITALS
OXYGEN SATURATION: 99 % | BODY MASS INDEX: 27.2 KG/M2 | SYSTOLIC BLOOD PRESSURE: 120 MMHG | DIASTOLIC BLOOD PRESSURE: 83 MMHG | TEMPERATURE: 98.2 F | WEIGHT: 147.8 LBS | HEIGHT: 62 IN | RESPIRATION RATE: 16 BRPM | HEART RATE: 69 BPM

## 2019-12-20 DIAGNOSIS — C71.6 MEDULLOBLASTOMA OF CEREBELLUM (H): ICD-10-CM

## 2019-12-20 DIAGNOSIS — C71.6 MEDULLOBLASTOMA OF CEREBELLUM (H): Primary | ICD-10-CM

## 2019-12-20 PROCEDURE — 99214 OFFICE O/P EST MOD 30 MIN: CPT | Mod: ZP | Performed by: PSYCHIATRY & NEUROLOGY

## 2019-12-20 PROCEDURE — G0463 HOSPITAL OUTPT CLINIC VISIT: HCPCS | Mod: ZF

## 2019-12-20 RX ORDER — GADOBUTROL 604.72 MG/ML
7.5 INJECTION INTRAVENOUS ONCE
Status: COMPLETED | OUTPATIENT
Start: 2019-12-20 | End: 2019-12-20

## 2019-12-20 RX ADMIN — GADOBUTROL 7.5 ML: 604.72 INJECTION INTRAVENOUS at 09:09

## 2019-12-20 ASSESSMENT — MIFFLIN-ST. JEOR: SCORE: 1323.8

## 2019-12-20 ASSESSMENT — PAIN SCALES - GENERAL: PAINLEVEL: NO PAIN (0)

## 2019-12-20 NOTE — PATIENT INSTRUCTIONS
Imaging reviewed, no signs of cancer.   Repeat in 3 months.   Will need to refill Ativan as needed prior to scan.   No lab testing needed.     Repeat hearing testing needed in 1/2020.     Return to clinic in 3/2020.     Anupama Morrison MD  Neuro-oncology  12/20/2019

## 2019-12-20 NOTE — NURSING NOTE
"Oncology Rooming Note    December 20, 2019 10:45 AM   Merissa Silverman is a 34 year old female who presents for:    Chief Complaint   Patient presents with     Oncology Clinic Visit     [facility] RETURN- MEDULLOBLASTOMA     Initial Vitals: /83 (BP Location: Right arm, Patient Position: Chair, Cuff Size: Adult Regular)   Pulse 69   Temp 98.2  F (36.8  C) (Oral)   Resp 16   Ht 1.575 m (5' 2.01\")   Wt 67 kg (147 lb 12.8 oz)   SpO2 99%   BMI 27.03 kg/m   Estimated body mass index is 27.03 kg/m  as calculated from the following:    Height as of this encounter: 1.575 m (5' 2.01\").    Weight as of this encounter: 67 kg (147 lb 12.8 oz). Body surface area is 1.71 meters squared.  No Pain (0) Comment: Data Unavailable   No LMP recorded. (Menstrual status: Chemotherapy).  Allergies reviewed: Yes  Medications reviewed: Yes    Medications: Medication refills not needed today.  Pharmacy name entered into [facility]: [facility] [place]  [address]    Clinical concerns: No new concerns. [place] was notified.      Hector Bhatti LPN            "

## 2019-12-20 NOTE — LETTER
12/20/2019       RE: Merissa Silverman  [address]  [address]  [place] [address]     Dear Colleague,    Thank you for referring your patient, Merissa Silverman, to the [facility]. Please see a copy of my visit note below.    NEURO-ONCOLOGY VISIT  12/20/2019    CHIEF COMPLAINT: Ms. 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 Completed craniospinal radiation with concurrent vincristine x 3 doses, but concurrent chemotherapy was stopped in the setting of pancytopenia. She completed 4 cycles of adjuvant chemotherapy with vincristine, CCNU, and cisplatin as of 1/2019. Currently managed on imaging surveillance.     She is presenting in follow-up and is not accompanied.        HISTORY OF PRESENT ILLNESS  -She is doing well. No complaints, except for a break out of eczema on chest, which was stress related.   -Hearing testing not completed last month.  -She has very occasional headaches after exertion or dehydration.   -Chronic, unchanged back pain after a really long day at work. No changes in bowel/ bladder function or radicular pain.   -Eating and drinking well.   -No dizziness or vertigo.  -Denies fatigue; working full time.   -She denies any seizure-like activity.  -Denies vision changes, weakness, or numbness and sensation chanegs.   -Persistent tinnitus, stable.     REVIEW OF SYSTEMS  A comprehensive ROS negative except as in HPI.      MEDICATIONS   Current Outpatient Medications   Medication     acetaminophen (TYLENOL) 325 MG tablet     LORazepam (ATIVAN) 1 MG tablet     triamcinolone (KENALOG) 0.1 % external cream     No current facility-administered medications for this visit.      DRUG ALLERGIES   Allergies   Allergen Reactions     Sulfa Drugs Unknown and Hives       ONCOLOGIC  HISTORY  -PRESENTATION: Progressively worsening headaches. Additionally was with abrupt position changes resulted in dizziness/ syncope. CTH at an outside hospital showed a mass in the left cerebellum. Transferred to [facility].  -4/11/2018 MRB showed a 3.5cm mass in the left cerebellar hemisphere that was associated with mild cerebellar tonsillar herniation and hydrocephalus.  -MRI spine showed no evidence of disease.   -4/13/2018 SURGERY: Suboccipital craniotomy with resection of the left cerebellar mass. Immediate post-operative MRI demonstrated a gross total resection.   PATHOLOGY: Medulloblastoma; Molecular markers pending.   -5/4/2018 NEURO-ONC: LP performed. Evaluated by radiation oncology. Recommending craniospinal radiation + concurrent vincristine followed by eight 6-week cycles of cisplatin, lomustine, and vincristine.  -5/4/2018 LP with CSF analysis: WBC 1/ RBC 1, protein 25, glucose 62, negative cytology.   -5/14 - 6/22/2018 CHEMORADS with vincristine 2 mg/m2 (stopped after 3 infusions due to pancytopenia).  -5/17/2018 Audiology- Baseline hearing testing with no hearing loss.  -6/4/2018 NEURO-ONC: Doing well, no new neurological symptoms. Tolerating treatment well. Ophtho referral. Labs improved.  -6/4/2018 NGS via STRATA: SMO mutation, TERT promotor mutation. Negative for microsatellite instability.   -6/25/2018 NEURO-ONC: Clinically stable, painful radiation-induced burn. Monitoring labs. Cancer Risk Assessment referral.  -8/13/2018 NEURO-ONC/MRB/ CHEMO: Clinically stable. Imaging shows expected post operative changes with no evidence of new disease. C1D1 of vincristine, CCNU and cisplatin.   -9/24/2018 NEURO-ONC: Neurologically stable, though did not tolerate first cycle of chemo well 2/2 nausea. Hold on starting C2D1 today due to leukopenia (WBC 2.7).  -9/27/2018 CHEMO: C2D1 of vincristine, CCNU and cisplatin.   -11/9/2018 NEURO-ONC/ MRB/ CHEMO: Clinically well. Imaging with no  "evidence of tumor recurrence. Cycle 3 CCNU and cisplatin; plan to hold vincristine on ODD cycles due to peripheral neuropathy. Repeat hearing testing ordered.   -12/4/2018 NEURO-ONC: New severe HA, likely tension. No new neurological symptoms or signs  -1/4/2019 NEURO-ONC/CHEMO: Clinically doing well. No changes. Cycle 4 CCNU and cisplatin. Holding vincristine. Last cycle.  -2/15/2019 NEURO-ONC/ MRB: Clinically stable. Labs not completely rebounded. Imaging with no concern for disease recurrence. Continue with imaging surveillance.  -2/25/2019 AUDIOGRAM: moderate SNHL 3-8 kHz.  -3/2019 Port removed.   -4/22/2019 NEURO-ONC/ MRB: Clinically stable. Labs stable. Imaging with no concern for disease recurrence. Continue with imaging surveillance. Evaluated by dermatology.   -6/24/2019 NEURO-ONC/ MRB: Clinically stable. Imaging with no concern for disease recurrence. Continue with imaging surveillance.  -9/23/2019 NEURO-ONC/ MRB: Clinically stable. Imaging with no concern for disease recurrence.  -12/20/2019 NEURO-ONC/ MRB: Clinically stable. Imaging with no concern for disease recurrence.    SOCIAL HISTORY   Tobacco use: Former smoker, E-cigs stopped on 5/3/2018  Alcohol use: None.   Drug use: Denies marijuana use.  Supplement, complimentary/ alternative medicine: None.   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      PHYSICAL EXAMINATION  /83 (BP Location: Right arm, Patient Position: Chair, Cuff Size: Adult Regular)   Pulse 69   Temp 98.2  F (36.8  C) (Oral)   Resp 16   Ht 1.575 m (5' 2.01\")   Wt 67 kg (147 lb 12.8 oz)   SpO2 99%   BMI 27.03 kg/m      Vitals:    12/20/19 1041   Weight: 67 kg (147 lb 12.8 oz)     Ht Readings from Last 2 Encounters:   12/20/19 1.575 m (5' 2.01\")   03/08/19 1.575 m (5' 2\")     KPS: 100    -Generally well appearing.  -Respiratory: Normal breath sounds, no audible wheezing.   -Skin: No rashes. Healed head incision.  -Hematologic/ lymphatic: No " abnormal bruising. No leg swelling.  -Psychiatric: Normal mood and affect. Pleasant, talkative.  -Neurologic:   MENTAL STATUS:     Alert, oriented.    Recall: Intact.    Speech fluent. Comprehension intact to multi-step commands.   Good right-left orientation.     CRANIAL NERVES:     Discs flat on fundoscopy.    Pupils are equal, round, reactive to light.     Extraocular movements full, patient denies diplopia. Previous mild eye jerks noted on pursuit.    Visual fields full.     Facial sensation intact to light touch.   Symmetric facial movements.   Hearing intact.   Palate moves symmetrically.     Tongue midline.  MOTOR:    Normal and symmetric tone.   Grossly 5/5 throughout.    No pronation or drift. No orbiting.   Able to rise from a chair without use of arms.   On toe/ heel walk, equal distance from floor to heels/ toes.   SENSATION:    Intact to light touch throughout.   COORDINATION:   Intact finger-nose with eyes open and closed.   REFLEXES:    Muted in legs, trace at biceps; symmetric. Improved at knees.    No Hoffmans.  GAIT:   Walks without assistance. Slightly off balance in the setting of recent use of Ativan.    Good speed. Normal stride length and heel strike. Normal turns. Normal arm swing.   Able to toe, heel walk. Able to tandem walk.       MEDICAL RECORDS  Obtained and personally reviewed all available outside medical records in addition to reviewing any records available in our electronic system.     LABS  Reviewed.     IMAGING  Personally reviewed MR brain imaging from last week and compared to prior imaging. To my eye, there is no sign of disease recurrence.     Imaging was shown to and results were reviewed with Merissa.        IMPRESSION/PLAN  Clinic was spent discussing in detail the nature of her cancer in light of her recent imaging. This was in addition to providing emotional support, answering questions pertaining to my recommendations, and devising the treatment plan as outlined  below.     No cancer-directed therapy indicated at this time. Merissa completed 4 cycles of adjuvant therapy and imaging continues to demonstrate complete response. General NCCN Guidelines for imaging surveillance is q 3 months of 2 years (until 1/2021), then q 6 months for 3 years. At recurrence, clinical trial options remain a strong consideration for recurrent disease, as does off label use of SMO inhibitors.     Clinically stable.     Must schedule repeat hearing testing come 1/2020.     PROBLEM LIST  Medulloblastoma  Steroid intolerance  Chemotherapy-induced pancytopenia  Chemotherapy-induced nausea/ vomiting   Radiation burn  Fatigue, cancer and treatment related, improving  Port removed     PLAN  -CANCER-DIRECTED THERAPY-  -Planned treatment stop.  -Imaging with no evidence of disease recurrence. Continue imaging surveillance; will repeat in 3 months as detailed above.  -Can refill pre-scan Ativan as needed.   -No repeat labs need, counts recovered.   -Repeat hearing testing needed in 1/2020.     -SEIZURE MANAGEMENT-  -While this patient is at increased risk of having seizures, given the lack of seizure history, there is no indication to prescribe an antiepileptic at this time. Will continue to monitor for seizure activity.    -Quality of life/ MOOD/ FATIGUE-  -Denies any mood issues. Denies fatigue.   -Continue to monitor mood as untreated/ undertreated depression can worsen fatigue, dysorexia, and quality of life.     Follows with Dr. Jakob Ryder MD - Family Medicine Physician at [facility].     Return to clinic in 3/2020 + repeat MRI.    Anupama Morrison MD  Neuro-oncology

## 2019-12-20 NOTE — PROGRESS NOTES
NEURO-ONCOLOGY VISIT  12/20/2019    CHIEF COMPLAINT: Ms. 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 Completed craniospinal radiation with concurrent vincristine x 3 doses, but concurrent chemotherapy was stopped in the setting of pancytopenia. She completed 4 cycles of adjuvant chemotherapy with vincristine, CCNU, and cisplatin as of 1/2019. Currently managed on imaging surveillance.     She is presenting in follow-up and is not accompanied.        HISTORY OF PRESENT ILLNESS  -She is doing well. No complaints, except for a break out of eczema on chest, which was stress related.   -Hearing testing not completed last month.  -She has very occasional headaches after exertion or dehydration.   -Chronic, unchanged back pain after a really long day at work. No changes in bowel/ bladder function or radicular pain.   -Eating and drinking well.   -No dizziness or vertigo.  -Denies fatigue; working full time.   -She denies any seizure-like activity.  -Denies vision changes, weakness, or numbness and sensation chanegs.   -Persistent tinnitus, stable.     REVIEW OF SYSTEMS  A comprehensive ROS negative except as in HPI.      MEDICATIONS   Current Outpatient Medications   Medication     acetaminophen (TYLENOL) 325 MG tablet     LORazepam (ATIVAN) 1 MG tablet     triamcinolone (KENALOG) 0.1 % external cream     No current facility-administered medications for this visit.      DRUG ALLERGIES   Allergies   Allergen Reactions     Sulfa Drugs Unknown and Hives       ONCOLOGIC HISTORY  -PRESENTATION: Progressively worsening headaches. Additionally was with abrupt position changes resulted in dizziness/ syncope. CTH at an outside hospital showed a mass in the left cerebellum. Transferred to [facility]  [place].  -4/11/2018 MRB showed a 3.5cm mass in the left cerebellar hemisphere that was associated with mild cerebellar tonsillar herniation and hydrocephalus.  -MRI spine showed no evidence of disease.   -4/13/2018 SURGERY: Suboccipital craniotomy with resection of the left cerebellar mass. Immediate post-operative MRI demonstrated a gross total resection.   PATHOLOGY: Medulloblastoma; Molecular markers pending.   -5/4/2018 NEURO-ONC: LP performed. Evaluated by radiation oncology. Recommending craniospinal radiation + concurrent vincristine followed by eight 6-week cycles of cisplatin, lomustine, and vincristine.  -5/4/2018 LP with CSF analysis: WBC 1/ RBC 1, protein 25, glucose 62, negative cytology.   -5/14 - 6/22/2018 CHEMORADS with vincristine 2 mg/m2 (stopped after 3 infusions due to pancytopenia).  -5/17/2018 Audiology- Baseline hearing testing with no hearing loss.  -6/4/2018 NEURO-ONC: Doing well, no new neurological symptoms. Tolerating treatment well. Ophtho referral. Labs improved.  -6/4/2018 NGS via [facility]: SMO mutation, TERT promotor mutation. Negative for microsatellite instability.   -6/25/2018 NEURO-ONC: Clinically stable, painful radiation-induced burn. Monitoring labs. Cancer Risk Assessment referral.  -8/13/2018 NEURO-ONC/MRB/ CHEMO: Clinically stable. Imaging shows expected post operative changes with no evidence of new disease. C1D1 of vincristine, CCNU and cisplatin.   -9/24/2018 NEURO-ONC: Neurologically stable, though did not tolerate first cycle of chemo well 2/2 nausea. Hold on starting C2D1 today due to leukopenia (WBC 2.7).  -9/27/2018 CHEMO: C2D1 of vincristine, CCNU and cisplatin.   -11/9/2018 NEURO-ONC/ MRB/ CHEMO: Clinically well. Imaging with no evidence of tumor recurrence. Cycle 3 CCNU and cisplatin; plan to hold vincristine on ODD cycles due to peripheral neuropathy. Repeat hearing testing ordered.   -12/4/2018 NEURO-ONC: New severe HA, likely tension. No new neurological symptoms  "or signs  -1/4/2019 NEURO-ONC/CHEMO: Clinically doing well. No changes. Cycle 4 CCNU and cisplatin. Holding vincristine. Last cycle.  -2/15/2019 NEURO-ONC/ MRB: Clinically stable. Labs not completely rebounded. Imaging with no concern for disease recurrence. Continue with imaging surveillance.  -2/25/2019 AUDIOGRAM: moderate SNHL 3-8 kHz.  -3/2019 Port removed.   -4/22/2019 NEURO-ONC/ MRB: Clinically stable. Labs stable. Imaging with no concern for disease recurrence. Continue with imaging surveillance. Evaluated by dermatology.   -6/24/2019 NEURO-ONC/ MRB: Clinically stable. Imaging with no concern for disease recurrence. Continue with imaging surveillance.  -9/23/2019 NEURO-ONC/ MRB: Clinically stable. Imaging with no concern for disease recurrence.  -12/20/2019 NEURO-ONC/ MRB: Clinically stable. Imaging with no concern for disease recurrence.    SOCIAL HISTORY   Tobacco use: Former smoker, E-cigs stopped on 5/3/2018  Alcohol use: None.   Drug use: Denies marijuana use.  Supplement, complimentary/ alternative medicine: None.   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      PHYSICAL EXAMINATION  /83 (BP Location: Right arm, Patient Position: Chair, Cuff Size: Adult Regular)   Pulse 69   Temp 98.2  F (36.8  C) (Oral)   Resp 16   Ht 1.575 m (5' 2.01\")   Wt 67 kg (147 lb 12.8 oz)   SpO2 99%   BMI 27.03 kg/m     Vitals:    12/20/19 1041   Weight: 67 kg (147 lb 12.8 oz)     Ht Readings from Last 2 Encounters:   12/20/19 1.575 m (5' 2.01\")   03/08/19 1.575 m (5' 2\")     KPS: 100    -Generally well appearing.  -Respiratory: Normal breath sounds, no audible wheezing.   -Skin: No rashes. Healed head incision.  -Hematologic/ lymphatic: No abnormal bruising. No leg swelling.  -Psychiatric: Normal mood and affect. Pleasant, talkative.  -Neurologic:   MENTAL STATUS:     Alert, oriented.    Recall: Intact.    Speech fluent. Comprehension intact to multi-step commands.   Good right-left " orientation.     CRANIAL NERVES:     Discs flat on fundoscopy.    Pupils are equal, round, reactive to light.     Extraocular movements full, patient denies diplopia. Previous mild eye jerks noted on pursuit.    Visual fields full.     Facial sensation intact to light touch.   Symmetric facial movements.   Hearing intact.   Palate moves symmetrically.     Tongue midline.  MOTOR:    Normal and symmetric tone.   Grossly 5/5 throughout.    No pronation or drift. No orbiting.   Able to rise from a chair without use of arms.   On toe/ heel walk, equal distance from floor to heels/ toes.   SENSATION:    Intact to light touch throughout.   COORDINATION:   Intact finger-nose with eyes open and closed.   REFLEXES:    Muted in legs, trace at biceps; symmetric. Improved at knees.    No Hoffmans.  GAIT:   Walks without assistance. Slightly off balance in the setting of recent use of Ativan.    Good speed. Normal stride length and heel strike. Normal turns. Normal arm swing.   Able to toe, heel walk. Able to tandem walk.       MEDICAL RECORDS  Obtained and personally reviewed all available outside medical records in addition to reviewing any records available in our electronic system.     LABS  Reviewed.     IMAGING  Personally reviewed MR brain imaging from last week and compared to prior imaging. To my eye, there is no sign of disease recurrence.     Imaging was shown to and results were reviewed with Merissa.        IMPRESSION/PLAN  Clinic was spent discussing in detail the nature of her cancer in light of her recent imaging. This was in addition to providing emotional support, answering questions pertaining to my recommendations, and devising the treatment plan as outlined below.     No cancer-directed therapy indicated at this time. Merissa completed 4 cycles of adjuvant therapy and imaging continues to demonstrate complete response. General NCCN Guidelines for imaging surveillance is q 3 months of 2 years (until 1/2021), then q  6 months for 3 years. At recurrence, clinical trial options remain a strong consideration for recurrent disease, as does off label use of SMO inhibitors.     Clinically stable.     Must schedule repeat hearing testing come 1/2020.     PROBLEM LIST  Medulloblastoma  Steroid intolerance  Chemotherapy-induced pancytopenia  Chemotherapy-induced nausea/ vomiting   Radiation burn  Fatigue, cancer and treatment related, improving  Port removed     PLAN  -CANCER-DIRECTED THERAPY-  -Planned treatment stop.  -Imaging with no evidence of disease recurrence. Continue imaging surveillance; will repeat in 3 months as detailed above.  -Can refill pre-scan Ativan as needed.   -No repeat labs need, counts recovered.   -Repeat hearing testing needed in 1/2020.     -SEIZURE MANAGEMENT-  -While this patient is at increased risk of having seizures, given the lack of seizure history, there is no indication to prescribe an antiepileptic at this time. Will continue to monitor for seizure activity.    -Quality of life/ MOOD/ FATIGUE-  -Denies any mood issues. Denies fatigue.   -Continue to monitor mood as untreated/ undertreated depression can worsen fatigue, dysorexia, and quality of life.     Follows with Dr. Jakob Ryder MD - Family Medicine Physician at [facility].     Return to clinic in 3/2020 + repeat MRI.    Anupama Morrison MD  Neuro-oncology

## 2020-01-02 ENCOUNTER — OFFICE VISIT (OUTPATIENT)
Dept: FAMILY MEDICINE | Facility: CLINIC | Age: 35
End: 2020-01-02
Payer: COMMERCIAL

## 2020-01-02 VITALS — DIASTOLIC BLOOD PRESSURE: 88 MMHG | SYSTOLIC BLOOD PRESSURE: 132 MMHG

## 2020-01-02 DIAGNOSIS — B35.4 TINEA CORPORIS: ICD-10-CM

## 2020-01-02 DIAGNOSIS — L29.9 LOCALIZED PRURITUS: Primary | ICD-10-CM

## 2020-01-02 DIAGNOSIS — L21.9 SEBORRHEIC DERMATITIS: ICD-10-CM

## 2020-01-02 LAB
KOH PREP SPEC: NORMAL
SPECIMEN SOURCE: NORMAL

## 2020-01-02 PROCEDURE — 99214 OFFICE O/P EST MOD 30 MIN: CPT | Mod: 25 | Performed by: PHYSICIAN ASSISTANT

## 2020-01-02 PROCEDURE — 87101 SKIN FUNGI CULTURE: CPT | Performed by: PHYSICIAN ASSISTANT

## 2020-01-02 PROCEDURE — 87220 TISSUE EXAM FOR FUNGI: CPT | Performed by: PHYSICIAN ASSISTANT

## 2020-01-02 RX ORDER — FLUOCINONIDE TOPICAL SOLUTION USP, 0.05% 0.5 MG/ML
SOLUTION TOPICAL
Qty: 60 ML | Refills: 0 | Status: SHIPPED | OUTPATIENT
Start: 2020-01-02 | End: 2020-10-27

## 2020-01-02 RX ORDER — TRIAMCINOLONE ACETONIDE 5 MG/G
CREAM TOPICAL 2 TIMES DAILY
Qty: 60 G | Refills: 1 | Status: SHIPPED | OUTPATIENT
Start: 2020-01-02 | End: 2020-10-27

## 2020-01-02 RX ORDER — KETOCONAZOLE 20 MG/ML
SHAMPOO TOPICAL
Qty: 120 ML | Refills: 11 | Status: SHIPPED | OUTPATIENT
Start: 2020-01-02 | End: 2020-04-06

## 2020-01-02 RX ORDER — TRIAMCINOLONE ACETONIDE 0.25 MG/G
CREAM TOPICAL
Qty: 30 G | Refills: 0 | Status: SHIPPED | OUTPATIENT
Start: 2020-01-02 | End: 2020-10-27

## 2020-01-02 RX ORDER — TERBINAFINE HYDROCHLORIDE 250 MG/1
250 TABLET ORAL DAILY
Qty: 30 TABLET | Refills: 0 | Status: SHIPPED | OUTPATIENT
Start: 2020-01-02 | End: 2020-01-24

## 2020-01-02 NOTE — PROGRESS NOTES
HPI:  I was asked to see pt by Dr. Morrison. Merissa Silverman is a 34 year old female patient here today for rash on scalp, trunk, arms, and face .  Patient states this has been present for a few months. Started on chest and then spread to arms, face, scalp and trunk.  Patient reports the following symptoms: itch. Pt works as a  .  Patient reports the following previous treatments: none.  Patient reports the following modifying factors: none.  Associated symptoms: none.  Patient has no other skin complaints today.  Remainder of the HPI, Meds, PMH, Allergies, FH, and SH was reviewed in chart.    Hx of medulloblastoma of cerebellum.     Past Medical History:   Diagnosis Date     Gastroesophageal reflux disease      Medulloblastoma (H)        Past Surgical History:   Procedure Laterality Date     CRANIOTOMY  04/13/2018     dermoid cyst removed right ovary       INSERT PORT VASCULAR ACCESS Right 5/9/2018    Procedure: INSERT PORT VASCULAR ACCESS;  Right Central Venous Chest Port Placement;  Surgeon: Sung Selby PA-C;  Location: [facility] OR     IR PORT REMOVAL RIGHT  3/8/2019     REMOVE PORT VASCULAR ACCESS Right 3/8/2019    Procedure: Right Port Removal;  Surgeon: Pedro Briceño PA-C;  Location: [facility] OR        Family History   Problem Relation Age of Onset     Colon Cancer Paternal Grandmother      Colon Cancer Paternal Grandfather      Glaucoma No family hx of      Macular Degeneration No family hx of        Social History     Socioeconomic History     Marital status:      Spouse name: Not on file     Number of children: Not on file     Years of education: Not on file     Highest education level: Not on file   Occupational History     Not on file   Social Needs     Financial resource strain: Not on file     Food insecurity:     Worry: Not on file     Inability: Not on file     Transportation needs:     Medical: Not on file     Non-medical: Not on file   Tobacco Use     Smoking status: Former  Smoker     Types: Other     Smokeless tobacco: Never Used     Tobacco comment: ECigs stopped 5/3/2018   Substance and Sexual Activity     Alcohol use: No     Comment: none     Drug use: No     Sexual activity: Not on file   Lifestyle     Physical activity:     Days per week: Not on file     Minutes per session: Not on file     Stress: Not on file   Relationships     Social connections:     Talks on phone: Not on file     Gets together: Not on file     Attends [employer]oration[employer] service: Not on file     Active member of club or organization: Not on file     Attends meetings of clubs or organizations: Not on file     Relationship status: Not on file     Intimate partner violence:     Fear of current or ex partner: Not on file     Emotionally abused: Not on file     Physically abused: Not on file     Forced sexual activity: Not on file   Other Topics Concern     Not on file   Social History Narrative    Started Radiation on May 14th       Outpatient Encounter Medications as of 1/2/2020   Medication Sig Dispense Refill     acetaminophen (TYLENOL) 325 MG tablet Take 650 mg by mouth       LORazepam (ATIVAN) 1 MG tablet Take 1 tab my mouth 30 minutes prior to imaging and then, repeat as needed just prior to scan. 2 tablet 0     triamcinolone (KENALOG) 0.1 % external cream Apply to AA on chest bid for 10-14 days and then when needed (Patient not taking: Reported on 1/2/2020) 80 g 0     No facility-administered encounter medications on file as of 1/2/2020.        Review Of Systems:  Skin: rash on scalp, face, trunk and arms  Eyes: negative  Ears/Nose/Throat: negative  Respiratory: No shortness of breath, dyspnea on exertion, cough, or hemoptysis  Cardiovascular: negative  Gastrointestinal: negative  Genitourinary: negative  Musculoskeletal: negative  Neurologic: negative  Psychiatric: negative  Hematologic/Lymphatic/Immunologic: negative  Endocrine: negative      Objective:     /88   Breastfeeding No   Eyes: Conjunctivae/lids:  Normal   ENT: Lips:  Normal  MSK: Normal  Cardiovascular: Peripheral edema none  Pulm: Breathing Normal  Neuro/Psych: Orientation: A/O x 3 Normal; Mood/Affect: Normal, NAD, WDWN  Pt accompanied by: self  Following areas examined: face, scalp, neck, ears, chest, abdomen, back, UE  Plata skin type:i   Findings:  Pink patches with trailing scale on face, ears, neck,  trunk and UE.   Pink and white thickened plaques on scalp  Assessment and Plan:  1) tinea corporis vs pityriasis rosea and localized pruritis  koh of chest neg  Take one pill of terbinafine by mouth daily.  Side effects of Terbinafine include but are not limited to elevated liver enzymes, nausea, vomiting, rash, taste changes, vision changes, photosensitivity, loss of smell, and headache.  Rash on face:  Apply a thin layer of triamcinolone 0.025% to affected area 2x a day for 2 weeks. Tapering with improvement.   Rash on body:  Apply a thin layer of triamcinolone to affected area on trunk and arms 2x a day for 2-3 weeks. Tapering with improvement. Do not use on face or body folds.  Discussed side effects of topical steroids including but not limited to atrophy (skin thinning), striae (stretch marks) telangiectasias, steroid acne, and others. Do not apply to normal skin. Do not apply to discolored skin that does not have rash present. Educated patient on post inflammatory hyperpigmentation.     2) seborrheic dermatitis vs psoriasis vs tinea capitis and localized pruritis  Fungal cx of scalp  Scalp:   Wash scalp daily with a medicated shampoo discussed with provider. Wet affected area daily, apply shampoo and lather, let sit for 3-5 minutes and then rinse.   If multiple shampoos discussed begin  two prescription shampoos or one prescription shampoo and one over the counter shampoo  (examples: Head and shoulders, Selsen Blue, Ketoconazole, T-Sal, and/or T-gel.     Lidex: Apply a thin layer to affected area on scalp 2x a day x 4 weeks, tapering with  improvement. Do not apply to face or body folds.     Side effects of topical steroids including but not limited to atrophy (skin thinning), striae (stretch marks) telangiectasias, steroid acne, and others. Do not apply to normal skin. Do not apply to discolored skin that does not have rash present.     Follow up in 3-4 weeks.

## 2020-01-02 NOTE — PATIENT INSTRUCTIONS
Scalp:   Wash scalp daily with a medicated shampoo discussed with provider. Wet affected area daily, apply shampoo and lather, let sit for 3-5 minutes and then rinse.   If multiple shampoos discussed begin  two prescription shampoos or one prescription shampoo and one over the counter shampoo  (examples: Head and shoulders, Selsen Blue, Ketoconazole, T-Sal, and/or T-gel.     Lidex: Apply a thin layer to affected area on scalp 2x a day x 4 weeks, tapering with improvement. Do not apply to face or body folds.     Side effects of topical steroids including but not limited to atrophy (skin thinning), striae (stretch marks) telangiectasias, steroid acne, and others. Do not apply to normal skin. Do not apply to discolored skin that does not have rash present.       Rash on face:  Apply a thin layer of triamcinolone 0.025% to affected area 2x a day for 2 weeks. Tapering with improvement.     Rash on body:  Apply a thin layer of triamcinolone to affected area on trunk and arms 2x a day for 2-3 weeks. Tapering with improvement. Do not use on face or body folds.  Discussed side effects of topical steroids including but not limited to atrophy (skin thinning), striae (stretch marks) telangiectasias, steroid acne, and others. Do not apply to normal skin. Do not apply to discolored skin that does not have rash present. Educated patient on post inflammatory hyperpigmentation.     Take one pill of terbinafine by mouth daily.     Side effects of Terbinafine include but are not limited to elevated liver enzymes, nausea, vomiting, rash, taste changes, vision changes, photosensitivity, loss of smell, and headache.

## 2020-01-24 ENCOUNTER — OFFICE VISIT (OUTPATIENT)
Dept: FAMILY MEDICINE | Facility: CLINIC | Age: 35
End: 2020-01-24
Payer: COMMERCIAL

## 2020-01-24 VITALS — DIASTOLIC BLOOD PRESSURE: 66 MMHG | SYSTOLIC BLOOD PRESSURE: 104 MMHG

## 2020-01-24 DIAGNOSIS — L29.9 LOCALIZED PRURITUS: ICD-10-CM

## 2020-01-24 DIAGNOSIS — D48.5 NEOPLASM OF UNCERTAIN BEHAVIOR OF SKIN: ICD-10-CM

## 2020-01-24 DIAGNOSIS — R21 RASH AND OTHER NONSPECIFIC SKIN ERUPTION: Primary | ICD-10-CM

## 2020-01-24 DIAGNOSIS — L85.3 XEROSIS OF SKIN: ICD-10-CM

## 2020-01-24 PROCEDURE — 11102 TANGNTL BX SKIN SINGLE LES: CPT | Performed by: PHYSICIAN ASSISTANT

## 2020-01-24 PROCEDURE — 99213 OFFICE O/P EST LOW 20 MIN: CPT | Mod: 25 | Performed by: PHYSICIAN ASSISTANT

## 2020-01-24 PROCEDURE — 11103 TANGNTL BX SKIN EA SEP/ADDL: CPT | Performed by: PHYSICIAN ASSISTANT

## 2020-01-24 PROCEDURE — 88305 TISSUE EXAM BY PATHOLOGIST: CPT | Mod: TC | Performed by: PHYSICIAN ASSISTANT

## 2020-01-24 PROCEDURE — 88312 SPECIAL STAINS GROUP 1: CPT | Mod: TC | Performed by: PHYSICIAN ASSISTANT

## 2020-01-24 RX ORDER — BETAMETHASONE DIPROPIONATE 0.5 MG/G
CREAM TOPICAL
Qty: 50 G | Refills: 2 | Status: SHIPPED | OUTPATIENT
Start: 2020-01-24 | End: 2020-10-27

## 2020-01-24 RX ORDER — LORATADINE 10 MG/1
TABLET ORAL
Qty: 60 TABLET | Refills: 1 | Status: SHIPPED | OUTPATIENT
Start: 2020-01-24 | End: 2020-05-18

## 2020-01-24 RX ORDER — DIPHENHYDRAMINE HCL 25 MG
TABLET ORAL
Qty: 30 TABLET | Refills: 1 | Status: SHIPPED | OUTPATIENT
Start: 2020-01-24 | End: 2020-05-18

## 2020-01-24 RX ORDER — CETIRIZINE HYDROCHLORIDE 10 MG/1
TABLET ORAL
Qty: 60 TABLET | Refills: 1 | Status: SHIPPED | OUTPATIENT
Start: 2020-01-24 | End: 2020-05-18

## 2020-01-24 NOTE — LETTER
1/24/2020         RE: Merissa Silverman  [address]  [address]  [place] [facility] [place] [facility] [address]        Dear Colleague,    Thank you for referring your patient, Merissa Silverman, to the [facility]. Please see a copy of my visit note below.    HPI:  Merissa Silverman is a 34 year old female patient here today for follow up rash on scalp, trunk, arms, and face  .  Patient states this has been present for a few months.  Patient reports the following symptoms: itch and rash, pt works as a  .  Patient reports the following previous treatments: LOV terbinifine, TAC to body, nizoral, and lidex to scalp with minimal change to rash. Stopped nizoral due to causing hair loss. Has been using t sal with some improvement. Also has an irritated spot on right shoulder for a while.   Patient reports the following modifying factors: none.  Associated symptoms: none.  Patient has no other skin complaints today.  Remainder of the HPI, Meds, PMH, Allergies, FH, and SH was reviewed in chart.      Past Medical History:   Diagnosis Date     Gastroesophageal reflux disease      Medulloblastoma (H)        Past Surgical History:   Procedure Laterality Date     CRANIOTOMY  04/13/2018     dermoid cyst removed right ovary       INSERT PORT VASCULAR ACCESS Right 5/9/2018    Procedure: INSERT PORT VASCULAR ACCESS;  Right Central Venous Chest Port Placement;  Surgeon: Sung Selby PA-C;  Location: [facility] OR     IR PORT REMOVAL RIGHT  3/8/2019     REMOVE PORT VASCULAR ACCESS Right 3/8/2019    Procedure: Right Port Removal;  Surgeon: Pedro Briceño PA-C;  Location: [facility] OR        Family History   Problem Relation Age of Onset     Colon Cancer Paternal Grandmother      Colon Cancer Paternal Grandfather      Glaucoma No family hx of      Macular Degeneration No family hx of        Social History     Socioeconomic History     Marital status:      Spouse name: Not on file     Number of children: Not on file      Years of education: Not on file     Highest education level: Not on file   Occupational History     Not on file   Social Needs     Financial resource strain: Not on file     Food insecurity:     Worry: Not on file     Inability: Not on file     Transportation needs:     Medical: Not on file     Non-medical: Not on file   Tobacco Use     Smoking status: Former Smoker     Types: Other     Smokeless tobacco: Never Used     Tobacco comment: ECigs stopped 5/3/2018   Substance and Sexual Activity     Alcohol use: No     Comment: none     Drug use: No     Sexual activity: Not on file   Lifestyle     Physical activity:     Days per week: Not on file     Minutes per session: Not on file     Stress: Not on file   Relationships     Social connections:     Talks on phone: Not on file     Gets together: Not on file     Attends Latter day service: Not on file     Active member of club or organization: Not on file     Attends meetings of clubs or organizations: Not on file     Relationship status: Not on file     Intimate partner violence:     Fear of current or ex partner: Not on file     Emotionally abused: Not on file     Physically abused: Not on file     Forced sexual activity: Not on file   Other Topics Concern     Not on file   Social History Narrative    Started Radiation on May 14th       Outpatient Encounter Medications as of 1/24/2020   Medication Sig Dispense Refill     acetaminophen (TYLENOL) 325 MG tablet Take 650 mg by mouth       fluocinonide (LIDEX) 0.05 % external solution Apply to affected area on scalp BID x 2-4 weeks, tapering with improvement. Do not apply to face or body folds. 60 mL 0     LORazepam (ATIVAN) 1 MG tablet Take 1 tab my mouth 30 minutes prior to imaging and then, repeat as needed just prior to scan. 2 tablet 0     triamcinolone (ARISTOCORT HP) 0.5 % external cream Apply topically 2 times daily A thin layer to aa on trunk and UE x 2-3 weeks. 60 g 1     triamcinolone (KENALOG) 0.025 % cream  Apply a thin layer to affected area on face BID x 1-2 weeks. Tapering with improvement. 30 g 0     ketoconazole (NIZORAL) 2 % external shampoo Wet affected area daily, apply shampoo and lather, let sit for 3-5 minutes and then rinse. (Patient not taking: Reported on 1/24/2020) 120 mL 11     [DISCONTINUED] terbinafine (LAMISIL) 250 MG tablet Take 1 tablet (250 mg) by mouth daily (Patient not taking: Reported on 1/24/2020) 30 tablet 0     [DISCONTINUED] triamcinolone (KENALOG) 0.1 % external cream Apply to AA on chest bid for 10-14 days and then when needed (Patient not taking: Reported on 1/2/2020) 80 g 0     No facility-administered encounter medications on file as of 1/24/2020.        Review Of Systems:  Skin: rash on scalp, face, neck, and trunk  Eyes: negative  Ears/Nose/Throat: negative  Respiratory: No shortness of breath, dyspnea on exertion, cough, or hemoptysis  Cardiovascular: negative  Gastrointestinal: negative  Genitourinary: negative  Musculoskeletal: negative  Neurologic: negative  Psychiatric: negative  Hematologic/Lymphatic/Immunologic: negative  Endocrine: negative      Objective:     /66   Eyes: Conjunctivae/lids: Normal   ENT: Lips:  Normal  MSK: Normal  Cardiovascular: Peripheral edema none  Pulm: Breathing Normal  Neuro/Psych: Orientation: A/O x 3 Normal; Mood/Affect: Normal, NAD, WDWN  Pt accompanied by: self  Following areas examined: face, scalp, neck, chest, right UE, back  Plata skin type:i   Findings:  Pink patches with scale on face, ears, neck,  trunk and UE.   Pink and white thickened plaques on scalp  Inflamed brown, stuck-on scaly appearing papules right trapezius 0.2cm  Assessment and Plan:  1) Rash and nonspecific skin eruption, localized pruritis, xerosis of skin  Pt has a hx of medulloblastoma  Stop terbinifine, nizoral and TAC.   Disc methotrexate, nbuvb, topicals, biologic. Before starting systemic would reach out to Dr. Morrison who is monitoring pt.  Psoriasis vs  erythema annulare centrifugum vs pityriasis rosea vs spongiosis  Take 2 claritin by mouth in the am  Take 2 zyrtec by mouth in the pm  Take 1-2 benadryl by mouth in the pm  Moisturize 2x a day with a thick emollient such as vaseline  Continue t sal to scalp  Continue lidex to scalp 2x a day for 3 weeks. Do not use on face or body folds  Start betamethasone to affected area on trunk and extremities 2x a day for 3 weeks. Do not use on face or body folds.     Side effects of topical steroids including but not limited to atrophy (skin thinning), striae (stretch marks) telangiectasias, steroid acne, and others. Do not apply to normal skin. Do not apply to discolored skin that does not have rash present.     TANGENTIAL BIOPSY:  After consent, anesthesia with LEC and prep, tangential biopsy performed.  No complications and routine wound care.  May grow back and will get a scar. Based on lesion type may need to completely remove lesion. Patient will be notified in 7-10 days of results. Wound care directions given.    2) Neoplasm of uncertain behavior right trapezius 0.2cm  isk vs scc  TANGENTIAL BIOPSY:  After consent, anesthesia with LEC and prep, tangential biopsy performed.  No complications and routine wound care.  May grow back and will get a scar. Based on lesion type may need to completely remove lesion. Patient will be notified in 7-10 days of results. Wound care directions given.        Follow up in 2-3 weeks      Again, thank you for allowing me to participate in the care of your patient.        Sincerely,        Elena Blake PA-C

## 2020-01-24 NOTE — PATIENT INSTRUCTIONS
Wound Care Instructions     FOR SUPERFICIAL WOUNDS     [facility] [phone]    [facility] [phone]          AFTER 24 HOURS YOU SHOULD REMOVE THE BANDAGE AND BEGIN DAILY DRESSING CHANGES AS FOLLOWS:     1) Remove Dressing.     2) Clean and dry the area with tap water using a Q-tip or sterile gauze pad.     3) Apply Vaseline, Aquaphor, Polysporin ointment or Bacitracin ointment over entire wound.  Do NOT use Neosporin ointment.     4) Cover the wound with a band-aid, or a sterile non-stick gauze pad and micropore paper tape      REPEAT THESE INSTRUCTIONS AT LEAST ONCE A DAY UNTIL THE WOUND HAS COMPLETELY HEALED.    It is an old wives tale that a wound heals better when it is exposed to air and allowed to dry out. The wound will heal faster with a better cosmetic result if it is kept moist with ointment and covered with a bandage.    **Do not let the wound dry out.**      Supplies Needed:      *Cotton tipped applicators (Q-tips)    *Polysporin Ointment or Bacitracin Ointment (NOT NEOSPORIN)    *Band-aids or non-stick gauze pads and micropore paper tape.      PATIENT INFORMATION:    During the healing process you will notice a number of changes. All wounds develop a small halo of redness surrounding the wound.  This means healing is occurring. Severe itching with extensive redness usually indicates sensitivity to the ointment or bandage tape used to dress the wound.  You should call our office if this develops.      Swelling  and/or discoloration around your surgical site is common, particularly when performed around the eye.    All wounds normally drain.  The larger the wound the more drainage there will be.  After 7-10 days, you will notice the wound beginning to shrink and new skin will begin to grow.  The wound is healed when you can see skin has formed over the entire area.  A healed wound has a healthy, shiny look to the surface and is red to dark pink in color to  normalize.  Wounds may take approximately 4-6 weeks to heal.  Larger wounds may take 6-8 weeks.  After the wound is healed you may discontinue dressing changes.    You may experience a sensation of tightness as your wound heals. This is normal and will gradually subside.    Your healed wound may be sensitive to temperature changes. This sensitivity improves with time, but if you re having a lot of discomfort, try to avoid temperature extremes.    Patients frequently experience itching after their wound appears to have healed because of the continue healing under the skin.  Plain Vaseline will help relieve the itching.        POSSIBLE COMPLICATIONS    BLEEDIN. Leave the bandage in place.  2. Use tightly rolled up gauze or a cloth to apply direct pressure over the bandage for 30  minutes.  3. Reapply pressure for an additional 30 minutes if necessary  4. Use additional gauze and tape to maintain pressure once the bleeding has stopped.      Psoriasis vs pityriasis rosea vs spongiosis  Take 2 claritin by mouth in the am  Take 2 zyrtec by mouth in the pm  Take 1-2 benadryl by mouth in the pm  Moisturize 2x a day with a thick emollient such as vaseline  Continue t sal to scalp  Continue lidex to scalp 2x a day for 3 weeks. Do not use on face or body folds  Start betamethasone to affected area on trunk and extremities 2x a day for 3 weeks. Do not use on face or body folds.     Side effects of topical steroids including but not limited to atrophy (skin thinning), striae (stretch marks) telangiectasias, steroid acne, and others. Do not apply to normal skin. Do not apply to discolored skin that does not have rash present.     Consider light treatment    Proper skin care from [facility]:    -Eliminate harsh soaps as they strip the natural oils from the skin, often resulting in dry itchy skin ( i.e. Dial, Zest, [place])  -Use mild soaps such as Cetaphil or Dove Sensitive Skin in the shower. You do not need  to use soap on arms, legs, and trunk every time you shower unless visibly soiled.   -Avoid hot or cold showers.  -After showering, lightly dry off and apply moisturizing within 2-3 minutes. This will help trap moisture in the skin.   -Aggressive use of a moisturizer at least 1-2 times a day to the entire body (including -Vanicream, Cetaphil, Aquaphor or Cerave) and moisturize hands after every washing.  -We recommend using moisturizers that come in a tub that needs to be scooped out, not a pump. This has more of an oil base. It will hold moisture in your skin much better than a water base moisturizer. The above recommended are non-pore clogging.               NARROWBAND ULTRAVIOLET B (NBUVB) CONSENT FORM  Narrowband ultraviolet B (NB UVB) is a type of phototherapy (light treatment) used to treat various skin conditions, including psoriasis, atopic dermatitis (eczema), and itching. This treatment exposes your skin to ultraviolet (UV) light for varying lengths of time. Possible benefits include improvement of existing lesions and reduction of new lesions. NB UVB will not lead to a permanent cure, but can effectively control or improve your condition, sometimes over extended periods of time.   Each patient will vary in the number of treatments required per week and the time it will take to reach significant improvement or clearance. Most patients initially require 3 treatments per week. Typically, treatments start with only a few seconds of UV light exposure and gradually increase as determined by your provider. It may take 15 to 25 treatments or longer to improve your condition. Not all patients will respond quickly or clear completely. However, in many cases, NBUVB has resulted in near-total clearing or remission.   The possible risks and side effects of NB UVB are:       Sunburn or blistering. This may occur at any time during therapy. Certain medications may also cause you to sunburn. Please inform us of any  medications you are taking, especially new medications during your treatment.      Theoretical increased risk of skin cancer. However, this has not been demonstrated in many studies with psoriasis patients and UVB treatment.       Dryness and itching.       Skin aging, including an increase in freckling, wrinkles, and dark spots.       Eye damage and cataracts. This is preventable with required protective goggles worn during treatments.       Increased frequency of cold sores (herpes labialis). If you have a history of cold sores, apply sunscreen on your lips to reduce the risk of an outbreak.       Increase in genital cancer in men with long-term UVB exposure (>300 treatments). This risk is decreased with use of a shield on the genital area.       Worsening of other medical conditions, such as lupus erythematosus or other sun-sensitive conditions.

## 2020-01-30 LAB
BACTERIA SPEC CULT: NORMAL
COPATH REPORT: NORMAL
SPECIMEN SOURCE: NORMAL

## 2020-02-03 ENCOUNTER — TELEPHONE (OUTPATIENT)
Dept: FAMILY MEDICINE | Facility: CLINIC | Age: 35
End: 2020-02-03

## 2020-02-03 NOTE — TELEPHONE ENCOUNTER
----- Message from Elena Blake PA-C sent at 1/31/2020 10:17 AM CST -----  A. Skin, right dorsal forearm, shave:   - Spongiotic dermatitis with mounded parakeratosis - shows either pityriasis rosea ( triggered by exposure to a virus) or eczema. Pityriasis rosea is difficult to treat and can resolve on its own. It usually resolves within 8 weeks but I have seen it take months. The other possible diagnosis includes eczema. Eczema can be chronic ( last a long time) but to expedite resolution we could try a few week course of oral prednisone in addition to your current treatment. If continue to flare after that there are options for long term tx.  Side effects of Prednisone including but not limited to weight gain, emotional lability, appetitive changes, anxiety, glucose intolerance, hypothalamic-pituitary-adrenal axis suppression, GI upset, impaired wound healing, myopathies, fluid and sodium retention, and rebound flaring.        B. Skin, right trapezius, shave:   - Verrucous keratosis -This is a benign lesion that does not need any additional treatment. If recurs or is bothersome can tx with cryo    
sent results via [facility]  
Breath sounds clear and equal bilaterally.

## 2020-02-05 ENCOUNTER — DOCUMENTATION ONLY (OUTPATIENT)
Dept: ONCOLOGY | Facility: CLINIC | Age: 35
End: 2020-02-05

## 2020-02-05 NOTE — PROGRESS NOTES
Received request for emotional support animal for patient. Forms completed and faxed to [phone].     Chelle Ruiz CMA ([facility])

## 2020-02-25 NOTE — PROGRESS NOTES
"[facility] - PRIMARY CARE SKIN    CC: skin cancer screening (full-body)  SUBJECTIVE:   Merissa Silverman is a(n) 33 year old female who presents to clinic today for a full-body skin exam , history of mild and moderate atypical nevi and has multiple nevi.    Issue one : She developed a itchy rash on the arms, trunk that was diagnosed as pityriasis rosea. Biopsy was done. The itchy has resolved, rash improved on the chest and back , but still persisting on arms and legs. Treated with triamcinolone 0.1% cream and augmented betamethasone diproprinate 0.05% - but this did not seem to help     Issue two : itchy scalp and scaly. Scalp started at the same time that the rash occurred. 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    Family Medical History  Skin cancer: YES - melanoma in paternal grandmother, \"skin chemo\" in an aunt  Psoriasis   NO     Sun Exposure History  Previous history of significant sun exposure:  Blistering sunburns: NO.  Tanning beds: YES - when younger.  Sunscreen usage: YES, SPF: 50+.  UV-protective clothing usage: YES.  Wide-brimmed hat usage: NO.  UV-protective sunglasses usage: YES.  Mid-day sun avoidance: YES.    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    Refer to electronic medical record (EMR) for past medical history and medications.    INTEGUMENTARY/SKIN: POSITIVE for changing lesions  ROS: 14 point review of systems was negative except the symptoms listed above in the HPI.        OBJECTIVE:   GENERAL: healthy, alert and no distress.  SKIN: Plata Skin Type - I.  This patient was examined from the top of the head to the bottom of the feet  " "including scalp, face, neck, trunk, buttocks, both arms, both legs, both hands, both feet, and all fingers and toes. The dermatoscope was used to help evaluate pigmented lesions.  Skin Pertinent Findings:  Upper extremities: Scattered brown macules of various sizes and shapes most consistent with (benign) melanocytic nevi.  Anterior lower extremities: Multiple brown macules of various sizes and shapes most consistent with (benign) melanocytic nevi.  Back: multiple brown macules of various sizes and shapes most consistent with (benign) melanocytic nevi .  Posterior lower extremities, Buttocks: Multiple brown macules of various sizes and shapes most consistent with (benign) melanocytic nevi .    Scalp : diffuse thick scaling and erythema with hair loss  Arms, trunk, face - scattered plaques of erythema, minimal scaling  Knees : scaly small plaques.  No pitting on the finger nails        ASSESSMENT:     Encounter Diagnoses   Name Primary?     Psoriasis Yes     Skin exam for malignant neoplasm      Melanocytic nevi of trunk      MDM ; because of the severe scalp involvement with hair loss and multiple other areas discussed methotrexate. NBUVB would be effective for the body area involved but not as effective for the scalp involvement. I will check with her oncologist Dr Morrison to make sure she would have no concerns with prescribing the methotrexate.    PLAN:      Psoriasis :       Clobetasol propionate shampoo - apply to scalp at bedtime, leave in place for 10 \" then rinse X 10 days        Recheck appointment in 7 days        Vitamin D 3 supplement 2000 international unit(s) per day  Skin exam in six months  SUN PROTECTION INSTRUCTIONS  Sun damage can lead to skin cancer and premature aging of the skin.      The best way to protect from sun damage to your skin is to avoid the sun during peak hours (10 am - 2 pm) even on overcast days.    Never use tanning beds. Tanning beds are associated with much higher risks of skin " "cancer.    All tanning damages the skin. Aim for ivory skin year round and you will have less trouble with your skin in years to come. There is no merit in getting \"a base tan\" before a warm weather vacation, as any tanning indicates your body's response to sun damage.    Stop smoking. Smokers have higher rates of skin cancer and also have premature skin wrinkling.    Use UPF sun-protective clothing, which while more expensive initially provides longer lasting coverage without having to worry about remembering to re-apply.  1. Wear a wide-brimmed hat and sunglasses.   2. Wear sun-protective clothing.  [employer] and other [employer] make sun protective clothing that are stylish, comfortable and cool.   [employer] and other [employer] make UV arm sleeves suitable for golfing, gardening and other activities.    Sunscreen instructions:  1. Use sunscreens with Zinc Oxide, Titanium Dioxide or Avobenzone to protect from UVA rays.  2. Use SPF 30-50+ to protect from UVB rays.  3. Re-apply every 2 hours even if water resistant.  4. Apply on your face every day even when cloudy and even in the winter. UVA \"aging rays\" penetrate window glass and are just as strong in the winter as in the summer.    FYI  You should use about 3 tablespoons of sunscreen to protect your whole body. Thus a typical eight ounce bottle of sunscreen should last 4 applications. Remember, that the SPF rating is compromised if you don t apply enough. Most people only apply 1/2 - 1/3 of the amount they need. Also don t forget areas such as your ears, feet, upper back and harder to reach places. Keep in mind that these amounts should be increased for larger body sizes.    Sunscreens with titanium dioxide and/or zinc oxide in the active ingredients are physical blockers as opposed to chemical blockers. Chemical-free sunscreens should not irritate the skin.    Spray-on sunscreens may be used for touch-up application only, not as a base layer. Also, " use with caution around small children due to inhalation risk.    SPF means sun protection factor, which is just the degree to which the sunscreen can protect against UVB rays. There is no rating system for UVA rays. SPF is calculated as the time skin will burn when sunscreen is applied vs. skin without sunscreen.    Water resistant sunscreens should be re-applied every 1-2 hours.    Product Recommendations:    Consider use of sunscreen sticks with Zinc Oxide and Titanium Dioxide active ingredients such as Neutrogena Pure&Free Baby Sunscreen Stick.    Good examples include: Blue Lizard, EltaMD, Solbar    Good daily moisturizers with SPF: Vanicream, CeraVe.    For sensitive skin, consider : SkinMedica Essential Defense Mineral Shield Broad Spectrum SPF 35    Men: consider use of Neutrogena Triple Protect Facial Lotion    Avoid retinyl palmitate products.  Avoid combination products that include both sunscreen and insect repellant, as sunscreen should be applied every 2 hours, but insect repellant should not be applied as frequently.    For more information:  https://www.skincancer.org/prevention/sun-protection/sunscreen/sunscreens-safe-and-eff  SKIN CANCER SELF-EXAM INSTRUCTIONS  Check every month in the mirror or with a household member. Be aware of any changes, especially bleeding or tenderness. Also, make sure to check your nails for color changes after removal of nail polish.    For melanoma, check for:  A - Asymmetry. One half unlike the other half.  B - Border. Irregular, scalloped, ragged, notched, blurred or poorly defined borders.  C - Color. Color variations from one area to another, with shades of tan, brown and/or black present. Sometimes white, red or blue.  D - Diameter. Greater than 6 mm (about the size of a pencil eraser). Any new growth of a mole should be concerning and be evaluated.  E - Evolving. A mole or skin lesion that looks different from the rest or is changing in size, shape or  color.    For basal cell carcinoma and squamous cell carcinoma, check for:    Sores, shiny bumps, nodules, scaly lesions, or wart-like growths that are itchy, tender, crusting, scabbing, eroding, oozing or bleeding.    Open sores/wounds or reddish/irritated areas that do not heal within 2-3 weeks.    Scar-like areas that are white, yellow or waxy in color.    Also, check your lymph nodes for tenderness or swelling every month. Check front of neck, back of neck, over the collarbone, and in the armpits. (Note that if you have a cold or other illness, they may be swollen)  TT: 25 minutes.  CT: 15 minutes.

## 2020-02-26 ENCOUNTER — OFFICE VISIT (OUTPATIENT)
Dept: FAMILY MEDICINE | Facility: CLINIC | Age: 35
End: 2020-02-26
Payer: COMMERCIAL

## 2020-02-26 VITALS — DIASTOLIC BLOOD PRESSURE: 88 MMHG | SYSTOLIC BLOOD PRESSURE: 124 MMHG

## 2020-02-26 DIAGNOSIS — D22.5 MELANOCYTIC NEVI OF TRUNK: ICD-10-CM

## 2020-02-26 DIAGNOSIS — Z12.83 SKIN EXAM FOR MALIGNANT NEOPLASM: ICD-10-CM

## 2020-02-26 DIAGNOSIS — L40.9 PSORIASIS: Primary | ICD-10-CM

## 2020-02-26 PROCEDURE — 87340 HEPATITIS B SURFACE AG IA: CPT | Performed by: FAMILY MEDICINE

## 2020-02-26 PROCEDURE — 99214 OFFICE O/P EST MOD 30 MIN: CPT | Performed by: FAMILY MEDICINE

## 2020-02-26 PROCEDURE — 86706 HEP B SURFACE ANTIBODY: CPT | Performed by: FAMILY MEDICINE

## 2020-02-26 PROCEDURE — 86803 HEPATITIS C AB TEST: CPT | Performed by: FAMILY MEDICINE

## 2020-02-26 PROCEDURE — 87389 HIV-1 AG W/HIV-1&-2 AB AG IA: CPT | Performed by: FAMILY MEDICINE

## 2020-02-26 PROCEDURE — 36415 COLL VENOUS BLD VENIPUNCTURE: CPT | Performed by: FAMILY MEDICINE

## 2020-02-26 RX ORDER — CLOBETASOL PROPIONATE 0.05 G/100ML
SHAMPOO TOPICAL
Qty: 118 ML | Refills: 1 | Status: SHIPPED | OUTPATIENT
Start: 2020-02-26 | End: 2020-05-18

## 2020-02-26 NOTE — PATIENT INSTRUCTIONS
"Psoriasis :       Clobetasol propionate shampoo - apply to scalp at bedtime, leave in place for 10 \" then rinse X 10 days        Recheck appointment in 7 days        Vitamin D 3 supplement 2000 international unit(s) per day  Skin exam in six months  SUN PROTECTION INSTRUCTIONS  Sun damage can lead to skin cancer and premature aging of the skin.      The best way to protect from sun damage to your skin is to avoid the sun during peak hours (10 am - 2 pm) even on overcast days.    Never use tanning beds. Tanning beds are associated with much higher risks of skin cancer.    All tanning damages the skin. Aim for ivory skin year round and you will have less trouble with your skin in years to come. There is no merit in getting \"a base tan\" before a warm weather vacation, as any tanning indicates your body's response to sun damage.    Stop smoking. Smokers have higher rates of skin cancer and also have premature skin wrinkling.    Use UPF sun-protective clothing, which while more expensive initially provides longer lasting coverage without having to worry about remembering to re-apply.  1. Wear a wide-brimmed hat and sunglasses.   2. Wear sun-protective clothing.  [employer] and other [employer] make sun protective clothing that are stylish, comfortable and cool.   [employer] and other [employer] make UV arm sleeves suitable for golfing, gardening and other activities.    Sunscreen instructions:  1. Use sunscreens with Zinc Oxide, Titanium Dioxide or Avobenzone to protect from UVA rays.  2. Use SPF 30-50+ to protect from UVB rays.  3. Re-apply every 2 hours even if water resistant.  4. Apply on your face every day even when cloudy and even in the winter. UVA \"aging rays\" penetrate window glass and are just as strong in the winter as in the summer.    FYI  You should use about 3 tablespoons of sunscreen to protect your whole body. Thus a typical eight ounce bottle of sunscreen should last 4 applications. Remember, " that the SPF rating is compromised if you don t apply enough. Most people only apply 1/2 - 1/3 of the amount they need. Also don t forget areas such as your ears, feet, upper back and harder to reach places. Keep in mind that these amounts should be increased for larger body sizes.    Sunscreens with titanium dioxide and/or zinc oxide in the active ingredients are physical blockers as opposed to chemical blockers. Chemical-free sunscreens should not irritate the skin.    Spray-on sunscreens may be used for touch-up application only, not as a base layer. Also, use with caution around small children due to inhalation risk.    SPF means sun protection factor, which is just the degree to which the sunscreen can protect against UVB rays. There is no rating system for UVA rays. SPF is calculated as the time skin will burn when sunscreen is applied vs. skin without sunscreen.    Water resistant sunscreens should be re-applied every 1-2 hours.    Product Recommendations:    Consider use of sunscreen sticks with Zinc Oxide and Titanium Dioxide active ingredients such as Neutrogena Pure&Free Baby Sunscreen Stick.    Good examples include: Blue Lizard, EltaMD, Solbar    Good daily moisturizers with SPF: Vanicream, CeraVe.    For sensitive skin, consider : SkinMedica Essential Defense Mineral Shield Broad Spectrum SPF 35    Men: consider use of Neutrogena Triple Protect Facial Lotion    Avoid retinyl palmitate products.  Avoid combination products that include both sunscreen and insect repellant, as sunscreen should be applied every 2 hours, but insect repellant should not be applied as frequently.    For more information:  https://www.skincancer.org/prevention/sun-protection/sunscreen/sunscreens-safe-and-eff  SKIN CANCER SELF-EXAM INSTRUCTIONS  Check every month in the mirror or with a household member. Be aware of any changes, especially bleeding or tenderness. Also, make sure to check your nails for color changes after  removal of nail polish.    For melanoma, check for:  A - Asymmetry. One half unlike the other half.  B - Border. Irregular, scalloped, ragged, notched, blurred or poorly defined borders.  C - Color. Color variations from one area to another, with shades of tan, brown and/or black present. Sometimes white, red or blue.  D - Diameter. Greater than 6 mm (about the size of a pencil eraser). Any new growth of a mole should be concerning and be evaluated.  E - Evolving. A mole or skin lesion that looks different from the rest or is changing in size, shape or color.    For basal cell carcinoma and squamous cell carcinoma, check for:    Sores, shiny bumps, nodules, scaly lesions, or wart-like growths that are itchy, tender, crusting, scabbing, eroding, oozing or bleeding.    Open sores/wounds or reddish/irritated areas that do not heal within 2-3 weeks.    Scar-like areas that are white, yellow or waxy in color.    Also, check your lymph nodes for tenderness or swelling every month. Check front of neck, back of neck, over the collarbone, and in the armpits. (Note that if you have a cold or other illness, they may be swollen)

## 2020-02-26 NOTE — LETTER
"    2/26/2020         RE: Merissa Silverman  [address]  [address]  [place] [address]        Dear Colleague,    Thank you for referring your patient, Merissa Silverman, to the [facility]. Please see a copy of my visit note below.    [facility] - PRIMARY CARE SKIN    CC: skin cancer screening (full-body)  SUBJECTIVE:   Merissa Silverman is a(n) 33 year old female who presents to clinic today for a full-body skin exam , history of mild and moderate atypical nevi and has multiple nevi.    Issue one : She developed a itchy rash on the arms, trunk that was diagnosed as pityriasis rosea. Biopsy was done. The itchy has resolved, rash improved on the chest and back , but still persisting on arms and legs. Treated with triamcinolone 0.1% cream and augmented betamethasone diproprinate 0.05% - but this did not seem to help     Issue two : itchy scalp and scaly. Scalp started at the same time that the rash occurred. 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    Family Medical History  Skin cancer: YES - melanoma in paternal grandmother, \"skin chemo\" in an aunt  Psoriasis   NO     Sun Exposure History  Previous history of significant sun exposure:  Blistering sunburns: NO.  Tanning beds: YES - when younger.  Sunscreen usage: YES, SPF: 50+.  UV-protective clothing usage: YES.  Wide-brimmed hat usage: NO.  UV-protective sunglasses usage: YES.  Mid-day sun avoidance: YES.    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    Refer to electronic medical record (EMR) for past medical history and medications.    INTEGUMENTARY/SKIN: POSITIVE for changing lesions  ROS: 14 " "point review of systems was negative except the symptoms listed above in the HPI.        OBJECTIVE:   GENERAL: healthy, alert and no distress.  SKIN: Plata Skin Type - I.  This patient was examined from the top of the head to the bottom of the feet  including scalp, face, neck, trunk, buttocks, both arms, both legs, both hands, both feet, and all fingers and toes. The dermatoscope was used to help evaluate pigmented lesions.  Skin Pertinent Findings:  Upper extremities: Scattered brown macules of various sizes and shapes most consistent with (benign) melanocytic nevi.  Anterior lower extremities: Multiple brown macules of various sizes and shapes most consistent with (benign) melanocytic nevi.  Back: multiple brown macules of various sizes and shapes most consistent with (benign) melanocytic nevi .  Posterior lower extremities, Buttocks: Multiple brown macules of various sizes and shapes most consistent with (benign) melanocytic nevi .    Scalp : diffuse thick scaling and erythema with hair loss  Arms, trunk, face - scattered plaques of erythema, minimal scaling  Knees : scaly small plaques.  No pitting on the finger nails        ASSESSMENT:     Encounter Diagnoses   Name Primary?     Psoriasis Yes     Skin exam for malignant neoplasm      Melanocytic nevi of trunk      MDM ; because of the severe scalp involvement with hair loss and multiple other areas discussed methotrexate. NBUVB would be effective for the body area involved but not as effective for the scalp involvement. I will check with her oncologist Dr Morrison to make sure she would have no concerns with prescribing the methotrexate.    PLAN:      Psoriasis :       Clobetasol propionate shampoo - apply to scalp at bedtime, leave in place for 10 \" then rinse X 10 days        Recheck appointment in 7 days        Vitamin D 3 supplement 2000 international unit(s) per day  Skin exam in six months  SUN PROTECTION INSTRUCTIONS  Sun damage can lead to skin " "cancer and premature aging of the skin.      The best way to protect from sun damage to your skin is to avoid the sun during peak hours (10 am - 2 pm) even on overcast days.    Never use tanning beds. Tanning beds are associated with much higher risks of skin cancer.    All tanning damages the skin. Aim for ivory skin year round and you will have less trouble with your skin in years to come. There is no merit in getting \"a base tan\" before a warm weather vacation, as any tanning indicates your body's response to sun damage.    Stop smoking. Smokers have higher rates of skin cancer and also have premature skin wrinkling.    Use UPF sun-protective clothing, which while more expensive initially provides longer lasting coverage without having to worry about remembering to re-apply.  1. Wear a wide-brimmed hat and sunglasses.   2. Wear sun-protective clothing.  [employer] and other [employer] make sun protective clothing that are stylish, comfortable and cool.   [employer] and other [employer] make UV arm sleeves suitable for golfing, gardening and other activities.    Sunscreen instructions:  1. Use sunscreens with Zinc Oxide, Titanium Dioxide or Avobenzone to protect from UVA rays.  2. Use SPF 30-50+ to protect from UVB rays.  3. Re-apply every 2 hours even if water resistant.  4. Apply on your face every day even when cloudy and even in the winter. UVA \"aging rays\" penetrate window glass and are just as strong in the winter as in the summer.    FYI  You should use about 3 tablespoons of sunscreen to protect your whole body. Thus a typical eight ounce bottle of sunscreen should last 4 applications. Remember, that the SPF rating is compromised if you don t apply enough. Most people only apply 1/2 - 1/3 of the amount they need. Also don t forget areas such as your ears, feet, upper back and harder to reach places. Keep in mind that these amounts should be increased for larger body sizes.    Sunscreens with " titanium dioxide and/or zinc oxide in the active ingredients are physical blockers as opposed to chemical blockers. Chemical-free sunscreens should not irritate the skin.    Spray-on sunscreens may be used for touch-up application only, not as a base layer. Also, use with caution around small children due to inhalation risk.    SPF means sun protection factor, which is just the degree to which the sunscreen can protect against UVB rays. There is no rating system for UVA rays. SPF is calculated as the time skin will burn when sunscreen is applied vs. skin without sunscreen.    Water resistant sunscreens should be re-applied every 1-2 hours.    Product Recommendations:    Consider use of sunscreen sticks with Zinc Oxide and Titanium Dioxide active ingredients such as Neutrogena Pure&Free Baby Sunscreen Stick.    Good examples include: Blue Lizard, EltaMD, Solbar    Good daily moisturizers with SPF: Vanicream, CeraVe.    For sensitive skin, consider : SkinMedica Essential Defense Mineral Shield Broad Spectrum SPF 35    Men: consider use of Neutrogena Triple Protect Facial Lotion    Avoid retinyl palmitate products.  Avoid combination products that include both sunscreen and insect repellant, as sunscreen should be applied every 2 hours, but insect repellant should not be applied as frequently.    For more information:  https://www.skincancer.org/prevention/sun-protection/sunscreen/sunscreens-safe-and-eff  SKIN CANCER SELF-EXAM INSTRUCTIONS  Check every month in the mirror or with a household member. Be aware of any changes, especially bleeding or tenderness. Also, make sure to check your nails for color changes after removal of nail polish.    For melanoma, check for:  A - Asymmetry. One half unlike the other half.  B - Border. Irregular, scalloped, ragged, notched, blurred or poorly defined borders.  C - Color. Color variations from one area to another, with shades of tan, brown and/or black present. Sometimes white,  red or blue.  D - Diameter. Greater than 6 mm (about the size of a pencil eraser). Any new growth of a mole should be concerning and be evaluated.  E - Evolving. A mole or skin lesion that looks different from the rest or is changing in size, shape or color.    For basal cell carcinoma and squamous cell carcinoma, check for:    Sores, shiny bumps, nodules, scaly lesions, or wart-like growths that are itchy, tender, crusting, scabbing, eroding, oozing or bleeding.    Open sores/wounds or reddish/irritated areas that do not heal within 2-3 weeks.    Scar-like areas that are white, yellow or waxy in color.    Also, check your lymph nodes for tenderness or swelling every month. Check front of neck, back of neck, over the collarbone, and in the armpits. (Note that if you have a cold or other illness, they may be swollen)  TT: 25 minutes.  CT: 15 minutes.        Again, thank you for allowing me to participate in the care of your patient.        Sincerely,        Padmini Trinidad MD

## 2020-02-27 LAB
HBV SURFACE AB SERPL IA-ACNC: 0 M[IU]/ML
HBV SURFACE AG SERPL QL IA: NONREACTIVE
HCV AB SERPL QL IA: NONREACTIVE
HIV 1+2 AB+HIV1 P24 AG SERPL QL IA: NONREACTIVE

## 2020-03-03 RX ORDER — METHOTREXATE 2.5 MG/1
TABLET ORAL
Refills: 0 | Status: CANCELLED | OUTPATIENT
Start: 2020-03-03 | End: 2020-03-25

## 2020-03-03 NOTE — PATIENT INSTRUCTIONS
FUTURE APPOINTMENTS    Please get labs done today and 1 week after you have started the methotrexate.    Follow up in 4 week(s).    METHOTREXATE (MTX) INSTRUCTIONS  Start per Dr. Trinidad's instruction after CBC results have returned.  Weekly dosage: Take by mouth three 2.5 mg tablets = 7.5 mg every 7th day.    FOLIC ACID INSTRUCTIONS  On the 6 days that you do not take methotrexate, take by mouth folic acid 1000 mcg. Do not take folic acid on the day that you take the methotrexate.    METHOTREXATE (MTX) INFORMATION  Side effects:    MOST patients do not experience side effects, and if present, these minor side effects improve over time as the body adjusts.    Increased sensitivity of the skin to the sunlight. Therefore, limit sun exposure and use at least a 50-75 SPF and reapply every 2 hours.    Nausea or vomiting    Abnormalities of liver function tests - liver function blood tests (LFT) will be ordered to watch your liver. These side effects are more likely to occur at higher doses.    About 1-3% of patients develop mouth sores, rashes, diarrhea or abnormalities of their blood counts.    Methotrexate may cause scarring (cirrhosis) of the liver, but this side effects is rare and is most likely to occur in patients who already have liver problems or are already taking drugs that are toxic to the liver.    Lung problems (persistent cough or unexplained shortness of breath) can occur while taking methotrexate. These symptoms are more common in people with poor lung function. Persistent cough or shortness of breath should be reported to your doctor.    Slow hair loss is seen in some patients, but the hair grows back after the medication is stopped.      You may continue using topical steroids infrequently as needed for the most affected areas of psoriasis and as previously directed.    Continue taking your Vitamin D supplement.

## 2020-03-03 NOTE — PROGRESS NOTES
"[facility] - PRIMARY CARE SKIN    CC: psoriasis  SUBJECTIVE:   Merissa Silverman is a(n) 33 year old female who presents to clinic today for follow-up of psoriasis.    An itchy rash on the arms and trunk has been biopsied with tissue pathology reading out spongiotic dermatitis with mounded parakeratosis, histologic differential diagnoses including pityriasis rosea or eczematous dermatitis though a partially treated psoriasis could not be excluded. Itchiness resolved, and the rash improved on the trunk with treatment of triamcinolone 0.1% cream, augmented betamethasone dipropionate 0.05% cream, antihistamines cetirizine, loratadine, and diphenhydramine. However, itchiness and rash continued on the arms and legs. Scalp itchiness and scaling also began at the same time and has been treated with fluocinonide 0.05% solution and ketoconazole 2% shampoo which were ineffective. She uses T/Gel and T/Sal shampoos.    I changed her topical steroid to clobetasol propionate 0.05% shampoo at her last office visit on 2/26/2020. I also asked her to start a Vitamin D supplement. We are considering starting methotrexate. Her oncologist has approved starting methotrexate.    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    Family Medical History  Skin cancer: YES - melanoma in paternal grandmother, also \"skin chemo\" in an aunt  Psoriasis   NO     Sun Exposure History  Previous history of significant sun exposure:  Blistering sunburns: NO.  Tanning beds: YES - when younger.  Sunscreen usage: YES, SPF: 50+.  UV-protective clothing usage: YES.  Wide-brimmed hat usage: NO.  UV-protective sunglasses usage: YES.  Mid-day sun avoidance: YES.    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    Refer to electronic medical record (EMR) for past medical history and medications.    ROS: 14 point " review of systems was negative except the symptoms listed above in the HPI.    This document serves as a record of the services and decisions personally performed and made by Padmini Trinidad MD and was created by Gilmer Flynn, a trained medical scribe, based on personal observations and provider statements to the medical scribe.  March 9, 2020 9:08 AM   Gilmer Flynn    OBJECTIVE:   GENERAL: healthy, alert and no distress.  SKIN: Plata Skin Type - I.  Face examined. The dermatoscope was used to help evaluate pigmented lesions.  Skin Pertinent Findings:  No change in exam findings. Erythematous plaques and scaling involving the scalp, face, trunk, arms, and knees.    ASSESSMENT:     Encounter Diagnosis   Name Primary?     Psoriasis Yes       Dosage:     Initial test dosage: Take by mouth 10.0 - 12.5 mg during the first week.    Increase dosage by 2.5 - 5.0 mg/week until maximal benefit is seen, then taper down by 2.5 mg/week to determine the lowest effective dosage to maintain control.    Typical final dosage = 15-20 mg/week.  Labs:    Baseline labs: CBC, LFT, HEP B, HEP C, BUN, CR, pregnancy test (and HIV if at risk)    Every 1-2 weeks after a dosage increase, re-check these labs: CBC, PLT, LFT.    Monthly monitoring labs:    LFT every month for 6 months and then every 2 months after that.    CMP (comprehensive metabolic profile) and CBC with platelets differential every 1-2 months.  Side effects:    MOST patients do not experience side effects, and if present, these minor side effects improve over time as the body adjusts.    Increased sensitivity of the skin to the sunlight. Therefore, limit sun exposure and use at least a 50-75 SPF and reapply every 2 hours.    Nausea or vomiting    Abnormalities of liver function tests - liver function blood tests (LFT) will be ordered to watch your liver. These side effects are more likely to occur at higher doses.    About 1-3% of patients develop mouth sores, rashes,  diarrhea or abnormalities of their blood counts.    Methotrexate may cause scarring (cirrhosis) of the liver, but this side effects is rare and is most likely to occur in patients who already have liver problems or are already taking drugs that are toxic to the liver.    Lung problems (persistent cough or unexplained shortness of breath) can occur while taking methotrexate. These symptoms are more common in people with poor lung function. Persistent cough or shortness of breath should be reported to your doctor.    Slow hair loss is seen in some patients, but the hair grows back after the medication is stopped.      PLAN:   Patient Instructions   FUTURE APPOINTMENTS    Please get labs done today and 1 week after you have started the methotrexate.    Follow up in 4 week(s).    METHOTREXATE (MTX) INSTRUCTIONS  Start per Dr. Trinidad's instruction after CBC results have returned.  Weekly dosage: Take by mouth three 2.5 mg tablets = 7.5 mg every 7th day.    FOLIC ACID INSTRUCTIONS  On the 6 days that you do not take methotrexate, take by mouth folic acid 1000 mcg. Do not take folic acid on the day that you take the methotrexate.    METHOTREXATE (MTX) INFORMATION  Side effects:    MOST patients do not experience side effects, and if present, these minor side effects improve over time as the body adjusts.    Increased sensitivity of the skin to the sunlight. Therefore, limit sun exposure and use at least a 50-75 SPF and reapply every 2 hours.    Nausea or vomiting    Abnormalities of liver function tests - liver function blood tests (LFT) will be ordered to watch your liver. These side effects are more likely to occur at higher doses.    About 1-3% of patients develop mouth sores, rashes, diarrhea or abnormalities of their blood counts.    Methotrexate may cause scarring (cirrhosis) of the liver, but this side effects is rare and is most likely to occur in patients who already have liver problems or are already taking  drugs that are toxic to the liver.    Lung problems (persistent cough or unexplained shortness of breath) can occur while taking methotrexate. These symptoms are more common in people with poor lung function. Persistent cough or shortness of breath should be reported to your doctor.    Slow hair loss is seen in some patients, but the hair grows back after the medication is stopped.      You may continue using topical steroids infrequently as needed for the most affected areas of psoriasis and as previously directed.    Continue taking your Vitamin D supplement.    TT: 20 minutes.  CT: 15 minutes.    The information in this document, created by the medical scribe for me, accurately reflects the services I personally performed and the decisions made by me. I have reviewed and approved this document for accuracy prior to leaving the patient care area.  March 9, 2020 9:08 AM  Padmini Trinidad MD  [facility]

## 2020-03-09 ENCOUNTER — OFFICE VISIT (OUTPATIENT)
Dept: FAMILY MEDICINE | Facility: CLINIC | Age: 35
End: 2020-03-09
Payer: COMMERCIAL

## 2020-03-09 VITALS — SYSTOLIC BLOOD PRESSURE: 120 MMHG | DIASTOLIC BLOOD PRESSURE: 62 MMHG

## 2020-03-09 DIAGNOSIS — L40.9 PSORIASIS: Primary | ICD-10-CM

## 2020-03-09 LAB
ALBUMIN SERPL-MCNC: 3.9 G/DL (ref 3.4–5)
ALP SERPL-CCNC: 52 U/L (ref 40–150)
ALT SERPL W P-5'-P-CCNC: 28 U/L (ref 0–50)
ANION GAP SERPL CALCULATED.3IONS-SCNC: 11 MMOL/L (ref 3–14)
AST SERPL W P-5'-P-CCNC: 22 U/L (ref 0–45)
BASOPHILS # BLD AUTO: 0 10E9/L (ref 0–0.2)
BASOPHILS NFR BLD AUTO: 0.4 %
BILIRUB SERPL-MCNC: 0.6 MG/DL (ref 0.2–1.3)
BUN SERPL-MCNC: 12 MG/DL (ref 7–30)
CALCIUM SERPL-MCNC: 9.4 MG/DL (ref 8.5–10.1)
CHLORIDE SERPL-SCNC: 102 MMOL/L (ref 94–109)
CO2 SERPL-SCNC: 23 MMOL/L (ref 20–32)
CREAT SERPL-MCNC: 0.58 MG/DL (ref 0.52–1.04)
DIFFERENTIAL METHOD BLD: ABNORMAL
EOSINOPHIL # BLD AUTO: 0.1 10E9/L (ref 0–0.7)
EOSINOPHIL NFR BLD AUTO: 3 %
ERYTHROCYTE [DISTWIDTH] IN BLOOD BY AUTOMATED COUNT: 11.7 % (ref 10–15)
GFR SERPL CREATININE-BSD FRML MDRD: >90 ML/MIN/{1.73_M2}
GLUCOSE SERPL-MCNC: 72 MG/DL (ref 70–99)
HCT VFR BLD AUTO: 30.3 % (ref 35–47)
HGB BLD-MCNC: 10.6 G/DL (ref 11.7–15.7)
LYMPHOCYTES # BLD AUTO: 0.4 10E9/L (ref 0.8–5.3)
LYMPHOCYTES NFR BLD AUTO: 13.7 %
MCH RBC QN AUTO: 31.5 PG (ref 26.5–33)
MCHC RBC AUTO-ENTMCNC: 35 G/DL (ref 31.5–36.5)
MCV RBC AUTO: 90 FL (ref 78–100)
MONOCYTES # BLD AUTO: 0.5 10E9/L (ref 0–1.3)
MONOCYTES NFR BLD AUTO: 19.3 %
NEUTROPHILS # BLD AUTO: 1.7 10E9/L (ref 1.6–8.3)
NEUTROPHILS NFR BLD AUTO: 63.6 %
PLATELET # BLD AUTO: 237 10E9/L (ref 150–450)
POTASSIUM SERPL-SCNC: 3.3 MMOL/L (ref 3.4–5.3)
PROT SERPL-MCNC: 7.4 G/DL (ref 6.8–8.8)
RBC # BLD AUTO: 3.37 10E12/L (ref 3.8–5.2)
SODIUM SERPL-SCNC: 136 MMOL/L (ref 133–144)
WBC # BLD AUTO: 2.7 10E9/L (ref 4–11)

## 2020-03-09 PROCEDURE — 36415 COLL VENOUS BLD VENIPUNCTURE: CPT | Performed by: FAMILY MEDICINE

## 2020-03-09 PROCEDURE — 80053 COMPREHEN METABOLIC PANEL: CPT | Performed by: FAMILY MEDICINE

## 2020-03-09 PROCEDURE — 85025 COMPLETE CBC W/AUTO DIFF WBC: CPT | Performed by: FAMILY MEDICINE

## 2020-03-09 PROCEDURE — 99213 OFFICE O/P EST LOW 20 MIN: CPT | Performed by: FAMILY MEDICINE

## 2020-03-09 RX ORDER — METHOTREXATE 2.5 MG/1
TABLET ORAL
Qty: 12 TABLET | Refills: 0 | Status: SHIPPED | OUTPATIENT
Start: 2020-03-09 | End: 2020-04-01

## 2020-03-09 RX ORDER — FOLIC ACID 1 MG/1
1 TABLET ORAL DAILY
Qty: 90 TABLET | Refills: 3 | Status: SHIPPED | OUTPATIENT
Start: 2020-03-09 | End: 2020-04-06 | Stop reason: ALTCHOICE

## 2020-03-09 NOTE — LETTER
"    3/9/2020         RE: Merissa Silverman  [address]  [address]  [place] [id number]        Dear Colleague,    Thank you for referring your patient, Merissa Silverman, to the [facility]. Please see a copy of my visit note below.    [facility] - PRIMARY CARE SKIN    CC: psoriasis  SUBJECTIVE:   Merissa Silverman is a(n) 33 year old female who presents to clinic today for follow-up of psoriasis.    An itchy rash on the arms and trunk has been biopsied with tissue pathology reading out spongiotic dermatitis with mounded parakeratosis, histologic differential diagnoses including pityriasis rosea or eczematous dermatitis though a partially treated psoriasis could not be excluded. Itchiness resolved, and the rash improved on the trunk with treatment of triamcinolone 0.1% cream, augmented betamethasone dipropionate 0.05% cream, antihistamines cetirizine, loratadine, and diphenhydramine. However, itchiness and rash continued on the arms and legs. Scalp itchiness and scaling also began at the same time and has been treated with fluocinonide 0.05% solution and ketoconazole 2% shampoo which were ineffective. She uses T/Gel and T/Sal shampoos.    I changed her topical steroid to clobetasol propionate 0.05% shampoo at her last office visit on 2/26/2020. I also asked her to start a Vitamin D supplement. We are considering starting methotrexate. Her oncologist has approved starting methotrexate.    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    Family Medical History  Skin cancer: YES - melanoma in paternal grandmother, also \"skin chemo\" in an aunt  Psoriasis   NO     Sun Exposure History  Previous history of significant sun exposure:  Blistering sunburns: NO.  Tanning beds: YES - when younger.  Sunscreen usage: YES, SPF: 50+.  UV-protective clothing usage: " YES.  Wide-brimmed hat usage: NO.  UV-protective sunglasses usage: YES.  Mid-day sun avoidance: YES.    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    Refer to electronic medical record (EMR) for past medical history and medications.    ROS: 14 point review of systems was negative except the symptoms listed above in the HPI.    This document serves as a record of the services and decisions personally performed and made by Padmini Trinidad MD and was created by Gilmer Flynn, a trained medical scribe, based on personal observations and provider statements to the medical scribe.  March 9, 2020 9:08 AM   Gilmer Flynn    OBJECTIVE:   GENERAL: healthy, alert and no distress.  SKIN: Plata Skin Type - I.  Face examined. The dermatoscope was used to help evaluate pigmented lesions.  Skin Pertinent Findings:  No change in exam findings. Erythematous plaques and scaling involving the scalp, face, trunk, arms, and knees.    ASSESSMENT:     Encounter Diagnosis   Name Primary?     Psoriasis Yes       Dosage:     Initial test dosage: Take by mouth 10.0 - 12.5 mg during the first week.    Increase dosage by 2.5 - 5.0 mg/week until maximal benefit is seen, then taper down by 2.5 mg/week to determine the lowest effective dosage to maintain control.    Typical final dosage = 15-20 mg/week.  Labs:    Baseline labs: CBC, LFT, HEP B, HEP C, BUN, CR, pregnancy test (and HIV if at risk)    Every 1-2 weeks after a dosage increase, re-check these labs: CBC, PLT, LFT.    Monthly monitoring labs:    LFT every month for 6 months and then every 2 months after that.    CMP (comprehensive metabolic profile) and CBC with platelets differential every 1-2 months.  Side effects:    MOST patients do not experience side effects, and if present, these minor side effects improve over time as the body adjusts.    Increased sensitivity of the skin to the sunlight. Therefore, limit sun exposure and use at least a 50-75 SPF and reapply  every 2 hours.    Nausea or vomiting    Abnormalities of liver function tests - liver function blood tests (LFT) will be ordered to watch your liver. These side effects are more likely to occur at higher doses.    About 1-3% of patients develop mouth sores, rashes, diarrhea or abnormalities of their blood counts.    Methotrexate may cause scarring (cirrhosis) of the liver, but this side effects is rare and is most likely to occur in patients who already have liver problems or are already taking drugs that are toxic to the liver.    Lung problems (persistent cough or unexplained shortness of breath) can occur while taking methotrexate. These symptoms are more common in people with poor lung function. Persistent cough or shortness of breath should be reported to your doctor.    Slow hair loss is seen in some patients, but the hair grows back after the medication is stopped.      PLAN:   Patient Instructions   FUTURE APPOINTMENTS    Please get labs done today and 1 week after you have started the methotrexate.    Follow up in 4 week(s).    METHOTREXATE (MTX) INSTRUCTIONS  Start per Dr. Trinidad's instruction after CBC results have returned.  Weekly dosage: Take by mouth three 2.5 mg tablets = 7.5 mg every 7th day.    FOLIC ACID INSTRUCTIONS  On the 6 days that you do not take methotrexate, take by mouth folic acid 1000 mcg. Do not take folic acid on the day that you take the methotrexate.    METHOTREXATE (MTX) INFORMATION  Side effects:    MOST patients do not experience side effects, and if present, these minor side effects improve over time as the body adjusts.    Increased sensitivity of the skin to the sunlight. Therefore, limit sun exposure and use at least a 50-75 SPF and reapply every 2 hours.    Nausea or vomiting    Abnormalities of liver function tests - liver function blood tests (LFT) will be ordered to watch your liver. These side effects are more likely to occur at higher doses.    About 1-3% of patients  develop mouth sores, rashes, diarrhea or abnormalities of their blood counts.    Methotrexate may cause scarring (cirrhosis) of the liver, but this side effects is rare and is most likely to occur in patients who already have liver problems or are already taking drugs that are toxic to the liver.    Lung problems (persistent cough or unexplained shortness of breath) can occur while taking methotrexate. These symptoms are more common in people with poor lung function. Persistent cough or shortness of breath should be reported to your doctor.    Slow hair loss is seen in some patients, but the hair grows back after the medication is stopped.      You may continue using topical steroids infrequently as needed for the most affected areas of psoriasis and as previously directed.    Continue taking your Vitamin D supplement.    TT: 20 minutes.  CT: 15 minutes.    The information in this document, created by the medical scribe for me, accurately reflects the services I personally performed and the decisions made by me. I have reviewed and approved this document for accuracy prior to leaving the patient care area.  March 9, 2020 9:08 AM  Padmini Trinidad MD  [facility]    Again, thank you for allowing me to participate in the care of your patient.        Sincerely,        Padmini Trinidad MD

## 2020-03-10 ENCOUNTER — TELEPHONE (OUTPATIENT)
Dept: FAMILY MEDICINE | Facility: CLINIC | Age: 35
End: 2020-03-10

## 2020-03-10 NOTE — TELEPHONE ENCOUNTER
----- Message from Padmini Trinidad MD sent at 3/10/2020  1:37 PM CDT -----  Please call :     WBC 2,700 which is on the low side. Will recheck this in 7 days after she has taken the first dose of the methotrexate.     Thank you,  Padmini Trinidad M.D.

## 2020-03-10 NOTE — TELEPHONE ENCOUNTER
Patient notified of test results and providers message, patient has no further questions.    Halima YORN BSN  [facility]  [phone]

## 2020-03-11 ENCOUNTER — HEALTH MAINTENANCE LETTER (OUTPATIENT)
Age: 35
End: 2020-03-11

## 2020-03-12 ENCOUNTER — MYC MEDICAL ADVICE (OUTPATIENT)
Dept: FAMILY MEDICINE | Facility: CLINIC | Age: 35
End: 2020-03-12

## 2020-03-12 NOTE — TELEPHONE ENCOUNTER
Please see [employer] message and advise.      Thank you,  Halima WRIGHTRN BSN  [facility]  [phone]

## 2020-03-13 NOTE — TELEPHONE ENCOUNTER
Please call ,      Let s check blood work in one week, I suspect I will cut the dosage in half.      Fax in Kevin 8 mg every 8 hours if needed. # 12     Continue with folic acid    Thank you,   MARTINEZ

## 2020-03-17 DIAGNOSIS — L40.9 PSORIASIS: ICD-10-CM

## 2020-03-17 LAB
BASOPHILS # BLD AUTO: 0 10E9/L (ref 0–0.2)
BASOPHILS NFR BLD AUTO: 0 %
DIFFERENTIAL METHOD BLD: ABNORMAL
EOSINOPHIL # BLD AUTO: 0.1 10E9/L (ref 0–0.7)
EOSINOPHIL NFR BLD AUTO: 1.7 %
ERYTHROCYTE [DISTWIDTH] IN BLOOD BY AUTOMATED COUNT: 12.1 % (ref 10–15)
HCT VFR BLD AUTO: 29.4 % (ref 35–47)
HGB BLD-MCNC: 10.2 G/DL (ref 11.7–15.7)
LYMPHOCYTES # BLD AUTO: 0.4 10E9/L (ref 0.8–5.3)
LYMPHOCYTES NFR BLD AUTO: 11.1 %
MCH RBC QN AUTO: 31.3 PG (ref 26.5–33)
MCHC RBC AUTO-ENTMCNC: 34.7 G/DL (ref 31.5–36.5)
MCV RBC AUTO: 90 FL (ref 78–100)
MONOCYTES # BLD AUTO: 0.5 10E9/L (ref 0–1.3)
MONOCYTES NFR BLD AUTO: 15 %
NEUTROPHILS # BLD AUTO: 2.6 10E9/L (ref 1.6–8.3)
NEUTROPHILS NFR BLD AUTO: 72.2 %
PLATELET # BLD AUTO: 235 10E9/L (ref 150–450)
RBC # BLD AUTO: 3.26 10E12/L (ref 3.8–5.2)
WBC # BLD AUTO: 3.6 10E9/L (ref 4–11)

## 2020-03-17 PROCEDURE — 80053 COMPREHEN METABOLIC PANEL: CPT | Performed by: FAMILY MEDICINE

## 2020-03-17 PROCEDURE — 85025 COMPLETE CBC W/AUTO DIFF WBC: CPT | Performed by: FAMILY MEDICINE

## 2020-03-17 PROCEDURE — 36415 COLL VENOUS BLD VENIPUNCTURE: CPT | Performed by: FAMILY MEDICINE

## 2020-03-18 ENCOUNTER — TELEPHONE (OUTPATIENT)
Dept: FAMILY MEDICINE | Facility: CLINIC | Age: 35
End: 2020-03-18

## 2020-03-18 LAB
ALBUMIN SERPL-MCNC: 3.7 G/DL (ref 3.4–5)
ALP SERPL-CCNC: 46 U/L (ref 40–150)
ALT SERPL W P-5'-P-CCNC: 31 U/L (ref 0–50)
ANION GAP SERPL CALCULATED.3IONS-SCNC: 9 MMOL/L (ref 3–14)
AST SERPL W P-5'-P-CCNC: 22 U/L (ref 0–45)
BILIRUB SERPL-MCNC: 0.5 MG/DL (ref 0.2–1.3)
BUN SERPL-MCNC: 9 MG/DL (ref 7–30)
CALCIUM SERPL-MCNC: 9.3 MG/DL (ref 8.5–10.1)
CHLORIDE SERPL-SCNC: 106 MMOL/L (ref 94–109)
CO2 SERPL-SCNC: 24 MMOL/L (ref 20–32)
CREAT SERPL-MCNC: 0.63 MG/DL (ref 0.52–1.04)
GFR SERPL CREATININE-BSD FRML MDRD: >90 ML/MIN/{1.73_M2}
GLUCOSE SERPL-MCNC: 72 MG/DL (ref 70–99)
POTASSIUM SERPL-SCNC: 3.3 MMOL/L (ref 3.4–5.3)
PROT SERPL-MCNC: 6.9 G/DL (ref 6.8–8.8)
SODIUM SERPL-SCNC: 139 MMOL/L (ref 133–144)

## 2020-03-18 NOTE — TELEPHONE ENCOUNTER
----- Message from Padmini Trinidad MD sent at 3/18/2020 11:41 AM CDT -----  Please call. Continue with the half dose of methotrexate. But if nausea and diarrhea again, will need to discuss other alternative. Lab is stable.     Thank you,   Donell

## 2020-03-24 NOTE — TELEPHONE ENCOUNTER
Patient notified of test results and providers message, patient has no further questions.    Halima YORN BSN  [facility]  [phone]

## 2020-04-01 ENCOUNTER — NURSE TRIAGE (OUTPATIENT)
Dept: FAMILY MEDICINE | Facility: CLINIC | Age: 35
End: 2020-04-01

## 2020-04-01 NOTE — TELEPHONE ENCOUNTER
Patient has a telephone visit scheduled for 4/6/20- is this ok or should this be changed to in clinic visit    Halima YORN BSN  [facility]  [phone]

## 2020-04-01 NOTE — TELEPHONE ENCOUNTER
Needs to be office visit, do not feel comfortable prescribing new medication like Otezla over the phone.     Thank you,   Padmini Trinidad M.D.

## 2020-04-01 NOTE — TELEPHONE ENCOUNTER
Left message on answering machine for patient to call back.  Will need to change phone visit to an in clinic visit on Monday 4/6/20    Halima YORN BSN  [facility]  [phone]

## 2020-04-01 NOTE — TELEPHONE ENCOUNTER
"Patient states that she started the lower dose of methotrexate last evening. At 9:50 she noted tunnel vision in both eyes. Denies weakness, dizziness, speech problems or confusion.   Patient states that she went to the ER last time she took methotrexate this past February. They found nothing wrong and sent her home. States that vision is worse this time.  Advised ED, another to drive.  is home with the patient.  Patient states that her vision is improving while talking on phone. States that if persists and does not resolve or any other new or worsening symptoms, will go to ED.  Will stop methotrexate.  Anything further, please advise. Triage to contact the patient.        Additional Information    Negative: Weakness of the face, arm or leg on one side of the body    Negative: Followed getting substance in the eye    Negative: Foreign body or object is or was lodged in the eye    Negative: Followed an eye injury    Negative: Followed sun lamp or sun exposure (UV keratitis)    Negative: Yellow or green discharge (pus) in the eye    Negative: Pregnant    Negative: Complete loss of vision in 1 or both eyes    Negative: Severe eye pain    Blurred vision or visual changes and present now and sudden onset or new (e.g., minutes, hours, days) (Exception: seeing floaters / black specks OR previously diagnosed migraine headaches with same symptoms)    Answer Assessment - Initial Assessment Questions  1. DESCRIPTION: \"What is the vision loss like? Describe it for me.\" (e.g., complete vision loss, blurred vision, double vision, floaters, etc.)      Tunnel vision  2. LOCATION: \"One or both eyes?\" If one, ask: \"Which eye?\"      both  3. SEVERITY: \"Can you see anything?\" If so, ask: \"What can you see?\" (e.g., fine print)      Tunnel vision, difficult to read due to narrow vision  4. ONSET: \"When did this begin?\" \"Did it start suddenly or has this been gradual?\"    About 9:50 this morning  5. PATTERN: \"Does this come and go, " "or has it been constant since it started?\"      constant  6. PAIN: \"Is there any pain in your eye(s)?\"  (Scale 1-10; or mild, moderate, severe)      no  7. CONTACTS-GLASSES: \"Do you wear contacts or glasses?\"      Contacts were in. Took them out right away  8. CAUSE: \"What do you think is causing this visual problem?\"      Methotrexate. Took first dose last night  9. OTHER SYMPTOMS: \"Do you have any other symptoms?\" (e.g., confusion, headache, arm or leg weakness, speech problems)      none  10. PREGNANCY: \"Is there any chance you are pregnant?\" \"When was your last menstrual period?\"        No period in 2 years due to radiation    Protocols used: VISION LOSS OR CHANGE-A-OH  An Wolfe RN    "

## 2020-04-01 NOTE — TELEPHONE ENCOUNTER
Please call :     Stop the methotrexate      The next best option may be Otezla but I need to see her in the office to review the medication before prescribing.       Set up for office visit on Monday.     Thank you,   Padmini Trinidad M.D.

## 2020-04-06 ENCOUNTER — OFFICE VISIT (OUTPATIENT)
Dept: FAMILY MEDICINE | Facility: CLINIC | Age: 35
End: 2020-04-06
Payer: COMMERCIAL

## 2020-04-06 VITALS — DIASTOLIC BLOOD PRESSURE: 64 MMHG | TEMPERATURE: 97.8 F | SYSTOLIC BLOOD PRESSURE: 122 MMHG

## 2020-04-06 DIAGNOSIS — L40.9 PSORIASIS: Primary | ICD-10-CM

## 2020-04-06 DIAGNOSIS — C71.6 MEDULLOBLASTOMA OF CEREBELLUM (H): ICD-10-CM

## 2020-04-06 DIAGNOSIS — D72.810 LYMPHOCYTES DECREASED: ICD-10-CM

## 2020-04-06 PROCEDURE — 99214 OFFICE O/P EST MOD 30 MIN: CPT | Performed by: FAMILY MEDICINE

## 2020-04-06 NOTE — PROGRESS NOTES
"[facility] - PRIMARY CARE SKIN    CC: psoriasis  SUBJECTIVE:   Merissa Silverman is a(n) 33 year old female who presents to clinic today for follow-up of psoriasis. She has a history of medulloblastoma of the cerebellum, s/p chemotherapy.    Most recently oral methotrexate was prescribed for the psoriasis but  did not tolerate the oral methotrexate , because of GI side effects and dizziness. Currently she is using clobetasol proprinate 0.05% shampoo 1-2 times per week. Triamcinolone 0.1% cream for the affected areas on the arms.   Current symptoms : continued plaques on the forearms, diffuse thick scaling on the scalp, facial involvement.   Previous treatments : methotrexate. 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    Family Medical History  Skin cancer: YES - melanoma in paternal grandmother, also \"skin chemo\" in an aunt  Psoriasis   NO     Sun Exposure History  Previous history of significant sun exposure:  Blistering sunburns: NO.  Tanning beds: YES - when younger.  Sunscreen usage: YES, SPF: 50+.  UV-protective clothing usage: YES.  Wide-brimmed hat usage: NO.  UV-protective sunglasses usage: YES.  Mid-day sun avoidance: YES.    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    Refer to electronic medical record (EMR) for past medical history and medications.    ROS: 14 point review of systems was negative except the symptoms listed above in the HPI.        OBJECTIVE:   GENERAL: healthy, alert and no distress.  SKIN: Plata Skin Type - I.  Face examined. The dermatoscope was used to help evaluate pigmented lesions.  Skin Pertinent Findings:  Scalp : thick diffuse scaling, erythema, hair thinning in the frontal area  Face - " scattered erythematous , scaly plaques,  Arms, legs- multiple erythematous scaly plaques    ASSESSMENT:     Encounter Diagnoses   Name Primary?     Psoriasis Yes     Medulloblastoma of cerebellum (H)      Lymphocytes decreased        MDM : discussed other systemic treatments in order to get better control of her psoriasis. In particular discussed apremilast( otezla ) , side effects discussed but not limited to  Depression, GI side effects . This has less  immunosuppression then other biologics.  PLAN:   Patient Instructions   Apremilast ( Otezla ) starter pack :  Follow directions listed in the package.    Clobetasol proprinate 0.05% shampoo- use once per day for the next 10 days then two times per week  TSal shampoo - alternate with Head and Shoulders    Triamcinolone 0.025% cream- apply to affected areas on face and neck two times per day for 7 days then once per day    Recheck in office in 4 weeks  Continue with Vitamin d Supplement.    TT: 25 minutes.  CT: 15 minutes.

## 2020-04-06 NOTE — LETTER
"    4/6/2020         RE: Merissa Silverman  [address]  [address] [place] [id number]        Dear Colleague,    Thank you for referring your patient, Merissa Silverman, to the [facility]. Please see a copy of my visit note below.    [facility] - PRIMARY CARE SKIN    CC: psoriasis  SUBJECTIVE:   Merissa Silverman is a(n) 33 year old female who presents to clinic today for follow-up of psoriasis. She has a history of medulloblastoma of the cerebellum, s/p chemotherapy.    Most recently oral methotrexate was prescribed for the psoriasis but  did not tolerate the oral methotrexate , because of GI side effects and dizziness. Currently she is using clobetasol proprinate 0.05% shampoo 1-2 times per week. Triamcinolone 0.1% cream for the affected areas on the arms.   Current symptoms : continued plaques on the forearms, diffuse thick scaling on the scalp, facial involvement.   Previous treatments : methotrexate. 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    Family Medical History  Skin cancer: YES - melanoma in paternal grandmother, also \"skin chemo\" in an aunt  Psoriasis   NO     Sun Exposure History  Previous history of significant sun exposure:  Blistering sunburns: NO.  Tanning beds: YES - when younger.  Sunscreen usage: YES, SPF: 50+.  UV-protective clothing usage: YES.  Wide-brimmed hat usage: NO.  UV-protective sunglasses usage: YES.  Mid-day sun avoidance: YES.    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    Refer to electronic medical record (EMR) for past medical history and medications.    ROS: 14 point review of systems was negative except the symptoms listed above in the HPI.        OBJECTIVE:   GENERAL: " healthy, alert and no distress.  SKIN: Plata Skin Type - I.  Face examined. The dermatoscope was used to help evaluate pigmented lesions.  Skin Pertinent Findings:  Scalp : thick diffuse scaling, erythema, hair thinning in the frontal area  Face - scattered erythematous , scaly plaques,  Arms, legs- multiple erythematous scaly plaques    ASSESSMENT:     Encounter Diagnoses   Name Primary?     Psoriasis Yes     Medulloblastoma of cerebellum (H)      Lymphocytes decreased        MDM : discussed other systemic treatments in order to get better control of her psoriasis. In particular discussed apremilast( otezla ) , side effects discussed but not limited to  Depression, GI side effects . This has less  immunosuppression then other biologics.  PLAN:   Patient Instructions   Apremilast ( Otezla ) starter pack :  Follow directions listed in the package.    Clobetasol proprinate 0.05% shampoo- use once per day for the next 10 days then two times per week  TSal shampoo - alternate with Head and Shoulders    Triamcinolone 0.025% cream- apply to affected areas on face and neck two times per day for 7 days then once per day    Recheck in office in 4 weeks  Continue with Vitamin d Supplement.    TT: 25 minutes.  CT: 15 minutes.        Again, thank you for allowing me to participate in the care of your patient.        Sincerely,        Padmini Trinidad MD

## 2020-04-06 NOTE — PATIENT INSTRUCTIONS
Apremilast ( Otezla ) starter pack :  Follow directions listed in the package.    Clobetasol proprinate 0.05% shampoo- use once per day for the next 10 days then two times per week  TSal shampoo - alternate with Head and Shoulders    Triamcinolone 0.025% cream- apply to affected areas on face and neck two times per day for 7 days then once per day    Recheck in office in 4 weeks  Continue with Vitamin d Supplement.

## 2020-04-06 NOTE — LETTER
April 6, 2020      Merissa Silverman  [address]  [address] [place] [id number]        To Whom It May Concern:    Merissa Silverman  was seen on April 6, 2020  Because of her chronic medical conditions she should not work at this time, from April 1 2020 thru May 4, 2020 because of increased risk with COVID-19.            Sincerely,        Padmini Trinidad MD

## 2020-04-07 ENCOUNTER — TRANSFERRED RECORDS (OUTPATIENT)
Dept: HEALTH INFORMATION MANAGEMENT | Facility: CLINIC | Age: 35
End: 2020-04-07

## 2020-04-07 ENCOUNTER — TELEPHONE (OUTPATIENT)
Dept: FAMILY MEDICINE | Facility: CLINIC | Age: 35
End: 2020-04-07

## 2020-04-07 LAB
ALT SERPL-CCNC: 28 U/L (ref 14–63)
AST SERPL-CCNC: 31 U/L (ref 15–37)
CREAT SERPL-MCNC: 0.73 MG/DL (ref 0.51–0.95)
GFR SERPL CREATININE-BSD FRML MDRD: >60 ML/MIN/1.73ME2 (ref 60–150)
GLUCOSE SERPL-MCNC: 84 MG/DL (ref 74–100)
POTASSIUM SERPL-SCNC: 3.3 MMOL/L (ref 3.5–5.1)

## 2020-04-20 NOTE — TELEPHONE ENCOUNTER
PA through secondary insurance is still pending. Resubmitted it again today. Releasing Rx to  spec and they can obtain copay assistance to cover $90 copay after primary insurance until PA is approved through secondary.

## 2020-04-28 NOTE — TELEPHONE ENCOUNTER
STILL NO still no response from PA through secondary plan. Pt has started treatment at $0 copay with copay assistance. Closing encounter

## 2020-05-15 NOTE — PROGRESS NOTES
"Merissa Silverman is a 34 year old female who is being evaluated via a billable video visit.      The patient has been notified of following:     \"This video visit will be conducted via a call between you and your physician/provider. We have found that certain health care needs can be provided without the need for an in-person physical exam.  This service lets us provide the care you need with a video conversation.  If a prescription is necessary we can send it directly to your pharmacy.  If lab work is needed we can place an order for that and you can then stop by our lab to have the test done at a later time.    Video visits are billed at different rates depending on your insurance coverage.  Please reach out to your insurance provider with any questions.    If during the course of the call the physician/provider feels a video visit is not appropriate, you will not be charged for this service.\"    Patient has given verbal consent for Video visit? Yes    How would you like to obtain your AVS? MyChart    Patient would like the video invitation sent by: Text to cell phone: [phone]     Will anyone else be joining your video visit? No     I have reviewed and updated the patient's allergies and medication list.    Concerns: No new concerns   Refills: No refills needed.      Secondary Video Option (D[employer]), send text message to:   [phone]   Baylee Jarrett CMA      _________________________________________________________________________    NEURO-ONCOLOGY VISIT  05/18/2020    CHIEF COMPLAINT: Ms. Merissa Silverman is a 34 year old right-handed woman with medulloblastoma (SHH-pathway activated and VI42-gmmlsysu, T2, M0 (spinal imaging and CSF negative)) arising from the left cerebellum, diagnosed following gross total resection on 4/13/2018. Completed craniospinal radiation with concurrent vincristine x 3 doses, but concurrent chemotherapy was stopped in the setting of pancytopenia. She completed 4 cycles " of adjuvant chemotherapy with vincristine, CCNU, and cisplatin as of 1/2019. Currently managed on imaging surveillance.     I met with Merissa today for this follow-up visit.        HISTORY OF PRESENT ILLNESS  -She is doing well.   -Presented to her local ER last month with compliant of SOB and chest pain. Diagnosed with PNA, COVID-19 testing not done. Started on antibiotics. Since then, feels good with no issues.   -Hearing testing not completed due to COVID-19 concerns. Agreeable to repeat testing. Notes persistent tinnitus, stable.   -Following with primary care physician for psoriasis; no medication has help. In 3/2020, started methotrexate but this failed and she had hallucinations. In 4/2020, started apremilast (Otezla) with minimal improvement. Skin changes are worse on the arms and scalp. Opting to not treat.   -She has very occasional headaches after exertion or dehydration.   -Chronic, unchanged back pain after a really long day at work. No changes in bowel/ bladder function or radicular pain. Off work right now and caring for her son. Planning to resume work on June 8th.   -Eating and drinking well.   -No dizziness or vertigo.  -Denies fatigue.  -She denies any seizure-like activity.  -Denies vision changes, weakness, or numbness and sensation chanegs.   -Looking to buy a new house soon, which is very exciting for their family.     REVIEW OF SYSTEMS  A comprehensive ROS negative except as in HPI.      MEDICATIONS   Current Outpatient Medications   Medication     Apremilast (OTEZLA) 10 & 20 & 30 MG TBPK     augmented betamethasone dipropionate (DIPROLENE-AF) 0.05 % external cream     fluocinonide (LIDEX) 0.05 % external solution     LORazepam (ATIVAN) 1 MG tablet     triamcinolone (ARISTOCORT HP) 0.5 % external cream     triamcinolone (KENALOG) 0.025 % cream     No current facility-administered medications for this visit.      Facility-Administered Medications Ordered in Other Visits   Medication      gadobutrol (GADAVIST) injection 7.5 mL     DRUG ALLERGIES   Allergies   Allergen Reactions     Sulfa Drugs Unknown and Hives       ONCOLOGIC HISTORY  -PRESENTATION: Progressively worsening headaches. Additionally was with abrupt position changes resulted in dizziness/ syncope. CTH at an outside hospital showed a mass in the left cerebellum. Transferred to [facility].  -4/11/2018 MRB showed a 3.5cm mass in the left cerebellar hemisphere that was associated with mild cerebellar tonsillar herniation and hydrocephalus.  -MRI spine showed no evidence of disease.   -4/13/2018 SURGERY: Suboccipital craniotomy with resection of the left cerebellar mass. Immediate post-operative MRI demonstrated a gross total resection.   PATHOLOGY: Medulloblastoma; Molecular markers pending.   -5/4/2018 NEURO-ONC: LP performed. Evaluated by radiation oncology. Recommending craniospinal radiation + concurrent vincristine followed by eight 6-week cycles of cisplatin, lomustine, and vincristine.  -5/4/2018 LP with CSF analysis: WBC 1/ RBC 1, protein 25, glucose 62, negative cytology.   -5/14 - 6/22/2018 CHEMORADS with vincristine 2 mg/m2 (stopped after 3 infusions due to pancytopenia).  -5/17/2018 Audiology- Baseline hearing testing with no hearing loss.  -6/4/2018 NEURO-ONC: Doing well, no new neurological symptoms. Tolerating treatment well. Ophtho referral. Labs improved.  -6/4/2018 NGS via [facility]: SMO mutation, TERT promotor mutation. Negative for microsatellite instability.   -6/25/2018 NEURO-ONC: Clinically stable, painful radiation-induced burn. Monitoring labs. Cancer Risk Assessment referral.  -8/13/2018 NEURO-ONC/MRB/ CHEMO: Clinically stable. Imaging shows expected post operative changes with no evidence of new disease. C1D1 of vincristine, CCNU and cisplatin.   -9/24/2018 NEURO-ONC: Neurologically stable, though did not tolerate first cycle of chemo well 2/2 nausea. Hold on starting C2D1 today due to leukopenia  (WBC 2.7).  -9/27/2018 CHEMO: C2D1 of vincristine, CCNU and cisplatin.   -11/9/2018 NEURO-ONC/ MRB/ CHEMO: Clinically well. Imaging with no evidence of tumor recurrence. Cycle 3 CCNU and cisplatin; plan to hold vincristine on ODD cycles due to peripheral neuropathy. Repeat hearing testing ordered.   -12/4/2018 NEURO-ONC: New severe HA, likely tension. No new neurological symptoms or signs  -1/4/2019 NEURO-ONC/CHEMO: Clinically doing well. No changes. Cycle 4 CCNU and cisplatin. Holding vincristine. Last cycle.  -2/15/2019 NEURO-ONC/ MRB: Clinically stable. Labs not completely rebounded. Imaging with no concern for disease recurrence. Continue with imaging surveillance.  -2/25/2019 AUDIOGRAM: moderate SNHL 3-8 kHz.  -3/2019 Port removed.   -4/22/2019 NEURO-ONC/ MRB: Clinically stable. Labs stable. Imaging with no concern for disease recurrence. Continue with imaging surveillance. Evaluated by dermatology.   -6/24/2019 NEURO-ONC/ MRB: Clinically stable. Imaging with no concern for disease recurrence. Continue with imaging surveillance.  -9/23/2019 NEURO-ONC/ MRB: Clinically stable. Imaging with no concern for disease recurrence.  -12/20/2019 NEURO-ONC/ MRB: Clinically stable. Imaging with no concern for disease recurrence.  -5/18/2020 NEURO-ONC/ MRB: Clinically stable. Imaging (contrast administration failed) with no concern for disease recurrence. Referral to repeat hearing testing placed.     SOCIAL HISTORY   Tobacco use: Former smoker, E-cigs stopped on 5/3/2018  Alcohol use: None.   Drug use: Denies marijuana use.  Supplement, complimentary/ alternative medicine: None.   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      PHYSICAL EXAMINATION  -Generally well appearing.  -Respiratory: No audible wheezing.   -Skin: No facial rashes.  -Psychiatric: Normal mood and affect. Pleasant, talkative.  -Neurologic:   MENTAL STATUS:     Alert, oriented to date.    Recall: Intact.    Speech fluent.     Comprehension intact to multi-step commands.     CRANIAL NERVES:     Pupils are equal, round, reactive to light.     Extraocular movements full, patient denies diplopia. No nystagmus.    Symmetric facial movements.   Hearing intact.   With normal phonation, no dysfunction of the palate or tongue.   MOTOR:    Antigravity in arms. No pronation or drift.   COORDINATION:   Mild dysmetria on the left, but overall, on target on finger-nose with eyes closed.     The rest of a comprehensive physical examination is deferred due to PHE (public health emergency) video visit restrictions.       MEDICAL RECORDS  Obtained and personally reviewed all available outside medical records in addition to reviewing any records available in our electronic system.     LABS  Reviewed.     IMAGING  Personally reviewed MR brain imaging from today and compared to prior imaging. To my eye, there is no sign of disease recurrence. Contrast administration failed, however, there was no increase in DWI or FLAIR about the left cerebellar resection cavity.     Imaging results were reviewed with Merissa.      Case and imaging to be discussed at Brain Tumor Conference today.       IMPRESSION/PLAN  Clinic was spent discussing in detail the nature of her cancer in light of her recent imaging. This was in addition to providing emotional support, answering questions pertaining to my recommendations, and devising the treatment plan as outlined below.     Merissa is clinically well and imaging demonstrates no concerning changes. As a result, there is no cancer-directed therapy indicated at this time. Merissa completed 4 cycles of adjuvant therapy and imaging continues to demonstrate complete response. General NCCN Guidelines for imaging surveillance is q 3-4 months of 2 years (until 1/2021), then q 6 months for 3 years. At recurrence, clinical trial options remain a strong consideration for recurrent disease, as does off label use of SMO inhibitors.     Must  schedule repeat hearing testing in the coming weeks. A referral order has been placed.     PROBLEM LIST  Medulloblastoma  Steroid intolerance  Chemotherapy-induced pancytopenia  Chemotherapy-induced nausea/ vomiting   Radiation burn  Fatigue, cancer and treatment related, improving  Port removed     PLAN  -CANCER-DIRECTED THERAPY-  -As above; Imaging with no evidence of disease recurrence. Continue imaging surveillance; will repeat in 4 months x 2, then q 6 months as detailed above.  -Can refill pre-scan Ativan as needed.   -No continued lab monitoring necessary.   -Repeat hearing testing needed in the coming weeks given history of ototoxicity and continued tinnitus.     -SEIZURE MANAGEMENT-  -While this patient is at increased risk of having seizures, given the lack of seizure history, there is no indication to prescribe an antiepileptic at this time. Will continue to monitor for seizure activity.    -Quality of life/ MOOD/ FATIGUE-  -Denies any mood issues. Denies fatigue.   -Continue to monitor mood as untreated/ undertreated depression can worsen fatigue, dysorexia, and quality of life.     Follows with Dr. Jakob Ryder MD - Family Medicine Physician at [facility].     Return to clinic in 9/2020 + repeat MRI.    Anupama Morrison MD  Neuro-oncology      Video-Visit Details  Type of service:  Video Visit    Video Start Time: 11:20 AM  Video End Time: 11:32 AM    Originating Location (pt. Location): Home    Distant Location (provider location):  [facility]     Platform used for Video Visit: Lobito Morrison MD

## 2020-05-18 ENCOUNTER — ANCILLARY PROCEDURE (OUTPATIENT)
Dept: MRI IMAGING | Facility: CLINIC | Age: 35
End: 2020-05-18
Attending: PSYCHIATRY & NEUROLOGY
Payer: COMMERCIAL

## 2020-05-18 ENCOUNTER — VIRTUAL VISIT (OUTPATIENT)
Dept: ONCOLOGY | Facility: CLINIC | Age: 35
End: 2020-05-18
Attending: PSYCHIATRY & NEUROLOGY
Payer: COMMERCIAL

## 2020-05-18 DIAGNOSIS — C71.6 MEDULLOBLASTOMA OF CEREBELLUM (H): Primary | ICD-10-CM

## 2020-05-18 DIAGNOSIS — H93.8X3 OTOTOXICITY OF BOTH EARS: ICD-10-CM

## 2020-05-18 DIAGNOSIS — C71.6 MEDULLOBLASTOMA OF CEREBELLUM (H): ICD-10-CM

## 2020-05-18 PROCEDURE — 40000114 ZZH STATISTIC NO CHARGE CLINIC VISIT

## 2020-05-18 PROCEDURE — 99214 OFFICE O/P EST MOD 30 MIN: CPT | Mod: GT | Performed by: PSYCHIATRY & NEUROLOGY

## 2020-05-18 RX ORDER — GADOBUTROL 604.72 MG/ML
7.5 INJECTION INTRAVENOUS ONCE
Status: ACTIVE | OUTPATIENT
Start: 2020-05-18

## 2020-05-18 NOTE — PATIENT INSTRUCTIONS
Imaging without concerns today.   Repeat in 4 months.     Need repeat hearing testing scheduled in the coming weeks.     Return to clinic in 9/2020 + repeat imaging.     Anupama Morrison MD  Neuro-oncology  5/18/2020

## 2020-05-19 ENCOUNTER — TELEPHONE (OUTPATIENT)
Dept: AUDIOLOGY | Facility: CLINIC | Age: 35
End: 2020-05-19

## 2020-10-27 ENCOUNTER — ANCILLARY PROCEDURE (OUTPATIENT)
Dept: MRI IMAGING | Facility: CLINIC | Age: 35
End: 2020-10-27
Attending: PSYCHIATRY & NEUROLOGY
Payer: COMMERCIAL

## 2020-10-27 ENCOUNTER — OFFICE VISIT (OUTPATIENT)
Dept: AUDIOLOGY | Facility: CLINIC | Age: 35
End: 2020-10-27
Payer: COMMERCIAL

## 2020-10-27 DIAGNOSIS — F41.9 ANXIETY: ICD-10-CM

## 2020-10-27 DIAGNOSIS — C71.6 MEDULLOBLASTOMA OF CEREBELLUM (H): ICD-10-CM

## 2020-10-27 DIAGNOSIS — F40.240 CLAUSTROPHOBIA: ICD-10-CM

## 2020-10-27 DIAGNOSIS — H90.3 SENSORINEURAL HEARING LOSS (SNHL), BILATERAL: Primary | ICD-10-CM

## 2020-10-27 PROCEDURE — A9585 GADOBUTROL INJECTION: HCPCS | Performed by: RADIOLOGY

## 2020-10-27 PROCEDURE — 92557 COMPREHENSIVE HEARING TEST: CPT | Performed by: AUDIOLOGIST

## 2020-10-27 PROCEDURE — 70553 MRI BRAIN STEM W/O & W/DYE: CPT | Performed by: RADIOLOGY

## 2020-10-27 PROCEDURE — 92550 TYMPANOMETRY & REFLEX THRESH: CPT | Performed by: AUDIOLOGIST

## 2020-10-27 RX ORDER — LORAZEPAM 1 MG/1
TABLET ORAL
Qty: 2 TABLET | Refills: 0 | Status: SHIPPED | OUTPATIENT
Start: 2020-10-27 | End: 2021-03-02

## 2020-10-27 RX ORDER — GADOBUTROL 604.72 MG/ML
7.5 INJECTION INTRAVENOUS ONCE
Status: COMPLETED | OUTPATIENT
Start: 2020-10-27 | End: 2020-10-27

## 2020-10-27 RX ADMIN — GADOBUTROL 7 ML: 604.72 INJECTION INTRAVENOUS at 10:17

## 2020-10-27 NOTE — DISCHARGE INSTRUCTIONS
MRI Contrast Discharge Instructions    The IV contrast you received today will pass out of your body in your  urine. This will happen in the next 24 hours. You will not feel this process.  Your urine will not change color.    Drink at least 4 extra glasses of water or juice today (unless your doctor  has restricted your fluids). This reduces the stress on your kidneys.  You may take your regular medicines.    If you are on dialysis: It is best to have dialysis today.    If you have a reaction: Most reactions happen right away. If you have  any new symptoms after leaving the hospital (such as hives or swelling),  call your hospital at the correct number below. Or call your family doctor.  If you have breathing distress or wheezing, call 911.    Special instructions: ***    I have read and understand the above information.    Signature:______________________________________ Date:___________    Staff:__________________________________________ Date:___________     Time:__________    [facility] Departments:    ___[place]: [phone]  ___[facility]: [phone]  ___[place]: [phone] ___[facility]: [phone]  ___[facility]: [phone]  ___[facility]: [phone]  ___[phone]  ___[facility]: [phone]  ___[phone]

## 2020-10-27 NOTE — PROGRESS NOTES
"AUDIOLOGY REPORT    SUBJECTIVE:  Merissa Silverman is a 35 year old female who was seen in Audiology at the [facility], [facility] and [facility] for audiologic evaluation.  The patient has been seen previously in this clinic on 2/25/2019 for hearing assessment and results indicated mild high-frequency sensorineural hearing loss bilaterally. The patient reports concern for bilateral ear infection but did see her primary care physician for this who confirmed there is no infection present, cerumen removal completed at that time but patient reports that her symptom of bilateral \"ear plugging\" is still present. Reports bilateral constant ringing tinnitus is still present and more severe as of recent. Denies other ear related issues or vertigo.    OBJECTIVE:  Otoscopic exam indicates ears are clear of cerumen bilaterally     Pure Tone Thresholds assessed using conventional audiometry with fair to good  reliability from 250-8000 Hz bilaterally using circumaural headphones     RIGHT:  Normal/borderline normal through 2000 Hz sloping to moderate sensorineural hearing loss 1391-3221 Hz; 10 dB decrease 4000 Hz    LEFT:   Normal/borderline normal through 2000 Hz sloping to moderate sensorineural hearing loss 9818-5369 Hz; 5-10 dB decrease all frequencies    Tympanogram:    RIGHT: normal eardrum mobility    LEFT:   normal eardrum mobility    Reflexes (reported by stimulus ear):  RIGHT: Ipsilateral is present at normal levels  RIGHT: Contralateral is present at normal levels  LEFT:   Ipsilateral is present at normal levels  LEFT:   Contralateral is present at normal levels    Speech Reception Threshold:    RIGHT: 5 dB HL    LEFT:   5 dB HL  Word Recognition Score:     RIGHT: 96% at 50 dB HL using NU-6 recorded word list.    LEFT:   96% at 55 dB HL using NU-6 recorded word list.    ASSESSMENT:  Normal to borderline normal hearing 250-2000 Hz bilaterally sloping to moderate sensorineural hearing loss " 7544-7355 Hz bilaterally. 10 dB decrease 4000 Hz right and 5-10 dB decrease all frequencies left. Normal middle ear status bilaterally. Today s results were discussed with the patient in detail.     PLAN:    Patient is scheduled to follow-up with her referring provider tomorrow regarding results.  Please call this clinic with questions regarding these results or recommendations.      Meenakshi Payne.  Licensed Audiologist  [place] #[id number]

## 2020-11-02 ENCOUNTER — TUMOR CONFERENCE (OUTPATIENT)
Dept: ONCOLOGY | Facility: CLINIC | Age: 35
End: 2020-11-02

## 2020-11-02 ENCOUNTER — VIRTUAL VISIT (OUTPATIENT)
Dept: ONCOLOGY | Facility: CLINIC | Age: 35
End: 2020-11-02
Attending: PSYCHIATRY & NEUROLOGY
Payer: COMMERCIAL

## 2020-11-02 DIAGNOSIS — F41.9 ANXIETY: ICD-10-CM

## 2020-11-02 DIAGNOSIS — C71.6 MEDULLOBLASTOMA OF CEREBELLUM (H): Primary | ICD-10-CM

## 2020-11-02 PROCEDURE — 99214 OFFICE O/P EST MOD 30 MIN: CPT | Mod: GT | Performed by: PSYCHIATRY & NEUROLOGY

## 2020-11-02 NOTE — PROGRESS NOTES
"Merissa Silverman is a 35 year old female who is being evaluated via a billable video visit.      The patient has been notified of following:     \"This video visit will be conducted via a call between you and your physician/provider. We have found that certain health care needs can be provided without the need for an in-person physical exam.  This service lets us provide the care you need with a video conversation.  If a prescription is necessary we can send it directly to your pharmacy.  If lab work is needed we can place an order for that and you can then stop by our lab to have the test done at a later time.    Video visits are billed at different rates depending on your insurance coverage.  Please reach out to your insurance provider with any questions.    If during the course of the call the physician/provider feels a video visit is not appropriate, you will not be charged for this service.\"    Patient has given verbal consent for Video visit? Yes    How would you like to obtain your AVS? MyChart     If you are dropped from the video visit, the video invite should be resent to: Text to cell phone: [phone]     Will anyone else be joining your video visit? No     Vitals - Patient Reported  Weight (Patient Reported): 523.9 kg (1155 lb)  Height (Patient Reported): 157.5 cm (5' 2.01\")  BMI (Based on Pt Reported Ht/Wt): 211.2  Pain Score: No Pain (0)    Increased anxiety.      Hector Bhatti LPN      Video-Visit Details  Type of service:  Video Visit    Video Start Time: 9:41 AM  Video End Time: 9:57 AM    Originating Location (pt. Location): Home    Distant Location (provider location):  [facility]     Platform used for Video Visit: Lobito Morrison MD    ___________________________________________________________    NEURO-ONCOLOGY VISIT  11/02/2020    CHIEF COMPLAINT: Ms. 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 Completed craniospinal radiation with concurrent vincristine x 3 doses, but concurrent chemotherapy was stopped in the setting of pancytopenia. She completed 4 cycles of adjuvant chemotherapy with vincristine, CCNU, and cisplatin as of 1/2019. Merissa is currently managed on imaging surveillance.     I met with Merissa today for this follow-up visit.        HISTORY OF PRESENT ILLNESS  -She is doing fairly well. No infectious symptoms. However, she is noting an increase in anxiety. She feels that she is worrying about \"everything\" lately. Her son is going full time to school and she is working part-time. She bought a new house, which is very exciting for their family.   -Hearing testing completed on 10/27 with moderate sensorineural hearing loss. Continued ringing in her ears.   -She has very occasional headaches after exertion or dehydration.   -Eating and drinking well.   -Chronic, unchanged back pain after a really long day at work. No changes in bowel/ bladder function or radicular pain.  -No dizziness or vertigo.  -Good energy, but feels that she tires quickly.   -She denies any seizure-like activity.  -Denies vision changes, weakness, or numbness and sensation chanegs.     REVIEW OF SYSTEMS  A comprehensive ROS negative except as in HPI.      MEDICATIONS   Current Outpatient Medications   Medication     LORazepam (ATIVAN) 1 MG tablet     No current facility-administered medications for this visit.      Facility-Administered Medications Ordered in Other Visits   Medication     gadobutrol (GADAVIST) injection 7.5 mL     DRUG ALLERGIES   Allergies   Allergen Reactions     Sulfa Drugs Unknown and Hives       ONCOLOGIC HISTORY  -PRESENTATION: Progressively worsening headaches. Additionally was with abrupt position changes resulted in dizziness/ syncope. CTH at an " outside hospital showed a mass in the left cerebellum. Transferred to [facility].  -4/11/2018 MRB showed a 3.5cm mass in the left cerebellar hemisphere that was associated with mild cerebellar tonsillar herniation and hydrocephalus.  -MRI spine showed no evidence of disease.   -4/13/2018 SURGERY: Suboccipital craniotomy with resection of the left cerebellar mass. Immediate post-operative MRI demonstrated a gross total resection.   PATHOLOGY: Medulloblastoma; Molecular markers pending.   -5/4/2018 NEURO-ONC: LP performed. Evaluated by radiation oncology. Recommending craniospinal radiation + concurrent vincristine followed by eight 6-week cycles of cisplatin, lomustine, and vincristine.  -5/4/2018 LP with CSF analysis: WBC 1/ RBC 1, protein 25, glucose 62, negative cytology.   -5/14 - 6/22/2018 CHEMORADS with vincristine 2 mg/m2 (stopped after 3 infusions due to pancytopenia).  -5/17/2018 Audiology testing: Baseline hearing testing with no hearing loss.  -6/4/2018 NEURO-ONC: Doing well, no new neurological symptoms. Tolerating treatment well. Ophtho referral. Labs improved.  -6/4/2018 NGS via STRATA: SMO mutation, TERT promotor mutation. Negative for microsatellite instability.   -6/25/2018 NEURO-ONC: Clinically stable, painful radiation-induced burn. Monitoring labs. Cancer Risk Assessment referral.  -8/13/2018 NEURO-ONC/MRB/ CHEMO: Clinically stable. Imaging shows expected post operative changes with no evidence of new disease. C1D1 of vincristine, CCNU and cisplatin.   -9/24/2018 NEURO-ONC: Neurologically stable, though did not tolerate first cycle of chemo well 2/2 nausea. Hold on starting C2D1 today due to leukopenia (WBC 2.7).  -9/27/2018 CHEMO: C2D1 of vincristine, CCNU and cisplatin.   -11/9/2018 NEURO-ONC/ MRB/ CHEMO: Clinically well. Imaging with no evidence of tumor recurrence. Cycle 3 CCNU and cisplatin; plan to hold vincristine on ODD cycles due to peripheral neuropathy. Repeat  hearing testing ordered.   -12/4/2018 NEURO-ONC: New severe HA, likely tension. No new neurological symptoms or signs  -1/4/2019 NEURO-ONC/CHEMO: Clinically doing well. No changes. Cycle 4 CCNU and cisplatin. Holding vincristine. Last cycle.  -2/15/2019 NEURO-ONC/ MRB: Clinically stable. Labs not completely rebounded. Imaging with no concern for disease recurrence. Continue with imaging surveillance.  -2/25/2019 Audiology testing: moderate SNHL 3-8 kHz.  -3/2019 Port removed.   -4/22/2019 NEURO-ONC/ MRB: Clinically stable. Labs stable. Imaging with no concern for disease recurrence. Continue with imaging surveillance. Evaluated by dermatology.   -6/24/2019 NEURO-ONC/ MRB: Clinically stable. Imaging with no concern for disease recurrence. Continue with imaging surveillance.  -9/23/2019 NEURO-ONC/ MRB: Clinically stable. Imaging with no concern for disease recurrence.  -12/20/2019 NEURO-ONC/ MRB: Clinically stable. Imaging with no concern for disease recurrence.  -5/18/2020 NEURO-ONC/ MRB: Clinically stable. Imaging (contrast administration failed) with no concern for disease recurrence. Referral to repeat hearing testing placed.   -10/27/2020 Audiology testing: Moderate sensorineural hearing loss.  -11/2/2020 NEURO-ONC/ MRB: Clinically stable, aside from a slight increase in anxiety. Imaging without concern.     SOCIAL HISTORY   Tobacco use: Former smoker, E-cigs stopped on 5/3/2018  Alcohol use: None.   Drug use: Denies marijuana use.  Supplement, complimentary/ alternative medicine: None.   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      PHYSICAL EXAMINATION  -Generally well appearing.  -Respiratory: No audible wheezing.   -Skin: No facial rashes.  -Psychiatric: Normal mood and affect. Pleasant, talkative.  -Neurologic:   MENTAL STATUS:     Alert, oriented to date.    Recall: Intact.    Speech fluent.    Comprehension intact to multi-step commands.     CRANIAL NERVES:     Pupils are  "equal, round, reactive to light.     Extraocular movements full, patient denies diplopia. No nystagmus.    Symmetric facial movements.   Hearing intact.   With normal phonation, no dysfunction of the palate or tongue.   MOTOR:    Antigravity in arms. No pronation or drift.   COORDINATION:   Mild dysmetria on the right today, but overall, on target on finger-nose with eyes closed.   SENSATION: Intact to light touch throughout.     The rest of a comprehensive physical examination is deferred due to PHE (public health emergency) video visit restrictions.       MEDICAL RECORDS  Obtained and personally reviewed all available outside medical records in addition to reviewing any records available in our electronic system.     LABS  Reviewed.     IMAGING  Personally reviewed MR brain imaging from last week and compared to prior imaging. To my eye, there is no sign of disease recurrence. No elevated rCBV. No new enhancement.     There are new foci of susceptibility artifacts within the left frontal lobe, right parietal lobe, right temporal lobe, and right middle cerebellar peduncle, which are related to prior radiation therapy.     Imaging results were reviewed with Merissa.      Case and imaging to be discussed at Brain Tumor Conference later today.       IMPRESSION/PLAN  Clinic was spent discussing in detail the nature of her cancer in light of her recent imaging. This was in addition to providing emotional support, answering questions pertaining to my recommendations, and devising the plan as outlined below.     Merissa is clinically well, aside from an increase in anxiety. There is no specific trigger as she just feels as though she is worrying about \"everything\". Merissa has yet to connect with her primary care physician to discuss this concern. For generalized anxiety, a good option would be to start a ssri and generally, I recommend Zoloft. However, will defer management to primary care, but am open to assisting if needed. " Encouraged Merissa to continue to reach out with ongoing concerns if needed.     With regard to her repeat MR brain imaging, the scan demonstrates no concerning changes. The new micro hemorrhages are related to prior radiation therapy. There is no cancer-directed therapy indicated at this time. Merissa completed 4 cycles of adjuvant therapy and imaging continues to demonstrate a complete response. Based on NCCN Guidelines for imaging surveillance, will repeat imaging once more in 4 months. If in 3/2021, imaging is stable, will then extend imaging interval to every 6 months for the next 3 years. At recurrence, clinical trial options remain a strong consideration for recurrent disease, as does off label use of SMO inhibitors.     PROBLEM LIST  Medulloblastoma  Steroid intolerance  Chemotherapy-induced pancytopenia  Chemotherapy-induced nausea/ vomiting   Radiation burn  Fatigue, cancer and treatment related, improving  Port removed     PLAN  -CANCER-DIRECTED THERAPY-  -As above; Imaging with no evidence of disease recurrence. Continue imaging surveillance; will repeat in 4 months x 1, then every 6 months.  -Can refill pre-scan Ativan as needed.   -No continued lab monitoring necessary.   -Repeat hearing testing done on 10/27/2020 with moderate sensorineural hearing loss. Will repeat annually; next due in 10/2021 given history of ototoxicity and continued tinnitus.     -SEIZURE MANAGEMENT-  -While this patient is at increased risk of having seizures, given the lack of seizure history, there is no indication to prescribe an antiepileptic at this time. Will continue to monitor for seizure activity.    -Quality of life/ MOOD/ FATIGUE-  -Noting an increase in general anxiety as noted above. Zoloft might be a good option.  -Follows with Dr. Jakob Ryder MD - Family Medicine Physician at [facility]. Encouraged Merissa to connect with Dr. Ryder and will defer management at this time, but can assist as needed.     Return to  clinic in 3/2021 + repeat MRI.    Anupama Morrison MD  Neuro-oncology

## 2020-11-02 NOTE — LETTER
"    11/2/2020         RE: Merissa Silverman  [address] [place] [address]        Dear Colleague,    Thank you for referring your patient, Merissa Silverman, to the [facility]. Please see a copy of my visit note below.    Merissa Silverman is a 35 year old female who is being evaluated via a billable video visit.      The patient has been notified of following:     \"This video visit will be conducted via a call between you and your physician/provider. We have found that certain health care needs can be provided without the need for an in-person physical exam.  This service lets us provide the care you need with a video conversation.  If a prescription is necessary we can send it directly to your pharmacy.  If lab work is needed we can place an order for that and you can then stop by our lab to have the test done at a later time.    Video visits are billed at different rates depending on your insurance coverage.  Please reach out to your insurance provider with any questions.    If during the course of the call the physician/provider feels a video visit is not appropriate, you will not be charged for this service.\"    Patient has given verbal consent for Video visit? Yes    How would you like to obtain your AVS? MyChart     If you are dropped from the video visit, the video invite should be resent to: Text to cell phone: [phone]     Will anyone else be joining your video visit? No     Vitals - Patient Reported  Weight (Patient Reported): 523.9 kg (1155 lb)  Height (Patient Reported): 157.5 cm (5' 2.01\")  BMI (Based on Pt Reported Ht/Wt): 211.2  Pain Score: No Pain (0)    Increased anxiety.      Hector Bhatti LPN      Video-Visit Details  Type of service:  Video Visit    Video Start Time: 9:41 AM  Video End Time: 9:57 AM    Originating Location (pt. Location): Home    Distant Location (provider location):  [facility]     Platform used for " "Video Visit: Lobito Morrison MD    ___________________________________________________________    NEURO-ONCOLOGY VISIT  11/02/2020    CHIEF COMPLAINT: Ms. 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 Completed craniospinal radiation with concurrent vincristine x 3 doses, but concurrent chemotherapy was stopped in the setting of pancytopenia. She completed 4 cycles of adjuvant chemotherapy with vincristine, CCNU, and cisplatin as of 1/2019. Merissa is currently managed on imaging surveillance.     I met with Merissa today for this follow-up visit.        HISTORY OF PRESENT ILLNESS  -She is doing fairly well. No infectious symptoms. However, she is noting an increase in anxiety. She feels that she is worrying about \"everything\" lately. Her son is going full time to school and she is working part-time. She bought a new house, which is very exciting for their family.   -Hearing testing completed on 10/27 with moderate sensorineural hearing loss. Continued ringing in her ears.   -She has very occasional headaches after exertion or dehydration.   -Eating and drinking well.   -Chronic, unchanged back pain after a really long day at work. No changes in bowel/ bladder function or radicular pain.  -No dizziness or vertigo.  -Good energy, but feels that she tires quickly.   -She denies any seizure-like activity.  -Denies vision changes, weakness, or numbness and sensation chanegs.     REVIEW OF SYSTEMS  A comprehensive ROS negative except as in HPI.      MEDICATIONS   Current Outpatient Medications   Medication     LORazepam (ATIVAN) 1 MG tablet     No current facility-administered medications for this visit.      Facility-Administered Medications Ordered in Other Visits   Medication     gadobutrol (GADAVIST) injection " 7.5 mL     DRUG ALLERGIES   Allergies   Allergen Reactions     Sulfa Drugs Unknown and Hives       ONCOLOGIC HISTORY  -PRESENTATION: Progressively worsening headaches. Additionally was with abrupt position changes resulted in dizziness/ syncope. CTH at an outside hospital showed a mass in the left cerebellum. Transferred to [facility].  -4/11/2018 MRB showed a 3.5cm mass in the left cerebellar hemisphere that was associated with mild cerebellar tonsillar herniation and hydrocephalus.  -MRI spine showed no evidence of disease.   -4/13/2018 SURGERY: Suboccipital craniotomy with resection of the left cerebellar mass. Immediate post-operative MRI demonstrated a gross total resection.   PATHOLOGY: Medulloblastoma; Molecular markers pending.   -5/4/2018 NEURO-ONC: LP performed. Evaluated by radiation oncology. Recommending craniospinal radiation + concurrent vincristine followed by eight 6-week cycles of cisplatin, lomustine, and vincristine.  -5/4/2018 LP with CSF analysis: WBC 1/ RBC 1, protein 25, glucose 62, negative cytology.   -5/14 - 6/22/2018 CHEMORADS with vincristine 2 mg/m2 (stopped after 3 infusions due to pancytopenia).  -5/17/2018 Audiology testing: Baseline hearing testing with no hearing loss.  -6/4/2018 NEURO-ONC: Doing well, no new neurological symptoms. Tolerating treatment well. Ophtho referral. Labs improved.  -6/4/2018 NGS via STRATA: SMO mutation, TERT promotor mutation. Negative for microsatellite instability.   -6/25/2018 NEURO-ONC: Clinically stable, painful radiation-induced burn. Monitoring labs. Cancer Risk Assessment referral.  -8/13/2018 NEURO-ONC/MRB/ CHEMO: Clinically stable. Imaging shows expected post operative changes with no evidence of new disease. C1D1 of vincristine, CCNU and cisplatin.   -9/24/2018 NEURO-ONC: Neurologically stable, though did not tolerate first cycle of chemo well 2/2 nausea. Hold on starting C2D1 today due to leukopenia (WBC 2.7).  -9/27/2018  CHEMO: C2D1 of vincristine, CCNU and cisplatin.   -11/9/2018 NEURO-ONC/ MRB/ CHEMO: Clinically well. Imaging with no evidence of tumor recurrence. Cycle 3 CCNU and cisplatin; plan to hold vincristine on ODD cycles due to peripheral neuropathy. Repeat hearing testing ordered.   -12/4/2018 NEURO-ONC: New severe HA, likely tension. No new neurological symptoms or signs  -1/4/2019 NEURO-ONC/CHEMO: Clinically doing well. No changes. Cycle 4 CCNU and cisplatin. Holding vincristine. Last cycle.  -2/15/2019 NEURO-ONC/ MRB: Clinically stable. Labs not completely rebounded. Imaging with no concern for disease recurrence. Continue with imaging surveillance.  -2/25/2019 Audiology testing: moderate SNHL 3-8 kHz.  -3/2019 Port removed.   -4/22/2019 NEURO-ONC/ MRB: Clinically stable. Labs stable. Imaging with no concern for disease recurrence. Continue with imaging surveillance. Evaluated by dermatology.   -6/24/2019 NEURO-ONC/ MRB: Clinically stable. Imaging with no concern for disease recurrence. Continue with imaging surveillance.  -9/23/2019 NEURO-ONC/ MRB: Clinically stable. Imaging with no concern for disease recurrence.  -12/20/2019 NEURO-ONC/ MRB: Clinically stable. Imaging with no concern for disease recurrence.  -5/18/2020 NEURO-ONC/ MRB: Clinically stable. Imaging (contrast administration failed) with no concern for disease recurrence. Referral to repeat hearing testing placed.   -10/27/2020 Audiology testing: Moderate sensorineural hearing loss.  -11/2/2020 NEURO-ONC/ MRB: Clinically stable, aside from a slight increase in anxiety. Imaging without concern.     SOCIAL HISTORY   Tobacco use: Former smoker, E-cigs stopped on 5/3/2018  Alcohol use: None.   Drug use: Denies marijuana use.  Supplement, complimentary/ alternative medicine: None.   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      PHYSICAL EXAMINATION  -Generally well appearing.  -Respiratory: No audible wheezing.   -Skin: No facial  "rashes.  -Psychiatric: Normal mood and affect. Pleasant, talkative.  -Neurologic:   MENTAL STATUS:     Alert, oriented to date.    Recall: Intact.    Speech fluent.    Comprehension intact to multi-step commands.     CRANIAL NERVES:     Pupils are equal, round, reactive to light.     Extraocular movements full, patient denies diplopia. No nystagmus.    Symmetric facial movements.   Hearing intact.   With normal phonation, no dysfunction of the palate or tongue.   MOTOR:    Antigravity in arms. No pronation or drift.   COORDINATION:   Mild dysmetria on the right today, but overall, on target on finger-nose with eyes closed.   SENSATION: Intact to light touch throughout.     The rest of a comprehensive physical examination is deferred due to PHE (public health emergency) video visit restrictions.       MEDICAL RECORDS  Obtained and personally reviewed all available outside medical records in addition to reviewing any records available in our electronic system.     LABS  Reviewed.     IMAGING  Personally reviewed MR brain imaging from last week and compared to prior imaging. To my eye, there is no sign of disease recurrence. No elevated rCBV. No new enhancement.     There are new foci of susceptibility artifacts within the left frontal lobe, right parietal lobe, right temporal lobe, and right middle cerebellar peduncle, which are related to prior radiation therapy.     Imaging results were reviewed with Merissa.      Case and imaging to be discussed at Brain Tumor Conference later today.       IMPRESSION/PLAN  Clinic was spent discussing in detail the nature of her cancer in light of her recent imaging. This was in addition to providing emotional support, answering questions pertaining to my recommendations, and devising the plan as outlined below.     Merissa is clinically well, aside from an increase in anxiety. There is no specific trigger as she just feels as though she is worrying about \"everything\". Merissa has yet to " connect with her primary care physician to discuss this concern. For generalized anxiety, a good option would be to start a ssri and generally, I recommend Zoloft. However, will defer management to primary care, but am open to assisting if needed. Encouraged Merissa to continue to reach out with ongoing concerns if needed.     With regard to her repeat MR brain imaging, the scan demonstrates no concerning changes. The new micro hemorrhages are related to prior radiation therapy. There is no cancer-directed therapy indicated at this time. Merissa completed 4 cycles of adjuvant therapy and imaging continues to demonstrate a complete response. Based on NCCN Guidelines for imaging surveillance, will repeat imaging once more in 4 months. If in 3/2021, imaging is stable, will then extend imaging interval to every 6 months for the next 3 years. At recurrence, clinical trial options remain a strong consideration for recurrent disease, as does off label use of SMO inhibitors.     PROBLEM LIST  Medulloblastoma  Steroid intolerance  Chemotherapy-induced pancytopenia  Chemotherapy-induced nausea/ vomiting   Radiation burn  Fatigue, cancer and treatment related, improving  Port removed     PLAN  -CANCER-DIRECTED THERAPY-  -As above; Imaging with no evidence of disease recurrence. Continue imaging surveillance; will repeat in 4 months x 1, then every 6 months.  -Can refill pre-scan Ativan as needed.   -No continued lab monitoring necessary.   -Repeat hearing testing done on 10/27/2020 with moderate sensorineural hearing loss. Will repeat annually; next due in 10/2021 given history of ototoxicity and continued tinnitus.     -SEIZURE MANAGEMENT-  -While this patient is at increased risk of having seizures, given the lack of seizure history, there is no indication to prescribe an antiepileptic at this time. Will continue to monitor for seizure activity.    -Quality of life/ MOOD/ FATIGUE-  -Noting an increase in general anxiety as noted  above. Zoloft might be a good option.  -Follows with Dr. Jakob Ryder MD - Family Medicine Physician at [facility]. Encouraged Merissa to connect with Dr. Ryder and will defer management at this time, but can assist as needed.     Return to clinic in 3/2021 + repeat MRI.    Anupama Morrison MD  Neuro-oncology

## 2020-11-02 NOTE — PATIENT INSTRUCTIONS
Imaging without concerns today.   Repeat in 4 months, then if good, repeat every 6 months.     Repeat hearing testing in 1 year.     Recommend connecting with primary care physician regarding complaint of increased anxiety. Starting a medication might be beneficial.     Return to clinic in 3/2020 + repeat imaging.     Anupama Morrison MD  Neuro-oncology  11/02/2020

## 2020-11-02 NOTE — TUMOR CONFERENCE
Tumor Conference Information  Tumor Conference: Brain  Specialties Present: Medical oncology, Pathology, Radiology, Radiation oncology, Surgery  Patient Status: A current patient  Pathology: Not Discussed  Treatment to Date: Surgical intervention(s), Chemoradiation, Adjuvant chemotherapy  Clinical Trial Eligibility: Not discussed  Recommended Plan: Observation (see comment) (Comment: reviewed imaging - grossly stable, repeat imaging with follow up in 4 months)  Did the review exceed 30 minutes?: did not           Documentation / Disclaimer Cancer Tumor Board Note  Cancer tumor board recommendations do not override what is determined to be reasonable care and treatment, which is dependent on the circumstances of a patient's case; the patient's medical, social, and personal concerns; and the clinical judgment of the oncologist [physician].

## 2020-11-04 ENCOUNTER — TELEPHONE (OUTPATIENT)
Dept: ONCOLOGY | Facility: CLINIC | Age: 35
End: 2020-11-04

## 2020-11-04 NOTE — TELEPHONE ENCOUNTER
Triage received a call from patient, she mentions slurred speech and intermittent trouble with word finding for the past couple of weeks. She noticed these symptoms are aggravated by fatigue. She mentions her ears feeling plugged. She denies ear pain, headache pain, nausea or vomiting.     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 She asks if she should follow-up prior to March 2021.

## 2020-11-09 ENCOUNTER — PATIENT OUTREACH (OUTPATIENT)
Dept: CARE COORDINATION | Facility: CLINIC | Age: 35
End: 2020-11-09

## 2020-11-09 NOTE — TELEPHONE ENCOUNTER
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 Patient is post surgery, radiation, and chemo. Patient has debilitating symptoms as a result of diagnosis and treatment. Patient is interested in applying for disability. She does not have benefits through her employer and will need to go through Social Security for disability income.    Intervention/Education/Resources Provided:   explained to Patient the process of applying for SSDI. Patient asked that  send the information through [employer] and email.  sent information on how and where to apply, compassionate allowances list, and Disability [employer] for Patient's reference. Patient will begin FMLA starting tomorrow to care for her child who has Autism Spectrum Disorder and is distance learning at this time. Patient reported having no further questions or concerns at this time.     Assessment/Plan:  Patient to utilize resources at her leisure.  will remain available as needed.    Provided patient/family with contact information and availability.    mE Gagnon [facility]  Outpatient Specialty Clinics  Direct Phone: [phone]

## 2021-01-03 ENCOUNTER — HEALTH MAINTENANCE LETTER (OUTPATIENT)
Age: 36
End: 2021-01-03

## 2021-03-02 ENCOUNTER — MYC MEDICAL ADVICE (OUTPATIENT)
Dept: ONCOLOGY | Facility: CLINIC | Age: 36
End: 2021-03-02

## 2021-03-02 DIAGNOSIS — F41.9 ANXIETY: ICD-10-CM

## 2021-03-02 DIAGNOSIS — F40.240 CLAUSTROPHOBIA: ICD-10-CM

## 2021-03-02 DIAGNOSIS — C71.6 MEDULLOBLASTOMA OF CEREBELLUM (H): ICD-10-CM

## 2021-03-02 RX ORDER — LORAZEPAM 1 MG/1
TABLET ORAL
Qty: 2 TABLET | Refills: 0 | Status: SHIPPED | OUTPATIENT
Start: 2021-03-02 | End: 2021-11-01

## 2021-03-04 NOTE — PROGRESS NOTES
"Merissa is a 35 year old who is being evaluated via a billable video visit.      How would you like to obtain your AVS? MyChart  If the video visit is dropped, the invitation should be resent by: Text to cell phone: [phone]  Will anyone else be joining your video visit? No     Vitals - Patient Reported  Weight (Patient Reported): 72.6 kg (160 lb)  Height (Patient Reported): 157.5 cm (5' 2\")  BMI (Based on Pt Reported Ht/Wt): 29.26  Pain Score: No Pain (0)    Jayna Stinson CMA March 8, 2021  3:03 PM       Video-Visit Details  Type of service:  Video Visit    Prep time: 10 minutes  Video Start Time: 3:36 PM  Video End Time: 3:47 PM  Post-visit documentation: 10 minutes    Originating Location (pt. Location): Home    Distant Location (provider location):  [facility]     Platform used for Video Visit: Lobito Morrison MD    ___________________________________________________________    NEURO-ONCOLOGY VISIT  03/08/2021    CHIEF COMPLAINT: Ms. 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 Completed craniospinal radiation with concurrent vincristine x 3 doses, but concurrent chemotherapy was stopped in the setting of pancytopenia. She completed 4 cycles of adjuvant chemotherapy with vincristine, CCNU, and cisplatin as of 1/2019. Merissa is currently managed on imaging surveillance.     I met with Merissa today for this follow-up visit.      HISTORY OF PRESENT ILLNESS  -She is doing fairly well.   -She had been noting an increase in anxiety, but this has improved some lately, especially with stopping work. Sometimes, she feels that she is worrying about \"everything\". She lacks motivation, but denies 'depression'. She is not interested in talking with a specialist or in starting any " medications at this time. Merissa notes that she fatigues easily; can always nap.   -Worsening memory/ word finding issues and thinking issues.   -She has occasional headaches after exertion or with dehydration.   -Eating and drinking well.   -Chronic, unchanged back pain. No changes in bowel/ bladder function or radicular pain.  -She denies any seizure-like activity.  -Denies vision changes, weakness, or numbness and sensation chanegs.   -Hearing can be muted at times.     REVIEW OF SYSTEMS  A comprehensive ROS negative except as in HPI.      MEDICATIONS   Current Outpatient Medications   Medication     LORazepam (ATIVAN) 1 MG tablet     No current facility-administered medications for this visit.      Facility-Administered Medications Ordered in Other Visits   Medication     gadobutrol (GADAVIST) injection 7.5 mL     DRUG ALLERGIES   Allergies   Allergen Reactions     Sulfa Drugs Unknown and Hives       ONCOLOGIC HISTORY  -PRESENTATION: Progressively worsening headaches. Additionally was with abrupt position changes resulted in dizziness/ syncope. CTH at an outside hospital showed a mass in the left cerebellum. Transferred to [facility].  -4/11/2018 MRB showed a 3.5cm mass in the left cerebellar hemisphere that was associated with mild cerebellar tonsillar herniation and hydrocephalus.  -MRI spine showed no evidence of disease.   -4/13/2018 SURGERY: Suboccipital craniotomy with resection of the left cerebellar mass. Immediate post-operative MRI demonstrated a gross total resection.   PATHOLOGY: Medulloblastoma; Molecular markers pending.   -5/4/2018 NEURO-ONC: LP performed. Evaluated by radiation oncology. Recommending craniospinal radiation + concurrent vincristine followed by eight 6-week cycles of cisplatin, lomustine, and vincristine.  -5/4/2018 LP with CSF analysis: WBC 1/ RBC 1, protein 25, glucose 62, negative cytology.   -5/14 - 6/22/2018 CHEMORADS with vincristine 2 mg/m2 (stopped after 3  infusions due to pancytopenia).  -5/17/2018 Audiology testing: Baseline hearing testing with no hearing loss.  -6/4/2018 NEURO-ONC: Doing well, no new neurological symptoms. Tolerating treatment well. Ophtho referral. Labs improved.  -6/4/2018 NGS via [facility]: SMO mutation, TERT promotor mutation. Negative for microsatellite instability.   -6/25/2018 NEURO-ONC: Clinically stable, painful radiation-induced burn. Monitoring labs. Cancer Risk Assessment referral.  -8/13/2018 NEURO-ONC/MRB/ CHEMO: Clinically stable. Imaging shows expected post operative changes with no evidence of new disease. C1D1 of vincristine, CCNU and cisplatin.   -9/24/2018 NEURO-ONC: Neurologically stable, though did not tolerate first cycle of chemo well 2/2 nausea. Hold on starting C2D1 today due to leukopenia (WBC 2.7).  -9/27/2018 CHEMO: C2D1 of vincristine, CCNU and cisplatin.   -11/9/2018 NEURO-ONC/ MRB/ CHEMO: Clinically well. Imaging with no evidence of tumor recurrence. Cycle 3 CCNU and cisplatin; plan to hold vincristine on ODD cycles due to peripheral neuropathy. Repeat hearing testing ordered.   -12/4/2018 NEURO-ONC: New severe HA, likely tension. No new neurological symptoms or signs  -1/4/2019 NEURO-ONC/CHEMO: Clinically doing well. No changes. Cycle 4 CCNU and cisplatin. Holding vincristine. Last cycle.  -2/15/2019 NEURO-ONC/ MRB: Clinically stable. Labs not completely rebounded. Imaging with no concern for disease recurrence. Continue with imaging surveillance.  -2/25/2019 Audiology testing: moderate SNHL 3-8 kHz.  -3/2019 Port removed.   -4/22/2019 NEURO-ONC/ MRB: Clinically stable. Labs stable. Imaging with no concern for disease recurrence. Continue with imaging surveillance. Evaluated by dermatology.   -6/24/2019 NEURO-ONC/ MRB: Clinically stable. Imaging with no concern for disease recurrence. Continue with imaging surveillance.  -9/23/2019 NEURO-ONC/ MRB: Clinically stable. Imaging with no concern for disease  recurrence.  -12/20/2019 NEURO-ONC/ MRB: Clinically stable. Imaging with no concern for disease recurrence.  -5/18/2020 NEURO-ONC/ MRB: Clinically stable. Imaging (contrast administration failed) with no concern for disease recurrence. Referral to repeat hearing testing placed.   -10/27/2020 Audiology testing: Moderate sensorineural hearing loss.  -11/2/2020 NEURO-ONC/ MRB: Clinically stable, aside from a slight increase in anxiety. Imaging without concern.   -3/8/2021 NEURO-ONC/ MRB: Clinically with mood issues; declined intervention. Imaging without concern.     SOCIAL HISTORY   Tobacco use: Former smoker, E-cigs stopped on 5/3/2018  Alcohol use: None.   Drug use: Denies.  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      PHYSICAL EXAMINATION  -Generally well appearing.  -Respiratory: No audible wheezing.   -Skin: No facial rashes.  -Psychiatric: Normal mood and affect. Pleasant, talkative.  -Neurologic:   MENTAL STATUS:     Alert, oriented to date.    Recall: Intact.    Speech fluent.    Comprehension intact to multi-step commands.     CRANIAL NERVES:     Pupils are equal, round, reactive to light.     Extraocular movements full, patient denies diplopia. No nystagmus.    Symmetric facial movements.   Hearing intact.   With normal phonation, no dysfunction of the palate or tongue.   MOTOR:    Antigravity in arms. No pronation or drift.   COORDINATION:   Mild dysmetria on the right today, but overall, on target on finger-nose with eyes closed.   SENSATION: Intact to light touch throughout.     The rest of a comprehensive physical examination is deferred due to PHE (public health emergency) video visit restrictions.       MEDICAL RECORDS  Obtained and personally reviewed all available outside medical records in addition to reviewing any records available in our electronic system.     LABS  Reviewed.     IMAGING  Personally reviewed MR brain imaging from today and compared to prior imaging.  To my eye, there is no sign of disease recurrence. No elevated rCBV. No new enhancement. There is a slight increase in T2 hyperintense signal since 9/20/2019, but this is likely treatment related.    Imaging results were reviewed with Merissa.      Case and imaging was discussed at Brain Tumor Conference earlier today.       IMPRESSION/PLAN  Clinic was spent discussing in detail the nature of her cancer in light of her recent imaging. This was in addition to providing emotional support, answering questions pertaining to my recommendations, and devising the plan as outlined below.     Merissa is clinically well, aside from an increase in anxiety and feeling a 'lack of motivation'. Mood has improved some since she quit working. We again discussed the option of starting a ssri and/ or placing a referral to Dr. Ruiz with psychology. Merissa declined both options, but I encouraged Merissa to continue to reach out with ongoing concerns if needed. Additionally, untreated depression can lead to 'pseudo-dementia'/ cognitive changes and chronic fatigued. Both of these complaints may improve after fully addressing concerns of depression.     With regard to her resent MR brain imaging; the scan demonstrates no concern for recurrence. Merissa completed 4 cycles of adjuvant therapy and imaging continues to demonstrate a complete response. Based on NCCN Guidelines for imaging surveillance, with imaging being stable today, will extend imaging interval to every 6 months for the next 3 years (2024). At recurrence, clinical trial options remain a strong consideration for recurrent disease, as does off label use of SMO inhibitors.     PROBLEM LIST  Medulloblastoma  Steroid intolerance  Chemotherapy-induced pancytopenia  Chemotherapy-induced nausea/ vomiting   Radiation burn  Fatigue, cancer and treatment related, improving  Port removed     PLAN  -CANCER-DIRECTED THERAPY-  -As above; Imaging with no evidence of disease recurrence. Continue imaging  surveillance; will repeat every 6 months through 3/2024.  -Can refill pre-scan Ativan as needed.   -No continued lab monitoring necessary.   -Repeat hearing testing done on 10/27/2020 with moderate sensorineural hearing loss. Will repeat annually; next due in 10/2021 given history of ototoxicity and continued tinnitus.     -SEIZURE MANAGEMENT-  -While this patient is at increased risk of having seizures, given the lack of seizure history, there is no indication to prescribe an antiepileptic at this time. Will continue to monitor for seizure activity.    -Quality of life/ MOOD/ FATIGUE-  -Noting an increase in general anxiety/ depression as noted above.  -Follows with Dr. Jakob Ryder MD - Family Medicine Physician at [facility]. Encouraged Merissa to connect with Dr. Ryder.     Return to clinic in 9/2021 + repeat MRI.    Anupama Morrison MD  Neuro-oncology

## 2021-03-07 ENCOUNTER — MYC REFILL (OUTPATIENT)
Dept: ONCOLOGY | Facility: CLINIC | Age: 36
End: 2021-03-07

## 2021-03-07 DIAGNOSIS — F40.240 CLAUSTROPHOBIA: ICD-10-CM

## 2021-03-07 DIAGNOSIS — F41.9 ANXIETY: ICD-10-CM

## 2021-03-07 DIAGNOSIS — C71.6 MEDULLOBLASTOMA OF CEREBELLUM (H): ICD-10-CM

## 2021-03-08 ENCOUNTER — TUMOR CONFERENCE (OUTPATIENT)
Dept: ONCOLOGY | Facility: CLINIC | Age: 36
End: 2021-03-08

## 2021-03-08 ENCOUNTER — VIRTUAL VISIT (OUTPATIENT)
Dept: ONCOLOGY | Facility: CLINIC | Age: 36
End: 2021-03-08
Attending: PSYCHIATRY & NEUROLOGY
Payer: COMMERCIAL

## 2021-03-08 ENCOUNTER — ANCILLARY PROCEDURE (OUTPATIENT)
Dept: MRI IMAGING | Facility: CLINIC | Age: 36
End: 2021-03-08
Attending: PSYCHIATRY & NEUROLOGY
Payer: COMMERCIAL

## 2021-03-08 DIAGNOSIS — C71.6 MEDULLOBLASTOMA OF CEREBELLUM (H): ICD-10-CM

## 2021-03-08 DIAGNOSIS — C71.6 MEDULLOBLASTOMA OF CEREBELLUM (H): Primary | ICD-10-CM

## 2021-03-08 PROCEDURE — 99214 OFFICE O/P EST MOD 30 MIN: CPT | Mod: GT | Performed by: PSYCHIATRY & NEUROLOGY

## 2021-03-08 PROCEDURE — A9585 GADOBUTROL INJECTION: HCPCS | Performed by: RADIOLOGY

## 2021-03-08 PROCEDURE — 70553 MRI BRAIN STEM W/O & W/DYE: CPT | Performed by: RADIOLOGY

## 2021-03-08 RX ORDER — GADOBUTROL 604.72 MG/ML
7.5 INJECTION INTRAVENOUS ONCE
Status: COMPLETED | OUTPATIENT
Start: 2021-03-08 | End: 2021-03-08

## 2021-03-08 RX ADMIN — GADOBUTROL 7.5 ML: 604.72 INJECTION INTRAVENOUS at 09:18

## 2021-03-08 NOTE — TUMOR CONFERENCE
Tumor Conference Information  Tumor Conference: Brain  Specialties Present: Medical oncology, Pathology, Radiology, Radiation oncology, Surgery  Patient Status: A current patient  Pathology: Not Discussed  Treatment to Date: Surgical intervention(s), Chemoradiation, Adjuvant chemotherapy  Clinical Trial Eligibility: Not discussed  Recommended Plan: Observation (see comment) (Comment: reviewed imaging, stable; follow up in 6 months with repeat MRI)  Did the review exceed 30 minutes?: did not           Documentation / Disclaimer Cancer Tumor Board Note  Cancer tumor board recommendations do not override what is determined to be reasonable care and treatment, which is dependent on the circumstances of a patient's case; the patient's medical, social, and personal concerns; and the clinical judgment of the oncologist [physician].

## 2021-03-08 NOTE — PATIENT INSTRUCTIONS
Imaging without concerns today.   Repeat in 6 months.     Repeat hearing testing due in October.     Return to clinic in 9/2021 + repeat imaging.     Anupama Morrison MD  Neuro-oncology

## 2021-03-08 NOTE — LETTER
"    3/8/2021         RE: Merissa Silverman  [address]  [place] [address]        Dear Colleague,    Thank you for referring your patient, Merissa Silverman, to the [facility]. Please see a copy of my visit note below.    Merissa is a 35 year old who is being evaluated via a billable video visit.      How would you like to obtain your AVS? MyChart  If the video visit is dropped, the invitation should be resent by: Text to cell phone: [phone]  Will anyone else be joining your video visit? No     Vitals - Patient Reported  Weight (Patient Reported): 72.6 kg (160 lb)  Height (Patient Reported): 157.5 cm (5' 2\")  BMI (Based on Pt Reported Ht/Wt): 29.26  Pain Score: No Pain (0)    Jayna Stinson CMA March 8, 2021  3:03 PM       Video-Visit Details  Type of service:  Video Visit    Prep time: 10 minutes  Video Start Time: 3:36 PM  Video End Time: 3:47 PM  Post-visit documentation: 10 minutes    Originating Location (pt. Location): Home    Distant Location (provider location):  [facility]     Platform used for Video Visit: Lobito Morrison MD    ___________________________________________________________    NEURO-ONCOLOGY VISIT  03/08/2021    CHIEF COMPLAINT: Ms. 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 Completed craniospinal radiation with concurrent vincristine x 3 doses, but concurrent chemotherapy was stopped in the setting of pancytopenia. She completed 4 cycles of adjuvant chemotherapy with vincristine, CCNU, and cisplatin as of 1/2019. Merissa is currently managed on imaging surveillance.     I met with Merissa today for this follow-up visit.      HISTORY OF PRESENT ILLNESS  -She is doing fairly well.   -She had been noting an " "increase in anxiety, but this has improved some lately, especially with stopping work. Sometimes, she feels that she is worrying about \"everything\". She lacks motivation, but denies 'depression'. She is not interested in talking with a specialist or in starting any medications at this time. Merissa notes that she fatigues easily; can always nap.   -Worsening memory/ word finding issues and thinking issues.   -She has occasional headaches after exertion or with dehydration.   -Eating and drinking well.   -Chronic, unchanged back pain. No changes in bowel/ bladder function or radicular pain.  -She denies any seizure-like activity.  -Denies vision changes, weakness, or numbness and sensation chanegs.   -Hearing can be muted at times.     REVIEW OF SYSTEMS  A comprehensive ROS negative except as in HPI.      MEDICATIONS   Current Outpatient Medications   Medication     LORazepam (ATIVAN) 1 MG tablet     No current facility-administered medications for this visit.      Facility-Administered Medications Ordered in Other Visits   Medication     gadobutrol (GADAVIST) injection 7.5 mL     DRUG ALLERGIES   Allergies   Allergen Reactions     Sulfa Drugs Unknown and Hives       ONCOLOGIC HISTORY  -PRESENTATION: Progressively worsening headaches. Additionally was with abrupt position changes resulted in dizziness/ syncope. CTH at an outside hospital showed a mass in the left cerebellum. Transferred to [facility].  -4/11/2018 MRB showed a 3.5cm mass in the left cerebellar hemisphere that was associated with mild cerebellar tonsillar herniation and hydrocephalus.  -MRI spine showed no evidence of disease.   -4/13/2018 SURGERY: Suboccipital craniotomy with resection of the left cerebellar mass. Immediate post-operative MRI demonstrated a gross total resection.   PATHOLOGY: Medulloblastoma; Molecular markers pending.   -5/4/2018 NEURO-ONC: LP performed. Evaluated by radiation oncology. Recommending craniospinal " radiation + concurrent vincristine followed by eight 6-week cycles of cisplatin, lomustine, and vincristine.  -5/4/2018 LP with CSF analysis: WBC 1/ RBC 1, protein 25, glucose 62, negative cytology.   -5/14 - 6/22/2018 CHEMORADS with vincristine 2 mg/m2 (stopped after 3 infusions due to pancytopenia).  -5/17/2018 Audiology testing: Baseline hearing testing with no hearing loss.  -6/4/2018 NEURO-ONC: Doing well, no new neurological symptoms. Tolerating treatment well. Ophtho referral. Labs improved.  -6/4/2018 NGS via STRATA: SMO mutation, TERT promotor mutation. Negative for microsatellite instability.   -6/25/2018 NEURO-ONC: Clinically stable, painful radiation-induced burn. Monitoring labs. Cancer Risk Assessment referral.  -8/13/2018 NEURO-ONC/MRB/ CHEMO: Clinically stable. Imaging shows expected post operative changes with no evidence of new disease. C1D1 of vincristine, CCNU and cisplatin.   -9/24/2018 NEURO-ONC: Neurologically stable, though did not tolerate first cycle of chemo well 2/2 nausea. Hold on starting C2D1 today due to leukopenia (WBC 2.7).  -9/27/2018 CHEMO: C2D1 of vincristine, CCNU and cisplatin.   -11/9/2018 NEURO-ONC/ MRB/ CHEMO: Clinically well. Imaging with no evidence of tumor recurrence. Cycle 3 CCNU and cisplatin; plan to hold vincristine on ODD cycles due to peripheral neuropathy. Repeat hearing testing ordered.   -12/4/2018 NEURO-ONC: New severe HA, likely tension. No new neurological symptoms or signs  -1/4/2019 NEURO-ONC/CHEMO: Clinically doing well. No changes. Cycle 4 CCNU and cisplatin. Holding vincristine. Last cycle.  -2/15/2019 NEURO-ONC/ MRB: Clinically stable. Labs not completely rebounded. Imaging with no concern for disease recurrence. Continue with imaging surveillance.  -2/25/2019 Audiology testing: moderate SNHL 3-8 kHz.  -3/2019 Port removed.   -4/22/2019 NEURO-ONC/ MRB: Clinically stable. Labs stable. Imaging with no concern for disease recurrence. Continue with imaging  surveillance. Evaluated by dermatology.   -6/24/2019 NEURO-ONC/ MRB: Clinically stable. Imaging with no concern for disease recurrence. Continue with imaging surveillance.  -9/23/2019 NEURO-ONC/ MRB: Clinically stable. Imaging with no concern for disease recurrence.  -12/20/2019 NEURO-ONC/ MRB: Clinically stable. Imaging with no concern for disease recurrence.  -5/18/2020 NEURO-ONC/ MRB: Clinically stable. Imaging (contrast administration failed) with no concern for disease recurrence. Referral to repeat hearing testing placed.   -10/27/2020 Audiology testing: Moderate sensorineural hearing loss.  -11/2/2020 NEURO-ONC/ MRB: Clinically stable, aside from a slight increase in anxiety. Imaging without concern.   -3/8/2021 NEURO-ONC/ MRB: Clinically with mood issues; declined intervention. Imaging without concern.     SOCIAL HISTORY   Tobacco use: Former smoker, E-cigs stopped on 5/3/2018  Alcohol use: None.   Drug use: Denies.  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      PHYSICAL EXAMINATION  -Generally well appearing.  -Respiratory: No audible wheezing.   -Skin: No facial rashes.  -Psychiatric: Normal mood and affect. Pleasant, talkative.  -Neurologic:   MENTAL STATUS:     Alert, oriented to date.    Recall: Intact.    Speech fluent.    Comprehension intact to multi-step commands.     CRANIAL NERVES:     Pupils are equal, round, reactive to light.     Extraocular movements full, patient denies diplopia. No nystagmus.    Symmetric facial movements.   Hearing intact.   With normal phonation, no dysfunction of the palate or tongue.   MOTOR:    Antigravity in arms. No pronation or drift.   COORDINATION:   Mild dysmetria on the right today, but overall, on target on finger-nose with eyes closed.   SENSATION: Intact to light touch throughout.     The rest of a comprehensive physical examination is deferred due to PHE (public health emergency) video visit restrictions.       MEDICAL  RECORDS  Obtained and personally reviewed all available outside medical records in addition to reviewing any records available in our electronic system.     LABS  Reviewed.     IMAGING  Personally reviewed MR brain imaging from today and compared to prior imaging. To my eye, there is no sign of disease recurrence. No elevated rCBV. No new enhancement. There is a slight increase in T2 hyperintense signal since 9/20/2019, but this is likely treatment related.    Imaging results were reviewed with Merissa.      Case and imaging was discussed at Brain Tumor Conference earlier today.       IMPRESSION/PLAN  Clinic was spent discussing in detail the nature of her cancer in light of her recent imaging. This was in addition to providing emotional support, answering questions pertaining to my recommendations, and devising the plan as outlined below.     Merissa is clinically well, aside from an increase in anxiety and feeling a 'lack of motivation'. Mood has improved some since she quit working. We again discussed the option of starting a ssri and/ or placing a referral to Dr. Ruiz with psychology. Merissa declined both options, but I encouraged Merissa to continue to reach out with ongoing concerns if needed. Additionally, untreated depression can lead to 'pseudo-dementia'/ cognitive changes and chronic fatigued. Both of these complaints may improve after fully addressing concerns of depression.     With regard to her resent MR brain imaging; the scan demonstrates no concern for recurrence. Merissa completed 4 cycles of adjuvant therapy and imaging continues to demonstrate a complete response. Based on NCCN Guidelines for imaging surveillance, with imaging being stable today, will extend imaging interval to every 6 months for the next 3 years (2024). At recurrence, clinical trial options remain a strong consideration for recurrent disease, as does off label use of SMO inhibitors.     PROBLEM LIST  Medulloblastoma  Steroid  intolerance  Chemotherapy-induced pancytopenia  Chemotherapy-induced nausea/ vomiting   Radiation burn  Fatigue, cancer and treatment related, improving  Port removed     PLAN  -CANCER-DIRECTED THERAPY-  -As above; Imaging with no evidence of disease recurrence. Continue imaging surveillance; will repeat every 6 months through 3/2024.  -Can refill pre-scan Ativan as needed.   -No continued lab monitoring necessary.   -Repeat hearing testing done on 10/27/2020 with moderate sensorineural hearing loss. Will repeat annually; next due in 10/2021 given history of ototoxicity and continued tinnitus.     -SEIZURE MANAGEMENT-  -While this patient is at increased risk of having seizures, given the lack of seizure history, there is no indication to prescribe an antiepileptic at this time. Will continue to monitor for seizure activity.    -Quality of life/ MOOD/ FATIGUE-  -Noting an increase in general anxiety/ depression as noted above.  -Follows with Dr. Jakob Ryder MD - Family Medicine Physician at [facility]. Encouraged Merissa to connect with Dr. Ryder.     Return to clinic in 9/2021 + repeat MRI.    Anupama Morrison MD  Neuro-oncology

## 2021-03-08 NOTE — DISCHARGE INSTRUCTIONS
MRI Contrast Discharge Instructions    The IV contrast you received today will pass out of your body in your  urine. This will happen in the next 24 hours. You will not feel this process.  Your urine will not change color.    Drink at least 4 extra glasses of water or juice today (unless your doctor  has restricted your fluids). This reduces the stress on your kidneys.  You may take your regular medicines.    If you are on dialysis: It is best to have dialysis today.    If you have a reaction: Most reactions happen right away. If you have  any new symptoms after leaving the hospital (such as hives or swelling),  call your hospital at the correct number below. Or call your family doctor.  If you have breathing distress or wheezing, call 911.    Special instructions: ***    I have read and understand the above information.    Signature:______________________________________ Date:___________    Staff:__________________________________________ Date:___________     Time:__________    [facility] Departments:    ___[place]: [phone]  ___[facility]: [phone]  ___[place]: [phone] ___[facility]: [phone]  ___[facility]: [phone]  ___[facility]: [phone]  ___[phone]  ___[facility]: [phone]  ___[phone]

## 2021-03-09 RX ORDER — LORAZEPAM 1 MG/1
TABLET ORAL
Qty: 2 TABLET | Refills: 0 | OUTPATIENT
Start: 2021-03-09

## 2021-04-25 ENCOUNTER — HEALTH MAINTENANCE LETTER (OUTPATIENT)
Age: 36
End: 2021-04-25

## 2021-08-09 ENCOUNTER — TUMOR CONFERENCE (OUTPATIENT)
Dept: ONCOLOGY | Facility: CLINIC | Age: 36
End: 2021-08-09
Payer: COMMERCIAL

## 2021-10-10 ENCOUNTER — HEALTH MAINTENANCE LETTER (OUTPATIENT)
Age: 36
End: 2021-10-10

## 2021-10-29 NOTE — PROGRESS NOTES
NEURO-ONCOLOGY VISIT  11/01/2021    CHIEF COMPLAINT: Ms. 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 Completed craniospinal radiation with concurrent vincristine x 3 doses, but concurrent chemotherapy was stopped in the setting of pancytopenia. She completed 4 cycles of adjuvant chemotherapy with vincristine, CCNU, and cisplatin as of 1/2019.     Imaging to date has not shown any evidence of cancer recurrence and as such, Merissa is currently managed on imaging surveillance per NCCN guidelines.     I met with Merissa today for this follow-up visit accompanied by Grzegorz (father).      HISTORY OF PRESENT ILLNESS  -She is doing fairly well. However, she had been experiencing daily headaches in the setting of sleep deprivation and also triggered by exercise/ exertion. Now resolved.   -Continues to have significant anxiety. Never tried prescribed Zoloft. More depression, less motivation and feels that from a physical standpoint; she cannot do things due to significant fatigue. She had been seeing a psychologist, but has not followed-up with him recently. Open to a referral to palliative care for assistance.   -For sleep, had been taking Gabapentin 300mg at bedtime. This had been helpful, but the prescription ran out.   -She has not been working. Looking to get SSD.   -In July, she presented to the ED for evaluation of facial and body numbness. Imaging at that time was stable. Recurrence of facial numbness has been noted in the setting of anxiety/ being very nervous.   -Worsening memory/ word finding issues and thinking issues.   -She denies any seizure-like activity.  -Denies vision changes or weakness. However, hands are frequently swollen and her wrists feel weak.   -Hearing can be muted at times. Hearing testing has yet to  be scheduled.     REVIEW OF SYSTEMS  A comprehensive ROS negative except as in HPI.      MEDICATIONS   Current Outpatient Medications   Medication     LORazepam (ATIVAN) 1 MG tablet     No current facility-administered medications for this visit.     Facility-Administered Medications Ordered in Other Visits   Medication     gadobutrol (GADAVIST) injection 7.5 mL     DRUG ALLERGIES   Allergies   Allergen Reactions     Sulfa Drugs Unknown and Hives       ONCOLOGIC HISTORY  -PRESENTATION: Progressively worsening headaches. Additionally was with abrupt position changes resulted in dizziness/ syncope. CTH at an outside hospital showed a mass in the left cerebellum. Transferred to [facility].  -4/11/2018 MRB showed a 3.5cm mass in the left cerebellar hemisphere that was associated with mild cerebellar tonsillar herniation and hydrocephalus.  -MRI spine showed no evidence of disease.   -4/13/2018 SURGERY: Suboccipital craniotomy with resection of the left cerebellar mass. Immediate post-operative MRI demonstrated a gross total resection.   PATHOLOGY: Medulloblastoma; Molecular markers pending.   -5/4/2018 NEURO-ONC: LP performed. Evaluated by radiation oncology. Recommending craniospinal radiation + concurrent vincristine followed by eight 6-week cycles of cisplatin, lomustine, and vincristine.  -5/4/2018 LP with CSF analysis: WBC 1/ RBC 1, protein 25, glucose 62, negative cytology.   -5/14 - 6/22/2018 CHEMORADS with vincristine 2 mg/m2 (stopped after 3 infusions due to pancytopenia).  -5/17/2018 Audiology testing: Baseline hearing testing with no hearing loss.  -6/4/2018 NEURO-ONC: Doing well, no new neurological symptoms. Tolerating treatment well. Ophtho referral. Labs improved.  -6/4/2018 NGS via STRATA: SMO mutation, TERT promotor mutation. Negative for microsatellite instability.   -6/25/2018 NEURO-ONC: Clinically stable, painful radiation-induced burn. Monitoring labs. Cancer Risk Assessment  referral.  -8/13/2018 NEURO-ONC/MRB/ CHEMO: Clinically stable. Imaging shows expected post operative changes with no evidence of new disease. C1D1 of vincristine, CCNU and cisplatin.   -9/24/2018 NEURO-ONC: Neurologically stable, though did not tolerate first cycle of chemo well 2/2 nausea. Hold on starting C2D1 today due to leukopenia (WBC 2.7).  -9/27/2018 CHEMO: C2D1 of vincristine, CCNU and cisplatin.   -11/9/2018 NEURO-ONC/ MRB/ CHEMO: Clinically well. Imaging with no evidence of tumor recurrence. Cycle 3 CCNU and cisplatin; plan to hold vincristine on ODD cycles due to peripheral neuropathy. Repeat hearing testing ordered.   -12/4/2018 NEURO-ONC: New severe HA, likely tension. No new neurological symptoms or signs  -1/4/2019 NEURO-ONC/CHEMO: Clinically doing well. No changes. Cycle 4 CCNU and cisplatin. Holding vincristine. Last cycle.  -2/15/2019 NEURO-ONC/ MRB: Clinically stable. Labs not completely rebounded. Imaging with no concern for disease recurrence. Continue with imaging surveillance.  -2/25/2019 Audiology testing: moderate SNHL 3-8 kHz.  -3/2019 Port removed.   -4/22/2019 NEURO-ONC/ MRB: Clinically stable. Labs stable. Imaging with no concern for disease recurrence. Continue with imaging surveillance. Evaluated by dermatology.   -6/24/2019 NEURO-ONC/ MRB: Clinically stable. Imaging with no concern for disease recurrence. Continue with imaging surveillance.  -9/23/2019 NEURO-ONC/ MRB: Clinically stable. Imaging with no concern for disease recurrence.  -12/20/2019 NEURO-ONC/ MRB: Clinically stable. Imaging with no concern for disease recurrence.  -5/18/2020 NEURO-ONC/ MRB: Clinically stable. Imaging (contrast administration failed) with no concern for disease recurrence. Referral to repeat hearing testing placed.   -10/27/2020 Audiology testing: Moderate sensorineural hearing loss.  -11/2/2020 NEURO-ONC/ MRB: Clinically stable, aside from a slight increase in anxiety. Imaging without concern.  "  -3/8/2021 NEURO-ONC/ MRB: Clinically with mood issues; declined intervention. Imaging without concern.   -11/1/2021 NEURO-ONC/ MRB: Clinically with significant anxiety and depression, plus overwhelming fatigue. Open to a referral to palliative care. Imaging without concern; repeat in 6 months.    SOCIAL HISTORY   Tobacco use: Former smoker, E-cigs stopped on 5/3/2018  Alcohol use: None.   Drug use: Denies.  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      PHYSICAL EXAMINATION  BP (!) 130/91   Pulse 85   Temp 98.2  F (36.8  C) (Oral)   Wt 84.1 kg (185 lb 6.4 oz)   SpO2 100%   BMI 33.90 kg/m       Wt Readings from Last 2 Encounters:   11/01/21 84.1 kg (185 lb 6.4 oz)   12/20/19 67 kg (147 lb 12.8 oz)      Ht Readings from Last 2 Encounters:   12/20/19 1.575 m (5' 2.01\")   03/08/19 1.575 m (5' 2\")      KPS: 90    -Generally well appearing. Fatigued in the setting of recent Ativan use prior to MRI.   -Respiratory: No audible wheezing.   -Skin: Psoriasis plaques.  -Psychiatric: Flat mood and affect. Tearful at times. Pleasant, talkative.  -Neurologic:   MENTAL STATUS:     Alert, oriented to date.    Recall: Intact.    Speech fluent.    Comprehension intact to multi-step commands.     CRANIAL NERVES:     Pupils are equal, round, reactive to light.     Extraocular movements full, patient denies diplopia. No nystagmus.    Symmetric facial movements.   Hearing intact.   With normal phonation, no dysfunction of the palate or tongue.   MOTOR: Antigravity in arms. No pronation or drift.   COORDINATION: Intact finger-nose with eyes closed.   SENSATION: Intact to light touch throughout.   GAIT: Steady, unassisted.    Able to toe, heel, and tandem walk.       MEDICAL RECORDS  Obtained and personally reviewed all available outside medical records in addition to reviewing any records available in our electronic system.     LABS  Reviewed.     IMAGING  Personally reviewed MR brain imaging from today " and compared to prior imaging. To my eye, there is no sign of disease recurrence. No elevated rCBV. No new enhancement. T2 FLAIR is largely stable. There is no contrast enhancement of the 5th or any other cranial nerve.    Imaging results were reviewed with Merissa and Grzegorz.      Case and imaging was discussed at Brain Tumor Conference earlier today.       IMPRESSION/PLAN  Clinic was spent discussing in detail the nature of her cancer in light of her recent imaging. This was in addition to providing emotional support, answering questions pertaining to my recommendations, and devising the plan as outlined below.     Merissa is clinically experiencing an increase in anxiety and depressed mood confounded by overwhelming fatigue. She is not able to work and any amount of exertion is met with low stamina. Additionally, Merissa is noting worsening cognition, but untreated depression can result in 'pseudo-dementia'/ cognitive changes related to depression and chronic fatigued. Merissa is open to a referral to Palliative Care to address both anxiety and depression. Merissa never tried the prescribed Zoloft and was previously on gabapentin at bedtime for sleep and muscle cramping, which was helpful, but the prescription could not be refilled. I will defer to palliative care to start any indicated medication(s).     With regard to her MR brain imaging; the scan from today demonstrates no concern for cancer recurrence. Based on NCCN guidelines for imaging surveillance, will continue at an imaging interval of every 6 months for the next 3 years (until 3/2024). At recurrence, clinical trial options remain a strong consideration, as does off label use of SMO inhibitors.     PROBLEM LIST  Medulloblastoma  Steroid intolerance  Chemotherapy-induced pancytopenia  Chemotherapy-induced nausea/ vomiting   Radiation burn  Fatigue, cancer and treatment related, improving  Port removed     PLAN  -CANCER-DIRECTED THERAPY-  -As above; Imaging with no  evidence of disease recurrence. Continue imaging surveillance; will repeat every 6 months.  -Can refill pre-scan Ativan as needed.   -No continued lab monitoring necessary.   -Repeat hearing testing done on 10/27/2020 with moderate sensorineural hearing loss. Will repeat annually; over due as of 10/2021 given history of ototoxicity and continued poor hearing.     -SEIZURE MANAGEMENT-  -Given the lack of seizure history, there is no indication to prescribe an antiepileptic at this time.    -Quality of life/ MOOD/ FATIGUE-  -Noting an increase in general anxiety/ depression as noted above.  -Referral placed to Palliative Care for management of mood.     COVID vaccinated. Interested in COVID booster. Received annual flu vaccine today.     Return to clinic in 4/2022 + repeat MRI.    Anupama Morrison MD  Neuro-oncology

## 2021-11-01 ENCOUNTER — ONCOLOGY VISIT (OUTPATIENT)
Dept: ONCOLOGY | Facility: CLINIC | Age: 36
End: 2021-11-01
Attending: PSYCHIATRY & NEUROLOGY
Payer: COMMERCIAL

## 2021-11-01 ENCOUNTER — ANCILLARY PROCEDURE (OUTPATIENT)
Dept: MRI IMAGING | Facility: CLINIC | Age: 36
End: 2021-11-01
Attending: PSYCHIATRY & NEUROLOGY
Payer: COMMERCIAL

## 2021-11-01 ENCOUNTER — TUMOR CONFERENCE (OUTPATIENT)
Dept: ONCOLOGY | Facility: CLINIC | Age: 36
End: 2021-11-01

## 2021-11-01 VITALS
SYSTOLIC BLOOD PRESSURE: 130 MMHG | HEART RATE: 85 BPM | DIASTOLIC BLOOD PRESSURE: 91 MMHG | WEIGHT: 185.4 LBS | OXYGEN SATURATION: 100 % | TEMPERATURE: 98.2 F | BODY MASS INDEX: 33.9 KG/M2

## 2021-11-01 DIAGNOSIS — F41.9 ANXIETY: ICD-10-CM

## 2021-11-01 DIAGNOSIS — Z23 NEED FOR PROPHYLACTIC VACCINATION AND INOCULATION AGAINST INFLUENZA: ICD-10-CM

## 2021-11-01 DIAGNOSIS — C71.6 MEDULLOBLASTOMA OF CEREBELLUM (H): ICD-10-CM

## 2021-11-01 DIAGNOSIS — F40.240 CLAUSTROPHOBIA: ICD-10-CM

## 2021-11-01 DIAGNOSIS — R45.89 DEPRESSED MOOD: ICD-10-CM

## 2021-11-01 DIAGNOSIS — C71.6 MEDULLOBLASTOMA OF CEREBELLUM (H): Primary | ICD-10-CM

## 2021-11-01 PROCEDURE — 99215 OFFICE O/P EST HI 40 MIN: CPT | Performed by: PSYCHIATRY & NEUROLOGY

## 2021-11-01 PROCEDURE — G0463 HOSPITAL OUTPT CLINIC VISIT: HCPCS | Mod: 25

## 2021-11-01 PROCEDURE — A9585 GADOBUTROL INJECTION: HCPCS | Performed by: RADIOLOGY

## 2021-11-01 PROCEDURE — 90686 IIV4 VACC NO PRSV 0.5 ML IM: CPT | Performed by: COLON & RECTAL SURGERY

## 2021-11-01 PROCEDURE — G0008 ADMIN INFLUENZA VIRUS VAC: HCPCS | Performed by: COLON & RECTAL SURGERY

## 2021-11-01 PROCEDURE — 70553 MRI BRAIN STEM W/O & W/DYE: CPT | Performed by: RADIOLOGY

## 2021-11-01 PROCEDURE — 250N000011 HC RX IP 250 OP 636: Performed by: COLON & RECTAL SURGERY

## 2021-11-01 RX ORDER — LORAZEPAM 1 MG/1
TABLET ORAL
Qty: 2 TABLET | Refills: 0 | Status: SHIPPED | OUTPATIENT
Start: 2021-11-01 | End: 2022-05-02

## 2021-11-01 RX ORDER — GADOBUTROL 604.72 MG/ML
7.5 INJECTION INTRAVENOUS ONCE
Status: COMPLETED | OUTPATIENT
Start: 2021-11-01 | End: 2021-11-01

## 2021-11-01 RX ADMIN — GADOBUTROL 7.5 ML: 604.72 INJECTION INTRAVENOUS at 10:47

## 2021-11-01 RX ADMIN — INFLUENZA A VIRUS A/VICTORIA/2570/2019 IVR-215 (H1N1) ANTIGEN (FORMALDEHYDE INACTIVATED), INFLUENZA A VIRUS A/TASMANIA/503/2020 IVR-221 (H3N2) ANTIGEN (FORMALDEHYDE INACTIVATED), INFLUENZA B VIRUS B/PHUKET/3073/2013 ANTIGEN (FORMALDEHYDE INACTIVATED), AND INFLUENZA B VIRUS B/WASHINGTON/02/2019 ANTIGEN (FORMALDEHYDE INACTIVATED) 0.5 ML: 15; 15; 15; 15 INJECTION, SUSPENSION INTRAMUSCULAR at 15:15

## 2021-11-01 ASSESSMENT — PAIN SCALES - GENERAL: PAINLEVEL: MILD PAIN (3)

## 2021-11-01 NOTE — DISCHARGE INSTRUCTIONS
MRI Contrast Discharge Instructions    The IV contrast you received today will pass out of your body in your  urine. This will happen in the next 24 hours. You will not feel this process.  Your urine will not change color.    Drink at least 4 extra glasses of water or juice today (unless your doctor  has restricted your fluids). This reduces the stress on your kidneys.  You may take your regular medicines.    If you are on dialysis: It is best to have dialysis today.    If you have a reaction: Most reactions happen right away. If you have  any new symptoms after leaving the hospital (such as hives or swelling),  call your hospital at the correct number below. Or call your family doctor.  If you have breathing distress or wheezing, call 911.    Special instructions: ***    I have read and understand the above information.    Signature:______________________________________ Date:___________    Staff:__________________________________________ Date:___________     Time:__________    [facility] Departments:    ___[place]: [phone]  ___[facility]: [phone]  ___[place]: [phone] ___[facility]: [phone]  ___[facility]: [phone]  ___[facility]: [phone]  ___[phone]  ___[facility]: [phone]  ___[phone]

## 2021-11-01 NOTE — TUMOR CONFERENCE
Tumor Conference Information  Tumor Conference: Brain  Specialties Present: Medical oncology, Pathology, Radiology, Radiation oncology, Surgery  Patient Status: A current patient  Pathology: Not Discussed  Treatment to Date: Surgical intervention(s), Chemoradiation, Adjuvant chemotherapy  Clinical Trial Eligibility: Not discussed  Recommended Plan: Observation (see comment) (Comment: reviewed imaging - stable scan; follow up in 6 months with MRI)  Did the review exceed 30 minutes?: did not           Documentation / Disclaimer Cancer Tumor Board Note  Cancer tumor board recommendations do not override what is determined to be reasonable care and treatment, which is dependent on the circumstances of a patient's case; the patient's medical, social, and personal concerns; and the clinical judgment of the oncologist [physician].

## 2021-11-01 NOTE — NURSING NOTE
Influenza vaccine given to patient in Right Deltoid . Patient tolerated injection without any incidents.     Has the patient received the information for the injectable influenza vaccine? YES    Kiah Rosenberg, KAIT - November 1, 2021 3:18 PM

## 2021-11-01 NOTE — NURSING NOTE
"Oncology Rooming Note    November 1, 2021 2:29 PM   Merissa Silverman is a 36 year old female who presents for:    Chief Complaint   Patient presents with     Oncology Clinic Visit     medulloblastoma of cerebellum     Initial Vitals: BP (!) 130/91   Pulse 85   Temp 98.2  F (36.8  C) (Oral)   Wt 84.1 kg (185 lb 6.4 oz)   SpO2 100%   BMI 33.90 kg/m   Estimated body mass index is 33.9 kg/m  as calculated from the following:    Height as of 12/20/19: 1.575 m (5' 2.01\").    Weight as of this encounter: 84.1 kg (185 lb 6.4 oz). Body surface area is 1.92 meters squared.  Mild Pain (3) Comment: Data Unavailable   No LMP recorded. (Menstrual status: Chemotherapy).  Allergies reviewed: Yes  Medications reviewed: Yes    Medications: Medication refills not needed today.  Pharmacy name entered into [employer]:    [id number] [place], [place] - [address] [facility] - [place], [place] - [address]    Clinical concerns: none       Catalina Barcenas CMA            "

## 2021-11-01 NOTE — LETTER
11/1/2021         RE: Merissa Silverman  [address]  [place] [address]        Dear Colleague,    Thank you for referring your patient, Merissa Silverman, to the [facility]. Please see a copy of my visit note below.    NEURO-ONCOLOGY VISIT  11/01/2021    CHIEF COMPLAINT: Ms. 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 Completed craniospinal radiation with concurrent vincristine x 3 doses, but concurrent chemotherapy was stopped in the setting of pancytopenia. She completed 4 cycles of adjuvant chemotherapy with vincristine, CCNU, and cisplatin as of 1/2019.     Imaging to date has not shown any evidence of cancer recurrence and as such, Merissa is currently managed on imaging surveillance per NCCN guidelines.     I met with Merissa today for this follow-up visit accompanied by Grzegorz (father).      HISTORY OF PRESENT ILLNESS  -She is doing fairly well. However, she had been experiencing daily headaches in the setting of sleep deprivation and also triggered by exercise/ exertion. Now resolved.   -Continues to have significant anxiety. Never tried prescribed Zoloft. More depression, less motivation and feels that from a physical standpoint; she cannot do things due to significant fatigue. She had been seeing a psychologist, but has not followed-up with him recently. Open to a referral to palliative care for assistance.   -For sleep, had been taking Gabapentin 300mg at bedtime. This had been helpful, but the prescription ran out.   -She has not been working. Looking to get SSD.   -In July, she presented to the ED for evaluation of facial and body numbness. Imaging at that time was stable. Recurrence of facial numbness has been noted in the setting of anxiety/ being very nervous.    -Worsening memory/ word finding issues and thinking issues.   -She denies any seizure-like activity.  -Denies vision changes or weakness. However, hands are frequently swollen and her wrists feel weak.   -Hearing can be muted at times. Hearing testing has yet to be scheduled.     REVIEW OF SYSTEMS  A comprehensive ROS negative except as in HPI.    MEDICATIONS   Current Outpatient Medications   Medication     LORazepam (ATIVAN) 1 MG tablet     No current facility-administered medications for this visit.     Facility-Administered Medications Ordered in Other Visits   Medication     gadobutrol (GADAVIST) injection 7.5 mL     DRUG ALLERGIES   Allergies   Allergen Reactions     Sulfa Drugs Unknown and Hives       ONCOLOGIC HISTORY  -PRESENTATION: Progressively worsening headaches. Additionally was with abrupt position changes resulted in dizziness/ syncope. CTH at an outside hospital showed a mass in the left cerebellum. Transferred to [facility].  -4/11/2018 MRB showed a 3.5cm mass in the left cerebellar hemisphere that was associated with mild cerebellar tonsillar herniation and hydrocephalus.  -MRI spine showed no evidence of disease.   -4/13/2018 SURGERY: Suboccipital craniotomy with resection of the left cerebellar mass. Immediate post-operative MRI demonstrated a gross total resection.   PATHOLOGY: Medulloblastoma; Molecular markers pending.   -5/4/2018 NEURO-ONC: LP performed. Evaluated by radiation oncology. Recommending craniospinal radiation + concurrent vincristine followed by eight 6-week cycles of cisplatin, lomustine, and vincristine.  -5/4/2018 LP with CSF analysis: WBC 1/ RBC 1, protein 25, glucose 62, negative cytology.   -5/14 - 6/22/2018 CHEMORADS with vincristine 2 mg/m2 (stopped after 3 infusions due to pancytopenia).  -5/17/2018 Audiology testing: Baseline hearing testing with no hearing loss.  -6/4/2018 NEURO-ONC: Doing well, no new neurological symptoms. Tolerating treatment  well. Ophtho referral. Labs improved.  -6/4/2018 NGS via [facility]: SMO mutation, TERT promotor mutation. Negative for microsatellite instability.   -6/25/2018 NEURO-ONC: Clinically stable, painful radiation-induced burn. Monitoring labs. Cancer Risk Assessment referral.  -8/13/2018 NEURO-ONC/MRB/ CHEMO: Clinically stable. Imaging shows expected post operative changes with no evidence of new disease. C1D1 of vincristine, CCNU and cisplatin.   -9/24/2018 NEURO-ONC: Neurologically stable, though did not tolerate first cycle of chemo well 2/2 nausea. Hold on starting C2D1 today due to leukopenia (WBC 2.7).  -9/27/2018 CHEMO: C2D1 of vincristine, CCNU and cisplatin.   -11/9/2018 NEURO-ONC/ MRB/ CHEMO: Clinically well. Imaging with no evidence of tumor recurrence. Cycle 3 CCNU and cisplatin; plan to hold vincristine on ODD cycles due to peripheral neuropathy. Repeat hearing testing ordered.   -12/4/2018 NEURO-ONC: New severe HA, likely tension. No new neurological symptoms or signs  -1/4/2019 NEURO-ONC/CHEMO: Clinically doing well. No changes. Cycle 4 CCNU and cisplatin. Holding vincristine. Last cycle.  -2/15/2019 NEURO-ONC/ MRB: Clinically stable. Labs not completely rebounded. Imaging with no concern for disease recurrence. Continue with imaging surveillance.  -2/25/2019 Audiology testing: moderate SNHL 3-8 kHz.  -3/2019 Port removed.   -4/22/2019 NEURO-ONC/ MRB: Clinically stable. Labs stable. Imaging with no concern for disease recurrence. Continue with imaging surveillance. Evaluated by dermatology.   -6/24/2019 NEURO-ONC/ MRB: Clinically stable. Imaging with no concern for disease recurrence. Continue with imaging surveillance.  -9/23/2019 NEURO-ONC/ MRB: Clinically stable. Imaging with no concern for disease recurrence.  -12/20/2019 NEURO-ONC/ MRB: Clinically stable. Imaging with no concern for disease recurrence.  -5/18/2020 NEURO-ONC/ MRB: Clinically stable. Imaging (contrast administration failed) with no concern  "for disease recurrence. Referral to repeat hearing testing placed.   -10/27/2020 Audiology testing: Moderate sensorineural hearing loss.  -11/2/2020 NEURO-ONC/ MRB: Clinically stable, aside from a slight increase in anxiety. Imaging without concern.   -3/8/2021 NEURO-ONC/ MRB: Clinically with mood issues; declined intervention. Imaging without concern.   -11/1/2021 NEURO-ONC/ MRB: Clinically with significant anxiety and depression, plus overwhelming fatigue. Open to a referral to palliative care. Imaging without concern; repeat in 6 months.    SOCIAL HISTORY   Tobacco use: Former smoker, E-cigs stopped on 5/3/2018  Alcohol use: None.   Drug use: Denies.  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      PHYSICAL EXAMINATION  BP (!) 130/91   Pulse 85   Temp 98.2  F (36.8  C) (Oral)   Wt 84.1 kg (185 lb 6.4 oz)   SpO2 100%   BMI 33.90 kg/m       Wt Readings from Last 2 Encounters:   11/01/21 84.1 kg (185 lb 6.4 oz)   12/20/19 67 kg (147 lb 12.8 oz)      Ht Readings from Last 2 Encounters:   12/20/19 1.575 m (5' 2.01\")   03/08/19 1.575 m (5' 2\")      KPS: 90    -Generally well appearing. Fatigued in the setting of recent Ativan use prior to MRI.   -Respiratory: No audible wheezing.   -Skin: Psoriasis plaques.  -Psychiatric: Flat mood and affect. Tearful at times. Pleasant, talkative.  -Neurologic:   MENTAL STATUS:     Alert, oriented to date.    Recall: Intact.    Speech fluent.    Comprehension intact to multi-step commands.     CRANIAL NERVES:     Pupils are equal, round, reactive to light.     Extraocular movements full, patient denies diplopia. No nystagmus.    Symmetric facial movements.   Hearing intact.   With normal phonation, no dysfunction of the palate or tongue.   MOTOR: Antigravity in arms. No pronation or drift.   COORDINATION: Intact finger-nose with eyes closed.   SENSATION: Intact to light touch throughout.   GAIT: Steady, unassisted.    Able to toe, heel, and tandem " saurabh.       MEDICAL RECORDS  Obtained and personally reviewed all available outside medical records in addition to reviewing any records available in our electronic system.     LABS  Reviewed.     IMAGING  Personally reviewed MR brain imaging from today and compared to prior imaging. To my eye, there is no sign of disease recurrence. No elevated rCBV. No new enhancement. T2 FLAIR is largely stable. There is no contrast enhancement of the 5th or any other cranial nerve.    Imaging results were reviewed with Merissa and Grzegorz.      Case and imaging was discussed at Brain Tumor Conference earlier today.       IMPRESSION/PLAN  Clinic was spent discussing in detail the nature of her cancer in light of her recent imaging. This was in addition to providing emotional support, answering questions pertaining to my recommendations, and devising the plan as outlined below.     Merissa is clinically experiencing an increase in anxiety and depressed mood confounded by overwhelming fatigue. She is not able to work and any amount of exertion is met with low stamina. Additionally, Merissa is noting worsening cognition, but untreated depression can result in 'pseudo-dementia'/ cognitive changes related to depression and chronic fatigued. Merissa is open to a referral to Palliative Care to address both anxiety and depression. Merissa never tried the prescribed Zoloft and was previously on gabapentin at bedtime for sleep and muscle cramping, which was helpful, but the prescription could not be refilled. I will defer to palliative care to start any indicated medication(s).     With regard to her MR brain imaging; the scan from today demonstrates no concern for cancer recurrence. Based on NCCN guidelines for imaging surveillance, will continue at an imaging interval of every 6 months for the next 3 years (until 3/2024). At recurrence, clinical trial options remain a strong consideration, as does off label use of SMO inhibitors.     PROBLEM  LIST  Medulloblastoma  Steroid intolerance  Chemotherapy-induced pancytopenia  Chemotherapy-induced nausea/ vomiting   Radiation burn  Fatigue, cancer and treatment related, improving  Port removed     PLAN  -CANCER-DIRECTED THERAPY-  -As above; Imaging with no evidence of disease recurrence. Continue imaging surveillance; will repeat every 6 months.  -Can refill pre-scan Ativan as needed.   -No continued lab monitoring necessary.   -Repeat hearing testing done on 10/27/2020 with moderate sensorineural hearing loss. Will repeat annually; over due as of 10/2021 given history of ototoxicity and continued poor hearing.     -SEIZURE MANAGEMENT-  -Given the lack of seizure history, there is no indication to prescribe an antiepileptic at this time.    -Quality of life/ MOOD/ FATIGUE-  -Noting an increase in general anxiety/ depression as noted above.  -Referral placed to Palliative Care for management of mood.     COVID vaccinated. Interested in COVID booster. Received annual flu vaccine today.     Return to clinic in 4/2022 + repeat MRI.    Anupama Morrison MD  Neuro-oncology

## 2021-11-04 ENCOUNTER — PATIENT OUTREACH (OUTPATIENT)
Dept: CARE COORDINATION | Facility: CLINIC | Age: 36
End: 2021-11-04

## 2021-11-04 NOTE — PROGRESS NOTES
Oncology Distress Screening Follow-up  Clinical Social Work  [facility]    Identified Concern and Score From Distress Screenin. How concerned are you about your ability to eat?   0           2. How concerned are you about unintended weight loss or your current weight?   0           3. How concerned are you about feeling depressed or very sad?   0           4. How concerned are you about feeling anxious or very scared?   9Abnormal            5. Do you struggle with the loss of meaning and lc in your life?   Not at all           6. How concerned are you about work and home life issues that may be affected by your cancer?   0           7. How concerned are you about knowing what resources are available to help you?   0           8. Do you currently have what you would describe as Sabianist or spiritual struggles?              Not at all               Date of Distress Screenin2021      Data: Ms. Merissa Silverman is a 35 year old right-handed woman with medulloblastoma  At time of last visit, Patient scored positive on distress screen.  called Patient today with intention of introducing them to psychosocial services and support, and following up on elevated distress.      Per visit note from Dr. Morrison on 2021:  Continues to have significant anxiety. Never tried prescribed Zoloft. More depression, less motivation and feels that from a physical standpoint; she cannot do things due to significant fatigue. She had been seeing a psychologist, but has not followed-up with him recently. Open to a referral to palliative care for assistance.       Intervention/Education Provided: Phone call to patient about distress screening. No answer - message left. Provided contact information in order to reach writer.       Follow-up Required: Will remain available for support and await patient's return call.          Em Gagnon [facility]  Clinical , Outpatient Specialty Clinics  Direct Phone:  [phone]

## 2021-11-23 ENCOUNTER — VIRTUAL VISIT (OUTPATIENT)
Dept: PALLIATIVE CARE | Facility: CLINIC | Age: 36
End: 2021-11-23
Attending: INTERNAL MEDICINE
Payer: COMMERCIAL

## 2021-11-23 DIAGNOSIS — R45.89 DEPRESSED MOOD: ICD-10-CM

## 2021-11-23 DIAGNOSIS — F32.9 REACTIVE DEPRESSION: ICD-10-CM

## 2021-11-23 DIAGNOSIS — F41.9 ANXIETY: ICD-10-CM

## 2021-11-23 DIAGNOSIS — C71.6 MEDULLOBLASTOMA OF CEREBELLUM (H): Primary | ICD-10-CM

## 2021-11-23 PROCEDURE — 99205 OFFICE O/P NEW HI 60 MIN: CPT | Mod: 95 | Performed by: INTERNAL MEDICINE

## 2021-11-23 PROCEDURE — 999N001193 HC VIDEO/TELEPHONE VISIT; NO CHARGE

## 2021-11-23 RX ORDER — BUPROPION HYDROCHLORIDE 150 MG/1
150 TABLET ORAL EVERY MORNING
Qty: 90 TABLET | Refills: 3 | Status: SHIPPED | OUTPATIENT
Start: 2021-11-23 | End: 2022-03-22

## 2021-11-23 RX ORDER — GABAPENTIN 300 MG/1
300 CAPSULE ORAL EVERY EVENING
Qty: 30 CAPSULE | Refills: 1 | Status: SHIPPED | OUTPATIENT
Start: 2021-11-23 | End: 2022-05-02

## 2021-11-23 NOTE — PROGRESS NOTES
Merissa is a 36 year old who is being evaluated via a billable video visit.      How would you like to obtain your AVS? MyChart  If the video visit is dropped, the invitation should be resent by: Text to cell phone: [phone]  Will anyone else be joining your video visit? No

## 2021-11-23 NOTE — PATIENT INSTRUCTIONS
EMILIO: I communicated with Dr Morrison. She does not think you are at any particular increased risk for seizures. So I ordered the Wellbutrin for you too.      Thank you for meeting with us in the [facility].    How to get a hold of us:  For non-urgent matters, MyChart works best.    For more urgent matters, or if you prefer not to use MyChart, call our clinic nurse coordinator Karen Turcios RN at [phone].    We have an on-call number for evenings and weekends. Please call this only if you are having uncontrolled symptoms or serious side effects from your medicines: [phone].     For refills, please give us a week (5 working days) notice. We don't always have providers available everyday to do refills. If you call the day you run out of your medicine, we may not be able to refill it in time, so call 5 days in advance!

## 2021-11-23 NOTE — PROGRESS NOTES
"Palliative Care Outpatient Clinic      Patient ID:  Medical - She has medulloblastoma arising from L cerebellum, dx with gross total rxn 4/2018, then craniospinal RT+vincristine.  As of 11/2021 she remains without evidence of recurrence.   Dr Morrison referred her to us 11/2021 for mood/anxiety concerns.       Social - Lives with her , 11 year old, and a dog. Seeking disability/SSD Fall 2021.     Care Planning -       History:  History gathered today from: patient, medical chart, outside records including Care Everywhere  Care Everywhere: new PCP in June referred her for psychotherapy.    Reviewed her health history as above.  \"I've been living with the fun aftereffects of surgery/chemo/radiation.\"  \"Some days I don't feel like the same person.\" More españa.  Difficulty concentration. At times really fatigue, tires easily. Jumps from task to task, forgets what she was trying to do.  Speech gets slurry when exhausted. None of these problems existed prior to cancer diagnosis. \"I was detail oriented.\"    Worked as a resident caretaker/ prior to cancer. Returned to work for a year and was doing better and then things worsened (concentration, memory, sleeping more, exhausted) and she stopped work ~Nov 2020.     In addition to not working: cooking is more difficult, loses track of the task, more clumsy. At times able to do housework, other times not. \"I'm hyperactive for a day then the next I'm wiped.\" This is random, no trigger for her more energy days.     Worries people  her because she sounds and at times acts different.    When we talk she mentions a lot of exhaustion, poor concentration, but does not mention worry/anxiety.  She describes a sense of 'all this is my fault.' Blames herself for things like her grandmother having a stroke.   \"I got out of surgery really good, full recovery, then all this shit hit the fan.\"   Has had panic attacks--'everything shut down, felt like a mini heart attack'. This " "was when her grandmother felt sick and she was helpless to help her, etc. These are rare, and usually triggered by an obvious stressor like grandmother's stroke.  Feels down in the dumps a lot; she's home alone a lot-- working--and that's tough, lonely.    In June met a new PCP who Rx'd sertraline, but didn't really talk with her about it, side effects, so she never took.  In July went to the ED with facial tingling, not much came of that, ED doc gave her gabapentin which she took QPM PRN 'it helped me relax I didn't worry.' She has run out of it.     She did see a psychologist this summer as prescribed by her PCP. \"I ran out of things to talk about.\" It was boring/redundant, 'he just asked me the same questions every time.'     Her mother and Aunt she notes have done really well on Wellbutrin.    Has a painful tingling in finger tips since Vincristine.     No hx of neuropsychiatric testing.    I asked her to describe in her own words what she thinks is wrong: \"I like to take care of everybody, do it all--I just can't do it all anymore.\"     Feels her role as a parent is going well.    PE: There were no vitals taken for this visit.   Wt Readings from Last 3 Encounters:   11/01/21 84.1 kg (185 lb 6.4 oz)   12/20/19 67 kg (147 lb 12.8 oz)   09/23/19 71.9 kg (158 lb 9.6 oz)     Alert NAD clear sensorium. Speech is slightly slow, I can't notice any dysarthria.  Affect is congruent      Data reviewed:  I reviewed recent labs and imaging, my comments:  MRI this month with stable L cbllr rxn changes, CINDY  Cr 0.7     database reviewed: y      Impression & Recommendations:  37 yo with treated L cerebellar medulloblastoma including resection, craniospinal RT and vincristine.    Initial ~good recovery complicated by the last approximately 15 months of worse fatigue, fatigability, concentration impairments, low mood, anxiety and panic.  I think she is depressed and may have RUT; it's impossible to know though how much " of her presentation is primary mood disturbance vs late organic effects of her resection, cancer, radiation, etc. Discussed those observations with her. D/w her there are things I think we can help with but probably some of her symptoms we really can't.     What she undoubtedly also has is significant existential distress, she's young, lost her sense of self and self-efficacy; grief and mourning for her life being very different than what she wanted, expected even immediately after her resection, etc. I think her prior psychologist did not bring a therapeutic approach that was able to help support/address some of the deeper existential and grief issues she is having.    Will resume gabap 300 hs, she liked it, helped her sleep, helped with nocturnal anxiety    Will d/w Dr Morrison and perhaps start Wellbutrin given good effects with it in her close relatives. With seizure risk I'll d/w Dr Morrison first.  I d/w her side effects including small elevation of seizure risk    Will check TSH given mood problems, I can't see it's been checked since all this started    Health Psychology referral.     Will consider psychostimulant use for the future but initially I think best to focus on mood as above.     Follow-up 1 mo      65 minutes spent on the date of the encounter doing chart review, history and exam, patient education & counseling, documentation and other activities as noted above.    Thank you for involving us in the patient's care.   Hal Vang MD / Palliative Medicine / Text me via [facility].      Video-Visit Details  Video Start Time: 1:04 PM  Type of service:  Video Visit  Video End Time:1:49 PM  Originating Location (pt. Location): Home  Distant Location (provider location): Home    Platform used for Video Visit: [employer]

## 2021-11-23 NOTE — LETTER
"11/23/2021       RE: Merissa Silverman  [address]  [place] [address]     Dear Colleague,    Thank you for referring your patient, Merissa Silverman[facility]. Please see a copy of my visit note below.    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  As of 11/2021 she remains without evidence of recurrence.   Dr Morrison referred her to us 11/2021 for mood/anxiety concerns.       Social - Lives with her , 11 year old, and a dog. Seeking disability/SSD Fall 2021.     Care Planning -       History:  History gathered today from: patient, medical chart, outside records including Care Everywhere  Care Everywhere: new PCP in June referred her for psychotherapy.    Reviewed her health history as above.  \"I've been living with the fun aftereffects of surgery/chemo/radiation.\"  \"Some days I don't feel like the same person.\" More españa.  Difficulty concentration. At times really fatigue, tires easily. Jumps from task to task, forgets what she was trying to do.  Speech gets slurry when exhausted. None of these problems existed prior to cancer diagnosis. \"I was detail oriented.\"    Worked as a resident caretaker/ prior to cancer. Returned to work for a year and was doing better and then things worsened (concentration, memory, sleeping more, exhausted) and she stopped work ~Nov 2020.     In addition to not working: cooking is more difficult, loses track of the task, more clumsy. At times able to do housework, other times not. \"I'm hyperactive for a day then the next I'm wiped.\" This is random, no trigger for her more energy days.     Worries people  her because she sounds and at times acts different.    When we talk she mentions a lot of exhaustion, poor concentration, but " "does not mention worry/anxiety.  She describes a sense of 'all this is my fault.' Blames herself for things like her grandmother having a stroke.   \"I got out of surgery really good, full recovery, then all this shit hit the fan.\"   Has had panic attacks--'everything shut down, felt like a mini heart attack'. This was when her grandmother felt sick and she was helpless to help her, etc. These are rare, and usually triggered by an obvious stressor like grandmother's stroke.  Feels down in the dumps a lot; she's home alone a lot-- working--and that's tough, lonely.    In June met a new PCP who Rx'd sertraline, but didn't really talk with her about it, side effects, so she never took.  In July went to the ED with facial tingling, not much came of that, ED doc gave her gabapentin which she took QPM PRN 'it helped me relax I didn't worry.' She has run out of it.     She did see a psychologist this summer as prescribed by her PCP. \"I ran out of things to talk about.\" It was boring/redundant, 'he just asked me the same questions every time.'     Her mother and Aunt she notes have done really well on Wellbutrin.    Has a painful tingling in finger tips since Vincristine.     No hx of neuropsychiatric testing.    I asked her to describe in her own words what she thinks is wrong: \"I like to take care of everybody, do it all--I just can't do it all anymore.\"     Feels her role as a parent is going well.    PE: There were no vitals taken for this visit.   Wt Readings from Last 3 Encounters:   11/01/21 84.1 kg (185 lb 6.4 oz)   12/20/19 67 kg (147 lb 12.8 oz)   09/23/19 71.9 kg (158 lb 9.6 oz)     Alert NAD clear sensorium. Speech is slightly slow, I can't notice any dysarthria.  Affect is congruent      Data reviewed:  I reviewed recent labs and imaging, my comments:  MRI this month with stable L cbllr rxn changes, CINDY  Cr 0.7     database reviewed: y      Impression & Recommendations:  35 yo with treated L cerebellar " medulloblastoma including resection, craniospinal RT and vincristine.    Initial ~good recovery complicated by the last approximately 15 months of worse fatigue, fatigability, concentration impairments, low mood, anxiety and panic.  I think she is depressed and may have RUT; it's impossible to know though how much of her presentation is primary mood disturbance vs late organic effects of her resection, cancer, radiation, etc. Discussed those observations with her. D/w her there are things I think we can help with but probably some of her symptoms we really can't.     What she undoubtedly also has is significant existential distress, she's young, lost her sense of self and self-efficacy; grief and mourning for her life being very different than what she wanted, expected even immediately after her resection, etc. I think her prior psychologist did not bring a therapeutic approach that was able to help support/address some of the deeper existential and grief issues she is having.    Will resume gabap 300 hs, she liked it, helped her sleep, helped with nocturnal anxiety    Will d/w Dr Morrison and perhaps start Wellbutrin given good effects with it in her close relatives. With seizure risk I'll d/w Dr Morrison first.  I d/w her side effects including small elevation of seizure risk    Will check TSH given mood problems, I can't see it's been checked since all this started    Health Psychology referral.     Will consider psychostimulant use for the future but initially I think best to focus on mood as above.     Follow-up 1 mo      65 minutes spent on the date of the encounter doing chart review, history and exam, patient education & counseling, documentation and other activities as noted above.    Thank you for involving us in the patient's care.   Hal Vang MD / Palliative Medicine / Text me via [facility].        Again, thank you for allowing me to participate in the care of your patient.      Sincerely,    Hal Scott  MD Taj

## 2022-01-14 ENCOUNTER — VIRTUAL VISIT (OUTPATIENT)
Dept: PALLIATIVE CARE | Facility: CLINIC | Age: 37
End: 2022-01-14
Attending: INTERNAL MEDICINE
Payer: COMMERCIAL

## 2022-01-14 DIAGNOSIS — R47.01 APHASIA DUE TO NEURO-ONCOLOGY DIAGNOSIS: ICD-10-CM

## 2022-01-14 DIAGNOSIS — C71.6 MEDULLOBLASTOMA OF CEREBELLUM (H): Primary | ICD-10-CM

## 2022-01-14 DIAGNOSIS — R45.89 DEPRESSED MOOD: ICD-10-CM

## 2022-01-14 DIAGNOSIS — R41.840 ATTENTION OR CONCENTRATION DEFICIT: ICD-10-CM

## 2022-01-14 PROCEDURE — 99214 OFFICE O/P EST MOD 30 MIN: CPT | Mod: GT | Performed by: INTERNAL MEDICINE

## 2022-01-14 NOTE — PROGRESS NOTES
"Merissa is a 36 year old who is being evaluated via a billable video visit.      How would you like to obtain your AVS? MyChart  If the video visit is dropped, the invitation should be resent by: Text to cell phone: [phone]  Will anyone else be joining your video visit? 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  As of 11/2021 she remains without evidence of recurrence.     Dr Morrison referred her to us 11/2021 for mood/anxiety/concentration/fatigue concerns: my impression was depression with anxiety, query an organic neuropsychiatric component related to her prior therapy including radiation, plus very clear existential distress and grief related to loss of her role as an independent adult other people depended on. I started bupropion and gabapentin.     Social - Lives with her , 11 year old, and a dog. Seeking SSD but has been denied previously.     Care Planning -     History:  History gathered today from: patient, medical chart    I apparently did not tell her that Dr Morrison Ok'd the wellbutrin so she didn't start it.    Gabapentin: she did start; did not take daily; 'when my nerves are shot' she takes it in PM; feels tired and relaxed on it. Using ~<3x a week. Likes it.    Has not called health psychology.    Got denied for SSD recently, again. \"I screw up my words so often, and days I'm unpredictably tired\" some days & can't imagine maintaining employment. Has not had OT/SLP/or neuropsych eval / therapy ever.     PE: There were no vitals taken for this visit.   Wt Readings from Last 3 Encounters:   11/01/21 84.1 kg (185 lb 6.4 oz)   12/20/19 67 kg (147 lb 12.8 oz)   09/23/19 71.9 kg (158 lb 9.6 oz)     Alert NAD  Speech slow and slightly dysarthric  Clear sensorium    Data reviewed:  I reviewed recent labs and imaging, my comments:  Cr 0.73  K " 3.3      [facility] database reviewed: y      Impression & Recommendations:  37 yo with treated L cerebellar medulloblastoma s/p resection, radiation, and chemo.    Did well immediately with treatment but progressive problems the last year with poor concentration, fatigue, low mood, anxiety, word finding difficulties.    Not totally clear how much of this is primary a 'mood event' due to untreated depression vs organic & related to prior extensive cancer treatment.    I apologized for dropping the communication about Wellbutrin; asked her to try it.  Continue prn PM gabapentin for severe anxiety    Encouraged her to call health psychology: some of her mood challenges I think are grief related, plus a heavy shame component about her not fully functioning/changed familiar/caregiving/vocational roles.    OT & SLP evals given aphasia, concentration impairments, etc. She never received these in the past but notably she describes a rapid and excellent *immediate* recovery after her surgery etc but then worsened in the last year.       Over 30 minutes spent on the date of the encounter doing chart review, history and exam, patient education & counseling, documentation and other activities as noted above.    Thank you for involving us in the patient's care.   Hal Vang MD / Palliative Medicine / Text me via [facility].      Video Start Time: 0918  Video-Visit Details    Type of service:  Video Visit    Video End Time:9:27 AM    Originating Location (pt. Location): Home    Distant Location (provider location):  [facility]     Platform used for Video Visit: [employer]

## 2022-01-14 NOTE — LETTER
"1/14/2022       RE: Merissa Silverman  [address]  [place] [address]     Dear Colleague,    Thank you for referring your patient, Merissa Silverman, [facility]. Please see a copy of my visit note below.    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  As of 11/2021 she remains without evidence of recurrence.     Dr Morrison referred her to us 11/2021 for mood/anxiety/concentration/fatigue concerns: my impression was depression with anxiety, query an organic neuropsychiatric component related to her prior therapy including radiation, plus very clear existential distress and grief related to loss of her role as an independent adult other people depended on. I started bupropion and gabapentin.     Social - Lives with her , 11 year old, and a dog. Seeking SSD but has been denied previously.     Care Planning -     History:  History gathered today from: patient, medical chart    I apparently did not tell her that Dr Morrison Ok'd the wellbutrin so she didn't start it.    Gabapentin: she did start; did not take daily; 'when my nerves are shot' she takes it in PM; feels tired and relaxed on it. Using ~<3x a week. Likes it.    Has not called health psychology.    Got denied for SSD recently, again. \"I screw up my words so often, and days I'm unpredictably tired\" some days & can't imagine maintaining employment. Has not had OT/SLP/or neuropsych eval / therapy ever.     PE: There were no vitals taken for this visit.   Wt Readings from Last 3 Encounters:   11/01/21 84.1 kg (185 lb 6.4 oz)   12/20/19 67 kg (147 lb 12.8 oz)   09/23/19 71.9 kg (158 lb 9.6 oz)     Alert NAD  Speech slow and slightly dysarthric  Clear sensorium    Data reviewed:  I reviewed recent labs and imaging, my " comments:  Cr 0.73  K 3.3       database reviewed: y      Impression & Recommendations:  37 yo with treated L cerebellar medulloblastoma s/p resection, radiation, and chemo.    Did well immediately with treatment but progressive problems the last year with poor concentration, fatigue, low mood, anxiety, word finding difficulties.    Not totally clear how much of this is primary a 'mood event' due to untreated depression vs organic & related to prior extensive cancer treatment.    I apologized for dropping the communication about Wellbutrin; asked her to try it.  Continue prn PM gabapentin for severe anxiety    Encouraged her to call health psychology: some of her mood challenges I think are grief related, plus a heavy shame component about her not fully functioning/changed familiar/caregiving/vocational roles.    OT & SLP evals given aphasia, concentration impairments, etc. She never received these in the past but notably she describes a rapid and excellent *immediate* recovery after her surgery etc but then worsened in the last year.       Over 30 minutes spent on the date of the encounter doing chart review, history and exam, patient education & counseling, documentation and other activities as noted above.    Thank you for involving us in the patient's care.       Hal Vang MD / Palliative Medicine / Text me via [facility].

## 2022-03-15 ENCOUNTER — NURSE TRIAGE (OUTPATIENT)
Dept: NURSING | Facility: CLINIC | Age: 37
End: 2022-03-15
Payer: COMMERCIAL

## 2022-03-15 NOTE — TELEPHONE ENCOUNTER
Triage call:     Patient calling after taking antidepressants   Started taking Wellbutrin in Jan.   Hair is falling out- noticed a bald spot on her head this morning   She is wanting to get off of these medications  She states this medication is not helping   She has been feeling paranoid- denies visual or auditory hallucinations   Heightened awareness of things   At the time of the call she states she is anxious, ears are hot she denies difficulty breathing and is able to calm herself throughout the call.     Patient is followed by Hal Vagn in palliative care. She has an appointment scheduled 3/22.   Will route to Dr. Vang to advise.     Lauren Garland RN   03/15/22 2:51 PM  [facility] Nurse Advisor      Reason for Disposition    Started on anti-anxiety medication and no relief    Additional Information    Negative: Severe difficulty breathing (e.g., struggling for each breath, speaks in single words)    Negative: Bluish (or gray) lips or face    Negative: Difficult to awaken or acting confused  (e.g., disoriented, slurred speech)    Negative: Hysterical or combative behavior    Negative: Sounds like a life-threatening emergency to the triager    Negative: Difficulty breathing and persists > 10 minutes and not relieved by reassurance provided by triager    Negative: Lightheadedness or dizziness and persists > 10 minutes and not relieved by reassurance provided by triager    Negative: Alcohol or drug abuse, known or suspected, and feeling very shaky (i.e., visible tremors of hands)    Negative: Patient sounds very sick or weak to the triager    Negative: Patient sounds very upset or troubled to the triager    Negative: Symptoms of anxiety or panic and has not been evaluated for this by physician    Negative: Symptoms interfere with work or school    Protocols used: ANXIETY AND PANIC ATTACK-A-OH

## 2022-03-22 ENCOUNTER — VIRTUAL VISIT (OUTPATIENT)
Dept: PALLIATIVE CARE | Facility: CLINIC | Age: 37
End: 2022-03-22
Attending: INTERNAL MEDICINE
Payer: COMMERCIAL

## 2022-03-22 DIAGNOSIS — R41.840 ATTENTION OR CONCENTRATION DEFICIT: ICD-10-CM

## 2022-03-22 DIAGNOSIS — F41.9 ANXIETY: ICD-10-CM

## 2022-03-22 DIAGNOSIS — C71.6 MEDULLOBLASTOMA OF CEREBELLUM (H): Primary | ICD-10-CM

## 2022-03-22 DIAGNOSIS — R47.01 APHASIA DUE TO NEURO-ONCOLOGY DIAGNOSIS: ICD-10-CM

## 2022-03-22 PROCEDURE — 99214 OFFICE O/P EST MOD 30 MIN: CPT | Mod: GT | Performed by: INTERNAL MEDICINE

## 2022-03-22 NOTE — LETTER
"3/22/2022       RE: Merissa Silverman  [address]  [place] [address]     Dear Colleague,    Thank you for referring your patient, Merissa Silverman, [facility]. Please see a copy of my visit note below.      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  As of 11/2021 she remains without evidence of recurrence.      Dr Morrison referred her to us 11/2021 for mood/anxiety/concentration/fatigue concerns: my impression was depression with anxiety, query an organic neuropsychiatric component related to her prior therapy including radiation, plus very clear existential distress and grief related to loss of her role as an independent adult other people depended on. I started bupropion and gabapentin.     Social - Lives with her , 11 year old, and a dog. Seeking SSD but has been denied previously.     Care Planning -       History:  History gathered today from: patient, medical chart    I started bupropion last visit and encouraged her to call health psychology as previously they had been recommended to her.    Bupropion: she experienced paranoia \"over every sound\" eg someone was watching her etc. \"More of a weird sensation like someone was behind me watching me\"  Never experienced anything like this before.  Plus felt 'spacey' all the time and tired. Anxiety worsened.    Most of this has completely resolved with stopping bupropion a week ago (everything except exhaustion).  Plus her hair and skin are better.  Feels safe, no thoughts of self harm.     Has a court date for disability coming up    I placed OT SLP referrals last visit, nothing came of that?    PE: There were no vitals taken for this visit.   Wt Readings from Last 3 Encounters:   11/01/21 84.1 kg (185 lb 6.4 oz)   12/20/19 67 " kg (147 lb 12.8 oz)   09/23/19 71.9 kg (158 lb 9.6 oz)     Alert NAD  Affect flat  Clear sensorium    Data reviewed:  I reviewed recent labs and imaging, my comments:      [facility] database reviewed:       Impression & Recommendations:  35 yo with definitively treated medulloblastoma with initial good recovery but then chronic worsening of memory, word finding, concentration, depressive mood, anxiety, without any evidence of cancer recurrence.    Terrible response to bupropion as above.     Given the complexity of her presentation and severe, idiosyncratic reactions to bupropion I recommended psychiatric eval.    I put on SLP and OT referrals in Jan--she never heard from them?? She definitely wants referrals, I will replace those.     Follow-up with us prn  Sees Dr Morrison in May for routine follow-up.     30 minutes spent on the date of the encounter doing chart review, history and exam, patient education & counseling, documentation and other activities as noted above.      Thank you for involving us in the patient's care.   Hal Vang MD / Palliative Medicine / Text me via [facility].

## 2022-03-22 NOTE — NURSING NOTE
Patient confirms medications and allergies are accurate via patients echeck in completion, and or denies any changes since last reviewed/verified.     Marlene Siu, Virtual Facilitator

## 2022-03-22 NOTE — PROGRESS NOTES
"Merissa is a 36 year old who is being evaluated via a billable video visit.      How would you like to obtain your AVS? MyChart  If the video visit is dropped, the invitation should be resent by: Text to cell phone: [phone]  Will anyone else be joining your video visit? 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  As of 11/2021 she remains without evidence of recurrence.      Dr Morrison referred her to us 11/2021 for mood/anxiety/concentration/fatigue concerns: my impression was depression with anxiety, query an organic neuropsychiatric component related to her prior therapy including radiation, plus very clear existential distress and grief related to loss of her role as an independent adult other people depended on. I started bupropion and gabapentin.     Social - Lives with her , 11 year old, and a dog. Seeking SSD but has been denied previously.     Care Planning -       History:  History gathered today from: patient, medical chart    I started bupropion last visit and encouraged her to call health psychology as previously they had been recommended to her.    Bupropion: she experienced paranoia \"over every sound\" eg someone was watching her etc. \"More of a weird sensation like someone was behind me watching me\"  Never experienced anything like this before.  Plus felt 'spacey' all the time and tired. Anxiety worsened.    Most of this has completely resolved with stopping bupropion a week ago (everything except exhaustion).  Plus her hair and skin are better.  Feels safe, no thoughts of self harm.     Has a court date for disability coming up    I placed OT SLP referrals last visit, nothing came of that?    PE: There were no vitals taken for this visit.   Wt Readings from Last 3 Encounters:   11/01/21 84.1 kg (185 lb 6.4 oz)   12/20/19 67 kg (147 lb 12.8 oz)   09/23/19 " 71.9 kg (158 lb 9.6 oz)     Alert NAD  Affect flat  Clear sensorium    Data reviewed:  I reviewed recent labs and imaging, my comments:      [facility] database reviewed:       Impression & Recommendations:  37 yo with definitively treated medulloblastoma with initial good recovery but then chronic worsening of memory, word finding, concentration, depressive mood, anxiety, without any evidence of cancer recurrence.    Terrible response to bupropion as above.     Given the complexity of her presentation and severe, idiosyncratic reactions to bupropion I recommended psychiatric eval.    I put on SLP and OT referrals in Jan--she never heard from them?? She definitely wants referrals, I will replace those.     Follow-up with us prn  Sees Dr Morrison in May for routine follow-up.     30 minutes spent on the date of the encounter doing chart review, history and exam, patient education & counseling, documentation and other activities as noted above.    Thank you for involving us in the patient's care.   Hal Vang MD / Palliative Medicine / Text me via [facility].      Video Start Time: 3:09 PM  Video-Visit Details    Type of service:  Video Visit    Video End Time:3:25 PM    Originating Location (pt. Location): Home    Distant Location (provider location):  [facility] academic office.   Platform used for Video Visit: [employer]

## 2022-03-30 ENCOUNTER — HOSPITAL ENCOUNTER (OUTPATIENT)
Dept: SPEECH THERAPY | Facility: CLINIC | Age: 37
Discharge: HOME | End: 2022-03-30
Attending: INTERNAL MEDICINE
Payer: COMMERCIAL

## 2022-03-30 DIAGNOSIS — F41.9 ANXIETY: ICD-10-CM

## 2022-03-30 DIAGNOSIS — R41.840 ATTENTION OR CONCENTRATION DEFICIT: ICD-10-CM

## 2022-03-30 DIAGNOSIS — C71.6 MEDULLOBLASTOMA OF CEREBELLUM (H): ICD-10-CM

## 2022-03-30 DIAGNOSIS — R47.01 APHASIA DUE TO NEURO-ONCOLOGY DIAGNOSIS: ICD-10-CM

## 2022-03-30 PROCEDURE — 92523 SPEECH SOUND LANG COMPREHEN: CPT | Mod: GN | Performed by: SPEECH-LANGUAGE PATHOLOGIST

## 2022-03-30 NOTE — PROGRESS NOTES
Merissa Silverman is a 36 year old female who is being seen via a billable video visit.      Patient has given verbal consent for Video visit? Yes    Video Start Time: 12:45    Telehealth Visit Details    Type of Service:  Telehealth    Video End Time (time video stopped): 1:28    Originating Location (pt. location): Home    Additional Participants in Telehealth Visit: none    Distant Location (provider location):  [facility]     Mode of Communication (Audio Visual or Audio Only):  Audio and visual.     Kellie Colindres, SLP  March 30, 2022      Bilobed Transposition Flap Text: The defect edges were debeveled with a #15 scalpel blade.  Given the location of the defect and the proximity to free margins a bilobed transposition flap was deemed most appropriate.  Using a sterile surgical marker, an appropriate bilobe flap drawn around the defect.    The area thus outlined was incised deep to adipose tissue with a #15 scalpel blade.  The skin margins were undermined to an appropriate distance in all directions utilizing iris scissors.

## 2022-03-30 NOTE — PROGRESS NOTES
"       Speech-Language Pathology Department   EVALUATION  [facility]    03/30/22 1300   General Information   Type of Evaluation Speech and Language  (Session completed via telehealth, please see attached note. )   Type Of Visit Initial   Start Of Care Date 03/30/22   Referring Physician Dr. Taj MD    Orders Evaluate And Treat   Orders Comment Aphasia, cognitive deficits, CA rehab, impairments, word finding problems long term after brain CA treatment.    Medical Diagnosis Mudulloblastoma of cerebellum, aphasia d/t neuro oncology treatment, attention or concentration deficit, anxiety   Onset Of Illness/injury Or Date Of Surgery 03/22/22  (Date of MD orders)   Precautions/Limitations  no known precautions/limitations   Hearing WFL   Surgical/Medical history reviewed Yes   Pertinent History Of Current Problem Pt is a 36 y.o. 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 Currently no evidence of recurrence. 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 Pt reports when she is tired or nervous her speech is less clear \"I sound like I'm drunk but I'm not\" or \"like I have a retainer in my mouth.\" Increased difficulty associated with fatigue in the evenings. Pt endorses difficulty with word finding in both verbal and written contexts.    Current Community Support  Family/friend caregiver   Patient Role/employment History Disabled  (Seeking disability)   General Observations Pt alert and engaged, mildly dysarthric speech.    Patient/family Goals Improve speech clarity and word finding for daily communication.    Speech   Deficits in Speech Respiration None   Deficits in " Phonation None   Deficits in Articulation   (Dysarthria )   Deficits in Resonance None   Deficits in Prosody None   Speech Comments Pt presents with mildly dysarthric speech in conversation, endorses a worsening of speech in the evenings or at other points in the day when fatigued or nervous/put on the spot.    Language: Auditory Comprehension (understanding of spoken language)   Tests were administered at the following levels Moderate (routine daily activities);Complex (vocation/community/social activities)   Yes/No Sentence and Simple Paragraph; [place] Diagnostic Aphasia Exam 3 short (out of 6 total) 4   Paragraph; Discourse Comprehension Test (out of 8 total; less than 7 is below mean) 4   Comments (Auditory Comprehension) Pt presents with mild to moderate impairments, suspect memory to be further impacting accuracy. Will hold and defer to OT for cognition before addressing.    Language: Verbal Expression (use of spoken language to express information)   Tests were administered at the following levels Basic (rote activities);Moderate (routine daily activities);Complex (vocation/community/social activities)   Phrase/Sentence Completion (out of 10 total) 9   Responsive Naming; [place] Diagnostic Aphasia Exam 3 (out of 20 total) 20   Define Words; [place] Test for Differential Diagnosis Of Aphasia (out of 10 total) 5   Generative Naming Score; Cognitive Linguistic Quick Test 5   Generative Naming; Cognitive Linguistic Quick Test Result Below mean  (Mean: 6.57, SD +/-1.49)   Reading Comprehension (understanding of written language)   Comments (Reading Comprehension) Did not formally assess, Pt reports mild difficulty with attn and focus/concentration. Suspect memory to further impact. Will hold and defer cognition to OT.    Written Expression (use of writing to express information)   Tests were administered at the following levels Moderate (routine daily activities)   Generate Sentences; [place] Test for  Differential Diagnosis Of Aphasia (out of 6 total) 6   Functional Assessment Scale (Written Expression) Mild Impairment   Comments (Written Expression) Utilized chat box in telehealth to assess W/E, Pt able to generate sentences but needed to re-read multiple times to ensure accuracy. Pt reports reliance on auto correct/spell check supports in electronic communication.    Cognitive Status Examination   Cognitive Status Exam Comments Suspect cognitive impairrments, will defer to OT at this time.    Education Assessment   Barriers to Learning Cognitive   Preferred Learning Style Listening;Reading;Demonstration;Pictures/video   General Therapy Interventions   Planned Therapy Interventions Language;Communication   Language Auditory comprehension;Verbal expression;Written expression   Communication Improve speech intelligibility   Clinical Impression, SLP Eval   Criteria for Skilled Therapeutic Interventions Met (SLP Eval) Yes, treatment indicated   SLP Diagnosis Mild to moderate expressive aphasia, mild to moderate dysarthria    Influenced by the following factors/impairments Fatigue   Functional limitations due to impairments Pt is unable to easily and effectively communicate with family and friends   Therapy Frequency 1 time;per week   Predicted Duration of Therapy Intervention (days/wks) 8 weeks    Risks and Benefits of Treatment have been explained. Yes   Patient, Family & other staff in agreement with plan of care Yes   Clinical Impression Comments Pt demonstrates mild to moderate aphasia in the areas of verbal expression and written expression. Pt additionally presents with mild to moderate dysarthria. Symptoms include word level paraphasic errors in verbal and written, imprecise articulation and sound/word level dysfluencies. Pt's language and speech symptoms are impairing her ability to communicate easily and effectively with family and friends. RECOMMEND: a course of skilled speech therapy targeting  verbal/written expression and speech intelligibility strategies for improved speech clarity to meet daily functional communication needs.    Language/Cognition Goals   Language/Cognition Goals 1;2;3   Language/Cognition Goal 1   Goal Identifier LTG 5-Azuhbnpf-Uslhqj Expression    Goal Description Patient will learn, implement, and demonstrate use of 3 word-finding strategies with 80% accuracy provided with min cues to meet daily expressive language needs.     Target Date 05/29/22   Language/Cognition Goal 2   Goal Identifier LTG 1-Jcxulfpa-Albkcga Expression    Goal Description Pt will complete a complex level writing task (i.e. thank you, emails, journaling) with 80% accuracy provided 0-min cues in order to write emails.    Target Date 05/29/22   Language/Cognition Goal 3   Goal Identifier LTG 3-Speech-Dysarthria    Goal Description Patient will demonstrate 80% intelligibility at the conversational level of speech for improved intelligibility provided 0-min cues when speaking with family and friends on the phone.   Target Date 05/29/22   Total Session Time   Sound production with lang comprehension and expression minutes (54021) 43   Total Evaluation Time 43     Thank you for referring Merissa Silverman to outpatient therapy at [facility]. Please call Kellie Colindres MA, SLP-CCC at [phone] email [email or web address] with any questions or concerns.      Kellie Colindres M.A., SLP-CCC  Speech-Language Pathologist

## 2022-04-22 ENCOUNTER — HOSPITAL ENCOUNTER (OUTPATIENT)
Dept: SPEECH THERAPY | Facility: CLINIC | Age: 37
Discharge: HOME | End: 2022-04-22
Payer: COMMERCIAL

## 2022-04-22 ENCOUNTER — TELEPHONE (OUTPATIENT)
Dept: ONCOLOGY | Facility: CLINIC | Age: 37
End: 2022-04-22

## 2022-04-22 PROCEDURE — 92507 TX SP LANG VOICE COMM INDIV: CPT | Mod: GN | Performed by: SPEECH-LANGUAGE PATHOLOGIST

## 2022-04-22 NOTE — PROGRESS NOTES
Merissa Silverman is a 36 year old female who is being seen via a billable video visit.      Patient has given verbal consent for Video visit? Yes    Video Start Time: 11    Telehealth Visit Details    Type of Service:  Telehealth    Video End Time (time video stopped): 11:40    Originating Location (pt. location): Home    Additional Participants in Telehealth Visit: none    Distant Location (provider location):  [facility]     Mode of Communication (Audio Visual or Audio Only):  audio and visual via [employer].     Kellie Colindres, SLP  April 22, 2022

## 2022-04-22 NOTE — TELEPHONE ENCOUNTER
Work ability paperwork received via fax from [employer]. Will be placed in provider folder for signature upon completion.     Fax: [phone]      Catalina Barcenas CMA

## 2022-04-27 NOTE — TELEPHONE ENCOUNTER
Reviewed paperwork with Dr. Vang who recommended patient send this to PCP office instead. Called patient and notified her of this. Palliative care not completing this paperwork, document was shredded in secure bin.

## 2022-04-29 NOTE — PROGRESS NOTES
NEURO-ONCOLOGY VISIT  05/02/2022    CHIEF COMPLAINT: Ms. Merissa Silverman is a 36 year old right-handed woman with medulloblastoma (SHH-pathway activated and [id number], T2, M0 (spinal imaging and CSF negative)) arising from the left cerebellum, diagnosed following gross total resection on 4/13/2018. Completed craniospinal radiation with concurrent vincristine x 3 doses, but concurrent chemotherapy was stopped in the setting of pancytopenia. She completed 4 cycles of adjuvant chemotherapy with vincristine, CCNU, and cisplatin as of 1/2019.     Imaging to date has shown no evidence of cancer recurrence and as such, Merissa is currently managed on imaging surveillance per NCCN guidelines.     I met with Merissa today for this follow-up visit accompanied by Grzegorz (father).      HISTORY OF PRESENT ILLNESS  -Merissa remains significantly fatigued. Sleep is poor; snoring at night.   -Experiencing mild daily headaches.  -No sinus pain, has seasonal allergies.    -Continues to have significant anxiety and depression, less motivation. She has not yet been seen by a psychologist.   -Worsening memory/ word finding issues and thinking issues.   -She denies any seizure-like activity.  -Denies vision changes or weakness.  -Hearing can be muted at times.      MEDICATIONS   Current Outpatient Medications   Medication     gabapentin (NEURONTIN) 300 MG capsule     LORazepam (ATIVAN) 1 MG tablet     No current facility-administered medications for this visit.     Facility-Administered Medications Ordered in Other Visits   Medication     gadobutrol (GADAVIST) injection 7.5 mL     DRUG ALLERGIES   Allergies   Allergen Reactions     Bupropion      Paranoid thoughts, spacey thoughts, rash, hair fell out     Sulfa Drugs Unknown and Hives       ONCOLOGIC HISTORY  -PRESENTATION: Progressively worsening headaches. Additionally was with abrupt position changes resulted in dizziness/ syncope. CTH at an outside hospital showed a mass in the left  cerebellum. Transferred to [facility].  -4/11/2018 MRB showed a 3.5cm mass in the left cerebellar hemisphere that was associated with mild cerebellar tonsillar herniation and hydrocephalus.  -MRI spine showed no evidence of disease.   -4/13/2018 SURGERY: Suboccipital craniotomy with resection of the left cerebellar mass. Immediate post-operative MRI demonstrated a gross total resection.   PATHOLOGY: Medulloblastoma; Molecular markers pending.   -5/4/2018 NEURO-ONC: LP performed. Evaluated by radiation oncology. Recommending craniospinal radiation + concurrent vincristine followed by eight 6-week cycles of cisplatin, lomustine, and vincristine.  -5/4/2018 LP with CSF analysis: WBC 1/ RBC 1, protein 25, glucose 62, negative cytology.   -5/14 - 6/22/2018 CHEMORADS with vincristine 2 mg/m2 (stopped after 3 infusions due to pancytopenia).  -5/17/2018 Audiology testing: Baseline hearing testing with no hearing loss.  -6/4/2018 NEURO-ONC: Doing well, no new neurological symptoms. Tolerating treatment well. Ophtho referral. Labs improved.  -6/4/2018 NGS via [facility]: SMO mutation, TERT promotor mutation. Negative for microsatellite instability.   -6/25/2018 NEURO-ONC: Clinically stable, painful radiation-induced burn. Monitoring labs. Cancer Risk Assessment referral.  -8/13/2018 NEURO-ONC/MRB/ CHEMO: Clinically stable. Imaging shows expected post operative changes with no evidence of new disease. C1D1 of vincristine, CCNU and cisplatin.   -9/24/2018 NEURO-ONC: Neurologically stable, though did not tolerate first cycle of chemo well 2/2 nausea. Hold on starting C2D1 today due to leukopenia (WBC 2.7).  -9/27/2018 CHEMO: C2D1 of vincristine, CCNU and cisplatin.   -11/9/2018 NEURO-ONC/ MRB/ CHEMO: Clinically well. Imaging with no evidence of tumor recurrence. Cycle 3 CCNU and cisplatin; plan to hold vincristine on ODD cycles due to peripheral neuropathy. Repeat hearing testing ordered.   -12/4/2018 NEURO-ONC:  New severe HA, likely tension. No new neurological symptoms or signs  -1/4/2019 NEURO-ONC/CHEMO: Clinically doing well. No changes. Cycle 4 CCNU and cisplatin. Holding vincristine. Last cycle.  -2/15/2019 NEURO-ONC/ MRB: Clinically stable. Labs not completely rebounded. Imaging with no concern for disease recurrence. Continue with imaging surveillance.  -2/25/2019 Audiology testing: moderate SNHL 3-8 kHz.  -3/2019 Port removed.   -4/22/2019 NEURO-ONC/ MRB: Clinically stable. Labs stable. Imaging with no concern for disease recurrence. Continue with imaging surveillance. Evaluated by dermatology.   -6/24/2019 NEURO-ONC/ MRB: Clinically stable. Imaging with no concern for disease recurrence. Continue with imaging surveillance.  -9/23/2019 NEURO-ONC/ MRB: Clinically stable. Imaging with no concern for disease recurrence.  -12/20/2019 NEURO-ONC/ MRB: Clinically stable. Imaging with no concern for disease recurrence.  -5/18/2020 NEURO-ONC/ MRB: Clinically stable. Imaging (contrast administration failed) with no concern for disease recurrence. Referral to repeat hearing testing placed.   -10/27/2020 Audiology testing: Moderate sensorineural hearing loss.  -11/2/2020 NEURO-ONC/ MRB: Clinically stable, aside from a slight increase in anxiety. Imaging without concern.   -3/8/2021 NEURO-ONC/ MRB: Clinically with mood issues; declined intervention. Imaging without concern.   -11/1/2021 NEURO-ONC/ MRB: Clinically with significant anxiety and depression, plus overwhelming fatigue. Open to a referral to palliative care. Imaging without concern; repeat in 6 months.  -5/2/2022 NEURO-ONC/ MRB: Imaging without concern; repeat in 6 months.    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      PHYSICAL EXAMINATION  /84 (BP Location: Right arm, Patient Position: Sitting, Cuff Size: Adult Regular)   Pulse 83   Temp 98.1  F (36.7  C) (Oral)   Resp 16   Wt 88.4 kg (194 lb 12.8 oz)    "SpO2 100%   BMI 35.62 kg/m       Wt Readings from Last 2 Encounters:   05/02/22 88.4 kg (194 lb 12.8 oz)   11/01/21 84.1 kg (185 lb 6.4 oz)      Ht Readings from Last 2 Encounters:   12/20/19 1.575 m (5' 2.01\")   03/08/19 1.575 m (5' 2\")      KPS: 60-70 (fatigue, low mood)    -Generally well appearing. Fatigued in the setting of recent Ativan use prior to MRI plus chronic fatigue.   -Respiratory: No audible wheezing.   -Skin: Psoriasis plaques.  -Psychiatric: Flat mood and affect. Pleasant, talkative.  -Neurologic:   MENTAL STATUS:     Alert, oriented to date.    Recall: Intact, but slow.   Speech fluent.    Comprehension intact to multi-step commands.     CRANIAL NERVES:     Pupils are equal, round.     Extraocular movements full, patient denies diplopia. No nystagmus.    Symmetric facial movements.   Hearing intact.   With normal phonation, no dysfunction of the palate or tongue.   MOTOR: Antigravity in arms. No pronation or drift.   COORDINATION: Intact finger-nose with eyes closed.   SENSATION: Intact to light touch throughout.   GAIT: Steady, unassisted.       MEDICAL RECORDS  Obtained and personally reviewed all available outside medical records in addition to reviewing any records available in our electronic system.     LABS  Reviewed.     IMAGING  Personally reviewed MR brain imaging from today and compared to prior imaging. To my eye, there is no sign of disease recurrence. No elevated rCBV. No new enhancement. T2 FLAIR is largely stable.     There is sinusitis present.     Imaging results were reviewed with Merissa and Grzegorz.      Case and imaging was discussed at Brain Tumor Conference earlier today.       IMPRESSION/PLAN  Clinic was spent discussing in detail the nature of her cancer in light of her recent imaging. This was in addition to providing emotional support, answering questions pertaining to my recommendations, and devising the plan as outlined below.    I have personally reviewed the ED encounter " notes from 4/10 when Merissa presented for evaluation of a cough. She was diagnosed with acute otitis media plus an acute upper respiratory viral infection. Work-up included a negative chest imaging and negative testing for COVID, influenza, and RSV. She was started on Augmentin and since then, her symptoms have resolved. She denies sinus pain, but notes some fullness. MRI with sinusitis. I personally spoke to reading neuro-radiologist to confirm that this finding was nothing more than sinusitis.     I have also reviewed her clinic notes from Dr. Vang plus personally spoken with him regarding her ongoing depression and anxiety plus chronic fatigue. She did not tolerate bupropion and Dr. Vang has referred her to psychiatry for evaluation and prescribing of medications. She has yet to meet with a psychiatrist. He also referred her to ST and OT.     I encouraged her to follow-up with her primary care provider regarding management of the sinusitis and for a fatigue work-up; thyroid panel and vitamin D, etc. Consideration can also be made for a referral to sleep medicine for evaluation of sleep quality, particularly given that she snores at night. Additionally, Merissa is noting ongoing issues with cognition, but untreated depression and fatigue can result in 'pseudo-dementia' or cognitive changes related to depression and chronic fatigued.     For evaluation of the ringing of the ears, I recommended repeat hearing testing and this might be a sign of worsening hearing. She may benefit from hearing aids.     With regard to her MR brain imaging; the scan from today demonstrates no concern for cancer recurrence. Based on NCCN guidelines for imaging surveillance, will continue at an imaging interval of every 6 months for a total of 3 years (until 3/2024). At recurrence, clinical trial options remain a strong consideration, as does off label use of SMO inhibitors.     PROBLEM LIST  Medulloblastoma  Steroid  intolerance  Chemotherapy-induced pancytopenia  Chemotherapy-induced nausea/ vomiting   Radiation burn  Fatigue, cancer and treatment related, improving  Port removed     PLAN  -CANCER-DIRECTED THERAPY-  -As above; Imaging with no evidence of disease recurrence. Continue imaging surveillance; will repeat every 6 months.  -Can refill pre-scan Ativan as needed.   -No continued lab monitoring necessary.   -Repeat hearing testing done on 10/27/2020 with moderate sensorineural hearing loss. Will repeat annually; over due as of 10/2021 given history of ototoxicity and continued poor hearing. I placed a scheduling request for this testing.     -SEIZURE MANAGEMENT-  -Given the lack of seizure history, there is no indication to prescribe an antiepileptic at this time.    -Quality of life/ MOOD/ FATIGUE-  -Noting an increase in general anxiety/ depression as noted above.  -Following with Palliative Care for management of mood.   -Encouraged follow-up with psychiatry.   -Encouraged follow-up with primary care for fatigue work-up.     Return to clinic in 11/2022 + repeat MRI.    Anupama Morrison MD  Neuro-oncology

## 2022-05-02 ENCOUNTER — ANCILLARY PROCEDURE (OUTPATIENT)
Dept: MRI IMAGING | Facility: CLINIC | Age: 37
End: 2022-05-02
Attending: PSYCHIATRY & NEUROLOGY
Payer: COMMERCIAL

## 2022-05-02 ENCOUNTER — ONCOLOGY VISIT (OUTPATIENT)
Dept: ONCOLOGY | Facility: CLINIC | Age: 37
End: 2022-05-02
Attending: PSYCHIATRY & NEUROLOGY
Payer: COMMERCIAL

## 2022-05-02 ENCOUNTER — PATIENT OUTREACH (OUTPATIENT)
Dept: ONCOLOGY | Facility: CLINIC | Age: 37
End: 2022-05-02

## 2022-05-02 ENCOUNTER — TUMOR CONFERENCE (OUTPATIENT)
Dept: ONCOLOGY | Facility: CLINIC | Age: 37
End: 2022-05-02

## 2022-05-02 VITALS
HEART RATE: 83 BPM | BODY MASS INDEX: 35.62 KG/M2 | DIASTOLIC BLOOD PRESSURE: 84 MMHG | WEIGHT: 194.8 LBS | TEMPERATURE: 98.1 F | RESPIRATION RATE: 16 BRPM | OXYGEN SATURATION: 100 % | SYSTOLIC BLOOD PRESSURE: 125 MMHG

## 2022-05-02 DIAGNOSIS — F41.9 ANXIETY: ICD-10-CM

## 2022-05-02 DIAGNOSIS — C71.6 MEDULLOBLASTOMA OF CEREBELLUM (H): ICD-10-CM

## 2022-05-02 DIAGNOSIS — F40.240 CLAUSTROPHOBIA: ICD-10-CM

## 2022-05-02 DIAGNOSIS — C71.6 MEDULLOBLASTOMA OF CEREBELLUM (H): Primary | ICD-10-CM

## 2022-05-02 PROCEDURE — G0463 HOSPITAL OUTPT CLINIC VISIT: HCPCS

## 2022-05-02 PROCEDURE — 70553 MRI BRAIN STEM W/O & W/DYE: CPT | Performed by: RADIOLOGY

## 2022-05-02 PROCEDURE — A9585 GADOBUTROL INJECTION: HCPCS | Performed by: RADIOLOGY

## 2022-05-02 PROCEDURE — 99215 OFFICE O/P EST HI 40 MIN: CPT | Performed by: PSYCHIATRY & NEUROLOGY

## 2022-05-02 RX ORDER — LORAZEPAM 1 MG/1
TABLET ORAL
Qty: 2 TABLET | Refills: 0 | Status: SHIPPED | OUTPATIENT
Start: 2022-05-02 | End: 2022-11-01

## 2022-05-02 RX ORDER — GADOBUTROL 604.72 MG/ML
10 INJECTION INTRAVENOUS ONCE
Status: COMPLETED | OUTPATIENT
Start: 2022-05-02 | End: 2022-05-02

## 2022-05-02 RX ADMIN — GADOBUTROL 8 ML: 604.72 INJECTION INTRAVENOUS at 10:50

## 2022-05-02 ASSESSMENT — PAIN SCALES - GENERAL: PAINLEVEL: MODERATE PAIN (4)

## 2022-05-02 NOTE — NURSING NOTE
"Oncology Rooming Note    May 2, 2022 11:45 AM   Merissa Silverman is a 36 year old female who presents for:    Chief Complaint   Patient presents with     Oncology Clinic Visit     MEDULLOBLASTOMA     Initial Vitals: /84 (BP Location: Right arm, Patient Position: Sitting, Cuff Size: Adult Regular)   Pulse 83   Temp 98.1  F (36.7  C) (Oral)   Resp 16   Wt 88.4 kg (194 lb 12.8 oz)   SpO2 100%   BMI 35.62 kg/m   Estimated body mass index is 35.62 kg/m  as calculated from the following:    Height as of 12/20/19: 1.575 m (5' 2.01\").    Weight as of this encounter: 88.4 kg (194 lb 12.8 oz). Body surface area is 1.97 meters squared.  Moderate Pain (4) Comment: Data Unavailable   No LMP recorded. (Menstrual status: Chemotherapy).  Allergies reviewed: Yes  Medications reviewed: Yes    Medications: Medication refills not needed today.  Pharmacy name entered into [facility]:    [id number] - [place], [place] - [address] [facility] - [place], [place] - [address]  [facility] [place], [place] - [address] [place] [address]    Clinical concerns: Wondering about possible thyroid condition.        Kiah Rosenberg [facility]            "

## 2022-05-02 NOTE — LETTER
Date:May 11, 2022      Patient was self referred, no letter generated. Do not send.        [facility] Health Information

## 2022-05-02 NOTE — TUMOR CONFERENCE
Tumor Conference Information  Tumor Conference: Neuro-Onc  Specialties Present: Medical oncology, Radiation oncology, Pathology, Radiology, Surgery  Patient Status: Retrospective  Stage: medulloblastoma  Treatment to Date: Surgery, Radiation, Chemotherapy  Clinical Trials: Not discussed  Genetic Testing Discussed/Recommended?: No  Supportive Care Services Discussed/Recommended?: No  Recommended Plan: Follows evidence-based guidelines (Comment: reviewed imaging - stable; follow up in 6 months with repeat MRI)  Did the review exceed 30 minutes?: did not           Documentation / Disclaimer Cancer Tumor Board Note  Cancer tumor board recommendations do not override what is determined to be reasonable care and treatment, which is dependent on the circumstances of a patient's case; the patient's medical, social, and personal concerns; and the clinical judgment of the oncologist [physician].

## 2022-05-02 NOTE — LETTER
5/2/2022         RE: Merissa Silverman  [address]  [place] [address]        Dear Colleague,    Thank you for referring your patient, Merissa Silverman, to the [facility]. Please see a copy of my visit note below.    NEURO-ONCOLOGY VISIT  05/02/2022    CHIEF COMPLAINT: Ms. 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 Completed craniospinal radiation with concurrent vincristine x 3 doses, but concurrent chemotherapy was stopped in the setting of pancytopenia. She completed 4 cycles of adjuvant chemotherapy with vincristine, CCNU, and cisplatin as of 1/2019.     Imaging to date has shown no evidence of cancer recurrence and as such, Merissa is currently managed on imaging surveillance per NCCN guidelines.     I met with Merissa today for this follow-up visit accompanied by Grzegorz (father).      HISTORY OF PRESENT ILLNESS  -Merissa remains significantly fatigued. Sleep is poor; snoring at night.   -Experiencing mild daily headaches.  -No sinus pain, has seasonal allergies.    -Continues to have significant anxiety and depression, less motivation. She has not yet been seen by a psychologist.   -Worsening memory/ word finding issues and thinking issues.   -She denies any seizure-like activity.  -Denies vision changes or weakness.  -Hearing can be muted at times.      MEDICATIONS   Current Outpatient Medications   Medication     gabapentin (NEURONTIN) 300 MG capsule     LORazepam (ATIVAN) 1 MG tablet     No current facility-administered medications for this visit.     Facility-Administered Medications Ordered in Other Visits   Medication     gadobutrol (GADAVIST) injection 7.5 mL     DRUG ALLERGIES   Allergies   Allergen Reactions     Bupropion      Paranoid thoughts, spacey thoughts,  rash, hair fell out     Sulfa Drugs Unknown and Hives       ONCOLOGIC HISTORY  -PRESENTATION: Progressively worsening headaches. Additionally was with abrupt position changes resulted in dizziness/ syncope. CTH at an outside hospital showed a mass in the left cerebellum. Transferred to [facility].  -4/11/2018 MRB showed a 3.5cm mass in the left cerebellar hemisphere that was associated with mild cerebellar tonsillar herniation and hydrocephalus.  -MRI spine showed no evidence of disease.   -4/13/2018 SURGERY: Suboccipital craniotomy with resection of the left cerebellar mass. Immediate post-operative MRI demonstrated a gross total resection.   PATHOLOGY: Medulloblastoma; Molecular markers pending.   -5/4/2018 NEURO-ONC: LP performed. Evaluated by radiation oncology. Recommending craniospinal radiation + concurrent vincristine followed by eight 6-week cycles of cisplatin, lomustine, and vincristine.  -5/4/2018 LP with CSF analysis: WBC 1/ RBC 1, protein 25, glucose 62, negative cytology.   -5/14 - 6/22/2018 CHEMORADS with vincristine 2 mg/m2 (stopped after 3 infusions due to pancytopenia).  -5/17/2018 Audiology testing: Baseline hearing testing with no hearing loss.  -6/4/2018 NEURO-ONC: Doing well, no new neurological symptoms. Tolerating treatment well. Ophtho referral. Labs improved.  -6/4/2018 NGS via [facility]: SMO mutation, TERT promotor mutation. Negative for microsatellite instability.   -6/25/2018 NEURO-ONC: Clinically stable, painful radiation-induced burn. Monitoring labs. Cancer Risk Assessment referral.  -8/13/2018 NEURO-ONC/MRB/ CHEMO: Clinically stable. Imaging shows expected post operative changes with no evidence of new disease. C1D1 of vincristine, CCNU and cisplatin.   -9/24/2018 NEURO-ONC: Neurologically stable, though did not tolerate first cycle of chemo well 2/2 nausea. Hold on starting C2D1 today due to leukopenia (WBC 2.7).  -9/27/2018 CHEMO: C2D1 of vincristine, CCNU and  cisplatin.   -11/9/2018 NEURO-ONC/ MRB/ CHEMO: Clinically well. Imaging with no evidence of tumor recurrence. Cycle 3 CCNU and cisplatin; plan to hold vincristine on ODD cycles due to peripheral neuropathy. Repeat hearing testing ordered.   -12/4/2018 NEURO-ONC: New severe HA, likely tension. No new neurological symptoms or signs  -1/4/2019 NEURO-ONC/CHEMO: Clinically doing well. No changes. Cycle 4 CCNU and cisplatin. Holding vincristine. Last cycle.  -2/15/2019 NEURO-ONC/ MRB: Clinically stable. Labs not completely rebounded. Imaging with no concern for disease recurrence. Continue with imaging surveillance.  -2/25/2019 Audiology testing: moderate SNHL 3-8 kHz.  -3/2019 Port removed.   -4/22/2019 NEURO-ONC/ MRB: Clinically stable. Labs stable. Imaging with no concern for disease recurrence. Continue with imaging surveillance. Evaluated by dermatology.   -6/24/2019 NEURO-ONC/ MRB: Clinically stable. Imaging with no concern for disease recurrence. Continue with imaging surveillance.  -9/23/2019 NEURO-ONC/ MRB: Clinically stable. Imaging with no concern for disease recurrence.  -12/20/2019 NEURO-ONC/ MRB: Clinically stable. Imaging with no concern for disease recurrence.  -5/18/2020 NEURO-ONC/ MRB: Clinically stable. Imaging (contrast administration failed) with no concern for disease recurrence. Referral to repeat hearing testing placed.   -10/27/2020 Audiology testing: Moderate sensorineural hearing loss.  -11/2/2020 NEURO-ONC/ MRB: Clinically stable, aside from a slight increase in anxiety. Imaging without concern.   -3/8/2021 NEURO-ONC/ MRB: Clinically with mood issues; declined intervention. Imaging without concern.   -11/1/2021 NEURO-ONC/ MRB: Clinically with significant anxiety and depression, plus overwhelming fatigue. Open to a referral to palliative care. Imaging without concern; repeat in 6 months.  -5/2/2022 NEURO-ONC/ MRB: Imaging without concern; repeat in 6 months.    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      PHYSICAL EXAMINATION  /84 (BP Location: Right arm, Patient Position: Sitting, Cuff Size: Adult Regular)   Pulse 83   Temp 98.1  F (36.7  C) (Oral)   Resp 16   Wt 88.4 kg (194 lb 12.8 oz)   SpO2 100%   BMI 35.62 kg/m       Wt Readings from Last 2 Encounters:   05/02/22 88.4 kg (194 lb 12.8 oz)   11/01/21 84.1 kg (185 lb 6.4 oz)      Ht Readings from Last 2 Encounters:   12/20/19 1.575 m (5' 2.01\")   03/08/19 1.575 m (5' 2\")      KPS: 60-70 (fatigue, low mood)    -Generally well appearing. Fatigued in the setting of recent Ativan use prior to MRI plus chronic fatigue.   -Respiratory: No audible wheezing.   -Skin: Psoriasis plaques.  -Psychiatric: Flat mood and affect. Pleasant, talkative.  -Neurologic:   MENTAL STATUS:     Alert, oriented to date.    Recall: Intact, but slow.   Speech fluent.    Comprehension intact to multi-step commands.     CRANIAL NERVES:     Pupils are equal, round.     Extraocular movements full, patient denies diplopia. No nystagmus.    Symmetric facial movements.   Hearing intact.   With normal phonation, no dysfunction of the palate or tongue.   MOTOR: Antigravity in arms. No pronation or drift.   COORDINATION: Intact finger-nose with eyes closed.   SENSATION: Intact to light touch throughout.   GAIT: Steady, unassisted.       MEDICAL RECORDS  Obtained and personally reviewed all available outside medical records in addition to reviewing any records available in our electronic system.     LABS  Reviewed.     IMAGING  Personally reviewed MR brain imaging from today and compared to prior imaging. To my eye, there is no sign of disease recurrence. No elevated rCBV. No new enhancement. T2 FLAIR is largely stable.     There is sinusitis present.     Imaging results were reviewed with Merissa and Grzegorz.      Case and imaging was discussed at Brain Tumor Conference earlier today.       IMPRESSION/PLAN  Clinic was spent " discussing in detail the nature of her cancer in light of her recent imaging. This was in addition to providing emotional support, answering questions pertaining to my recommendations, and devising the plan as outlined below.    I have personally reviewed the ED encounter notes from 4/10 when Merissa presented for evaluation of a cough. She was diagnosed with acute otitis media plus an acute upper respiratory viral infection. Work-up included a negative chest imaging and negative testing for COVID, influenza, and RSV. She was started on Augmentin and since then, her symptoms have resolved. She denies sinus pain, but notes some fullness. MRI with sinusitis. I personally spoke to reading neuro-radiologist to confirm that this finding was nothing more than sinusitis.     I have also reviewed her clinic notes from Dr. Vang plus personally spoken with him regarding her ongoing depression and anxiety plus chronic fatigue. She did not tolerate bupropion and Dr. Vang has referred her to psychiatry for evaluation and prescribing of medications. She has yet to meet with a psychiatrist. He also referred her to ST and OT.     I encouraged her to follow-up with her primary care provider regarding management of the sinusitis and for a fatigue work-up; thyroid panel and vitamin D, etc. Consideration can also be made for a referral to sleep medicine for evaluation of sleep quality, particularly given that she snores at night. Additionally, Merissa is noting ongoing issues with cognition, but untreated depression and fatigue can result in 'pseudo-dementia' or cognitive changes related to depression and chronic fatigued.     For evaluation of the ringing of the ears, I recommended repeat hearing testing and this might be a sign of worsening hearing. She may benefit from hearing aids.     With regard to her MR brain imaging; the scan from today demonstrates no concern for cancer recurrence. Based on NCCN guidelines for imaging  surveillance, will continue at an imaging interval of every 6 months for a total of 3 years (until 3/2024). At recurrence, clinical trial options remain a strong consideration, as does off label use of SMO inhibitors.     PROBLEM LIST  Medulloblastoma  Steroid intolerance  Chemotherapy-induced pancytopenia  Chemotherapy-induced nausea/ vomiting   Radiation burn  Fatigue, cancer and treatment related, improving  Port removed     PLAN  -CANCER-DIRECTED THERAPY-  -As above; Imaging with no evidence of disease recurrence. Continue imaging surveillance; will repeat every 6 months.  -Can refill pre-scan Ativan as needed.   -No continued lab monitoring necessary.   -Repeat hearing testing done on 10/27/2020 with moderate sensorineural hearing loss. Will repeat annually; over due as of 10/2021 given history of ototoxicity and continued poor hearing. I placed a scheduling request for this testing.     -SEIZURE MANAGEMENT-  -Given the lack of seizure history, there is no indication to prescribe an antiepileptic at this time.    -Quality of life/ MOOD/ FATIGUE-  -Noting an increase in general anxiety/ depression as noted above.  -Following with Palliative Care for management of mood.   -Encouraged follow-up with psychiatry.   -Encouraged follow-up with primary care for fatigue work-up.     Return to clinic in 11/2022 + repeat MRI.    Anupama Morrison MD  Neuro-oncology        Again, thank you for allowing me to participate in the care of your patient.        Sincerely,        Anupama Morrison MD

## 2022-05-02 NOTE — DISCHARGE INSTRUCTIONS
MRI Contrast Discharge Instructions    The IV contrast you received today will pass out of your body in your  urine. This will happen in the next 24 hours. You will not feel this process.  Your urine will not change color.    Drink at least 4 extra glasses of water or juice today (unless your doctor  has restricted your fluids). This reduces the stress on your kidneys.  You may take your regular medicines.    If you are on dialysis: It is best to have dialysis today.    If you have a reaction: Most reactions happen right away. If you have  any new symptoms after leaving the hospital (such as hives or swelling),  call your hospital at the correct number below. Or call your family doctor.  If you have breathing distress or wheezing, call 911.    Special instructions: ***    I have read and understand the above information.    Signature:______________________________________ Date:___________    Staff:__________________________________________ Date:___________     Time:__________    [facility] Departments:    ___[place]: [phone]  ___[facility]: [phone]  ___[place]: [phone] ___[facility]: [phone]  ___[facility]: [phone]  ___[facility]: [phone]  ___[phone]  ___[facility]: [phone]  ___[phone]

## 2022-05-03 ENCOUNTER — PATIENT OUTREACH (OUTPATIENT)
Dept: ONCOLOGY | Facility: CLINIC | Age: 37
End: 2022-05-03
Payer: COMMERCIAL

## 2022-05-03 DIAGNOSIS — H93.8X3 OTOTOXICITY OF BOTH EARS: Primary | ICD-10-CM

## 2022-05-04 ENCOUNTER — HOSPITAL ENCOUNTER (OUTPATIENT)
Dept: SPEECH THERAPY | Facility: CLINIC | Age: 37
Discharge: HOME | End: 2022-05-04
Payer: COMMERCIAL

## 2022-05-04 PROCEDURE — 92507 TX SP LANG VOICE COMM INDIV: CPT | Mod: GN | Performed by: SPEECH-LANGUAGE PATHOLOGIST

## 2022-05-04 NOTE — PROGRESS NOTES
Merissa Silverman is a 36 year old female who is being seen via a billable video visit.      Patient has given verbal consent for Video visit? Yes    Video Start Time: 9:30    Telehealth Visit Details    Type of Service:  Telehealth    Video End Time (time video stopped): 10:10    Originating Location (pt. location): Home    Additional Participants in Telehealth Visit: none    Distant Location (provider location):  [facility] [place]     Mode of Communication (Audio Visual or Audio Only):  audio and visual via [employer].     Kellie Colindres, SLP  May 4, 2022

## 2022-05-10 ENCOUNTER — HOSPITAL ENCOUNTER (OUTPATIENT)
Dept: SPEECH THERAPY | Facility: CLINIC | Age: 37
Discharge: HOME | End: 2022-05-10
Payer: COMMERCIAL

## 2022-05-10 PROCEDURE — 92507 TX SP LANG VOICE COMM INDIV: CPT | Mod: GN | Performed by: SPEECH-LANGUAGE PATHOLOGIST

## 2022-05-10 NOTE — PROGRESS NOTES
Merissa Silverman is a 36 year old female who is being seen via a billable video visit.      Patient has given verbal consent for Video visit? Yes    Video Start Time: 11    Telehealth Visit Details    Type of Service:  Telehealth    Video End Time (time video stopped): 11:40    Originating Location (pt. location): Home    Additional Participants in Telehealth Visit: none    Distant Location (provider location):  [facility] [place]     Mode of Communication (Audio Visual or Audio Only):  audio and visual via [employer]Well.     Kellie Colindres, SLP  May 10, 2022

## 2022-05-16 ENCOUNTER — HOSPITAL ENCOUNTER (OUTPATIENT)
Dept: SPEECH THERAPY | Facility: CLINIC | Age: 37
Discharge: HOME | End: 2022-05-16
Payer: COMMERCIAL

## 2022-05-16 PROCEDURE — 92507 TX SP LANG VOICE COMM INDIV: CPT | Mod: GN | Performed by: SPEECH-LANGUAGE PATHOLOGIST

## 2022-05-16 NOTE — PROGRESS NOTES
Merissa Silverman is a 36 year old female who is being seen via a billable video visit.      Patient has given verbal consent for Video visit? Yes    Video Start Time: 11    Telehealth Visit Details    Type of Service:  Telehealth    Video End Time (time video stopped): 11:40    Originating Location (pt. location): Home    Additional Participants in Telehealth Visit: none    Distant Location (provider location):  [facility] [place]     Mode of Communication (Audio Visual or Audio Only):  audio and visual via [employer]Well.     Kellie Colindres, SLP  May 16, 2022

## 2022-05-21 ENCOUNTER — HEALTH MAINTENANCE LETTER (OUTPATIENT)
Age: 37
End: 2022-05-21

## 2022-05-23 ENCOUNTER — HOSPITAL ENCOUNTER (OUTPATIENT)
Dept: SPEECH THERAPY | Facility: CLINIC | Age: 37
Discharge: HOME | End: 2022-05-23
Payer: COMMERCIAL

## 2022-05-23 PROCEDURE — 92507 TX SP LANG VOICE COMM INDIV: CPT | Mod: GN | Performed by: SPEECH-LANGUAGE PATHOLOGIST

## 2022-05-23 NOTE — PROGRESS NOTES
Merissa Silverman is a 36 year old female who is being seen via a billable video visit.      Patient has given verbal consent for Video visit? Yes    Video Start Time: 11    Telehealth Visit Details    Type of Service:  Telehealth    Video End Time (time video stopped): 11:40    Originating Location (pt. location): Home    Additional Participants in Telehealth Visit: none    Distant Location (provider location):  [facility] [place]     Mode of Communication (Audio Visual or Audio Only):  audio and visual via [employer]Well.     Kellie Colindres, SLP  May 23, 2022

## 2022-05-23 NOTE — PROGRESS NOTES
[facility]    Outpatient Speech Language Pathology Progress Note  Patient: Merissa Silverman  : 1985    Beginning/End Dates of Reporting Period:  3/30/2022 to 2022    Referring Provider: Dr RODRIGUEZ Morrison MD (PCP)    Therapy Diagnosis: Mild to moderate expressive aphasia, mild to moderate dysarthria     Client Self Report: Pt is a 36 y.o. female who completed an OP SLP evaluation on 3/30/2022, please see the EMR for further information.  Pt reports becoming more regulated with medication changes, endorses ongoing fatigue, word finding worse with this. Is finding use of description strategy to be helpful.     Objective Measurements:   See below:      Goals:  Goal Identifier LTG 6-Xoaxwmyu-Tqprva Expression    Goal Description Patient will learn, implement, and demonstrate use of 3 word-finding strategies with 80% accuracy provided with min cues to meet daily expressive language needs.     Target Date 22-Extend goal 2022   Date Met      Progress (detail required for progress note):  Goal not met, Pt demonstrating 70-80% I for moderate to complex level tasks, improves to 100% given min to mod cues, continue goal for reduced cueing, increased accuracy and endurance.      Goal Identifier LTG 7-Vtqqvjal-Logpzhv Expression    Goal Description Pt will complete a complex level writing task (i.e. thank you, emails, journaling) with 80% accuracy provided 0-min cues in order to write emails.    Target Date 22   Date Met      Progress (detail required for progress note):  Discontinue goal, Pt does not report ongoing concerns/difficult to address as Pt is using her cell phone at home for virtual visits. Education provided regarding strategies.      Goal Identifier LTG 3-Speech-Dysarthria    Goal Description Patient will demonstrate 80% intelligibility at the conversational level of speech for improved  intelligibility provided 0-min cues when speaking with family and friends on the phone.   Target Date 05/29/22   Date Met      Progress (detail required for progress note):  Discharge goal, pt demonstrating % in conversation.        Plan:  Continue therapy per current plan of care, targeting V/E goal outlined above.     Discharge:  No  Thank you for referring Mer[facility] Silver[facility]. Please call Kellie Colindres MA, SLP-CCC at [phone] email [email or web address] with any questions or concerns.      Kellie Colindres M.A., SLP-CCC  Speech-Language Pathologist

## 2022-07-08 ENCOUNTER — PATIENT OUTREACH (OUTPATIENT)
Dept: ONCOLOGY | Facility: CLINIC | Age: 37
End: 2022-07-08

## 2022-07-08 NOTE — PROGRESS NOTES
[facility]: Cancer Care Short Note                                                                                          Completed chart audit to assign Oncology Care Coordination enrollment status.      Halima Ferguson RN, BSN  Oncology/Neurosurgery Care Coordinator  [facility]    .

## 2022-08-09 NOTE — PROGRESS NOTES
[facility]    Outpatient Speech Language Pathology Discharge Note  Patient: Merissa Silverman  : 1985    Beginning/End Dates of Reporting Period:  22 to 22    Referring Provider: Dr. RODRIGUEZ Morrison MD     Therapy Diagnosis: Mild to moderate expressive aphasia, mild to moderate dysarthria    Client Self Report: Pt is a 36 y.o. female who completed an OP SLP evaluation on 3/30/22, please see the EMR for further information. During this treatment period Pt completed 1 session and cancelled/NS'ed her remaining visits. SLP called and left a VM to re-establish care but Pt has not returned the call.       Outcome Measures (most recent score):Limited as Pt completed x1 session during this reporting period. No changes since last reporting period.         Goals:  Goal Identifier LTG 4-Vmoamhef-Jbhozh Expression    Goal Description Patient will learn, implement, and demonstrate use of 3 word-finding strategies with 80% accuracy provided with min cues to meet daily expressive language needs.     Target Date 22   Date Met      Progress (detail required for progress note):  Goal not met, Pt demonstrating 70-80% I for moderate to complex level tasks, improves to 100% given min to mod cues, continue goal for reduced cueing, increased accuracy and endurance.        Plan:  Discharge from therapy.    Discharge: Yes     Reason for Discharge: Patient chooses to discontinue therapy.      Discharge Plan: Patient to continue home program.    Thank you for referring Merissa Silverman to outpatient therapy at [facility]. Please call Kellie Colindres MA, SLP-CCC at [phone] email [email or web address] with any questions or concerns.      Kellie Colindres M.A., SLP-CCC  Speech-Language Pathologist

## 2022-08-09 NOTE — ADDENDUM NOTE
Encounter addended by: Kellie Colindres, SLP on: 8/9/2022 9:29 AM   Actions taken: Clinical Note Signed, Episode resolved

## 2022-09-18 ENCOUNTER — HEALTH MAINTENANCE LETTER (OUTPATIENT)
Age: 37
End: 2022-09-18

## 2022-11-01 ENCOUNTER — MYC REFILL (OUTPATIENT)
Dept: ONCOLOGY | Facility: CLINIC | Age: 37
End: 2022-11-01

## 2022-11-01 DIAGNOSIS — C71.6 MEDULLOBLASTOMA OF CEREBELLUM (H): ICD-10-CM

## 2022-11-01 DIAGNOSIS — F41.9 ANXIETY: ICD-10-CM

## 2022-11-01 DIAGNOSIS — F40.240 CLAUSTROPHOBIA: ICD-10-CM

## 2022-11-02 NOTE — PROGRESS NOTES
NEURO-ONCOLOGY VISIT  11/04/2022    CHIEF COMPLAINT: Ms. 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 Completed craniospinal radiation with concurrent vincristine x 3 doses, but concurrent chemotherapy was stopped in the setting of pancytopenia. She completed 4 cycles of adjuvant chemotherapy with vincristine, CCNU, and cisplatin as of 1/2019.     Imaging to date has shown no evidence of cancer recurrence and as such, Merissa is currently managed on imaging surveillance per NCCN guidelines.     I met with Merissa today for this follow-up visit accompanied by Grzegorz (father).      HISTORY OF PRESENT ILLNESS  -Merissa is doing fairly well and Grzegorz agrees.   -Energy much improved. She was found to have hypothyroidism. She will be started on levothyroxine 50 mcg and this has made a world of a difference.   -Infrequent headaches.  -Less anxiety and depression, more motivation.  -Better memory, word finding, and thinking.   -She denies any seizure-like activity.  -Denies vision changes or weakness.  -Hearing is decreased, but declined repeat testing.      MEDICATIONS   Current Outpatient Medications   Medication     levothyroxine (SYNTHROID/LEVOTHROID) 50 MCG tablet     LORazepam (ATIVAN) 1 MG tablet     No current facility-administered medications for this visit.     Facility-Administered Medications Ordered in Other Visits   Medication     gadobutrol (GADAVIST) injection 7.5 mL     DRUG ALLERGIES   Allergies   Allergen Reactions     Bupropion      Paranoid thoughts, spacey thoughts, rash, hair fell out     Sulfa Drugs Unknown and Hives       ONCOLOGIC HISTORY  -PRESENTATION: Progressively worsening headaches. Additionally was with abrupt position changes resulted in dizziness/ syncope. CTH at an outside hospital showed a mass in  the left cerebellum. Transferred to [facility].  -4/11/2018 MRB showed a 3.5cm mass in the left cerebellar hemisphere that was associated with mild cerebellar tonsillar herniation and hydrocephalus.  -MRI spine showed no evidence of disease.   -4/13/2018 SURGERY: Suboccipital craniotomy with resection of the left cerebellar mass. Immediate post-operative MRI demonstrated a gross total resection.   PATHOLOGY: Medulloblastoma; Molecular markers pending.   -5/4/2018 NEURO-ONC: LP performed. Evaluated by radiation oncology. Recommending craniospinal radiation + concurrent vincristine followed by eight 6-week cycles of cisplatin, lomustine, and vincristine.  -5/4/2018 LP with CSF analysis: WBC 1/ RBC 1, protein 25, glucose 62, negative cytology.   -5/14 - 6/22/2018 CHEMORADS with vincristine 2 mg/m2 (stopped after 3 infusions due to pancytopenia).  -5/17/2018 Audiology testing: Baseline hearing testing with no hearing loss.  -6/4/2018 NEURO-ONC: Doing well, no new neurological symptoms. Tolerating treatment well. Ophtho referral. Labs improved.  -6/4/2018 NGS via STRATA: SMO mutation, TERT promotor mutation. Negative for microsatellite instability.   -6/25/2018 NEURO-ONC: Clinically stable, painful radiation-induced burn. Monitoring labs. Cancer Risk Assessment referral.  -8/13/2018 NEURO-ONC/MRB/ CHEMO: Clinically stable. Imaging shows expected post operative changes with no evidence of new disease. C1D1 of vincristine, CCNU and cisplatin.   -9/24/2018 NEURO-ONC: Neurologically stable, though did not tolerate first cycle of chemo well 2/2 nausea. Hold on starting C2D1 today due to leukopenia (WBC 2.7).  -9/27/2018 CHEMO: C2D1 of vincristine, CCNU and cisplatin.   -11/9/2018 NEURO-ONC/ MRB/ CHEMO: Clinically well. Imaging with no evidence of tumor recurrence. Cycle 3 CCNU and cisplatin; plan to hold vincristine on ODD cycles due to peripheral neuropathy. Repeat hearing testing ordered.   -12/4/2018  "NEURO-ONC: New severe HA, likely tension. No new neurological symptoms or signs  -1/4/2019 NEURO-ONC/CHEMO: Clinically doing well. No changes. Cycle 4 CCNU and cisplatin. Holding vincristine. Last cycle.  -2/15/2019 NEURO-ONC/ MRB: Clinically stable. Labs not completely rebounded. Imaging with no concern for disease recurrence. Continue with imaging surveillance.  -2/25/2019 Audiology testing: moderate SNHL 3-8 kHz.  -3/2019 Port removed.   -4/22/2019 NEURO-ONC/ MRB: Clinically stable. Labs stable. Imaging with no concern for disease recurrence. Continue with imaging surveillance. Evaluated by dermatology.   -6/24/2019 NEURO-ONC/ MRB: Clinically stable. Imaging with no concern for disease recurrence. Continue with imaging surveillance.  -9/23/2019 NEURO-ONC/ MRB: Clinically stable. Imaging with no concern for disease recurrence.  -12/20/2019 NEURO-ONC/ MRB: Clinically stable. Imaging with no concern for disease recurrence.  -5/18/2020 NEURO-ONC/ MRB: Clinically stable. Imaging (contrast administration failed) with no concern for disease recurrence. Referral to repeat hearing testing placed.   -10/27/2020 Audiology testing: Moderate sensorineural hearing loss.  -11/2/2020 NEURO-ONC/ MRB: Clinically stable, aside from a slight increase in anxiety. Imaging without concern.   -3/8/2021 NEURO-ONC/ MRB: Clinically with mood issues; declined intervention. Imaging without concern.   -11/1/2021 NEURO-ONC/ MRB: Clinically with significant anxiety and depression, plus overwhelming fatigue. Open to a referral to palliative care. Imaging without concern; repeat in 6 months.  -5/2/2022 NEURO-ONC/ MRB: Hypothyroidism corrected and clinically better. Imaging without concern; repeat in 6 months.    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.   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      PHYSICAL EXAMINATION  /81   Pulse 85   Temp 98  F (36.7  C) (Oral)   Resp 16   Ht 1.575 m (5' 2.01\")   Wt 73.8 kg (162 lb 9.6 oz)   " "SpO2 100%   BMI 29.73 kg/m       Wt Readings from Last 2 Encounters:   11/04/22 73.8 kg (162 lb 9.6 oz)   05/02/22 88.4 kg (194 lb 12.8 oz)      Ht Readings from Last 2 Encounters:   11/04/22 1.575 m (5' 2.01\")   12/20/19 1.575 m (5' 2.01\")      KPS: 90    -Generally well appearing. Good energy today.  -Respiratory: No audible wheezing.   -Psychiatric: Reactive mood and affect. Pleasant, talkative.  -Neurologic:   MENTAL STATUS:     Alert, oriented to date.    Recall: Intact.   Speech fluent.    Comprehension intact to multi-step commands.     CRANIAL NERVES:     Pupils are equal, round.     Extraocular movements full, denies diplopia. No nystagmus.    Symmetric facial movements.   Hearing intact.   With normal phonation, no dysfunction of the palate or tongue.   MOTOR: Antigravity in arms. No pronation or drift.   COORDINATION: Intact finger-nose with eyes closed.   SENSATION: Intact to light touch throughout.   GAIT: Steady, unassisted. Able to toe, heel, and tandem walk.       MEDICAL RECORDS  Obtained and personally reviewed all available outside medical records in addition to reviewing any records available in our electronic system.   -Primary care notes.     LABS  7/2022 TSH 0.01, T4 1.21.     IMAGING  Personally reviewed MR brain imaging from today and compared to prior imaging. To my eye, there is no sign of disease recurrence. No elevated rCBV. No new enhancement. T2 FLAIR is largely stable.     Imaging results were reviewed with Merissa and Grzegorz.        IMPRESSION/PLAN  Clinic was spent discussing in detail the nature of her cancer in light of her recent imaging. This was in addition to providing emotional support, answering questions pertaining to my recommendations, and devising the plan as outlined below.    I have personally reviewed the primary care note from May in which the mucosal thickening of her sphenoid sinus seen on imaging was addressed. Doxycycline was prescribed. Lab testing also revealed " hypothyroidism and she was started on levothyroxine. Since then, correction of her hypothyroidism has made a world of a difference. Her energy, mood, and cognition are all improved. She denies any other new neurological symptoms. Merissa completed sessions with ST and I have reviewed these notes.     With regard to her MR brain imaging; the scan from today demonstrates no concern for cancer recurrence. I personally reviewed the imaging with neuro-radiology who is in agreement with this impression. Based on NCCN guidelines for imaging surveillance, will continue at an imaging interval of every 6 months for a total of 3 years (until 3/2024). At recurrence, clinical trial options remain a strong consideration, as does off label use of SMO inhibitors.     PROBLEM LIST  Medulloblastoma  Steroid intolerance  Chemotherapy-induced pancytopenia  Chemotherapy-induced nausea/ vomiting   Radiation burn  Fatigue, cancer and treatment related, improving  Port removed     PLAN  -CANCER-DIRECTED THERAPY-  -As above; Imaging with no evidence of disease recurrence. Continue imaging surveillance; will repeat every 6 months.  -Can refill pre-scan Ativan as needed.   -No continued lab monitoring necessary.   -Repeat hearing testing done on 10/27/2020 with moderate sensorineural hearing loss. Will repeat annually; over due as of 10/2021 given history of ototoxicity and continued poor hearing.    Merissa declined repeat testing for now.     -SEIZURE MANAGEMENT-  -Given the lack of seizure history, there is no indication to prescribe an antiepileptic at this time.    -Quality of life/ MOOD/ FATIGUE-  -History of anxiety/ depression.   Improved, continue to monitor.  -Following with Palliative Care for management of mood.   -Encouraged follow-up with primary care for management of hypothyroidism.     Return to clinic in 5/2023 + repeat MRI.    Anupama Morrison MD  Neuro-oncology

## 2022-11-02 NOTE — TELEPHONE ENCOUNTER
Medication: lorazepam    Last prescribing provider: Dr. Morrison 5/2/2022    Last clinic visit date: 5/2/2022    Any missed appointments or no-shows since last clinic visit? NO    Recommendations for requested medication (if none, N/A): Taken prior to scan    Next clinic visit date: 11/4/2022    Any other pertinent information (if none, N/A): Prior to scan.  Brain MRI scheduled for 11/4/2022.

## 2022-11-04 ENCOUNTER — ONCOLOGY VISIT (OUTPATIENT)
Dept: ONCOLOGY | Facility: CLINIC | Age: 37
End: 2022-11-04
Attending: PSYCHIATRY & NEUROLOGY
Payer: COMMERCIAL

## 2022-11-04 ENCOUNTER — ANCILLARY PROCEDURE (OUTPATIENT)
Dept: MRI IMAGING | Facility: CLINIC | Age: 37
End: 2022-11-04
Attending: PSYCHIATRY & NEUROLOGY
Payer: COMMERCIAL

## 2022-11-04 VITALS
BODY MASS INDEX: 29.92 KG/M2 | WEIGHT: 162.6 LBS | HEART RATE: 85 BPM | DIASTOLIC BLOOD PRESSURE: 81 MMHG | OXYGEN SATURATION: 100 % | TEMPERATURE: 98 F | RESPIRATION RATE: 16 BRPM | SYSTOLIC BLOOD PRESSURE: 113 MMHG | HEIGHT: 62 IN

## 2022-11-04 DIAGNOSIS — C71.6 MEDULLOBLASTOMA OF CEREBELLUM (H): ICD-10-CM

## 2022-11-04 DIAGNOSIS — C71.6 MEDULLOBLASTOMA OF CEREBELLUM (H): Primary | ICD-10-CM

## 2022-11-04 PROCEDURE — 70553 MRI BRAIN STEM W/O & W/DYE: CPT | Mod: GC | Performed by: RADIOLOGY

## 2022-11-04 PROCEDURE — 99214 OFFICE O/P EST MOD 30 MIN: CPT | Performed by: PSYCHIATRY & NEUROLOGY

## 2022-11-04 PROCEDURE — A9585 GADOBUTROL INJECTION: HCPCS | Performed by: RADIOLOGY

## 2022-11-04 PROCEDURE — G0463 HOSPITAL OUTPT CLINIC VISIT: HCPCS

## 2022-11-04 RX ORDER — LEVOTHYROXINE SODIUM 50 UG/1
50 TABLET ORAL DAILY
COMMUNITY
Start: 2022-05-04

## 2022-11-04 RX ORDER — LORAZEPAM 1 MG/1
TABLET ORAL
Qty: 2 TABLET | Refills: 0 | Status: SHIPPED | OUTPATIENT
Start: 2022-11-04

## 2022-11-04 RX ORDER — GADOBUTROL 604.72 MG/ML
10 INJECTION INTRAVENOUS ONCE
Status: COMPLETED | OUTPATIENT
Start: 2022-11-04 | End: 2022-11-04

## 2022-11-04 RX ADMIN — GADOBUTROL 9 ML: 604.72 INJECTION INTRAVENOUS at 13:15

## 2022-11-04 ASSESSMENT — PAIN SCALES - GENERAL: PAINLEVEL: NO PAIN (0)

## 2022-11-04 NOTE — LETTER
Date:November 4, 2022      Provider requested that no letter be sent. Do not send.       [facility]

## 2022-11-04 NOTE — DISCHARGE INSTRUCTIONS
MRI Contrast Discharge Instructions    The IV contrast you received today will pass out of your body in your  urine. This will happen in the next 24 hours. You will not feel this process.  Your urine will not change color.    Drink at least 4 extra glasses of water or juice today (unless your doctor  has restricted your fluids). This reduces the stress on your kidneys.  You may take your regular medicines.    If you are on dialysis: It is best to have dialysis today.    If you have a reaction: Most reactions happen right away. If you have  any new symptoms after leaving the hospital (such as hives or swelling),  call your hospital at the correct number below. Or call your family doctor.  If you have breathing distress or wheezing, call 911.    Special instructions: ***    I have read and understand the above information.    Signature:______________________________________ Date:___________    Staff:__________________________________________ Date:___________     Time:__________    [facility] Departments:    ___[place]: [phone]  ___[facility]: [phone]  ___[place]: [phone] ___[facility]: [phone]  ___[facility]: [phone]  ___[facility]: [phone]  ___[phone]  ___[facility]: [phone]  ___[phone]

## 2022-11-04 NOTE — LETTER
11/4/2022         RE: Merissa Silverman  [address]  [place] [address]        Dear Colleague,    Thank you for referring your patient, Merissa Silverman, to the [facility]. Please see a copy of my visit note below.    NEURO-ONCOLOGY VISIT  11/04/2022    CHIEF COMPLAINT: Ms. 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 Completed craniospinal radiation with concurrent vincristine x 3 doses, but concurrent chemotherapy was stopped in the setting of pancytopenia. She completed 4 cycles of adjuvant chemotherapy with vincristine, CCNU, and cisplatin as of 1/2019.     Imaging to date has shown no evidence of cancer recurrence and as such, Merissa is currently managed on imaging surveillance per NCCN guidelines.     I met with Merissa today for this follow-up visit accompanied by Grzegorz (father).      HISTORY OF PRESENT ILLNESS  -Merissa is doing fairly well and Grzegorz agrees.   -Energy much improved. She was found to have hypothyroidism. She will be started on levothyroxine 50 mcg and this has made a world of a difference.   -Infrequent headaches.  -Less anxiety and depression, more motivation.  -Better memory, word finding, and thinking.   -She denies any seizure-like activity.  -Denies vision changes or weakness.  -Hearing is decreased, but declined repeat testing.      MEDICATIONS   Current Outpatient Medications   Medication     levothyroxine (SYNTHROID/LEVOTHROID) 50 MCG tablet     LORazepam (ATIVAN) 1 MG tablet     No current facility-administered medications for this visit.     Facility-Administered Medications Ordered in Other Visits   Medication     gadobutrol (GADAVIST) injection 7.5 mL     DRUG ALLERGIES   Allergies   Allergen Reactions     Bupropion      Paranoid thoughts,  spacey thoughts, rash, hair fell out     Sulfa Drugs Unknown and Hives       ONCOLOGIC HISTORY  -PRESENTATION: Progressively worsening headaches. Additionally was with abrupt position changes resulted in dizziness/ syncope. CTH at an outside hospital showed a mass in the left cerebellum. Transferred to [facility].  -4/11/2018 MRB showed a 3.5cm mass in the left cerebellar hemisphere that was associated with mild cerebellar tonsillar herniation and hydrocephalus.  -MRI spine showed no evidence of disease.   -4/13/2018 SURGERY: Suboccipital craniotomy with resection of the left cerebellar mass. Immediate post-operative MRI demonstrated a gross total resection.   PATHOLOGY: Medulloblastoma; Molecular markers pending.   -5/4/2018 NEURO-ONC: LP performed. Evaluated by radiation oncology. Recommending craniospinal radiation + concurrent vincristine followed by eight 6-week cycles of cisplatin, lomustine, and vincristine.  -5/4/2018 LP with CSF analysis: WBC 1/ RBC 1, protein 25, glucose 62, negative cytology.   -5/14 - 6/22/2018 CHEMORADS with vincristine 2 mg/m2 (stopped after 3 infusions due to pancytopenia).  -5/17/2018 Audiology testing: Baseline hearing testing with no hearing loss.  -6/4/2018 NEURO-ONC: Doing well, no new neurological symptoms. Tolerating treatment well. Ophtho referral. Labs improved.  -6/4/2018 NGS via STRATA: SMO mutation, TERT promotor mutation. Negative for microsatellite instability.   -6/25/2018 NEURO-ONC: Clinically stable, painful radiation-induced burn. Monitoring labs. Cancer Risk Assessment referral.  -8/13/2018 NEURO-ONC/MRB/ CHEMO: Clinically stable. Imaging shows expected post operative changes with no evidence of new disease. C1D1 of vincristine, CCNU and cisplatin.   -9/24/2018 NEURO-ONC: Neurologically stable, though did not tolerate first cycle of chemo well 2/2 nausea. Hold on starting C2D1 today due to leukopenia (WBC 2.7).  -9/27/2018 CHEMO: C2D1 of  vincristine, CCNU and cisplatin.   -11/9/2018 NEURO-ONC/ MRB/ CHEMO: Clinically well. Imaging with no evidence of tumor recurrence. Cycle 3 CCNU and cisplatin; plan to hold vincristine on ODD cycles due to peripheral neuropathy. Repeat hearing testing ordered.   -12/4/2018 NEURO-ONC: New severe HA, likely tension. No new neurological symptoms or signs  -1/4/2019 NEURO-ONC/CHEMO: Clinically doing well. No changes. Cycle 4 CCNU and cisplatin. Holding vincristine. Last cycle.  -2/15/2019 NEURO-ONC/ MRB: Clinically stable. Labs not completely rebounded. Imaging with no concern for disease recurrence. Continue with imaging surveillance.  -2/25/2019 Audiology testing: moderate SNHL 3-8 kHz.  -3/2019 Port removed.   -4/22/2019 NEURO-ONC/ MRB: Clinically stable. Labs stable. Imaging with no concern for disease recurrence. Continue with imaging surveillance. Evaluated by dermatology.   -6/24/2019 NEURO-ONC/ MRB: Clinically stable. Imaging with no concern for disease recurrence. Continue with imaging surveillance.  -9/23/2019 NEURO-ONC/ MRB: Clinically stable. Imaging with no concern for disease recurrence.  -12/20/2019 NEURO-ONC/ MRB: Clinically stable. Imaging with no concern for disease recurrence.  -5/18/2020 NEURO-ONC/ MRB: Clinically stable. Imaging (contrast administration failed) with no concern for disease recurrence. Referral to repeat hearing testing placed.   -10/27/2020 Audiology testing: Moderate sensorineural hearing loss.  -11/2/2020 NEURO-ONC/ MRB: Clinically stable, aside from a slight increase in anxiety. Imaging without concern.   -3/8/2021 NEURO-ONC/ MRB: Clinically with mood issues; declined intervention. Imaging without concern.   -11/1/2021 NEURO-ONC/ MRB: Clinically with significant anxiety and depression, plus overwhelming fatigue. Open to a referral to palliative care. Imaging without concern; repeat in 6 months.  -5/2/2022 NEURO-ONC/ MRB: Hypothyroidism corrected and clinically better. Imaging  "without concern; repeat in 6 months.    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      PHYSICAL EXAMINATION  /81   Pulse 85   Temp 98  F (36.7  C) (Oral)   Resp 16   Ht 1.575 m (5' 2.01\")   Wt 73.8 kg (162 lb 9.6 oz)   SpO2 100%   BMI 29.73 kg/m       Wt Readings from Last 2 Encounters:   11/04/22 73.8 kg (162 lb 9.6 oz)   05/02/22 88.4 kg (194 lb 12.8 oz)      Ht Readings from Last 2 Encounters:   11/04/22 1.575 m (5' 2.01\")   12/20/19 1.575 m (5' 2.01\")      KPS: 90    -Generally well appearing. Good energy today.  -Respiratory: No audible wheezing.   -Psychiatric: Reactive mood and affect. Pleasant, talkative.  -Neurologic:   MENTAL STATUS:     Alert, oriented to date.    Recall: Intact.   Speech fluent.    Comprehension intact to multi-step commands.     CRANIAL NERVES:     Pupils are equal, round.     Extraocular movements full, denies diplopia. No nystagmus.    Symmetric facial movements.   Hearing intact.   With normal phonation, no dysfunction of the palate or tongue.   MOTOR: Antigravity in arms. No pronation or drift.   COORDINATION: Intact finger-nose with eyes closed.   SENSATION: Intact to light touch throughout.   GAIT: Steady, unassisted. Able to toe, heel, and tandem walk.       MEDICAL RECORDS  Obtained and personally reviewed all available outside medical records in addition to reviewing any records available in our electronic system.   -Primary care notes.     LABS  7/2022 TSH 0.01, T4 1.21.     IMAGING  Personally reviewed MR brain imaging from today and compared to prior imaging. To my eye, there is no sign of disease recurrence. No elevated rCBV. No new enhancement. T2 FLAIR is largely stable.     Imaging results were reviewed with Merissa and Grzegorz.        IMPRESSION/PLAN  Clinic was spent discussing in detail the nature of her cancer in light of her recent imaging. This was in addition to providing emotional support, answering questions " pertaining to my recommendations, and devising the plan as outlined below.    I have personally reviewed the primary care note from May in which the mucosal thickening of her sphenoid sinus seen on imaging was addressed. Doxycycline was prescribed. Lab testing also revealed hypothyroidism and she was started on levothyroxine. Since then, correction of her hypothyroidism has made a world of a difference. Her energy, mood, and cognition are all improved. She denies any other new neurological symptoms. Merissa completed sessions with ST and I have reviewed these notes.     With regard to her MR brain imaging; the scan from today demonstrates no concern for cancer recurrence. I personally reviewed the imaging with neuro-radiology who is in agreement with this impression. Based on NCCN guidelines for imaging surveillance, will continue at an imaging interval of every 6 months for a total of 3 years (until 3/2024). At recurrence, clinical trial options remain a strong consideration, as does off label use of SMO inhibitors.     PROBLEM LIST  Medulloblastoma  Steroid intolerance  Chemotherapy-induced pancytopenia  Chemotherapy-induced nausea/ vomiting   Radiation burn  Fatigue, cancer and treatment related, improving  Port removed     PLAN  -CANCER-DIRECTED THERAPY-  -As above; Imaging with no evidence of disease recurrence. Continue imaging surveillance; will repeat every 6 months.  -Can refill pre-scan Ativan as needed.   -No continued lab monitoring necessary.   -Repeat hearing testing done on 10/27/2020 with moderate sensorineural hearing loss. Will repeat annually; over due as of 10/2021 given history of ototoxicity and continued poor hearing.    Merissa declined repeat testing for now.     -SEIZURE MANAGEMENT-  -Given the lack of seizure history, there is no indication to prescribe an antiepileptic at this time.    -Quality of life/ MOOD/ FATIGUE-  -History of anxiety/ depression.   Improved, continue to  monitor.  -Following with Palliative Care for management of mood.   -Encouraged follow-up with primary care for management of hypothyroidism.     Return to clinic in 5/2023 + repeat MRI.    Anupama Morrison MD  Neuro-oncology        Again, thank you for allowing me to participate in the care of your patient.        Sincerely,        Anupama Morrison MD

## 2022-11-04 NOTE — NURSING NOTE
"Oncology Rooming Note    November 4, 2022 2:27 PM   Merissa Silverman is a 37 year old female who presents for:    Chief Complaint   Patient presents with     Oncology Clinic Visit     [facility] RETURN - MEDULLOBLASTOMA     Initial Vitals: /81   Pulse 85   Temp 98  F (36.7  C) (Oral)   Resp 16   Ht 1.575 m (5' 2.01\")   Wt 73.8 kg (162 lb 9.6 oz)   SpO2 100%   BMI 29.73 kg/m   Estimated body mass index is 29.73 kg/m  as calculated from the following:    Height as of this encounter: 1.575 m (5' 2.01\").    Weight as of this encounter: 73.8 kg (162 lb 9.6 oz). Body surface area is 1.8 meters squared.  No Pain (0) Comment: Data Unavailable   No LMP recorded. (Menstrual status: Chemotherapy).  Allergies reviewed: Yes  Medications reviewed: Yes    Medications: Medication refills not needed today.  Pharmacy name entered into [facility]:    [id number] [place] [facility] [address] [place] [address] [facility] [place] - [address]  [id number] [place] [address] [place] [address] NAILA Kay            "

## 2023-01-05 NOTE — PROGRESS NOTES
HPI:  Merissa Silverman is a 34 year old female patient here today for follow up rash on scalp, trunk, arms, and face  .  Patient states this has been present for a few months.  Patient reports the following symptoms: itch and rash, pt works as a  .  Patient reports the following previous treatments: LOV terbinifine, TAC to body, nizoral, and lidex to scalp with minimal change to rash. Stopped nizoral due to causing hair loss. Has been using t sal with some improvement. Also has an irritated spot on right shoulder for a while.   Patient reports the following modifying factors: none.  Associated symptoms: none.  Patient has no other skin complaints today.  Remainder of the HPI, Meds, PMH, Allergies, FH, and SH was reviewed in chart.      Past Medical History:   Diagnosis Date     Gastroesophageal reflux disease      Medulloblastoma (H)        Past Surgical History:   Procedure Laterality Date     CRANIOTOMY  04/13/2018     dermoid cyst removed right ovary       INSERT PORT VASCULAR ACCESS Right 5/9/2018    Procedure: INSERT PORT VASCULAR ACCESS;  Right Central Venous Chest Port Placement;  Surgeon: Sung Selby PA-C;  Location: [facility] OR     IR PORT REMOVAL RIGHT  3/8/2019     REMOVE PORT VASCULAR ACCESS Right 3/8/2019    Procedure: Right Port Removal;  Surgeon: Pedro Briceño PA-C;  Location: [facility] OR        Family History   Problem Relation Age of Onset     Colon Cancer Paternal Grandmother      Colon Cancer Paternal Grandfather      Glaucoma No family hx of      Macular Degeneration No family hx of        Social History     Socioeconomic History     Marital status:      Spouse name: Not on file     Number of children: Not on file     Years of education: Not on file     Highest education level: Not on file   Occupational History     Not on file   Social Needs     Financial resource strain: Not on file     Food insecurity:     Worry: Not on file     Inability: Not on file      Transportation needs:     Medical: Not on file     Non-medical: Not on file   Tobacco Use     Smoking status: Former Smoker     Types: Other     Smokeless tobacco: Never Used     Tobacco comment: ECigs stopped 5/3/2018   Substance and Sexual Activity     Alcohol use: No     Comment: none     Drug use: No     Sexual activity: Not on file   Lifestyle     Physical activity:     Days per week: Not on file     Minutes per session: Not on file     Stress: Not on file   Relationships     Social connections:     Talks on phone: Not on file     Gets together: Not on file     Attends Mu-ism service: Not on file     Active member of club or organization: Not on file     Attends meetings of clubs or organizations: Not on file     Relationship status: Not on file     Intimate partner violence:     Fear of current or ex partner: Not on file     Emotionally abused: Not on file     Physically abused: Not on file     Forced sexual activity: Not on file   Other Topics Concern     Not on file   Social History Narrative    Started Radiation on May 14th       Outpatient Encounter Medications as of 1/24/2020   Medication Sig Dispense Refill     acetaminophen (TYLENOL) 325 MG tablet Take 650 mg by mouth       fluocinonide (LIDEX) 0.05 % external solution Apply to affected area on scalp BID x 2-4 weeks, tapering with improvement. Do not apply to face or body folds. 60 mL 0     LORazepam (ATIVAN) 1 MG tablet Take 1 tab my mouth 30 minutes prior to imaging and then, repeat as needed just prior to scan. 2 tablet 0     triamcinolone (ARISTOCORT HP) 0.5 % external cream Apply topically 2 times daily A thin layer to aa on trunk and UE x 2-3 weeks. 60 g 1     triamcinolone (KENALOG) 0.025 % cream Apply a thin layer to affected area on face BID x 1-2 weeks. Tapering with improvement. 30 g 0     ketoconazole (NIZORAL) 2 % external shampoo Wet affected area daily, apply shampoo and lather, let sit for 3-5 minutes and then rinse. (Patient not  taking: Reported on 1/24/2020) 120 mL 11     [DISCONTINUED] terbinafine (LAMISIL) 250 MG tablet Take 1 tablet (250 mg) by mouth daily (Patient not taking: Reported on 1/24/2020) 30 tablet 0     [DISCONTINUED] triamcinolone (KENALOG) 0.1 % external cream Apply to AA on chest bid for 10-14 days and then when needed (Patient not taking: Reported on 1/2/2020) 80 g 0     No facility-administered encounter medications on file as of 1/24/2020.        Review Of Systems:  Skin: rash on scalp, face, neck, and trunk  Eyes: negative  Ears/Nose/Throat: negative  Respiratory: No shortness of breath, dyspnea on exertion, cough, or hemoptysis  Cardiovascular: negative  Gastrointestinal: negative  Genitourinary: negative  Musculoskeletal: negative  Neurologic: negative  Psychiatric: negative  Hematologic/Lymphatic/Immunologic: negative  Endocrine: negative      Objective:     /66   Eyes: Conjunctivae/lids: Normal   ENT: Lips:  Normal  MSK: Normal  Cardiovascular: Peripheral edema none  Pulm: Breathing Normal  Neuro/Psych: Orientation: A/O x 3 Normal; Mood/Affect: Normal, NAD, WDWN  Pt accompanied by: self  Following areas examined: face, scalp, neck, chest, right UE, back  Plata skin type:i   Findings:  Pink patches with scale on face, ears, neck,  trunk and UE.   Pink and white thickened plaques on scalp  Inflamed brown, stuck-on scaly appearing papules right trapezius 0.2cm  Assessment and Plan:  1) Rash and nonspecific skin eruption, localized pruritis, xerosis of skin  Pt has a hx of medulloblastoma  Stop terbinifine, nizoral and TAC.   Disc methotrexate, nbuvb, topicals, biologic. Before starting systemic would reach out to Dr. Morrison who is monitoring pt.  Psoriasis vs erythema annulare centrifugum vs pityriasis rosea vs spongiosis  Take 2 claritin by mouth in the am  Take 2 zyrtec by mouth in the pm  Take 1-2 benadryl by mouth in the pm  Moisturize 2x a day with a thick emollient such as vaseline  Continue t sal to  scalp  Continue lidex to scalp 2x a day for 3 weeks. Do not use on face or body folds  Start betamethasone to affected area on trunk and extremities 2x a day for 3 weeks. Do not use on face or body folds.     Side effects of topical steroids including but not limited to atrophy (skin thinning), striae (stretch marks) telangiectasias, steroid acne, and others. Do not apply to normal skin. Do not apply to discolored skin that does not have rash present.     TANGENTIAL BIOPSY:  After consent, anesthesia with LEC and prep, tangential biopsy performed.  No complications and routine wound care.  May grow back and will get a scar. Based on lesion type may need to completely remove lesion. Patient will be notified in 7-10 days of results. Wound care directions given.    2) Neoplasm of uncertain behavior right trapezius 0.2cm  isk vs scc  TANGENTIAL BIOPSY:  After consent, anesthesia with LEC and prep, tangential biopsy performed.  No complications and routine wound care.  May grow back and will get a scar. Based on lesion type may need to completely remove lesion. Patient will be notified in 7-10 days of results. Wound care directions given.        Follow up in 2-3 weeks     No

## 2023-06-04 ENCOUNTER — HEALTH MAINTENANCE LETTER (OUTPATIENT)
Age: 38
End: 2023-06-04

## 2024-07-14 ENCOUNTER — HEALTH MAINTENANCE LETTER (OUTPATIENT)
Age: 39
End: 2024-07-14

## 2025-07-19 ENCOUNTER — HEALTH MAINTENANCE LETTER (OUTPATIENT)
Age: 40
End: 2025-07-19

## (undated) DEVICE — SU DERMABOND ADVANCED .7ML DNX12

## (undated) DEVICE — DECANTER BAG 2002S

## (undated) DEVICE — PACK CENTRAL LINE INSERTION SAN32CLFCG

## (undated) DEVICE — KIT INTRODUCER FLUENT MICRO 5FRX10CM ECHO TIP KIT-038-04

## (undated) DEVICE — GLOVE PROTEXIS POWDER FREE SMT 7.5  2D72PT75X

## (undated) DEVICE — GOWN XLG DISP 9545

## (undated) DEVICE — LINEN GOWN XLG 5407

## (undated) DEVICE — GLOVE PROTEXIS POWDER FREE SMT 8.0  2D72PT80X

## (undated) DEVICE — COVER ULTRASOUND PROBE W/GEL FLEXI-FEEL 6"X58" LF  25-FF658

## (undated) DEVICE — LINEN TOWEL PACK X5 5464

## (undated) DEVICE — KNIFE HANDLE W/15 BLADE 371615

## (undated) DEVICE — Device

## (undated) DEVICE — SOL NACL 0.9% INJ 250ML BAG 2B1322Q

## (undated) DEVICE — SU MONOCRYL 4-0 P-3 18" UND Y494G

## (undated) DEVICE — SU VICRYL 3-0 SH 27" J316H

## (undated) DEVICE — CONNECTOR ONE-LINK INJECTION SITE LF 7N8399

## (undated) DEVICE — DILATOR VASCULAR 6FRX19CM 405504

## (undated) RX ORDER — GABAPENTIN 300 MG/1
CAPSULE ORAL
Status: DISPENSED
Start: 2019-03-08

## (undated) RX ORDER — ONDANSETRON 4 MG/1
TABLET, ORALLY DISINTEGRATING ORAL
Status: DISPENSED
Start: 2018-05-18

## (undated) RX ORDER — LORAZEPAM 0.5 MG/1
TABLET ORAL
Status: DISPENSED
Start: 2018-05-14

## (undated) RX ORDER — HEPARIN SODIUM,PORCINE 10 UNIT/ML
VIAL (ML) INTRAVENOUS
Status: DISPENSED
Start: 2019-03-08

## (undated) RX ORDER — ACETAMINOPHEN 325 MG/1
TABLET ORAL
Status: DISPENSED
Start: 2018-05-09

## (undated) RX ORDER — HEPARIN SODIUM (PORCINE) LOCK FLUSH IV SOLN 100 UNIT/ML 100 UNIT/ML
SOLUTION INTRAVENOUS
Status: DISPENSED
Start: 2019-03-08

## (undated) RX ORDER — ACETAMINOPHEN 325 MG/1
TABLET ORAL
Status: DISPENSED
Start: 2019-03-08

## (undated) RX ORDER — GABAPENTIN 300 MG/1
CAPSULE ORAL
Status: DISPENSED
Start: 2018-05-09

## (undated) RX ORDER — HEPARIN SODIUM,PORCINE 10 UNIT/ML
VIAL (ML) INTRAVENOUS
Status: DISPENSED
Start: 2019-02-15

## (undated) RX ORDER — LIDOCAINE HYDROCHLORIDE 10 MG/ML
INJECTION, SOLUTION EPIDURAL; INFILTRATION; INTRACAUDAL; PERINEURAL
Status: DISPENSED
Start: 2019-03-08

## (undated) RX ORDER — LORAZEPAM 0.5 MG/1
TABLET ORAL
Status: DISPENSED
Start: 2018-05-02

## (undated) RX ORDER — HEPARIN SODIUM (PORCINE) LOCK FLUSH IV SOLN 100 UNIT/ML 100 UNIT/ML
SOLUTION INTRAVENOUS
Status: DISPENSED
Start: 2019-02-15